# Patient Record
Sex: MALE | Race: WHITE | Employment: OTHER | URBAN - METROPOLITAN AREA
[De-identification: names, ages, dates, MRNs, and addresses within clinical notes are randomized per-mention and may not be internally consistent; named-entity substitution may affect disease eponyms.]

---

## 2019-05-04 DIAGNOSIS — E78.5 HYPERLIPIDEMIA, UNSPECIFIED HYPERLIPIDEMIA TYPE: ICD-10-CM

## 2019-05-04 DIAGNOSIS — H91.90 DECREASED HEARING, UNSPECIFIED LATERALITY: ICD-10-CM

## 2019-05-04 DIAGNOSIS — R35.1 NOCTURIA: ICD-10-CM

## 2019-05-04 DIAGNOSIS — M10.9 GOUT, UNSPECIFIED CAUSE, UNSPECIFIED CHRONICITY, UNSPECIFIED SITE: ICD-10-CM

## 2019-05-04 DIAGNOSIS — G25.81 RESTLESS LEG: ICD-10-CM

## 2019-05-04 PROBLEM — F32.A DEPRESSION: Status: ACTIVE | Noted: 2019-05-04

## 2019-05-04 PROBLEM — M19.90 OSTEOARTHRITIS: Status: ACTIVE | Noted: 2019-05-04

## 2019-05-04 RX ORDER — ESCITALOPRAM OXALATE 10 MG/1
10 TABLET ORAL DAILY
COMMUNITY
End: 2019-05-31 | Stop reason: SDUPTHER

## 2019-05-04 RX ORDER — GEMFIBROZIL 600 MG/1
600 TABLET, FILM COATED ORAL
COMMUNITY
End: 2019-05-31 | Stop reason: SDUPTHER

## 2019-05-04 RX ORDER — FINASTERIDE 5 MG/1
5 TABLET, FILM COATED ORAL DAILY
COMMUNITY
End: 2019-05-31 | Stop reason: SDUPTHER

## 2019-05-04 RX ORDER — ALLOPURINOL 300 MG/1
300 TABLET ORAL DAILY
COMMUNITY
End: 2019-05-31 | Stop reason: SDUPTHER

## 2019-05-04 RX ORDER — VENLAFAXINE HYDROCHLORIDE 150 MG/1
150 CAPSULE, EXTENDED RELEASE ORAL EVERY MORNING
COMMUNITY
End: 2019-05-31 | Stop reason: SDUPTHER

## 2019-05-31 ENCOUNTER — OFFICE VISIT (OUTPATIENT)
Dept: FAMILY MEDICINE CLINIC | Age: 79
End: 2019-05-31
Payer: MEDICARE

## 2019-05-31 VITALS
DIASTOLIC BLOOD PRESSURE: 90 MMHG | BODY MASS INDEX: 34.07 KG/M2 | HEART RATE: 84 BPM | OXYGEN SATURATION: 96 % | WEIGHT: 243.4 LBS | SYSTOLIC BLOOD PRESSURE: 140 MMHG | HEIGHT: 71 IN

## 2019-05-31 DIAGNOSIS — R53.83 FATIGUE, UNSPECIFIED TYPE: ICD-10-CM

## 2019-05-31 DIAGNOSIS — F33.9 EPISODE OF RECURRENT MAJOR DEPRESSIVE DISORDER, UNSPECIFIED DEPRESSION EPISODE SEVERITY (HCC): ICD-10-CM

## 2019-05-31 DIAGNOSIS — R06.02 SHORTNESS OF BREATH: ICD-10-CM

## 2019-05-31 DIAGNOSIS — M25.531 BILATERAL WRIST PAIN: ICD-10-CM

## 2019-05-31 DIAGNOSIS — M25.532 BILATERAL WRIST PAIN: ICD-10-CM

## 2019-05-31 DIAGNOSIS — R35.1 NOCTURIA: ICD-10-CM

## 2019-05-31 DIAGNOSIS — E78.5 HYPERLIPIDEMIA, UNSPECIFIED HYPERLIPIDEMIA TYPE: Primary | ICD-10-CM

## 2019-05-31 DIAGNOSIS — M10.9 GOUT, UNSPECIFIED CAUSE, UNSPECIFIED CHRONICITY, UNSPECIFIED SITE: ICD-10-CM

## 2019-05-31 DIAGNOSIS — R07.9 CHEST PAIN, UNSPECIFIED TYPE: ICD-10-CM

## 2019-05-31 PROBLEM — H26.492 LEFT POSTERIOR CAPSULAR OPACIFICATION: Status: ACTIVE | Noted: 2017-01-18

## 2019-05-31 PROBLEM — M17.12 OSTEOARTHRITIS OF LEFT KNEE: Status: ACTIVE | Noted: 2019-02-27

## 2019-05-31 PROCEDURE — G8427 DOCREV CUR MEDS BY ELIG CLIN: HCPCS | Performed by: FAMILY MEDICINE

## 2019-05-31 PROCEDURE — 1123F ACP DISCUSS/DSCN MKR DOCD: CPT | Performed by: FAMILY MEDICINE

## 2019-05-31 PROCEDURE — 99214 OFFICE O/P EST MOD 30 MIN: CPT | Performed by: FAMILY MEDICINE

## 2019-05-31 PROCEDURE — 93000 ELECTROCARDIOGRAM COMPLETE: CPT | Performed by: FAMILY MEDICINE

## 2019-05-31 PROCEDURE — 4040F PNEUMOC VAC/ADMIN/RCVD: CPT | Performed by: FAMILY MEDICINE

## 2019-05-31 PROCEDURE — 1036F TOBACCO NON-USER: CPT | Performed by: FAMILY MEDICINE

## 2019-05-31 PROCEDURE — G8417 CALC BMI ABV UP PARAM F/U: HCPCS | Performed by: FAMILY MEDICINE

## 2019-05-31 RX ORDER — FINASTERIDE 5 MG/1
5 TABLET, FILM COATED ORAL DAILY
Qty: 90 TABLET | Refills: 1 | Status: ON HOLD | OUTPATIENT
Start: 2019-05-31 | End: 2019-07-12 | Stop reason: SDUPTHER

## 2019-05-31 RX ORDER — GEMFIBROZIL 600 MG/1
600 TABLET, FILM COATED ORAL
Qty: 180 TABLET | Refills: 1 | Status: SHIPPED | OUTPATIENT
Start: 2019-05-31 | End: 2019-07-31 | Stop reason: SDUPTHER

## 2019-05-31 RX ORDER — ALLOPURINOL 300 MG/1
300 TABLET ORAL DAILY
Qty: 90 TABLET | Refills: 1 | Status: SHIPPED | OUTPATIENT
Start: 2019-05-31 | End: 2019-10-09 | Stop reason: SDUPTHER

## 2019-05-31 RX ORDER — VENLAFAXINE HYDROCHLORIDE 150 MG/1
150 CAPSULE, EXTENDED RELEASE ORAL EVERY MORNING
Qty: 90 CAPSULE | Refills: 1 | Status: ON HOLD | OUTPATIENT
Start: 2019-05-31 | End: 2019-07-12 | Stop reason: SDUPTHER

## 2019-05-31 RX ORDER — ESCITALOPRAM OXALATE 10 MG/1
10 TABLET ORAL DAILY
Qty: 90 TABLET | Refills: 1 | Status: ON HOLD | OUTPATIENT
Start: 2019-05-31 | End: 2019-07-12 | Stop reason: SDUPTHER

## 2019-05-31 ASSESSMENT — ENCOUNTER SYMPTOMS: SHORTNESS OF BREATH: 1

## 2019-05-31 ASSESSMENT — PATIENT HEALTH QUESTIONNAIRE - PHQ9
1. LITTLE INTEREST OR PLEASURE IN DOING THINGS: 0
SUM OF ALL RESPONSES TO PHQ QUESTIONS 1-9: 0
SUM OF ALL RESPONSES TO PHQ9 QUESTIONS 1 & 2: 0
SUM OF ALL RESPONSES TO PHQ QUESTIONS 1-9: 0
2. FEELING DOWN, DEPRESSED OR HOPELESS: 0

## 2019-05-31 NOTE — PROGRESS NOTES
L knee pain d/t recent total knee replacement. Neurological: Positive for dizziness (occasionally - with working under a car. ). Negative for headaches.        PAST MEDICAL HISTORY  Past Medical History:   Diagnosis Date    Decreased hearing 2019    Gout 2019    Hyperlipidemia 2019    Nocturia 2019    Osteoarthritis 2019    Restless leg 2019       FAMILY HISTORY  Family History   Problem Relation Age of Onset    Cancer Mother        SOCIAL HISTORY  Social History     Socioeconomic History    Marital status:      Spouse name: None    Number of children: None    Years of education: None    Highest education level: None   Occupational History    None   Social Needs    Financial resource strain: None    Food insecurity:     Worry: None     Inability: None    Transportation needs:     Medical: None     Non-medical: None   Tobacco Use    Smoking status: Former Smoker     Packs/day: 2.00     Years: 25.00     Pack years: 50.00     Types: Cigarettes     Start date: 1960     Last attempt to quit:      Years since quittin.4    Smokeless tobacco: Never Used   Substance and Sexual Activity    Alcohol use: None    Drug use: None    Sexual activity: None   Lifestyle    Physical activity:     Days per week: None     Minutes per session: None    Stress: None   Relationships    Social connections:     Talks on phone: None     Gets together: None     Attends Jainism service: None     Active member of club or organization: None     Attends meetings of clubs or organizations: None     Relationship status: None    Intimate partner violence:     Fear of current or ex partner: None     Emotionally abused: None     Physically abused: None     Forced sexual activity: None   Other Topics Concern    None   Social History Narrative    None        SURGICAL HISTORY  Past Surgical History:   Procedure Laterality Date    BLEPHAROPLASTY  2010    CHOLECYSTECTOMY      COLECTOMY  2000    EYE SURGERY Left 2011    EYE SURGERY Right 2011    JOINT REPLACEMENT      KNEE ARTHROSCOPY  2002       CURRENT MEDICATIONS  Current Outpatient Medications   Medication Sig Dispense Refill    escitalopram (LEXAPRO) 10 MG tablet Take 1 tablet by mouth daily 90 tablet 1    allopurinol (ZYLOPRIM) 300 MG tablet Take 1 tablet by mouth daily 90 tablet 1    venlafaxine (EFFEXOR XR) 150 MG extended release capsule Take 1 capsule by mouth every morning 90 capsule 1    gemfibrozil (LOPID) 600 MG tablet Take 1 tablet by mouth 2 times daily (before meals) 180 tablet 1    finasteride (PROSCAR) 5 MG tablet Take 1 tablet by mouth daily 90 tablet 1    Multiple Vitamins-Minerals (PRESERVISION AREDS PO) Take by mouth 2 caps by mouth twice a day. No current facility-administered medications for this visit. ALLERGIES  No Known Allergies    PHYSICAL EXAM    Physical Exam   Constitutional: He is oriented to person, place, and time. He appears well-developed and well-nourished. No distress. HENT:   Head: Normocephalic and atraumatic. Cardiovascular: Normal rate, regular rhythm and normal heart sounds. Pulmonary/Chest: Effort normal and breath sounds normal.   Patient is speaking in full sentences. Lungs CTA bilaterally. Musculoskeletal: Normal range of motion. He exhibits no tenderness (calf pain ). Neurological: He is alert and oriented to person, place, and time. Skin: Skin is warm and dry. He is not diaphoretic. Psychiatric: He has a normal mood and affect. Thought content normal.   Nursing note and vitals reviewed. ASSESSMENT & PLAN    1. Hyperlipidemia, unspecified hyperlipidemia type  Continue taking medications as prescribed. Refills will be provided as necessary. Will complete fasting lab work, and patient will be updated on those results once completed. - gemfibrozil (LOPID) 600 MG tablet;  Take 1 tablet by mouth 2 times daily (before meals)  Dispense: 180 tablet; Metabolic Panel; Future  - TSH without Reflex; Future  - T4, Free; Future  - Vitamin B12; Future  - Folate; Future    8. Bilateral wrist pain  Was instructed to use hand splints bilaterally to help with the pain as this seems to be c/w carpal tunnel. He is to let us know should the wrist splints did not help. Patient verbalized understanding of and agreement with current POC for the assessment and plan dictated above. Electronically signed by HUANG Lindsey on 5/31/2019      Please note that this chart was generated using dragon dictation software. Although every effort was made to ensure the accuracy of this automated transcription, some errors in transcription may have occurred.

## 2019-06-04 ENCOUNTER — TELEPHONE (OUTPATIENT)
Dept: FAMILY MEDICINE CLINIC | Age: 79
End: 2019-06-04

## 2019-06-04 NOTE — TELEPHONE ENCOUNTER
----- Message from Tierney Sampson sent at 6/4/2019 11:40 AM EDT -----  Contact: PATIENT   TO BHARAT:    PATIENT IS TRYING TO FIND OUT ABOUT HIS REFERRAL TO CARDIOLOGIST AND LEON SAID REFERRAL IS PENDING.

## 2019-06-04 NOTE — TELEPHONE ENCOUNTER
Spoke with pt 1205 pm regard refer to cardiologist genia Rain [place] [facility] please call [facility] Cardiology at [phone] to check on the status of his refer. pt voiced understanding.   Electronically signed by Akshat Waller LPN on 0/9/3723 at 49:84 PM

## 2019-06-04 NOTE — TELEPHONE ENCOUNTER
Please have the pt contact their office to see if they have him scheduled yet. Thanks, [address] [place] [address] Rd Cardiology  [address]  [employer]  K[place]n W[place], [address] [facility],Third Floor  [phone]

## 2019-06-05 ENCOUNTER — TELEPHONE (OUTPATIENT)
Dept: FAMILY MEDICINE CLINIC | Age: 79
End: 2019-06-05

## 2019-06-05 NOTE — TELEPHONE ENCOUNTER
SPOKE WITH PT  PM REGARD. BW RESULTS PER OVI CARTER PAC AND CONTINUE TAKING MEDICATIONS AS PRESCRIBED.  PT VOICED UNDERSTANDING  Electronically signed by Lorri Diaz LPN on 1/7/5341 at 9:48 PM

## 2019-06-14 ENCOUNTER — NURSE ONLY (OUTPATIENT)
Dept: CARDIOLOGY CLINIC | Age: 79
End: 2019-06-14
Payer: MEDICARE

## 2019-06-14 ENCOUNTER — INITIAL CONSULT (OUTPATIENT)
Dept: CARDIOLOGY CLINIC | Age: 79
End: 2019-06-14
Payer: MEDICARE

## 2019-06-14 VITALS
HEART RATE: 90 BPM | WEIGHT: 243 LBS | BODY MASS INDEX: 34.37 KG/M2 | DIASTOLIC BLOOD PRESSURE: 80 MMHG | SYSTOLIC BLOOD PRESSURE: 138 MMHG

## 2019-06-14 DIAGNOSIS — R00.2 HEART PALPITATIONS: ICD-10-CM

## 2019-06-14 DIAGNOSIS — R07.9 CHEST PAIN, UNSPECIFIED TYPE: Primary | ICD-10-CM

## 2019-06-14 DIAGNOSIS — R06.02 SOB (SHORTNESS OF BREATH): ICD-10-CM

## 2019-06-14 DIAGNOSIS — R07.2 PRECORDIAL PAIN: ICD-10-CM

## 2019-06-14 PROCEDURE — 0296T PR EXT ECG > 48HR TO 21 DAY RCRD W/CONECT INTL RCRD: CPT | Performed by: INTERNAL MEDICINE

## 2019-06-14 PROCEDURE — 93000 ELECTROCARDIOGRAM COMPLETE: CPT | Performed by: INTERNAL MEDICINE

## 2019-06-14 PROCEDURE — 99204 OFFICE O/P NEW MOD 45 MIN: CPT | Performed by: INTERNAL MEDICINE

## 2019-06-14 PROCEDURE — G8427 DOCREV CUR MEDS BY ELIG CLIN: HCPCS | Performed by: INTERNAL MEDICINE

## 2019-06-14 PROCEDURE — G8417 CALC BMI ABV UP PARAM F/U: HCPCS | Performed by: INTERNAL MEDICINE

## 2019-06-14 NOTE — PROGRESS NOTES
Applied Zio monitor for 2 days. PF#[id number]. Instructed patient to avoid showering in the first 24 hours. Press the button on the monitor when you feel a symptom and write it in your diary. Do not submerge in water. No lotion or power near the patch. Please return the monitor in the box provided. Patient verbalized understanding.

## 2019-06-14 NOTE — ASSESSMENT & PLAN NOTE
Ongoing for a year, describes as pressure felicita matthews he is walking up a hill after he gets his mail .  Descried as pressure , it does not radiate but  he does feel pain in left shoulder

## 2019-06-14 NOTE — ASSESSMENT & PLAN NOTE
HE is ytired all the time especially after a busy day. EKG shows frequent  PVC, HE snores at night .  HE has less energy , check tsh , place holter , check echo

## 2019-06-14 NOTE — PROGRESS NOTES
CARDIOLOGY CONSULT  NOTE    Chief Complaint: chest Pain / SOB     HPI:   Ana Alaniz is a 70y.o. year old who has Past medical history as noted below. HE comes in for evaluation due to worsening shortness of breath with exertion and chest pressure when he is walking up a hill or drive way. HE says the pressure in chest gets better with rest , itr does not radiate but once in a while he feels left arm pressure and tingling. HE also notices tingling and palpations. EKG shows frequent PVC ( every 5th beat)  Father had MI in his 52's . HE is retired from [employer] . Some times her gets very tired and fatigued with no energy after strenuous work   Snores at night       Current Outpatient Medications   Medication Sig Dispense Refill    metoprolol tartrate (LOPRESSOR) 25 MG tablet Take 1 tablet by mouth 2 times daily 60 tablet 5    escitalopram (LEXAPRO) 10 MG tablet Take 1 tablet by mouth daily 90 tablet 1    allopurinol (ZYLOPRIM) 300 MG tablet Take 1 tablet by mouth daily 90 tablet 1    venlafaxine (EFFEXOR XR) 150 MG extended release capsule Take 1 capsule by mouth every morning 90 capsule 1    gemfibrozil (LOPID) 600 MG tablet Take 1 tablet by mouth 2 times daily (before meals) 180 tablet 1    finasteride (PROSCAR) 5 MG tablet Take 1 tablet by mouth daily 90 tablet 1    Multiple Vitamins-Minerals (PRESERVISION AREDS PO) Take by mouth 2 caps by mouth twice a day. No current facility-administered medications for this visit. Allergies:   Patient has no known allergies.     Patient History:  Past Medical History:   Diagnosis Date    Decreased hearing 5/4/2019    Gout 5/4/2019    Hyperlipidemia 5/4/2019    Nocturia 5/4/2019    Osteoarthritis 5/4/2019    Restless leg 5/4/2019     Past Surgical History:   Procedure Laterality Date    BLEPHAROPLASTY  2010    CHOLECYSTECTOMY  2001    COLECTOMY  2000    EYE SURGERY Left 2011    EYE SURGERY Right 2011    JOINT REPLACEMENT      KNEE ARTHROSCOPY  2002     Family History   Problem Relation Age of Onset    Cancer Mother      Social History     Tobacco Use    Smoking status: Former Smoker     Packs/day: 2.00     Years: 25.00     Pack years: 50.00     Types: Cigarettes     Start date: 1960     Last attempt to quit:      Years since quittin.4    Smokeless tobacco: Never Used   Substance Use Topics    Alcohol use: Not on file        Review of Systems:   · Constitutional: No Fever or Weight Loss   · Eyes: No Decreased Vision  · ENT: No Headaches, Hearing Loss or Vertigo  · Cardiovascular: as per note above   · Respiratory: No cough or wheezing and as per note above. · Gastrointestinal: No abdominal pain, appetite loss, blood in stools, constipation, diarrhea or heartburn  · Genitourinary: No dysuria, trouble voiding, or hematuria  · Musculoskeletal:  denies any new  joint aches , swelling  or pain   · Integumentary: No rash or pruritis  · Neurological: No TIA or stroke symptoms  · Psychiatric: No anxiety or depression  · Endocrine: No malaise, fatigue or temperature intolerance  · Hematologic/Lymphatic: No bleeding problems, blood clots or swollen lymph nodes  · Allergic/Immunologic: No nasal congestion or hives    Objective:      Physical Exam:  /80   Pulse 90   Wt 243 lb (110.2 kg)   BMI 34.37 kg/m²   Wt Readings from Last 3 Encounters:   19 243 lb (110.2 kg)   19 243 lb 6.4 oz (110.4 kg)     Body mass index is 34.37 kg/m². Vitals:    19 0844   BP: 138/80   Pulse: 90        General Appearance:  No distress, conversant  Constitutional:  Well developed, Well nourished, No acute distress, Non-toxic appearance. HENT:  Normocephalic, Atraumatic, Bilateral external ears normal, Oropharynx moist, No oral exudates, Nose normal. Neck- Normal range of motion, No tenderness, Supple, No stridor,no apical-carotid delay  Eyes:  PERRL, EOMI, Conjunctiva normal, No discharge.    Respiratory: Normal breath sounds, No respiratory distress, No wheezing, No chest tenderness. ,no use of accessory muscles, NO crackles  Cardiovascular: (PMI) apex non displaced,no lifts no thrills,S1 and S2 audible, No added heart sounds, No signs of ankle edema, or volume overload, No evidence of JVD, No crackles  GI:  Bowel sounds normal, Soft, No tenderness, No masses, No gross visceromegaly   :  No costovertebral angle tenderness   Musculoskeletal:  No edema, no tenderness, no deformities. Back- no tenderness  Integument:  Well hydrated, no rash   Lymphatic:  No lymphadenopathy noted   Neurologic:  Alert & oriented x 3, CN 2-12 normal, normal motor function, normal sensory function, no focal deficits noted   Psychiatric:  Speech and behavior appropriate       Medical decision making and Data review:  DATA:  No results found for: TROPONINT  BNP:  No results found for: PROBNP  PT/INR:  No results found for: PTINR  No results found for: LABA1C  No results found for: CHOL, TRIG, HDL, LDLCALC, LDLDIRECT  Lab Results   Component Value Date    ALT 21 01/17/2012    AST 23 01/17/2012     TSH: No results found for: TSH  Lab Results   Component Value Date    AST 23 01/17/2012    ALT 21 01/17/2012    BILIDIR 0.2 01/17/2012    BILITOT 0.4 01/17/2012    ALKPHOS 73 01/17/2012     No results found for: PROBNP  No results found for: LABA1C  No results found for: WBC, HGB, HCT, PLT  All labs, medications and tests reviewed by myself including data and history from outside source , patient and available family . Assessment & Plan:      1. Chest pain, unspecified type    2. Heart palpitations    3. Precordial pain    4. SOB (shortness of breath)         SOB (shortness of breath)  Notices with exertion , no ankle swelling, check bnp    Precordial pain  Ongoing for a year, describes as pressure especiall ywhen he is walking up a hill after he gets his mail .  Descried as pressure , it does not radiate but  he does feel pain in left shoulder very concerning for angina, start metoprolol , and get cardiolite     Heart palpitations  HE is ytired all the time especially after a busy day. EKG shows frequent  PVC, HE snores at night . HE has less energy , check tsh , place holter , check echo     Dyslipidemia :  All available lab work was reviewed. Patient was advised to repeat lab work before next visit. Necessary orders were placed , instructions given by myself       Counseled extensively and medication compliance urged. We discussed that for the  prevention of ASCVD our  goal is aggressive risk modification. Patient is encouraged to exercise even a brisk walk for 30 minutes  at least 3 to 4 times a week   Various goals were discussed and questions answered. Continue current medications. Appropriate prescriptions are addressed and refills ordered. Questions answered and patient verbalizes understanding. Call for any problems, questions, or concerns. Continue all other medications of all above medical condition listed as is. Return in about 1 month (around 7/14/2019). Please note this report has been partially produced using speech recognition software and may contain errors related to that system including errors in grammar, punctuation, and spelling, as well as words and phrases that may be inappropriate. If there are any questions or concerns please feel free to contact the dictating provider for clarification.

## 2019-06-21 ENCOUNTER — TELEPHONE (OUTPATIENT)
Dept: CARDIOLOGY CLINIC | Age: 79
End: 2019-06-21

## 2019-06-21 NOTE — TELEPHONE ENCOUNTER
Patient just started Metoprolol he has a rash on his foot that is causing a stabbing pain , please call wants to discontinue

## 2019-06-24 ENCOUNTER — PROCEDURE VISIT (OUTPATIENT)
Dept: CARDIOLOGY CLINIC | Age: 79
End: 2019-06-24
Payer: MEDICARE

## 2019-06-24 DIAGNOSIS — R00.2 HEART PALPITATIONS: ICD-10-CM

## 2019-06-24 DIAGNOSIS — R06.02 SOB (SHORTNESS OF BREATH): ICD-10-CM

## 2019-06-24 DIAGNOSIS — R07.9 CHEST PAIN, UNSPECIFIED TYPE: ICD-10-CM

## 2019-06-24 DIAGNOSIS — R07.2 PRECORDIAL PAIN: ICD-10-CM

## 2019-06-24 LAB
LV EF: 41 %
LVEF MODALITY: NORMAL

## 2019-06-24 PROCEDURE — 0298T PR EXT ECG > 48HR TO 21 DAY REVIEW AND INTERPRETATN: CPT | Performed by: INTERNAL MEDICINE

## 2019-06-24 PROCEDURE — 78452 HT MUSCLE IMAGE SPECT MULT: CPT | Performed by: INTERNAL MEDICINE

## 2019-06-24 PROCEDURE — 93015 CV STRESS TEST SUPVJ I&R: CPT | Performed by: INTERNAL MEDICINE

## 2019-06-24 PROCEDURE — A9500 TC99M SESTAMIBI: HCPCS | Performed by: INTERNAL MEDICINE

## 2019-06-25 ENCOUNTER — TELEPHONE (OUTPATIENT)
Dept: CARDIOLOGY CLINIC | Age: 79
End: 2019-06-25

## 2019-06-25 NOTE — TELEPHONE ENCOUNTER
Abnormal 2 day monitor , with wide complex runs  Patient had a min HR of 53 bpm, max HR of 164 bpm, and avg HR of 85  bpm. Predominant underlying rhythm was Sinus Rhythm. 8 Ventricular  Tachycardia runs occurred, the run with the fastest interval  lasting 4 beats with a max rate of 164 bpm, the longest lasting 5  beats with an avg rate of  130 bpm. Isolated SVEs were rare (<1.0%), SVE Couplets were rare  (<1.0%), and SVE Triplets were rare (<1.0%). Electronically signed by Dr. Loco Humphrey 06/24/19 05:52 PM (CT)    Left msg on v/m with results.

## 2019-06-25 NOTE — TELEPHONE ENCOUNTER
Pt will come in 7-3-19 for ABN NM.       Summary    Supervising physician Dr. Robin Loredo .   [place] portion of stress test is negative for ischemia by diagnostic criteria.    Completed 4.6 METS and 4 Mins of exercise    Global hypokinesis with EF of 41 %    Mild ischemia of lateral wall involving small to medium size of left    ventricle    Abnormal stress test

## 2019-06-27 ENCOUNTER — PROCEDURE VISIT (OUTPATIENT)
Dept: CARDIOLOGY CLINIC | Age: 79
End: 2019-06-27
Payer: MEDICARE

## 2019-06-27 DIAGNOSIS — R06.02 SOB (SHORTNESS OF BREATH): Primary | ICD-10-CM

## 2019-06-27 DIAGNOSIS — R07.9 CHEST PAIN, UNSPECIFIED TYPE: ICD-10-CM

## 2019-06-27 DIAGNOSIS — R07.2 PRECORDIAL PAIN: ICD-10-CM

## 2019-06-27 DIAGNOSIS — R00.2 HEART PALPITATIONS: ICD-10-CM

## 2019-06-27 LAB
LV EF: 50 %
LVEF MODALITY: NORMAL

## 2019-06-27 PROCEDURE — 93306 TTE W/DOPPLER COMPLETE: CPT | Performed by: INTERNAL MEDICINE

## 2019-07-02 ENCOUNTER — HOSPITAL ENCOUNTER (INPATIENT)
Age: 79
LOS: 12 days | Discharge: HOME HEALTH | DRG: 330 | End: 2019-07-14
Attending: EMERGENCY MEDICINE | Admitting: INTERNAL MEDICINE
Payer: MEDICARE

## 2019-07-02 ENCOUNTER — APPOINTMENT (OUTPATIENT)
Dept: GENERAL RADIOLOGY | Age: 79
DRG: 330 | End: 2019-07-02
Payer: MEDICARE

## 2019-07-02 DIAGNOSIS — E86.0 DEHYDRATION: ICD-10-CM

## 2019-07-02 DIAGNOSIS — R19.7 BLOODY DIARRHEA: ICD-10-CM

## 2019-07-02 DIAGNOSIS — R58 ABDOMINAL HEMORRHAGE: ICD-10-CM

## 2019-07-02 DIAGNOSIS — R06.02 SHORTNESS OF BREATH: ICD-10-CM

## 2019-07-02 DIAGNOSIS — R53.1 GENERALIZED WEAKNESS: Primary | ICD-10-CM

## 2019-07-02 DIAGNOSIS — R58 BLEEDING: ICD-10-CM

## 2019-07-02 PROBLEM — K92.2 GIB (GASTROINTESTINAL BLEEDING): Status: ACTIVE | Noted: 2019-07-02

## 2019-07-02 LAB
ALBUMIN SERPL-MCNC: 3 GM/DL (ref 3.4–5)
ALP BLD-CCNC: 56 IU/L (ref 40–129)
ALT SERPL-CCNC: 9 U/L (ref 10–40)
ANION GAP SERPL CALCULATED.3IONS-SCNC: 10 MMOL/L (ref 4–16)
APTT: 33.9 SECONDS (ref 21.2–33)
AST SERPL-CCNC: 14 IU/L (ref 15–37)
BASOPHILS ABSOLUTE: 0.1 K/CU MM
BASOPHILS RELATIVE PERCENT: 0.6 % (ref 0–1)
BILIRUB SERPL-MCNC: 0.3 MG/DL (ref 0–1)
BUN BLDV-MCNC: 24 MG/DL (ref 6–23)
CALCIUM SERPL-MCNC: 7.2 MG/DL (ref 8.3–10.6)
CHLORIDE BLD-SCNC: 116 MMOL/L (ref 99–110)
CO2: 18 MMOL/L (ref 21–32)
CREAT SERPL-MCNC: 0.7 MG/DL (ref 0.9–1.3)
DIFFERENTIAL TYPE: ABNORMAL
EOSINOPHILS ABSOLUTE: 0.1 K/CU MM
EOSINOPHILS RELATIVE PERCENT: 0.6 % (ref 0–3)
GFR AFRICAN AMERICAN: >60 ML/MIN/1.73M2
GFR NON-AFRICAN AMERICAN: >60 ML/MIN/1.73M2
GLUCOSE BLD-MCNC: 99 MG/DL (ref 70–99)
HCT VFR BLD CALC: 32.3 % (ref 42–52)
HCT VFR BLD CALC: 33 % (ref 42–52)
HEMOGLOBIN: 10.2 GM/DL (ref 13.5–18)
HEMOGLOBIN: 10.5 GM/DL (ref 13.5–18)
IMMATURE NEUTROPHIL %: 0.2 % (ref 0–0.43)
INR BLD: 1.24 INDEX
LACTATE: 1.6 MMOL/L (ref 0.4–2)
LYMPHOCYTES ABSOLUTE: 0.9 K/CU MM
LYMPHOCYTES RELATIVE PERCENT: 10.6 % (ref 24–44)
MCH RBC QN AUTO: 31.1 PG (ref 27–31)
MCHC RBC AUTO-ENTMCNC: 31.6 % (ref 32–36)
MCV RBC AUTO: 98.5 FL (ref 78–100)
MONOCYTES ABSOLUTE: 0.3 K/CU MM
MONOCYTES RELATIVE PERCENT: 4 % (ref 0–4)
NUCLEATED RBC %: 0 %
PDW BLD-RTO: 13.8 % (ref 11.7–14.9)
PLATELET # BLD: 228 K/CU MM (ref 140–440)
PMV BLD AUTO: 11 FL (ref 7.5–11.1)
POTASSIUM SERPL-SCNC: 4.2 MMOL/L (ref 3.5–5.1)
PRO-BNP: 95.08 PG/ML
PROTHROMBIN TIME: 14.1 SECONDS (ref 9.12–12.5)
RBC # BLD: 3.28 M/CU MM (ref 4.6–6.2)
SEGMENTED NEUTROPHILS ABSOLUTE COUNT: 7 K/CU MM
SEGMENTED NEUTROPHILS RELATIVE PERCENT: 84 % (ref 36–66)
SODIUM BLD-SCNC: 144 MMOL/L (ref 135–145)
TOTAL IMMATURE NEUTOROPHIL: 0.02 K/CU MM
TOTAL NUCLEATED RBC: 0 K/CU MM
TOTAL PROTEIN: 5.2 GM/DL (ref 6.4–8.2)
TROPONIN T: <0.01 NG/ML
WBC # BLD: 8.3 K/CU MM (ref 4–10.5)

## 2019-07-02 PROCEDURE — 86922 COMPATIBILITY TEST ANTIGLOB: CPT

## 2019-07-02 PROCEDURE — 2580000003 HC RX 258: Performed by: INTERNAL MEDICINE

## 2019-07-02 PROCEDURE — 93005 ELECTROCARDIOGRAM TRACING: CPT | Performed by: EMERGENCY MEDICINE

## 2019-07-02 PROCEDURE — 85610 PROTHROMBIN TIME: CPT

## 2019-07-02 PROCEDURE — 2060000000 HC ICU INTERMEDIATE R&B

## 2019-07-02 PROCEDURE — 36415 COLL VENOUS BLD VENIPUNCTURE: CPT

## 2019-07-02 PROCEDURE — 99285 EMERGENCY DEPT VISIT HI MDM: CPT

## 2019-07-02 PROCEDURE — 80053 COMPREHEN METABOLIC PANEL: CPT

## 2019-07-02 PROCEDURE — 83605 ASSAY OF LACTIC ACID: CPT

## 2019-07-02 PROCEDURE — 96361 HYDRATE IV INFUSION ADD-ON: CPT

## 2019-07-02 PROCEDURE — 87324 CLOSTRIDIUM AG IA: CPT

## 2019-07-02 PROCEDURE — 96366 THER/PROPH/DIAG IV INF ADDON: CPT

## 2019-07-02 PROCEDURE — 84484 ASSAY OF TROPONIN QUANT: CPT

## 2019-07-02 PROCEDURE — 2580000003 HC RX 258: Performed by: EMERGENCY MEDICINE

## 2019-07-02 PROCEDURE — 85025 COMPLETE CBC W/AUTO DIFF WBC: CPT

## 2019-07-02 PROCEDURE — 86900 BLOOD TYPING SEROLOGIC ABO: CPT

## 2019-07-02 PROCEDURE — 85730 THROMBOPLASTIN TIME PARTIAL: CPT

## 2019-07-02 PROCEDURE — 87507 IADNA-DNA/RNA PROBE TQ 12-25: CPT

## 2019-07-02 PROCEDURE — 71045 X-RAY EXAM CHEST 1 VIEW: CPT

## 2019-07-02 PROCEDURE — 6360000002 HC RX W HCPCS: Performed by: EMERGENCY MEDICINE

## 2019-07-02 PROCEDURE — 96365 THER/PROPH/DIAG IV INF INIT: CPT

## 2019-07-02 PROCEDURE — 86901 BLOOD TYPING SEROLOGIC RH(D): CPT

## 2019-07-02 PROCEDURE — P9016 RBC LEUKOCYTES REDUCED: HCPCS

## 2019-07-02 PROCEDURE — 85018 HEMOGLOBIN: CPT

## 2019-07-02 PROCEDURE — 86850 RBC ANTIBODY SCREEN: CPT

## 2019-07-02 PROCEDURE — 85014 HEMATOCRIT: CPT

## 2019-07-02 PROCEDURE — 93010 ELECTROCARDIOGRAM REPORT: CPT | Performed by: INTERNAL MEDICINE

## 2019-07-02 PROCEDURE — 83880 ASSAY OF NATRIURETIC PEPTIDE: CPT

## 2019-07-02 RX ORDER — VENLAFAXINE HYDROCHLORIDE 37.5 MG/1
150 CAPSULE, EXTENDED RELEASE ORAL EVERY MORNING
Status: DISCONTINUED | OUTPATIENT
Start: 2019-07-03 | End: 2019-07-14 | Stop reason: HOSPADM

## 2019-07-02 RX ORDER — FINASTERIDE 5 MG/1
5 TABLET, FILM COATED ORAL DAILY
Status: DISCONTINUED | OUTPATIENT
Start: 2019-07-03 | End: 2019-07-14 | Stop reason: HOSPADM

## 2019-07-02 RX ORDER — CLONIDINE HYDROCHLORIDE 0.1 MG/1
0.1 TABLET ORAL 4 TIMES DAILY PRN
Status: DISCONTINUED | OUTPATIENT
Start: 2019-07-02 | End: 2019-07-03

## 2019-07-02 RX ORDER — 0.9 % SODIUM CHLORIDE 0.9 %
500 INTRAVENOUS SOLUTION INTRAVENOUS ONCE
Status: COMPLETED | OUTPATIENT
Start: 2019-07-02 | End: 2019-07-02

## 2019-07-02 RX ORDER — SODIUM CHLORIDE 9 MG/ML
INJECTION, SOLUTION INTRAVENOUS CONTINUOUS
Status: DISCONTINUED | OUTPATIENT
Start: 2019-07-02 | End: 2019-07-04

## 2019-07-02 RX ORDER — ACETAMINOPHEN 325 MG/1
650 TABLET ORAL EVERY 4 HOURS PRN
Status: DISCONTINUED | OUTPATIENT
Start: 2019-07-02 | End: 2019-07-14 | Stop reason: HOSPADM

## 2019-07-02 RX ORDER — ESCITALOPRAM OXALATE 10 MG/1
10 TABLET ORAL DAILY
Status: DISCONTINUED | OUTPATIENT
Start: 2019-07-03 | End: 2019-07-14 | Stop reason: HOSPADM

## 2019-07-02 RX ORDER — ALLOPURINOL 100 MG/1
300 TABLET ORAL DAILY
Status: DISCONTINUED | OUTPATIENT
Start: 2019-07-03 | End: 2019-07-14 | Stop reason: HOSPADM

## 2019-07-02 RX ADMIN — SODIUM CHLORIDE: 9 INJECTION, SOLUTION INTRAVENOUS at 22:37

## 2019-07-02 RX ADMIN — CALCIUM GLUCONATE 1 G: 98 INJECTION, SOLUTION INTRAVENOUS at 17:52

## 2019-07-02 RX ADMIN — SODIUM CHLORIDE 500 ML: 9 INJECTION, SOLUTION INTRAVENOUS at 16:40

## 2019-07-02 ASSESSMENT — PAIN SCALES - GENERAL
PAINLEVEL_OUTOF10: 0
PAINLEVEL_OUTOF10: 0

## 2019-07-02 NOTE — ED NOTES
Bed: ED-17  Expected date:   Expected time:   Means of arrival:   Comments:  brad Collins RN  07/02/19 [id number]

## 2019-07-02 NOTE — H&P
None     Inability: None    Transportation needs:     Medical: None     Non-medical: None   Tobacco Use    Smoking status: Former Smoker     Packs/day: 2.00     Years: 25.00     Pack years: 50.00     Types: Cigarettes     Start date: 1960     Last attempt to quit: 1985     Years since quittin.5    Smokeless tobacco: Never Used   Substance and Sexual Activity    Alcohol use: None    Drug use: None    Sexual activity: None   Lifestyle    Physical activity:     Days per week: None     Minutes per session: None    Stress: None   Relationships    Social connections:     Talks on phone: None     Gets together: None     Attends Christian service: None     Active member of club or organization: None     Attends meetings of clubs or organizations: None     Relationship status: None    Intimate partner violence:     Fear of current or ex partner: None     Emotionally abused: None     Physically abused: None     Forced sexual activity: None   Other Topics Concern    None   Social History Narrative    None       MEDICATIONS   Medications Prior to Admission  Not in a hospital admission.     Current Medications  Current Facility-Administered Medications   Medication Dose Route Frequency Provider Last Rate Last Dose    calcium gluconate 1 g in dextrose 5 % 100 mL IVPB  1 g Intravenous Once Tobey Nissen,  mL/hr at 19 1752 1 g at 19 1752     Current Outpatient Medications   Medication Sig Dispense Refill    escitalopram (LEXAPRO) 10 MG tablet Take 1 tablet by mouth daily 90 tablet 1    allopurinol (ZYLOPRIM) 300 MG tablet Take 1 tablet by mouth daily 90 tablet 1    venlafaxine (EFFEXOR XR) 150 MG extended release capsule Take 1 capsule by mouth every morning 90 capsule 1    gemfibrozil (LOPID) 600 MG tablet Take 1 tablet by mouth 2 times daily (before meals) 180 tablet 1    finasteride (PROSCAR) 5 MG tablet Take 1 tablet by mouth daily 90 tablet 1    Multiple Vitamins-Minerals Completed Updated: 07/02/19 1559     Narrative:       EXAMINATION:  ONE XRAY VIEW OF THE CHEST    7/2/2019 3:37 pm    COMPARISON:  None. HISTORY:  ORDERING SYSTEM PROVIDED HISTORY: chest pain  TECHNOLOGIST PROVIDED HISTORY:  Reason for exam:->chest pain  Ordering Physician Provided Reason for Exam: chest pain  Acuity: Acute  Type of Exam: Initial  Additional signs and symptoms: na  Relevant Medical/Surgical History: na    FINDINGS:  The lungs are without acute focal process.  There is no effusion or  pneumothorax. The cardiomediastinal silhouette is without acute process.  The  osseous structures are without acute process.     Impression:       No acute process.         EKG personally reviewed with rate 74, NSR      Relevant labs and imaging reviewed    ASSESSMENT AND PLAN       Acute lower GIB - red blood DE  Does not appear to be bleeding in the ED but reportedly had bleeding all morning today  - clears  - IVF  - trend H&H q6  - check lactate  - GI eval   - close monitoring    SOB, OTERO, decreased exercise tolerance  - consult card - known to Dr Nayely Song and completed outpatient stress and TTE  - tele, close observation    Depression  BPH  HLD    SCD ppx    Case d/w ED physician    [address] [facility], Internal Medicine  7/2/2019 at 5:53 PM

## 2019-07-03 ENCOUNTER — APPOINTMENT (OUTPATIENT)
Dept: CT IMAGING | Age: 79
DRG: 330 | End: 2019-07-03
Payer: MEDICARE

## 2019-07-03 LAB
ADENOVIRUS F 40 41 PCR: NOT DETECTED
ANION GAP SERPL CALCULATED.3IONS-SCNC: 9 MMOL/L (ref 4–16)
ASTROVIRUS PCR: NOT DETECTED
BASOPHILS ABSOLUTE: 0.1 K/CU MM
BASOPHILS RELATIVE PERCENT: 0.5 % (ref 0–1)
BUN BLDV-MCNC: 35 MG/DL (ref 6–23)
CALCIUM SERPL-MCNC: 8.5 MG/DL (ref 8.3–10.6)
CAMPYLOBACTER PCR: NOT DETECTED
CHLORIDE BLD-SCNC: 109 MMOL/L (ref 99–110)
CLOSTRIDIUM DIFFICILE, PCR: NORMAL
CLOSTRIDIUM DIFFICILE, PCR: NORMAL
CO2: 25 MMOL/L (ref 21–32)
CREAT SERPL-MCNC: 0.8 MG/DL (ref 0.9–1.3)
CRYPTOSPORIDIUM PCR: NOT DETECTED
CYCLOSPORA CAYETANENSIS PCR: NOT DETECTED
DIFFERENTIAL TYPE: ABNORMAL
E COLI 0157 PCR: NOT DETECTED
E COLI ENTEROAGGREGATIVE PCR: NOT DETECTED
E COLI ENTEROPATHOGENIC PCR: NOT DETECTED
E COLI ENTEROTOXIGENIC PCR: NOT DETECTED
E COLI SHIGA LIKE TOXIN PCR: NOT DETECTED
E COLI SHIGELLA/ENTEROINVASIVE PCR: NOT DETECTED
ENTAMOEBA HISTOLYTICA PCR: NOT DETECTED
EOSINOPHILS ABSOLUTE: 0.2 K/CU MM
EOSINOPHILS RELATIVE PERCENT: 1.7 % (ref 0–3)
GFR AFRICAN AMERICAN: >60 ML/MIN/1.73M2
GFR NON-AFRICAN AMERICAN: >60 ML/MIN/1.73M2
GIARDIA LAMBLIA PCR: NOT DETECTED
GLUCOSE BLD-MCNC: 108 MG/DL (ref 70–99)
HCT VFR BLD CALC: 27.5 % (ref 42–52)
HCT VFR BLD CALC: 28.4 % (ref 42–52)
HEMOGLOBIN: 8.8 GM/DL (ref 13.5–18)
HEMOGLOBIN: 9.1 GM/DL (ref 13.5–18)
IMMATURE NEUTROPHIL %: 0.2 % (ref 0–0.43)
LACTATE: 1.2 MMOL/L (ref 0.4–2)
LYMPHOCYTES ABSOLUTE: 2.1 K/CU MM
LYMPHOCYTES RELATIVE PERCENT: 21 % (ref 24–44)
MCH RBC QN AUTO: 31.1 PG (ref 27–31)
MCHC RBC AUTO-ENTMCNC: 32 % (ref 32–36)
MCV RBC AUTO: 96.9 FL (ref 78–100)
MONOCYTES ABSOLUTE: 0.7 K/CU MM
MONOCYTES RELATIVE PERCENT: 6.7 % (ref 0–4)
NOROVIRUS GI GII PCR: NOT DETECTED
NUCLEATED RBC %: 0 %
PDW BLD-RTO: 13.9 % (ref 11.7–14.9)
PLATELET # BLD: 234 K/CU MM (ref 140–440)
PLESIOMONAS SHIGELLOIDES PCR: NOT DETECTED
PMV BLD AUTO: 10.8 FL (ref 7.5–11.1)
POTASSIUM SERPL-SCNC: 4.7 MMOL/L (ref 3.5–5.1)
RBC # BLD: 2.93 M/CU MM (ref 4.6–6.2)
ROTAVIRUS A PCR: NOT DETECTED
SALMONELLA PCR: NOT DETECTED
SAPOVIRUS PCR: NOT DETECTED
SEGMENTED NEUTROPHILS ABSOLUTE COUNT: 7.1 K/CU MM
SEGMENTED NEUTROPHILS RELATIVE PERCENT: 69.9 % (ref 36–66)
SODIUM BLD-SCNC: 143 MMOL/L (ref 135–145)
TOTAL IMMATURE NEUTOROPHIL: 0.02 K/CU MM
TOTAL NUCLEATED RBC: 0 K/CU MM
VIBRIO CHOLERAE PCR: NOT DETECTED
VIBRIO PCR: NOT DETECTED
WBC # BLD: 10.2 K/CU MM (ref 4–10.5)
YERSINIA ENTEROCOLITICA PCR: NOT DETECTED

## 2019-07-03 PROCEDURE — 83690 ASSAY OF LIPASE: CPT

## 2019-07-03 PROCEDURE — 80048 BASIC METABOLIC PNL TOTAL CA: CPT

## 2019-07-03 PROCEDURE — 85025 COMPLETE CBC W/AUTO DIFF WBC: CPT

## 2019-07-03 PROCEDURE — 80053 COMPREHEN METABOLIC PANEL: CPT

## 2019-07-03 PROCEDURE — 6370000000 HC RX 637 (ALT 250 FOR IP): Performed by: INTERNAL MEDICINE

## 2019-07-03 PROCEDURE — 85018 HEMOGLOBIN: CPT

## 2019-07-03 PROCEDURE — 2060000000 HC ICU INTERMEDIATE R&B

## 2019-07-03 PROCEDURE — 6360000004 HC RX CONTRAST MEDICATION: Performed by: INTERNAL MEDICINE

## 2019-07-03 PROCEDURE — 74177 CT ABD & PELVIS W/CONTRAST: CPT

## 2019-07-03 PROCEDURE — 82150 ASSAY OF AMYLASE: CPT

## 2019-07-03 PROCEDURE — 36415 COLL VENOUS BLD VENIPUNCTURE: CPT

## 2019-07-03 PROCEDURE — 99221 1ST HOSP IP/OBS SF/LOW 40: CPT | Performed by: INTERNAL MEDICINE

## 2019-07-03 PROCEDURE — 6370000000 HC RX 637 (ALT 250 FOR IP): Performed by: SPECIALIST

## 2019-07-03 PROCEDURE — 83605 ASSAY OF LACTIC ACID: CPT

## 2019-07-03 PROCEDURE — 85014 HEMATOCRIT: CPT

## 2019-07-03 PROCEDURE — 2580000003 HC RX 258: Performed by: INTERNAL MEDICINE

## 2019-07-03 RX ORDER — MIDODRINE HYDROCHLORIDE 5 MG/1
10 TABLET ORAL
Status: DISCONTINUED | OUTPATIENT
Start: 2019-07-03 | End: 2019-07-05

## 2019-07-03 RX ORDER — 0.9 % SODIUM CHLORIDE 0.9 %
10 VIAL (ML) INJECTION
Status: COMPLETED | OUTPATIENT
Start: 2019-07-03 | End: 2019-07-03

## 2019-07-03 RX ORDER — 0.9 % SODIUM CHLORIDE 0.9 %
500 INTRAVENOUS SOLUTION INTRAVENOUS ONCE
Status: COMPLETED | OUTPATIENT
Start: 2019-07-03 | End: 2019-07-03

## 2019-07-03 RX ADMIN — IOPAMIDOL 75 ML: 755 INJECTION, SOLUTION INTRAVENOUS at 16:21

## 2019-07-03 RX ADMIN — VENLAFAXINE HYDROCHLORIDE 150 MG: 37.5 CAPSULE, EXTENDED RELEASE ORAL at 08:04

## 2019-07-03 RX ADMIN — SODIUM CHLORIDE: 9 INJECTION, SOLUTION INTRAVENOUS at 16:30

## 2019-07-03 RX ADMIN — POLYETHYLENE GLYCOL 3350, SODIUM SULFATE ANHYDROUS, SODIUM BICARBONATE, SODIUM CHLORIDE, POTASSIUM CHLORIDE 4000 ML: 236; 22.74; 6.74; 5.86; 2.97 POWDER, FOR SOLUTION ORAL at 18:03

## 2019-07-03 RX ADMIN — METOPROLOL TARTRATE 25 MG: 25 TABLET ORAL at 23:38

## 2019-07-03 RX ADMIN — SODIUM CHLORIDE 10 ML: 9 INJECTION, SOLUTION INTRAMUSCULAR; INTRAVENOUS; SUBCUTANEOUS at 16:21

## 2019-07-03 RX ADMIN — ALLOPURINOL 300 MG: 100 TABLET ORAL at 08:04

## 2019-07-03 RX ADMIN — SODIUM CHLORIDE 500 ML: 9 INJECTION, SOLUTION INTRAVENOUS at 08:51

## 2019-07-03 RX ADMIN — FINASTERIDE 5 MG: 5 TABLET, FILM COATED ORAL at 08:04

## 2019-07-03 RX ADMIN — ESCITALOPRAM OXALATE 10 MG: 10 TABLET ORAL at 08:04

## 2019-07-03 RX ADMIN — IOHEXOL 50 ML: 240 INJECTION, SOLUTION INTRATHECAL; INTRAVASCULAR; INTRAVENOUS; ORAL at 16:20

## 2019-07-03 ASSESSMENT — PAIN SCALES - GENERAL: PAINLEVEL_OUTOF10: 0

## 2019-07-03 NOTE — CONSULTS
CARDIOLOGY CONSULT NOTE   Reason for consultation:  Fatigue, shortness of breath, abnormal stress test    Referring physician:  Brigido Haines MD     Primary care physician: Gary Montanez MD      Dear Dr. Jacqueline Swanson  Thanks for the consult. History of present illness:Rohit is a 70 y. o.year old who  presents with fatigue, for shortness of breath which is mild, for few weekls, intermittent, self limiting, not associated with cough or fever, gets worse with activity and better with rest, he had abnormal stress test which is suggestive of lateral wall ischemia, unfortunately, he has rectal bleeding also and GI has been on the case/. Blood pressure, cholesterol, blood glucose and weight are well controlled. Past medical history:    has a past medical history of Decreased hearing, Gout, H/O cardiovascular stress test, H/O echocardiogram, Hyperlipidemia, Nocturia, Osteoarthritis, and Restless leg. Past surgical history:   has a past surgical history that includes colectomy (2000); Knee arthroscopy (2002); joint replacement; Cholecystectomy (2001); Blepharoplasty (2010); Eye surgery (Left, 2011); and Eye surgery (Right, 2011). Social History:   reports that he quit smoking about 34 years ago. His smoking use included cigarettes. He started smoking about 59 years ago. He has a 50.00 pack-year smoking history.  He has never used smokeless tobacco.  Family history:   no family history of CAD, STROKE of DM    No Known Allergies      midodrine (PROAMATINE) tablet 10 mg TID WC   polyethylene glycol (GoLYTELY) solution 4,000 mL Once   iohexol (OMNIPAQUE 240) injection 50 mL ONCE PRN   allopurinol (ZYLOPRIM) tablet 300 mg Daily   escitalopram (LEXAPRO) tablet 10 mg Daily   finasteride (PROSCAR) tablet 5 mg Daily   metoprolol tartrate (LOPRESSOR) tablet 25 mg BID   venlafaxine (EFFEXOR XR) extended release capsule 150 mg QAM   0.9 % sodium chloride infusion Continuous   acetaminophen (TYLENOL) tablet 650 mg Q4H PRN

## 2019-07-03 NOTE — PROGRESS NOTES
Notified Rammary NP of patient having reaction to metoprolol. Patient states he gets rash on top of foot. Waiting response.     Raghav Juarez RN

## 2019-07-03 NOTE — CONSULTS
Will defer hold PT eval for time being -- no significant needs for acute care PT service. Patient moving well in limited intervals with nursing staff, without need for physical assist, with limiting SOB. Recommend continuing nursing mobility in limited intervals to avoid SOB exacerbations. Intend to f/u chart review in few days.     Jake [facility] 7627  2:16 PM 7/3/2019

## 2019-07-03 NOTE — CONSULTS
to  diverticular bleed, however an upper GI bleeding lesion cannot be  entirely ruled out. RECOMMENDATIONS:  1.  I agree with present management with IV fluids. 2.  Monitor the patient's serial H and H and transfuse on p.r.n. basis  to keep hemoglobin above 7 gm percent. 3.  We will follow up on the CTA of the abdomen and pelvis results. 4.  We will proceed with colonoscopy and EGD in a.m. as a part of workup  of the patient's GI bleeding. 5.  Small bowel evaluation later if needed. 6.  The case and plan has been discussed in detail with the patient.         Todd Merida MD    D: 07/03/2019 10:47:17       T: 07/03/2019 11:22:55     [id number]  Job#: [id number]     Doc#: [id number]    CC:  Macie Christie MD

## 2019-07-04 ENCOUNTER — ANESTHESIA (OUTPATIENT)
Dept: ENDOSCOPY | Age: 79
DRG: 330 | End: 2019-07-04
Payer: MEDICARE

## 2019-07-04 ENCOUNTER — ANESTHESIA EVENT (OUTPATIENT)
Dept: ENDOSCOPY | Age: 79
DRG: 330 | End: 2019-07-04
Payer: MEDICARE

## 2019-07-04 VITALS — OXYGEN SATURATION: 100 % | DIASTOLIC BLOOD PRESSURE: 46 MMHG | SYSTOLIC BLOOD PRESSURE: 117 MMHG

## 2019-07-04 LAB
ALBUMIN SERPL-MCNC: 3.4 GM/DL (ref 3.4–5)
ALP BLD-CCNC: 56 IU/L (ref 40–129)
ALT SERPL-CCNC: 10 U/L (ref 10–40)
AMYLASE: 49 U/L (ref 25–115)
ANION GAP SERPL CALCULATED.3IONS-SCNC: 9 MMOL/L (ref 4–16)
AST SERPL-CCNC: 16 IU/L (ref 15–37)
BASOPHILS ABSOLUTE: 0.1 K/CU MM
BASOPHILS RELATIVE PERCENT: 0.6 % (ref 0–1)
BILIRUB SERPL-MCNC: 0.2 MG/DL (ref 0–1)
BUN BLDV-MCNC: 21 MG/DL (ref 6–23)
CALCIUM SERPL-MCNC: 8.3 MG/DL (ref 8.3–10.6)
CHLORIDE BLD-SCNC: 109 MMOL/L (ref 99–110)
CO2: 24 MMOL/L (ref 21–32)
CREAT SERPL-MCNC: 0.7 MG/DL (ref 0.9–1.3)
DIFFERENTIAL TYPE: ABNORMAL
EOSINOPHILS ABSOLUTE: 0.2 K/CU MM
EOSINOPHILS RELATIVE PERCENT: 1.7 % (ref 0–3)
GFR AFRICAN AMERICAN: >60 ML/MIN/1.73M2
GFR NON-AFRICAN AMERICAN: >60 ML/MIN/1.73M2
GLUCOSE BLD-MCNC: 98 MG/DL (ref 70–99)
HCT VFR BLD CALC: 22.2 % (ref 42–52)
HCT VFR BLD CALC: 24.8 % (ref 42–52)
HEMOGLOBIN: 7.2 GM/DL (ref 13.5–18)
HEMOGLOBIN: 8 GM/DL (ref 13.5–18)
IMMATURE NEUTROPHIL %: 0.2 % (ref 0–0.43)
LACTATE: 1.6 MMOL/L (ref 0.4–2)
LIPASE: 23 IU/L (ref 13–60)
LYMPHOCYTES ABSOLUTE: 1.9 K/CU MM
LYMPHOCYTES RELATIVE PERCENT: 22 % (ref 24–44)
MCH RBC QN AUTO: 31 PG (ref 27–31)
MCHC RBC AUTO-ENTMCNC: 32.3 % (ref 32–36)
MCV RBC AUTO: 96.1 FL (ref 78–100)
MONOCYTES ABSOLUTE: 0.6 K/CU MM
MONOCYTES RELATIVE PERCENT: 7.4 % (ref 0–4)
NUCLEATED RBC %: 0 %
PDW BLD-RTO: 14 % (ref 11.7–14.9)
PLATELET # BLD: 212 K/CU MM (ref 140–440)
PMV BLD AUTO: 11.3 FL (ref 7.5–11.1)
POTASSIUM SERPL-SCNC: 3.8 MMOL/L (ref 3.5–5.1)
RBC # BLD: 2.58 M/CU MM (ref 4.6–6.2)
SEGMENTED NEUTROPHILS ABSOLUTE COUNT: 5.9 K/CU MM
SEGMENTED NEUTROPHILS RELATIVE PERCENT: 68.1 % (ref 36–66)
SODIUM BLD-SCNC: 142 MMOL/L (ref 135–145)
TOTAL IMMATURE NEUTOROPHIL: 0.02 K/CU MM
TOTAL NUCLEATED RBC: 0 K/CU MM
TOTAL PROTEIN: 5.4 GM/DL (ref 6.4–8.2)
WBC # BLD: 8.6 K/CU MM (ref 4–10.5)

## 2019-07-04 PROCEDURE — 6370000000 HC RX 637 (ALT 250 FOR IP): Performed by: INTERNAL MEDICINE

## 2019-07-04 PROCEDURE — 2580000003 HC RX 258: Performed by: ANESTHESIOLOGY

## 2019-07-04 PROCEDURE — 6360000002 HC RX W HCPCS: Performed by: ANESTHESIOLOGY

## 2019-07-04 PROCEDURE — 0DJD8ZZ INSPECTION OF LOWER INTESTINAL TRACT, VIA NATURAL OR ARTIFICIAL OPENING ENDOSCOPIC: ICD-10-PCS | Performed by: SPECIALIST

## 2019-07-04 PROCEDURE — 3609009500 HC COLONOSCOPY DIAGNOSTIC OR SCREENING: Performed by: SPECIALIST

## 2019-07-04 PROCEDURE — 2709999900 HC NON-CHARGEABLE SUPPLY: Performed by: SPECIALIST

## 2019-07-04 PROCEDURE — 0DJ08ZZ INSPECTION OF UPPER INTESTINAL TRACT, VIA NATURAL OR ARTIFICIAL OPENING ENDOSCOPIC: ICD-10-PCS | Performed by: SPECIALIST

## 2019-07-04 PROCEDURE — 3700000000 HC ANESTHESIA ATTENDED CARE: Performed by: SPECIALIST

## 2019-07-04 PROCEDURE — 99232 SBSQ HOSP IP/OBS MODERATE 35: CPT | Performed by: INTERNAL MEDICINE

## 2019-07-04 PROCEDURE — 2060000000 HC ICU INTERMEDIATE R&B

## 2019-07-04 PROCEDURE — C9113 INJ PANTOPRAZOLE SODIUM, VIA: HCPCS | Performed by: SPECIALIST

## 2019-07-04 PROCEDURE — 85018 HEMOGLOBIN: CPT

## 2019-07-04 PROCEDURE — 3609012800 HC EGD DIAGNOSTIC ONLY: Performed by: SPECIALIST

## 2019-07-04 PROCEDURE — 6360000002 HC RX W HCPCS: Performed by: SPECIALIST

## 2019-07-04 PROCEDURE — 2580000003 HC RX 258: Performed by: INTERNAL MEDICINE

## 2019-07-04 PROCEDURE — 3700000001 HC ADD 15 MINUTES (ANESTHESIA): Performed by: SPECIALIST

## 2019-07-04 PROCEDURE — 85014 HEMATOCRIT: CPT

## 2019-07-04 RX ORDER — SODIUM CHLORIDE 9 MG/ML
INJECTION, SOLUTION INTRAVENOUS CONTINUOUS PRN
Status: DISCONTINUED | OUTPATIENT
Start: 2019-07-04 | End: 2019-07-04 | Stop reason: SDUPTHER

## 2019-07-04 RX ORDER — PROPOFOL 10 MG/ML
INJECTION, EMULSION INTRAVENOUS PRN
Status: DISCONTINUED | OUTPATIENT
Start: 2019-07-04 | End: 2019-07-04 | Stop reason: SDUPTHER

## 2019-07-04 RX ORDER — PANTOPRAZOLE SODIUM 40 MG/10ML
40 INJECTION, POWDER, LYOPHILIZED, FOR SOLUTION INTRAVENOUS DAILY
Status: DISCONTINUED | OUTPATIENT
Start: 2019-07-04 | End: 2019-07-10

## 2019-07-04 RX ADMIN — METOPROLOL TARTRATE 25 MG: 25 TABLET ORAL at 10:26

## 2019-07-04 RX ADMIN — SODIUM CHLORIDE: 9 INJECTION, SOLUTION INTRAVENOUS at 07:36

## 2019-07-04 RX ADMIN — MIDODRINE HYDROCHLORIDE 10 MG: 5 TABLET ORAL at 13:01

## 2019-07-04 RX ADMIN — PROPOFOL 50 MG: 10 INJECTION, EMULSION INTRAVENOUS at 08:16

## 2019-07-04 RX ADMIN — ESCITALOPRAM OXALATE 10 MG: 10 TABLET ORAL at 10:26

## 2019-07-04 RX ADMIN — PROPOFOL 50 MG: 10 INJECTION, EMULSION INTRAVENOUS at 07:56

## 2019-07-04 RX ADMIN — VENLAFAXINE HYDROCHLORIDE 150 MG: 37.5 CAPSULE, EXTENDED RELEASE ORAL at 10:28

## 2019-07-04 RX ADMIN — PROPOFOL 50 MG: 10 INJECTION, EMULSION INTRAVENOUS at 07:50

## 2019-07-04 RX ADMIN — PROPOFOL 50 MG: 10 INJECTION, EMULSION INTRAVENOUS at 07:45

## 2019-07-04 RX ADMIN — SODIUM CHLORIDE: 9 INJECTION, SOLUTION INTRAVENOUS at 04:54

## 2019-07-04 RX ADMIN — ALLOPURINOL 300 MG: 100 TABLET ORAL at 10:28

## 2019-07-04 RX ADMIN — METOPROLOL TARTRATE 25 MG: 25 TABLET ORAL at 20:55

## 2019-07-04 RX ADMIN — MIDODRINE HYDROCHLORIDE 10 MG: 5 TABLET ORAL at 10:27

## 2019-07-04 RX ADMIN — PANTOPRAZOLE SODIUM 40 MG: 40 INJECTION, POWDER, FOR SOLUTION INTRAVENOUS at 10:26

## 2019-07-04 RX ADMIN — PROPOFOL 50 MG: 10 INJECTION, EMULSION INTRAVENOUS at 08:06

## 2019-07-04 RX ADMIN — PROPOFOL 50 MG: 10 INJECTION, EMULSION INTRAVENOUS at 08:03

## 2019-07-04 RX ADMIN — FINASTERIDE 5 MG: 5 TABLET, FILM COATED ORAL at 10:27

## 2019-07-04 ASSESSMENT — ENCOUNTER SYMPTOMS: SHORTNESS OF BREATH: 1

## 2019-07-04 ASSESSMENT — PAIN SCALES - GENERAL: PAINLEVEL_OUTOF10: 0

## 2019-07-04 NOTE — PROGRESS NOTES
Progress Note (Hospitalist, Internal Medicine)  IDENTIFYING INFORMATION   PATIENT:  Naun Lynne  MRN:  [id number]  ADMIT DATE: 7/2/2019  TIME OF EVALUATION: 7/4/2019 12:02 PM      HISTORY OF PRESENT ILLNESS   Rohit Weller is a 70 y.o. male with a past medical history of subacute SOB, OTERO who visits with Dr Kevin Krause and had outpatient stress and ECHO last week who was suppose to visit card outpatient today for discussion of results developed acute onset red bloody diarrhea since this morning all morning and was in the bathroom. Denies any abdominal pain or painful passage of stool. His GI symptoms caused him to be fatigued noting some SOB that is worse with exertion. In the ED, there were no visible evidence of bleeding and w/o abdo symptoms. SUBJECTIVE     No more bleeding overnight. Had breakfast after scope and appears to be doing fair. MEDICATIONS   Medications Prior to Admission  Medications Prior to Admission: escitalopram (LEXAPRO) 10 MG tablet, Take 1 tablet by mouth daily  allopurinol (ZYLOPRIM) 300 MG tablet, Take 1 tablet by mouth daily  venlafaxine (EFFEXOR XR) 150 MG extended release capsule, Take 1 capsule by mouth every morning  gemfibrozil (LOPID) 600 MG tablet, Take 1 tablet by mouth 2 times daily (before meals)  finasteride (PROSCAR) 5 MG tablet, Take 1 tablet by mouth daily  Multiple Vitamins-Minerals (PRESERVISION AREDS PO), Take by mouth 2 caps by mouth twice a day.   metoprolol tartrate (LOPRESSOR) 25 MG tablet, Take 1 tablet by mouth 2 times daily    Current Medications  Current Facility-Administered Medications   Medication Dose Route Frequency Provider Last Rate Last Dose    pantoprazole (PROTONIX) injection 40 mg  40 mg Intravenous Daily Doreen Sandoval MD   40 mg at 07/04/19 1026    midodrine (PROAMATINE) tablet 10 mg  10 mg Oral TID  Melissa Santana MD   10 mg at 07/04/19 1027    allopurinol (ZYLOPRIM) tablet 300 mg  300 mg Oral Daily Melissa Santana MD   300 mg at 07/04/19 1028   

## 2019-07-04 NOTE — OP NOTE
discharge and the  patient will also be followed up by his primary provider, Dr. Gary Riddle. The patient tolerated the procedure well. There were no postop  complications.         Darnell Kenny MD    D: 07/04/2019 8:39:27       T: 07/04/2019 9:07:37     [id number]  Job#: [id number]     Doc#: [id number]    CC:  Yoana Herrera MD

## 2019-07-04 NOTE — ANESTHESIA PRE PROCEDURE
Felicity Song MD   150 mg at 07/03/19 0804    0.9 % sodium chloride infusion   Intravenous Continuous Andre Cloud  mL/hr at 07/04/19 0454      acetaminophen (TYLENOL) tablet 650 mg  650 mg Oral Q4H PRN Andre Cloud MD        hydrALAZINE (APRESOLINE) 10 mg in sodium chloride 0.9 % 50 mL ivpb  10 mg Intravenous Q4H PRN Andre Cloud MD           Allergies:  No Known Allergies    Problem List:    Patient Active Problem List   Diagnosis Code    Hyperlipidemia E78.5    Depression F32.9    Osteoarthritis M19.90    Nocturia R35.1    Decreased hearing H91.90    Restless leg G25.81    Gout M10.9    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    Fuchs' corneal dystrophy H18.51    Left posterior capsular opacification H26.492    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     H/O echocardiogram 8/27/19    EF 50, Mod AR, Mild MR & TR    Hyperlipidemia 5/4/2019    Nocturia 5/4/2019    Osteoarthritis 5/4/2019    Restless leg 5/4/2019       Past Surgical History:        Procedure Laterality Date    BLEPHAROPLASTY  2010    CHOLECYSTECTOMY  2001    COLECTOMY  2000    EYE SURGERY Left 2011    EYE SURGERY Right 2011    JOINT REPLACEMENT      KNEE ARTHROSCOPY  2002       Social History:    Social History     Tobacco Use    Smoking status: Former Smoker     Packs/day: 2.00     Years: 25.00     Pack years: 50.00     Types: Cigarettes     Start date: 5/31/1960     Last attempt

## 2019-07-04 NOTE — OP NOTE
[address], [address] [place] [address]                                OPERATIVE REPORT    PATIENT NAME: KENTRELL ESPINOZA                      :        1940  MED REC NO:   [id number]                          ROOM:       2018  ACCOUNT NO:   [de-identified]                           ADMIT DATE: 2019  PROVIDER:     Sergio Olivia MD    DATE OF PROCEDURE:  2019    EGD REPORT    PRIMARY PROVIDER:  Joan Lucero MD    The patient is in room 2018. CHIEF COMPLAINT:  History of hematochezia; rule out upper GI bleeding. PREMEDICATION:  Please refer to the anesthesiologist's notes. DESCRIPTION OF PROCEDURE:  The patient was placed in the left lateral  decubitus position and the video Olympus gastroscope was introduced in  the back of the throat and was advanced from the esophagus. ESOPHAGUS:  A small sliding hiatal hernia was noted in the mucosa. The  GE junction was inflamed, but there was no evidence of ulcer, stricture,  Walker esophagus or mass lesion. STOMACH:  The fundus, cardia, body, antrum, lesser curvature, greater  curvature and the pyloric regions of the stomach were examined. The  mucosa of the stomach was hyperemic in the antrum and prepyloric region,  but no erosion or ulcers were seen. The gastroscope was retroflexed and  the fundus and cardia were carefully examined and no mass lesions were  seen. DUODENUM:  The first and second portions of the duodenum were examined  and a large diverticulum was noted in the second portion of the duodenum  with slightly hyperemic mucosa, but no erosion or ulcers were seen. No blood recent or old was seen in the upper GI tract. POSTOPERATIVE DIAGNOSES:  1.  Small sliding hiatal hernia. 2.  Mild distal esophagitis, gastritis and duodenitis. 3.  Incidentally large periampullary diverticulum noted in the duodenum. RECOMMENDATIONS:  1.   We will proceed with colonoscopy for further workup of the patient's  anemia and GI bleeding. 2.  We will monitor the patient's H and H.  3.  The patient has been instructed to avoid intake of NSAIDs. The patient tolerated the procedure well. There were no postop  complications.         Ale Ramirez MD    D: 07/04/2019 7:56:02       T: 07/04/2019 13:49:41     [email or web address]  Job#: [id number]     Doc#: [id number]    CC:  Arvind Currie MD

## 2019-07-05 ENCOUNTER — APPOINTMENT (OUTPATIENT)
Dept: CT IMAGING | Age: 79
DRG: 330 | End: 2019-07-05
Payer: MEDICARE

## 2019-07-05 LAB
ALBUMIN SERPL-MCNC: 3 GM/DL (ref 3.4–5)
ALP BLD-CCNC: 51 IU/L (ref 40–128)
ALT SERPL-CCNC: 11 U/L (ref 10–40)
ANION GAP SERPL CALCULATED.3IONS-SCNC: 9 MMOL/L (ref 4–16)
AST SERPL-CCNC: 16 IU/L (ref 15–37)
BASOPHILS ABSOLUTE: 0 K/CU MM
BASOPHILS RELATIVE PERCENT: 0.5 % (ref 0–1)
BILIRUB SERPL-MCNC: 0.2 MG/DL (ref 0–1)
BUN BLDV-MCNC: 14 MG/DL (ref 6–23)
CALCIUM SERPL-MCNC: 8.2 MG/DL (ref 8.3–10.6)
CHLORIDE BLD-SCNC: 108 MMOL/L (ref 99–110)
CO2: 26 MMOL/L (ref 21–32)
CREAT SERPL-MCNC: 0.7 MG/DL (ref 0.9–1.3)
DIFFERENTIAL TYPE: ABNORMAL
EOSINOPHILS ABSOLUTE: 0.2 K/CU MM
EOSINOPHILS RELATIVE PERCENT: 3.3 % (ref 0–3)
GFR AFRICAN AMERICAN: >60 ML/MIN/1.73M2
GFR NON-AFRICAN AMERICAN: >60 ML/MIN/1.73M2
GLUCOSE BLD-MCNC: 95 MG/DL (ref 70–99)
HCT VFR BLD CALC: 20.3 % (ref 42–52)
HCT VFR BLD CALC: 20.9 % (ref 42–52)
HCT VFR BLD CALC: 24.1 % (ref 42–52)
HCT VFR BLD CALC: 25 % (ref 42–52)
HEMOGLOBIN: 7.7 GM/DL (ref 13.5–18)
HEMOGLOBIN: 8 GM/DL (ref 13.5–18)
HEMOGLOBIN: ABNORMAL GM/DL (ref 13.5–18)
HEMOGLOBIN: ABNORMAL GM/DL (ref 13.5–18)
IMMATURE NEUTROPHIL %: 0.3 % (ref 0–0.43)
LACTATE: 1.1 MMOL/L (ref 0.4–2)
LYMPHOCYTES ABSOLUTE: 1.9 K/CU MM
LYMPHOCYTES RELATIVE PERCENT: 26.1 % (ref 24–44)
MCH RBC QN AUTO: 31.2 PG (ref 27–31)
MCHC RBC AUTO-ENTMCNC: 32.1 % (ref 32–36)
MCV RBC AUTO: 97.2 FL (ref 78–100)
MONOCYTES ABSOLUTE: 0.6 K/CU MM
MONOCYTES RELATIVE PERCENT: 7.6 % (ref 0–4)
NUCLEATED RBC %: 0 %
PDW BLD-RTO: 14.2 % (ref 11.7–14.9)
PLATELET # BLD: 196 K/CU MM (ref 140–440)
PMV BLD AUTO: 11.5 FL (ref 7.5–11.1)
POTASSIUM SERPL-SCNC: 4.1 MMOL/L (ref 3.5–5.1)
RBC # BLD: 2.15 M/CU MM (ref 4.6–6.2)
SEGMENTED NEUTROPHILS ABSOLUTE COUNT: 4.6 K/CU MM
SEGMENTED NEUTROPHILS RELATIVE PERCENT: 62.2 % (ref 36–66)
SODIUM BLD-SCNC: 143 MMOL/L (ref 135–145)
TOTAL IMMATURE NEUTOROPHIL: 0.02 K/CU MM
TOTAL NUCLEATED RBC: 0 K/CU MM
TOTAL PROTEIN: 4.8 GM/DL (ref 6.4–8.2)
WBC # BLD: 7.4 K/CU MM (ref 4–10.5)

## 2019-07-05 PROCEDURE — 86900 BLOOD TYPING SEROLOGIC ABO: CPT

## 2019-07-05 PROCEDURE — 2060000000 HC ICU INTERMEDIATE R&B

## 2019-07-05 PROCEDURE — 6360000004 HC RX CONTRAST MEDICATION: Performed by: INTERNAL MEDICINE

## 2019-07-05 PROCEDURE — 80053 COMPREHEN METABOLIC PANEL: CPT

## 2019-07-05 PROCEDURE — 85018 HEMOGLOBIN: CPT

## 2019-07-05 PROCEDURE — 36415 COLL VENOUS BLD VENIPUNCTURE: CPT

## 2019-07-05 PROCEDURE — 6370000000 HC RX 637 (ALT 250 FOR IP): Performed by: INTERNAL MEDICINE

## 2019-07-05 PROCEDURE — 85025 COMPLETE CBC W/AUTO DIFF WBC: CPT

## 2019-07-05 PROCEDURE — 2580000003 HC RX 258: Performed by: INTERNAL MEDICINE

## 2019-07-05 PROCEDURE — 74174 CTA ABD&PLVS W/CONTRAST: CPT

## 2019-07-05 PROCEDURE — 83605 ASSAY OF LACTIC ACID: CPT

## 2019-07-05 PROCEDURE — 80048 BASIC METABOLIC PNL TOTAL CA: CPT

## 2019-07-05 PROCEDURE — 2580000003 HC RX 258: Performed by: SPECIALIST

## 2019-07-05 PROCEDURE — 86901 BLOOD TYPING SEROLOGIC RH(D): CPT

## 2019-07-05 PROCEDURE — C9113 INJ PANTOPRAZOLE SODIUM, VIA: HCPCS | Performed by: SPECIALIST

## 2019-07-05 PROCEDURE — 85014 HEMATOCRIT: CPT

## 2019-07-05 PROCEDURE — 6360000002 HC RX W HCPCS: Performed by: SPECIALIST

## 2019-07-05 PROCEDURE — 99232 SBSQ HOSP IP/OBS MODERATE 35: CPT | Performed by: INTERNAL MEDICINE

## 2019-07-05 PROCEDURE — P9016 RBC LEUKOCYTES REDUCED: HCPCS

## 2019-07-05 PROCEDURE — 86850 RBC ANTIBODY SCREEN: CPT

## 2019-07-05 PROCEDURE — 86922 COMPATIBILITY TEST ANTIGLOB: CPT

## 2019-07-05 PROCEDURE — 36430 TRANSFUSION BLD/BLD COMPNT: CPT

## 2019-07-05 RX ORDER — 0.9 % SODIUM CHLORIDE 0.9 %
250 INTRAVENOUS SOLUTION INTRAVENOUS ONCE
Status: COMPLETED | OUTPATIENT
Start: 2019-07-05 | End: 2019-07-06

## 2019-07-05 RX ORDER — 0.9 % SODIUM CHLORIDE 0.9 %
10 VIAL (ML) INJECTION
Status: COMPLETED | OUTPATIENT
Start: 2019-07-05 | End: 2019-07-05

## 2019-07-05 RX ORDER — 0.9 % SODIUM CHLORIDE 0.9 %
250 INTRAVENOUS SOLUTION INTRAVENOUS ONCE
Status: COMPLETED | OUTPATIENT
Start: 2019-07-05 | End: 2019-07-05

## 2019-07-05 RX ORDER — SODIUM CHLORIDE 9 MG/ML
INJECTION, SOLUTION INTRAVENOUS CONTINUOUS
Status: DISCONTINUED | OUTPATIENT
Start: 2019-07-05 | End: 2019-07-08

## 2019-07-05 RX ADMIN — METOPROLOL TARTRATE 25 MG: 25 TABLET ORAL at 08:54

## 2019-07-05 RX ADMIN — Medication 10 ML: at 10:50

## 2019-07-05 RX ADMIN — SODIUM CHLORIDE 250 ML: 9 INJECTION, SOLUTION INTRAVENOUS at 23:55

## 2019-07-05 RX ADMIN — ALLOPURINOL 300 MG: 100 TABLET ORAL at 08:53

## 2019-07-05 RX ADMIN — METOPROLOL TARTRATE 25 MG: 25 TABLET ORAL at 20:44

## 2019-07-05 RX ADMIN — FINASTERIDE 5 MG: 5 TABLET, FILM COATED ORAL at 08:54

## 2019-07-05 RX ADMIN — SODIUM CHLORIDE: 9 INJECTION, SOLUTION INTRAVENOUS at 23:43

## 2019-07-05 RX ADMIN — SODIUM CHLORIDE: 9 INJECTION, SOLUTION INTRAVENOUS at 11:24

## 2019-07-05 RX ADMIN — ESCITALOPRAM OXALATE 10 MG: 10 TABLET ORAL at 08:54

## 2019-07-05 RX ADMIN — SODIUM CHLORIDE 250 ML: 9 INJECTION, SOLUTION INTRAVENOUS at 04:58

## 2019-07-05 RX ADMIN — IOPAMIDOL 100 ML: 755 INJECTION, SOLUTION INTRAVENOUS at 10:50

## 2019-07-05 RX ADMIN — VENLAFAXINE HYDROCHLORIDE 150 MG: 37.5 CAPSULE, EXTENDED RELEASE ORAL at 08:54

## 2019-07-05 RX ADMIN — PANTOPRAZOLE SODIUM 40 MG: 40 INJECTION, POWDER, FOR SOLUTION INTRAVENOUS at 08:54

## 2019-07-05 ASSESSMENT — PAIN SCALES - GENERAL
PAINLEVEL_OUTOF10: 0
PAINLEVEL_OUTOF10: 0

## 2019-07-05 NOTE — PROGRESS NOTES
Progress Note (Hospitalist, Internal Medicine)  IDENTIFYING INFORMATION   PATIENT:  Shree Vega  MRN:  [id number]  ADMIT DATE: 7/2/2019  TIME OF EVALUATION: 7/5/2019 12:13 PM      HISTORY OF PRESENT ILLNESS   Rohit Lakhani is a 70 y.o. male with a past medical history of subacute SOB, OTERO who visits with Dr Kathy Opitz and had outpatient stress and ECHO last week who was suppose to visit card outpatient today for discussion of results developed acute onset red bloody diarrhea since this morning all morning and was in the bathroom. Denies any abdominal pain or painful passage of stool. His GI symptoms caused him to be fatigued noting some SOB that is worse with exertion. In the ED, there were no visible evidence of bleeding and w/o abdo symptoms. SUBJECTIVE     Had food yesterday, doing fair all day but later had rebleeding this morning - liquid with bright red blood and minimal stool    MEDICATIONS   Medications Prior to Admission  Medications Prior to Admission: escitalopram (LEXAPRO) 10 MG tablet, Take 1 tablet by mouth daily  allopurinol (ZYLOPRIM) 300 MG tablet, Take 1 tablet by mouth daily  venlafaxine (EFFEXOR XR) 150 MG extended release capsule, Take 1 capsule by mouth every morning  gemfibrozil (LOPID) 600 MG tablet, Take 1 tablet by mouth 2 times daily (before meals)  finasteride (PROSCAR) 5 MG tablet, Take 1 tablet by mouth daily  Multiple Vitamins-Minerals (PRESERVISION AREDS PO), Take by mouth 2 caps by mouth twice a day.   metoprolol tartrate (LOPRESSOR) 25 MG tablet, Take 1 tablet by mouth 2 times daily    Current Medications  Current Facility-Administered Medications   Medication Dose Route Frequency Provider Last Rate Last Dose    0.9 % sodium chloride infusion   Intravenous Continuous Moises Raymond MD 75 mL/hr at 07/05/19 1124      pantoprazole (PROTONIX) injection 40 mg  40 mg Intravenous Daily Jt Espinoza MD   40 mg at 07/05/19 0854    allopurinol (ZYLOPRIM) tablet 300 mg  300 mg Oral Daily

## 2019-07-05 NOTE — PROGRESS NOTES
Report given to this nurse by Clraibel Mar RN, no skin issues patient is awake, alert and orient.   Claribel Mar took patient to the bathroom and walked him in the hallway before she left he tolerated this well

## 2019-07-06 ENCOUNTER — APPOINTMENT (OUTPATIENT)
Dept: INTERVENTIONAL RADIOLOGY/VASCULAR | Age: 79
DRG: 330 | End: 2019-07-06
Payer: MEDICARE

## 2019-07-06 ENCOUNTER — APPOINTMENT (OUTPATIENT)
Dept: NUCLEAR MEDICINE | Age: 79
DRG: 330 | End: 2019-07-06
Payer: MEDICARE

## 2019-07-06 LAB
ABO/RH: NORMAL
ALBUMIN SERPL-MCNC: 3 GM/DL (ref 3.4–5)
ALP BLD-CCNC: 49 IU/L (ref 40–128)
ALT SERPL-CCNC: 16 U/L (ref 10–40)
ANION GAP SERPL CALCULATED.3IONS-SCNC: 5 MMOL/L (ref 4–16)
ANTIBODY SCREEN: NEGATIVE
AST SERPL-CCNC: 21 IU/L (ref 15–37)
BASOPHILS ABSOLUTE: 0 K/CU MM
BASOPHILS RELATIVE PERCENT: 0.6 % (ref 0–1)
BILIRUB SERPL-MCNC: 0.7 MG/DL (ref 0–1)
BUN BLDV-MCNC: 13 MG/DL (ref 6–23)
CALCIUM SERPL-MCNC: 8.1 MG/DL (ref 8.3–10.6)
CHLORIDE BLD-SCNC: 109 MMOL/L (ref 99–110)
CO2: 27 MMOL/L (ref 21–32)
COMPONENT: NORMAL
CREAT SERPL-MCNC: 0.8 MG/DL (ref 0.9–1.3)
CROSSMATCH RESULT: NORMAL
DIFFERENTIAL TYPE: ABNORMAL
EOSINOPHILS ABSOLUTE: 0.3 K/CU MM
EOSINOPHILS RELATIVE PERCENT: 4.1 % (ref 0–3)
GFR AFRICAN AMERICAN: >60 ML/MIN/1.73M2
GFR NON-AFRICAN AMERICAN: >60 ML/MIN/1.73M2
GLUCOSE BLD-MCNC: 89 MG/DL (ref 70–99)
HCT VFR BLD CALC: 25.1 % (ref 42–52)
HCT VFR BLD CALC: 25.7 % (ref 42–52)
HCT VFR BLD CALC: 27.6 % (ref 42–52)
HEMOGLOBIN: 8.1 GM/DL (ref 13.5–18)
HEMOGLOBIN: 8.3 GM/DL (ref 13.5–18)
HEMOGLOBIN: 8.8 GM/DL (ref 13.5–18)
IMMATURE NEUTROPHIL %: 0.3 % (ref 0–0.43)
LYMPHOCYTES ABSOLUTE: 1.5 K/CU MM
LYMPHOCYTES RELATIVE PERCENT: 21.3 % (ref 24–44)
MCH RBC QN AUTO: 29.9 PG (ref 27–31)
MCHC RBC AUTO-ENTMCNC: 32.3 % (ref 32–36)
MCV RBC AUTO: 92.6 FL (ref 78–100)
MONOCYTES ABSOLUTE: 0.5 K/CU MM
MONOCYTES RELATIVE PERCENT: 6.4 % (ref 0–4)
NUCLEATED RBC %: 0.3 %
PDW BLD-RTO: 15.4 % (ref 11.7–14.9)
PLATELET # BLD: 178 K/CU MM (ref 140–440)
PMV BLD AUTO: 10.9 FL (ref 7.5–11.1)
POTASSIUM SERPL-SCNC: 4.3 MMOL/L (ref 3.5–5.1)
RBC # BLD: 2.71 M/CU MM (ref 4.6–6.2)
SEGMENTED NEUTROPHILS ABSOLUTE COUNT: 4.9 K/CU MM
SEGMENTED NEUTROPHILS RELATIVE PERCENT: 67.3 % (ref 36–66)
SODIUM BLD-SCNC: 141 MMOL/L (ref 135–145)
STATUS: NORMAL
TOTAL IMMATURE NEUTOROPHIL: 0.02 K/CU MM
TOTAL NUCLEATED RBC: 0 K/CU MM
TOTAL PROTEIN: 4.7 GM/DL (ref 6.4–8.2)
TRANSFUSION STATUS: NORMAL
UNIT DIVISION: 0
UNIT NUMBER: NORMAL
WBC # BLD: 7.2 K/CU MM (ref 4–10.5)

## 2019-07-06 PROCEDURE — 99221 1ST HOSP IP/OBS SF/LOW 40: CPT | Performed by: SURGERY

## 2019-07-06 PROCEDURE — 2709999900 HC NON-CHARGEABLE SUPPLY

## 2019-07-06 PROCEDURE — 85025 COMPLETE CBC W/AUTO DIFF WBC: CPT

## 2019-07-06 PROCEDURE — 6370000000 HC RX 637 (ALT 250 FOR IP): Performed by: INTERNAL MEDICINE

## 2019-07-06 PROCEDURE — 2060000000 HC ICU INTERMEDIATE R&B

## 2019-07-06 PROCEDURE — 80053 COMPREHEN METABOLIC PANEL: CPT

## 2019-07-06 PROCEDURE — C9113 INJ PANTOPRAZOLE SODIUM, VIA: HCPCS | Performed by: SPECIALIST

## 2019-07-06 PROCEDURE — 36430 TRANSFUSION BLD/BLD COMPNT: CPT

## 2019-07-06 PROCEDURE — 75726 ARTERY X-RAYS ABDOMEN: CPT

## 2019-07-06 PROCEDURE — 78278 ACUTE GI BLOOD LOSS IMAGING: CPT

## 2019-07-06 PROCEDURE — 36415 COLL VENOUS BLD VENIPUNCTURE: CPT

## 2019-07-06 PROCEDURE — C1894 INTRO/SHEATH, NON-LASER: HCPCS

## 2019-07-06 PROCEDURE — 2580000003 HC RX 258: Performed by: INTERNAL MEDICINE

## 2019-07-06 PROCEDURE — 6360000002 HC RX W HCPCS: Performed by: SPECIALIST

## 2019-07-06 PROCEDURE — B4101ZZ FLUOROSCOPY OF ABDOMINAL AORTA USING LOW OSMOLAR CONTRAST: ICD-10-PCS | Performed by: RADIOLOGY

## 2019-07-06 PROCEDURE — C1887 CATHETER, GUIDING: HCPCS

## 2019-07-06 PROCEDURE — 36245 INS CATH ABD/L-EXT ART 1ST: CPT

## 2019-07-06 PROCEDURE — C1769 GUIDE WIRE: HCPCS

## 2019-07-06 PROCEDURE — A9560 TC99M LABELED RBC: HCPCS | Performed by: SPECIALIST

## 2019-07-06 PROCEDURE — 80048 BASIC METABOLIC PNL TOTAL CA: CPT

## 2019-07-06 PROCEDURE — 99232 SBSQ HOSP IP/OBS MODERATE 35: CPT | Performed by: INTERNAL MEDICINE

## 2019-07-06 PROCEDURE — 85014 HEMATOCRIT: CPT

## 2019-07-06 PROCEDURE — 3430000000 HC RX DIAGNOSTIC RADIOPHARMACEUTICAL: Performed by: SPECIALIST

## 2019-07-06 PROCEDURE — 85018 HEMOGLOBIN: CPT

## 2019-07-06 RX ORDER — 0.9 % SODIUM CHLORIDE 0.9 %
250 INTRAVENOUS SOLUTION INTRAVENOUS ONCE
Status: COMPLETED | OUTPATIENT
Start: 2019-07-06 | End: 2019-07-08

## 2019-07-06 RX ADMIN — METOPROLOL TARTRATE 25 MG: 25 TABLET ORAL at 09:17

## 2019-07-06 RX ADMIN — Medication 25 MILLICURIE: at 14:21

## 2019-07-06 RX ADMIN — METOPROLOL TARTRATE 25 MG: 25 TABLET ORAL at 20:15

## 2019-07-06 RX ADMIN — PANTOPRAZOLE SODIUM 40 MG: 40 INJECTION, POWDER, FOR SOLUTION INTRAVENOUS at 09:16

## 2019-07-06 RX ADMIN — ESCITALOPRAM OXALATE 10 MG: 10 TABLET ORAL at 09:16

## 2019-07-06 RX ADMIN — VENLAFAXINE HYDROCHLORIDE 150 MG: 37.5 CAPSULE, EXTENDED RELEASE ORAL at 09:16

## 2019-07-06 RX ADMIN — FINASTERIDE 5 MG: 5 TABLET, FILM COATED ORAL at 09:17

## 2019-07-06 RX ADMIN — SODIUM CHLORIDE 250 ML: 9 INJECTION, SOLUTION INTRAVENOUS at 16:45

## 2019-07-06 RX ADMIN — ALLOPURINOL 300 MG: 100 TABLET ORAL at 09:17

## 2019-07-06 RX ADMIN — SODIUM CHLORIDE: 9 INJECTION, SOLUTION INTRAVENOUS at 23:46

## 2019-07-06 ASSESSMENT — PAIN SCALES - GENERAL
PAINLEVEL_OUTOF10: 0
PAINLEVEL_OUTOF10: 0

## 2019-07-06 NOTE — PROCEDURES
IR Note  R fem access/5 fr sheath  SMA angio shows no extravasation of vascular abnormality.   Catheter and sheath removed

## 2019-07-06 NOTE — PROGRESS NOTES
escitalopram (LEXAPRO) tablet 10 mg  10 mg Oral Daily Bernard Pugh MD   10 mg at 07/06/19 0916    finasteride (PROSCAR) tablet 5 mg  5 mg Oral Daily Bernard Pugh MD   5 mg at 07/06/19 0917    metoprolol tartrate (LOPRESSOR) tablet 25 mg  25 mg Oral BID Bernard Pugh MD   25 mg at 07/06/19 0917    venlafaxine (EFFEXOR XR) extended release capsule 150 mg  150 mg Oral QAM Bernard Pugh MD   150 mg at 07/06/19 7777    acetaminophen (TYLENOL) tablet 650 mg  650 mg Oral Q4H PRN Bernard Pugh MD        hydrALAZINE (APRESOLINE) 10 mg in sodium chloride 0.9 % 50 mL ivpb  10 mg Intravenous Q4H PRN Bernard Pugh MD             Allergies  No Known Allergies    REVIEW OF SYSTEMS     Within above limitations. 14 point review of systems reviewed. Pertinent positive or negative as per HPI or otherwise negative per 14 point systems review. Reviewed 7/6/2019 at 12:53 PM    PHYSICAL EXAM       Blood pressure 107/62, pulse 55, temperature 97.9 °F (36.6 °C), temperature source Oral, resp. rate 13, height 6' (1.829 m), weight 247 lb 9.2 oz (112.3 kg), SpO2 99 %. General - AAO x 3  Psych - Appropriate affect/speech. No agitation  Eyes - Eye lids intact. No scleral icterus  ENT - Lips wnl. External ear clear/dry/intact. No thyromegaly on inspection  Neuro - No gross peripheral or central neuro deficits on inspection  Heart - Sinus. RRR. S1 and S2 present. No elevated JVD appreciated  Lung - Adequate air entry b/l, No crackles/wheezes appreciated  GI -  Soft. No guarding/rigidity. BS+   - No CVA/suprapubic tenderness or palpable bladder distension  Skin - Intact. No rash/petechiae/ecchymosis. Warm extremities      LABS AND IMAGING   CBC  [unfilled]    Last 3 Hemoglobin  Lab Results   Component Value Date    HGB 8.1 07/06/2019    HGB  07/05/2019     6.5   CRITICAL CALLED TO CHRISTEL LAO ON 7/5/19 AT 2312 BY TIFFANIE.       HGB 8.0 07/05/2019     Last 3 WBC/ANC  Lab Results   Component Value Date    WBC 7.2 07/06/2019    WBC 7.4

## 2019-07-06 NOTE — CONSULTS
Non-medical: None   Tobacco Use    Smoking status: Former Smoker     Packs/day: 2.00     Years: 25.00     Pack years: 50.00     Types: Cigarettes     Start date: 1960     Last attempt to quit: 1985     Years since quittin.5    Smokeless tobacco: Never Used   Substance and Sexual Activity    Alcohol use: None    Drug use: None    Sexual activity: None   Lifestyle    Physical activity:     Days per week: None     Minutes per session: None    Stress: None   Relationships    Social connections:     Talks on phone: None     Gets together: None     Attends Hoahaoism service: None     Active member of club or organization: None     Attends meetings of clubs or organizations: None     Relationship status: None    Intimate partner violence:     Fear of current or ex partner: None     Emotionally abused: None     Physically abused: None     Forced sexual activity: None   Other Topics Concern    None   Social History Narrative    None      Family History   Problem Relation Age of Onset    Cancer Mother     otherwise irrelevant to this surgical issue. Review of Systems:  Constitutional: Negative for fever, chills, diaphoresis, appetite change and fatigue. HENT: Negative for sore throat, trouble swallowing and voice change. Respiratory: Negative for cough, positive for shortness of breath no wheezing. Cardiovascular: Negative for chest pain positive for SOB with one flight of stairs exertion, no pitting LE edema. Gastrointestinal: Negative for nausea, vomiting, abdominal distention, constipation, no diarrhea, positive h/o blood in stool, not anymore, no anal bleeding or rectal pain. Musculoskeletal: Negative for joint swelling and arthralgias. Skin: Warm and dry, well perfused. Neurological: Negative for seizures, syncope, speech difficulty and weakness. Hematological/Lymphatic: Negative for adenopathy. No history of DVT/PE. Does not bruise/bleed easily.    Psychiatric/Behavioral: 20.3 (L) 42 - 52 %   CBC Auto Differential    Collection Time: 07/06/19  7:28 AM   Result Value Ref Range    WBC 7.2 4.0 - 10.5 K/CU MM    RBC 2.71 (L) 4.6 - 6.2 M/CU MM    Hemoglobin 8.1 (L) 13.5 - 18.0 GM/DL    Hematocrit 25.1 (L) 42 - 52 %    MCV 92.6 78 - 100 FL    MCH 29.9 27 - 31 PG    MCHC 32.3 32.0 - 36.0 %    RDW 15.4 (H) 11.7 - 14.9 %    Platelets 416 260 - 658 K/CU MM    MPV 10.9 7.5 - 11.1 FL    Differential Type AUTOMATED DIFFERENTIAL     Segs Relative 67.3 (H) 36 - 66 %    Lymphocytes % 21.3 (L) 24 - 44 %    Monocytes % 6.4 (H) 0 - 4 %    Eosinophils % 4.1 (H) 0 - 3 %    Basophils % 0.6 0 - 1 %    Segs Absolute 4.9 K/CU MM    Lymphocytes # 1.5 K/CU MM    Monocytes # 0.5 K/CU MM    Eosinophils # 0.3 K/CU MM    Basophils # 0.0 K/CU MM    Nucleated RBC % 0.3 %    Total Nucleated RBC 0.0 K/CU MM    Total Immature Neutrophil 0.02 K/CU MM    Immature Neutrophil % 0.3 0 - 0.43 %   Comprehensive Metabolic Panel    Collection Time: 07/06/19  7:28 AM   Result Value Ref Range    Sodium 141 135 - 145 MMOL/L    Potassium 4.3 3.5 - 5.1 MMOL/L    Chloride 109 99 - 110 mMol/L    CO2 27 21 - 32 MMOL/L    BUN 13 6 - 23 MG/DL    CREATININE 0.8 (L) 0.9 - 1.3 MG/DL    Glucose 89 70 - 99 MG/DL    Calcium 8.1 (L) 8.3 - 10.6 MG/DL    Alb 3.0 (L) 3.4 - 5.0 GM/DL    Total Protein 4.7 (L) 6.4 - 8.2 GM/DL    Total Bilirubin 0.7 0.0 - 1.0 MG/DL    ALT 16 10 - 40 U/L    AST 21 15 - 37 IU/L    Alkaline Phosphatase 49 40 - 128 IU/L    GFR Non-African American >60 >60 mL/min/1.73m2    GFR African American >60 >60 mL/min/1.73m2    Anion Gap 5 4 - 16       Diagnosis:  Patient Active Problem List   Diagnosis    Hyperlipidemia    Depression    Osteoarthritis    Nocturia    Decreased hearing    Restless leg    Gout    Benign neoplasm of right choroid    Fuchs' corneal dystrophy    Left posterior capsular opacification    Macular pigment deposit    Macular scar    Post corneal transplant    Osteoarthritis of left knee   

## 2019-07-06 NOTE — PROGRESS NOTES
Provider Lori notified about- \"You order a bleeding study on this patient today. The test is complete it and the nuclear test just call me to inform that they found out that he does a bleed. Please call.

## 2019-07-07 ENCOUNTER — APPOINTMENT (OUTPATIENT)
Dept: NUCLEAR MEDICINE | Age: 79
DRG: 330 | End: 2019-07-07
Payer: MEDICARE

## 2019-07-07 ENCOUNTER — APPOINTMENT (OUTPATIENT)
Dept: INTERVENTIONAL RADIOLOGY/VASCULAR | Age: 79
DRG: 330 | End: 2019-07-07
Payer: MEDICARE

## 2019-07-07 LAB
ALBUMIN SERPL-MCNC: 3 GM/DL (ref 3.4–5)
ALP BLD-CCNC: 52 IU/L (ref 40–128)
ALT SERPL-CCNC: 20 U/L (ref 10–40)
ANION GAP SERPL CALCULATED.3IONS-SCNC: 7 MMOL/L (ref 4–16)
AST SERPL-CCNC: 25 IU/L (ref 15–37)
BASOPHILS ABSOLUTE: 0.1 K/CU MM
BASOPHILS RELATIVE PERCENT: 0.6 % (ref 0–1)
BILIRUB SERPL-MCNC: 0.4 MG/DL (ref 0–1)
BUN BLDV-MCNC: 13 MG/DL (ref 6–23)
CALCIUM SERPL-MCNC: 8.2 MG/DL (ref 8.3–10.6)
CHLORIDE BLD-SCNC: 109 MMOL/L (ref 99–110)
CO2: 27 MMOL/L (ref 21–32)
CREAT SERPL-MCNC: 0.8 MG/DL (ref 0.9–1.3)
DIFFERENTIAL TYPE: ABNORMAL
EOSINOPHILS ABSOLUTE: 0.4 K/CU MM
EOSINOPHILS RELATIVE PERCENT: 5.2 % (ref 0–3)
GFR AFRICAN AMERICAN: >60 ML/MIN/1.73M2
GFR NON-AFRICAN AMERICAN: >60 ML/MIN/1.73M2
GLUCOSE BLD-MCNC: 98 MG/DL (ref 70–99)
HCT VFR BLD CALC: 26.2 % (ref 42–52)
HCT VFR BLD CALC: 27 % (ref 42–52)
HCT VFR BLD CALC: 28.1 % (ref 42–52)
HEMOGLOBIN: 8.3 GM/DL (ref 13.5–18)
HEMOGLOBIN: 8.5 GM/DL (ref 13.5–18)
HEMOGLOBIN: 8.9 GM/DL (ref 13.5–18)
IMMATURE NEUTROPHIL %: 0.4 % (ref 0–0.43)
LYMPHOCYTES ABSOLUTE: 1.6 K/CU MM
LYMPHOCYTES RELATIVE PERCENT: 18.5 % (ref 24–44)
MCH RBC QN AUTO: 29.2 PG (ref 27–31)
MCHC RBC AUTO-ENTMCNC: 31.5 % (ref 32–36)
MCV RBC AUTO: 92.8 FL (ref 78–100)
MONOCYTES ABSOLUTE: 0.6 K/CU MM
MONOCYTES RELATIVE PERCENT: 7 % (ref 0–4)
NUCLEATED RBC %: 0.2 %
PDW BLD-RTO: 16.1 % (ref 11.7–14.9)
PLATELET # BLD: 186 K/CU MM (ref 140–440)
PMV BLD AUTO: 10.4 FL (ref 7.5–11.1)
POTASSIUM SERPL-SCNC: 4 MMOL/L (ref 3.5–5.1)
RBC # BLD: 2.91 M/CU MM (ref 4.6–6.2)
SEGMENTED NEUTROPHILS ABSOLUTE COUNT: 5.8 K/CU MM
SEGMENTED NEUTROPHILS RELATIVE PERCENT: 68.3 % (ref 36–66)
SODIUM BLD-SCNC: 143 MMOL/L (ref 135–145)
TOTAL IMMATURE NEUTOROPHIL: 0.03 K/CU MM
TOTAL NUCLEATED RBC: 0 K/CU MM
TOTAL PROTEIN: 4.7 GM/DL (ref 6.4–8.2)
WBC # BLD: 8.4 K/CU MM (ref 4–10.5)

## 2019-07-07 PROCEDURE — C1769 GUIDE WIRE: HCPCS

## 2019-07-07 PROCEDURE — 2709999900 HC NON-CHARGEABLE SUPPLY

## 2019-07-07 PROCEDURE — P9017 PLASMA 1 DONOR FRZ W/IN 8 HR: HCPCS

## 2019-07-07 PROCEDURE — C9113 INJ PANTOPRAZOLE SODIUM, VIA: HCPCS | Performed by: SPECIALIST

## 2019-07-07 PROCEDURE — 85014 HEMATOCRIT: CPT

## 2019-07-07 PROCEDURE — C1894 INTRO/SHEATH, NON-LASER: HCPCS

## 2019-07-07 PROCEDURE — 80053 COMPREHEN METABOLIC PANEL: CPT

## 2019-07-07 PROCEDURE — 36245 INS CATH ABD/L-EXT ART 1ST: CPT

## 2019-07-07 PROCEDURE — 36415 COLL VENOUS BLD VENIPUNCTURE: CPT

## 2019-07-07 PROCEDURE — 99232 SBSQ HOSP IP/OBS MODERATE 35: CPT | Performed by: SURGERY

## 2019-07-07 PROCEDURE — 2060000000 HC ICU INTERMEDIATE R&B

## 2019-07-07 PROCEDURE — 75726 ARTERY X-RAYS ABDOMEN: CPT

## 2019-07-07 PROCEDURE — 85018 HEMOGLOBIN: CPT

## 2019-07-07 PROCEDURE — 36430 TRANSFUSION BLD/BLD COMPNT: CPT

## 2019-07-07 PROCEDURE — 6370000000 HC RX 637 (ALT 250 FOR IP): Performed by: INTERNAL MEDICINE

## 2019-07-07 PROCEDURE — 86927 PLASMA FRESH FROZEN: CPT

## 2019-07-07 PROCEDURE — 85025 COMPLETE CBC W/AUTO DIFF WBC: CPT

## 2019-07-07 PROCEDURE — 78278 ACUTE GI BLOOD LOSS IMAGING: CPT

## 2019-07-07 PROCEDURE — 6360000004 HC RX CONTRAST MEDICATION: Performed by: INTERNAL MEDICINE

## 2019-07-07 PROCEDURE — 99232 SBSQ HOSP IP/OBS MODERATE 35: CPT | Performed by: INTERNAL MEDICINE

## 2019-07-07 PROCEDURE — B4101ZZ FLUOROSCOPY OF ABDOMINAL AORTA USING LOW OSMOLAR CONTRAST: ICD-10-PCS | Performed by: RADIOLOGY

## 2019-07-07 PROCEDURE — 6360000002 HC RX W HCPCS: Performed by: SPECIALIST

## 2019-07-07 PROCEDURE — 2580000003 HC RX 258: Performed by: INTERNAL MEDICINE

## 2019-07-07 PROCEDURE — 6360000002 HC RX W HCPCS: Performed by: INTERNAL MEDICINE

## 2019-07-07 RX ORDER — 0.9 % SODIUM CHLORIDE 0.9 %
250 INTRAVENOUS SOLUTION INTRAVENOUS ONCE
Status: COMPLETED | OUTPATIENT
Start: 2019-07-07 | End: 2019-07-07

## 2019-07-07 RX ADMIN — ALLOPURINOL 300 MG: 100 TABLET ORAL at 09:33

## 2019-07-07 RX ADMIN — FINASTERIDE 5 MG: 5 TABLET, FILM COATED ORAL at 09:33

## 2019-07-07 RX ADMIN — SODIUM CHLORIDE 250 ML: 9 INJECTION, SOLUTION INTRAVENOUS at 17:30

## 2019-07-07 RX ADMIN — VENLAFAXINE HYDROCHLORIDE 150 MG: 37.5 CAPSULE, EXTENDED RELEASE ORAL at 09:32

## 2019-07-07 RX ADMIN — METOPROLOL TARTRATE 25 MG: 25 TABLET ORAL at 09:33

## 2019-07-07 RX ADMIN — ESCITALOPRAM OXALATE 10 MG: 10 TABLET ORAL at 09:33

## 2019-07-07 RX ADMIN — PANTOPRAZOLE SODIUM 40 MG: 40 INJECTION, POWDER, FOR SOLUTION INTRAVENOUS at 09:32

## 2019-07-07 RX ADMIN — IOPAMIDOL 44 ML: 755 INJECTION, SOLUTION INTRAVENOUS at 22:11

## 2019-07-07 RX ADMIN — SODIUM CHLORIDE: 9 INJECTION, SOLUTION INTRAVENOUS at 14:20

## 2019-07-07 RX ADMIN — DESMOPRESSIN ACETATE 44.92 MCG: 4 SOLUTION INTRAVENOUS at 20:44

## 2019-07-07 ASSESSMENT — PAIN SCALES - GENERAL: PAINLEVEL_OUTOF10: 0

## 2019-07-07 NOTE — PROGRESS NOTES
Pt up to the bathroom with standby assist. Pt had no BM, but had gas. Blood noted on the toilet paper. Will cont to monitor.

## 2019-07-07 NOTE — PROGRESS NOTES
Frozen plasma 2 units were order. Initiated first unit. In the room with the patient to monitor for the first 15 minutes. Patient tolerates the transfusion well.

## 2019-07-07 NOTE — PROGRESS NOTES
Assume care for the patient. Bedside report received from [facility]. Patient is asleep at this time. Dressing around the right groin  puncture site the dressing is clean, dry, and intact. No hematoma noted. Call light within reach.

## 2019-07-07 NOTE — PROGRESS NOTES
Today's plan: continue present care, patient is still losing blood and not stable for discharge, hold antiplatelets and not stable for [facility]. Admit Date:  7/2/2019    Subjective:ok      Chief complaints on admission  Chief Complaint   Patient presents with    Fatigue    Rectal Bleeding         History of present illness:Rohit is a 70 y. o.year old who  presents with had concerns including Fatigue and Rectal Bleeding. Past medical history:    has a past medical history of Decreased hearing, Gout, H/O cardiovascular stress test, H/O echocardiogram, Hyperlipidemia, Nocturia, Osteoarthritis, and Restless leg. Past surgical history:   has a past surgical history that includes colectomy (2000); Knee arthroscopy (2002); joint replacement; Cholecystectomy (2001); Blepharoplasty (2010); Eye surgery (Left, 2011); Eye surgery (Right, 2011); Upper gastrointestinal endoscopy (N/A, 7/4/2019); and Colonoscopy (N/A, 7/4/2019). Social History:   reports that he quit smoking about 34 years ago. His smoking use included cigarettes. He started smoking about 59 years ago. He has a 50.00 pack-year smoking history. He has never used smokeless tobacco.  Family history:  family history includes Cancer in his mother. No Known Allergies      Objective:   /68   Pulse 56   Temp 98.6 °F (37 °C) (Oral)   Resp 21   Ht 6' (1.829 m)   Wt 247 lb 9.2 oz (112.3 kg)   SpO2 100%   BMI 33.58 kg/m²       Intake/Output Summary (Last 24 hours) at 7/7/2019 1924  Last data filed at 7/7/2019 1841  Gross per 24 hour   Intake 3765.56 ml   Output 750 ml   Net 3015.56 ml       TELEMETRY: Sinus     Physical Exam:  Constitutional:  Well developed, Well nourished, No acute distress, Non-toxic appearance. HENT:  Normocephalic, Atraumatic, Bilateral external ears normal, Oropharynx moist, No oral exudates, Nose normal. Neck- Normal range of motion, No tenderness, Supple, No stridor. Eyes:  PERRL, EOMI, Conjunctiva normal, No discharge.

## 2019-07-08 ENCOUNTER — ANESTHESIA EVENT (OUTPATIENT)
Dept: OPERATING ROOM | Age: 79
DRG: 330 | End: 2019-07-08
Payer: MEDICARE

## 2019-07-08 ENCOUNTER — ANESTHESIA (OUTPATIENT)
Dept: OPERATING ROOM | Age: 79
DRG: 330 | End: 2019-07-08
Payer: MEDICARE

## 2019-07-08 VITALS
OXYGEN SATURATION: 91 % | RESPIRATION RATE: 5 BRPM | SYSTOLIC BLOOD PRESSURE: 168 MMHG | DIASTOLIC BLOOD PRESSURE: 83 MMHG | TEMPERATURE: 97.9 F

## 2019-07-08 LAB
ALBUMIN SERPL-MCNC: 3.3 GM/DL (ref 3.4–5)
ALP BLD-CCNC: 58 IU/L (ref 40–128)
ALT SERPL-CCNC: 26 U/L (ref 10–40)
AMYLASE: 39 U/L (ref 25–115)
ANION GAP SERPL CALCULATED.3IONS-SCNC: 11 MMOL/L (ref 4–16)
AST SERPL-CCNC: 26 IU/L (ref 15–37)
BASOPHILS ABSOLUTE: 0 K/CU MM
BASOPHILS RELATIVE PERCENT: 0.3 % (ref 0–1)
BILIRUB SERPL-MCNC: 0.5 MG/DL (ref 0–1)
BUN BLDV-MCNC: 10 MG/DL (ref 6–23)
CALCIUM SERPL-MCNC: 8.5 MG/DL (ref 8.3–10.6)
CHLORIDE BLD-SCNC: 108 MMOL/L (ref 99–110)
CO2: 24 MMOL/L (ref 21–32)
COMPONENT: NORMAL
COMPONENT: NORMAL
CREAT SERPL-MCNC: 0.7 MG/DL (ref 0.9–1.3)
DIFFERENTIAL TYPE: ABNORMAL
EOSINOPHILS ABSOLUTE: 0.3 K/CU MM
EOSINOPHILS RELATIVE PERCENT: 3.4 % (ref 0–3)
GFR AFRICAN AMERICAN: >60 ML/MIN/1.73M2
GFR NON-AFRICAN AMERICAN: >60 ML/MIN/1.73M2
GLUCOSE BLD-MCNC: 101 MG/DL (ref 70–99)
HCT VFR BLD CALC: 23.7 % (ref 42–52)
HCT VFR BLD CALC: 26.6 % (ref 42–52)
HCT VFR BLD CALC: 36 % (ref 42–52)
HCT VFR BLD CALC: 36.3 % (ref 42–52)
HEMOGLOBIN: 11.5 GM/DL (ref 13.5–18)
HEMOGLOBIN: 7.5 GM/DL (ref 13.5–18)
HEMOGLOBIN: 8.4 GM/DL (ref 13.5–18)
HEMOGLOBIN: ABNORMAL GM/DL (ref 13.5–18)
IMMATURE NEUTROPHIL %: 0.3 % (ref 0–0.43)
LIPASE: 21 IU/L (ref 13–60)
LYMPHOCYTES ABSOLUTE: 1.3 K/CU MM
LYMPHOCYTES RELATIVE PERCENT: 14.1 % (ref 24–44)
MCH RBC QN AUTO: 29.6 PG (ref 27–31)
MCHC RBC AUTO-ENTMCNC: 31.6 % (ref 32–36)
MCV RBC AUTO: 93.7 FL (ref 78–100)
MONOCYTES ABSOLUTE: 0.5 K/CU MM
MONOCYTES RELATIVE PERCENT: 5.5 % (ref 0–4)
NUCLEATED RBC %: 0 %
PDW BLD-RTO: 16.3 % (ref 11.7–14.9)
PLATELET # BLD: 197 K/CU MM (ref 140–440)
PMV BLD AUTO: 11 FL (ref 7.5–11.1)
POTASSIUM SERPL-SCNC: 4.1 MMOL/L (ref 3.5–5.1)
RBC # BLD: 2.53 M/CU MM (ref 4.6–6.2)
SEGMENTED NEUTROPHILS ABSOLUTE COUNT: 7.2 K/CU MM
SEGMENTED NEUTROPHILS RELATIVE PERCENT: 76.4 % (ref 36–66)
SODIUM BLD-SCNC: 143 MMOL/L (ref 135–145)
STATUS: NORMAL
STATUS: NORMAL
TOTAL IMMATURE NEUTOROPHIL: 0.03 K/CU MM
TOTAL NUCLEATED RBC: 0 K/CU MM
TOTAL PROTEIN: 5.3 GM/DL (ref 6.4–8.2)
TRANSFUSION STATUS: NORMAL
TRANSFUSION STATUS: NORMAL
UNIT DIVISION: 0
UNIT DIVISION: NORMAL
UNIT NUMBER: NORMAL
UNIT NUMBER: NORMAL
WBC # BLD: 9.4 K/CU MM (ref 4–10.5)

## 2019-07-08 PROCEDURE — 86850 RBC ANTIBODY SCREEN: CPT

## 2019-07-08 PROCEDURE — 85025 COMPLETE CBC W/AUTO DIFF WBC: CPT

## 2019-07-08 PROCEDURE — 0DBF0ZZ EXCISION OF RIGHT LARGE INTESTINE, OPEN APPROACH: ICD-10-PCS | Performed by: SURGERY

## 2019-07-08 PROCEDURE — 86900 BLOOD TYPING SEROLOGIC ABO: CPT

## 2019-07-08 PROCEDURE — 2580000003 HC RX 258: Performed by: INTERNAL MEDICINE

## 2019-07-08 PROCEDURE — 85014 HEMATOCRIT: CPT

## 2019-07-08 PROCEDURE — 85018 HEMOGLOBIN: CPT

## 2019-07-08 PROCEDURE — 7100000000 HC PACU RECOVERY - FIRST 15 MIN: Performed by: SURGERY

## 2019-07-08 PROCEDURE — 3600000014 HC SURGERY LEVEL 4 ADDTL 15MIN: Performed by: SURGERY

## 2019-07-08 PROCEDURE — 94761 N-INVAS EAR/PLS OXIMETRY MLT: CPT

## 2019-07-08 PROCEDURE — APPSS30 APP SPLIT SHARED TIME 16-30 MINUTES: Performed by: NURSE PRACTITIONER

## 2019-07-08 PROCEDURE — 6360000002 HC RX W HCPCS: Performed by: SURGERY

## 2019-07-08 PROCEDURE — 2500000003 HC RX 250 WO HCPCS: Performed by: SURGERY

## 2019-07-08 PROCEDURE — 36430 TRANSFUSION BLD/BLD COMPNT: CPT

## 2019-07-08 PROCEDURE — 3700000001 HC ADD 15 MINUTES (ANESTHESIA): Performed by: SURGERY

## 2019-07-08 PROCEDURE — 36415 COLL VENOUS BLD VENIPUNCTURE: CPT

## 2019-07-08 PROCEDURE — C9113 INJ PANTOPRAZOLE SODIUM, VIA: HCPCS | Performed by: SPECIALIST

## 2019-07-08 PROCEDURE — 1200000000 HC SEMI PRIVATE

## 2019-07-08 PROCEDURE — P9016 RBC LEUKOCYTES REDUCED: HCPCS

## 2019-07-08 PROCEDURE — 82150 ASSAY OF AMYLASE: CPT

## 2019-07-08 PROCEDURE — 2580000003 HC RX 258: Performed by: SPECIALIST

## 2019-07-08 PROCEDURE — 80048 BASIC METABOLIC PNL TOTAL CA: CPT

## 2019-07-08 PROCEDURE — 6370000000 HC RX 637 (ALT 250 FOR IP): Performed by: SURGERY

## 2019-07-08 PROCEDURE — 6360000002 HC RX W HCPCS: Performed by: NURSE ANESTHETIST, CERTIFIED REGISTERED

## 2019-07-08 PROCEDURE — 3700000000 HC ANESTHESIA ATTENDED CARE: Performed by: SURGERY

## 2019-07-08 PROCEDURE — 2709999900 HC NON-CHARGEABLE SUPPLY: Performed by: SURGERY

## 2019-07-08 PROCEDURE — 2580000003 HC RX 258

## 2019-07-08 PROCEDURE — 88307 TISSUE EXAM BY PATHOLOGIST: CPT

## 2019-07-08 PROCEDURE — 2500000003 HC RX 250 WO HCPCS: Performed by: NURSE ANESTHETIST, CERTIFIED REGISTERED

## 2019-07-08 PROCEDURE — 6360000002 HC RX W HCPCS: Performed by: SPECIALIST

## 2019-07-08 PROCEDURE — 6370000000 HC RX 637 (ALT 250 FOR IP): Performed by: INTERNAL MEDICINE

## 2019-07-08 PROCEDURE — 99232 SBSQ HOSP IP/OBS MODERATE 35: CPT | Performed by: INTERNAL MEDICINE

## 2019-07-08 PROCEDURE — 83690 ASSAY OF LIPASE: CPT

## 2019-07-08 PROCEDURE — 86901 BLOOD TYPING SEROLOGIC RH(D): CPT

## 2019-07-08 PROCEDURE — 7100000001 HC PACU RECOVERY - ADDTL 15 MIN: Performed by: SURGERY

## 2019-07-08 PROCEDURE — 3600000004 HC SURGERY LEVEL 4 BASE: Performed by: SURGERY

## 2019-07-08 PROCEDURE — 80053 COMPREHEN METABOLIC PANEL: CPT

## 2019-07-08 PROCEDURE — 86922 COMPATIBILITY TEST ANTIGLOB: CPT

## 2019-07-08 PROCEDURE — 2580000003 HC RX 258: Performed by: NURSE ANESTHETIST, CERTIFIED REGISTERED

## 2019-07-08 PROCEDURE — 2720000010 HC SURG SUPPLY STERILE: Performed by: SURGERY

## 2019-07-08 RX ORDER — HYDROMORPHONE HCL 110MG/55ML
PATIENT CONTROLLED ANALGESIA SYRINGE INTRAVENOUS PRN
Status: DISCONTINUED | OUTPATIENT
Start: 2019-07-08 | End: 2019-07-08 | Stop reason: SDUPTHER

## 2019-07-08 RX ORDER — DIPHENHYDRAMINE HCL 25 MG
25 TABLET ORAL ONCE
Status: COMPLETED | OUTPATIENT
Start: 2019-07-08 | End: 2019-07-08

## 2019-07-08 RX ORDER — 0.9 % SODIUM CHLORIDE 0.9 %
250 INTRAVENOUS SOLUTION INTRAVENOUS ONCE
Status: DISCONTINUED | OUTPATIENT
Start: 2019-07-08 | End: 2019-07-08

## 2019-07-08 RX ORDER — LIDOCAINE HYDROCHLORIDE 20 MG/ML
INJECTION, SOLUTION INTRAVENOUS PRN
Status: DISCONTINUED | OUTPATIENT
Start: 2019-07-08 | End: 2019-07-08 | Stop reason: SDUPTHER

## 2019-07-08 RX ORDER — PROPOFOL 10 MG/ML
INJECTION, EMULSION INTRAVENOUS PRN
Status: DISCONTINUED | OUTPATIENT
Start: 2019-07-08 | End: 2019-07-08 | Stop reason: SDUPTHER

## 2019-07-08 RX ORDER — ONDANSETRON 2 MG/ML
INJECTION INTRAMUSCULAR; INTRAVENOUS PRN
Status: DISCONTINUED | OUTPATIENT
Start: 2019-07-08 | End: 2019-07-08 | Stop reason: SDUPTHER

## 2019-07-08 RX ORDER — ROCURONIUM BROMIDE 10 MG/ML
INJECTION, SOLUTION INTRAVENOUS PRN
Status: DISCONTINUED | OUTPATIENT
Start: 2019-07-08 | End: 2019-07-08 | Stop reason: SDUPTHER

## 2019-07-08 RX ORDER — SODIUM CHLORIDE, SODIUM LACTATE, POTASSIUM CHLORIDE, CALCIUM CHLORIDE 600; 310; 30; 20 MG/100ML; MG/100ML; MG/100ML; MG/100ML
INJECTION, SOLUTION INTRAVENOUS CONTINUOUS PRN
Status: DISCONTINUED | OUTPATIENT
Start: 2019-07-08 | End: 2019-07-08 | Stop reason: SDUPTHER

## 2019-07-08 RX ORDER — BACITRACIN, NEOMYCIN, POLYMYXIN B 400; 3.5; 5 [USP'U]/G; MG/G; [USP'U]/G
OINTMENT TOPICAL 2 TIMES DAILY PRN
Status: DISCONTINUED | OUTPATIENT
Start: 2019-07-08 | End: 2019-07-14 | Stop reason: HOSPADM

## 2019-07-08 RX ORDER — SODIUM CHLORIDE, SODIUM LACTATE, POTASSIUM CHLORIDE, CALCIUM CHLORIDE 600; 310; 30; 20 MG/100ML; MG/100ML; MG/100ML; MG/100ML
INJECTION, SOLUTION INTRAVENOUS CONTINUOUS
Status: DISCONTINUED | OUTPATIENT
Start: 2019-07-08 | End: 2019-07-10

## 2019-07-08 RX ORDER — SODIUM CHLORIDE 9 MG/ML
INJECTION, SOLUTION INTRAVENOUS
Status: DISPENSED
Start: 2019-07-08 | End: 2019-07-08

## 2019-07-08 RX ORDER — HYDRALAZINE HYDROCHLORIDE 20 MG/ML
INJECTION INTRAMUSCULAR; INTRAVENOUS PRN
Status: DISCONTINUED | OUTPATIENT
Start: 2019-07-08 | End: 2019-07-08 | Stop reason: SDUPTHER

## 2019-07-08 RX ORDER — HYDROMORPHONE HCL 110MG/55ML
0.5 PATIENT CONTROLLED ANALGESIA SYRINGE INTRAVENOUS
Status: DISCONTINUED | OUTPATIENT
Start: 2019-07-08 | End: 2019-07-14 | Stop reason: HOSPADM

## 2019-07-08 RX ORDER — 0.9 % SODIUM CHLORIDE 0.9 %
250 INTRAVENOUS SOLUTION INTRAVENOUS ONCE
Status: COMPLETED | OUTPATIENT
Start: 2019-07-08 | End: 2019-07-08

## 2019-07-08 RX ORDER — ONDANSETRON 2 MG/ML
4 INJECTION INTRAMUSCULAR; INTRAVENOUS EVERY 6 HOURS PRN
Status: DISCONTINUED | OUTPATIENT
Start: 2019-07-08 | End: 2019-07-14 | Stop reason: HOSPADM

## 2019-07-08 RX ORDER — 0.9 % SODIUM CHLORIDE 0.9 %
20 INTRAVENOUS SOLUTION INTRAVENOUS ONCE
Status: DISCONTINUED | OUTPATIENT
Start: 2019-07-08 | End: 2019-07-08

## 2019-07-08 RX ORDER — SUCCINYLCHOLINE/SOD CL,ISO/PF 100 MG/5ML
SYRINGE (ML) INTRAVENOUS PRN
Status: DISCONTINUED | OUTPATIENT
Start: 2019-07-08 | End: 2019-07-08 | Stop reason: SDUPTHER

## 2019-07-08 RX ORDER — HYDRALAZINE HYDROCHLORIDE 20 MG/ML
5 INJECTION INTRAMUSCULAR; INTRAVENOUS EVERY 10 MIN PRN
Status: DISCONTINUED | OUTPATIENT
Start: 2019-07-08 | End: 2019-07-08 | Stop reason: HOSPADM

## 2019-07-08 RX ORDER — CEFAZOLIN SODIUM 2 G/100ML
2 INJECTION, SOLUTION INTRAVENOUS ONCE
Status: COMPLETED | OUTPATIENT
Start: 2019-07-08 | End: 2019-07-08

## 2019-07-08 RX ORDER — SODIUM CHLORIDE, SODIUM LACTATE, POTASSIUM CHLORIDE, CALCIUM CHLORIDE 600; 310; 30; 20 MG/100ML; MG/100ML; MG/100ML; MG/100ML
INJECTION, SOLUTION INTRAVENOUS
Status: COMPLETED
Start: 2019-07-08 | End: 2019-07-08

## 2019-07-08 RX ORDER — LABETALOL 20 MG/4 ML (5 MG/ML) INTRAVENOUS SYRINGE
5 EVERY 10 MIN PRN
Status: DISCONTINUED | OUTPATIENT
Start: 2019-07-08 | End: 2019-07-08 | Stop reason: HOSPADM

## 2019-07-08 RX ORDER — KETOROLAC TROMETHAMINE 30 MG/ML
INJECTION, SOLUTION INTRAMUSCULAR; INTRAVENOUS PRN
Status: DISCONTINUED | OUTPATIENT
Start: 2019-07-08 | End: 2019-07-08 | Stop reason: SDUPTHER

## 2019-07-08 RX ORDER — DEXAMETHASONE SODIUM PHOSPHATE 4 MG/ML
INJECTION, SOLUTION INTRA-ARTICULAR; INTRALESIONAL; INTRAMUSCULAR; INTRAVENOUS; SOFT TISSUE PRN
Status: DISCONTINUED | OUTPATIENT
Start: 2019-07-08 | End: 2019-07-08 | Stop reason: SDUPTHER

## 2019-07-08 RX ORDER — ACETAMINOPHEN 10 MG/ML
INJECTION, SOLUTION INTRAVENOUS PRN
Status: DISCONTINUED | OUTPATIENT
Start: 2019-07-08 | End: 2019-07-08 | Stop reason: SDUPTHER

## 2019-07-08 RX ORDER — HYDROMORPHONE HCL 110MG/55ML
0.5 PATIENT CONTROLLED ANALGESIA SYRINGE INTRAVENOUS EVERY 5 MIN PRN
Status: DISCONTINUED | OUTPATIENT
Start: 2019-07-08 | End: 2019-07-08 | Stop reason: HOSPADM

## 2019-07-08 RX ORDER — CEFAZOLIN SODIUM 2 G/100ML
2 INJECTION, SOLUTION INTRAVENOUS EVERY 8 HOURS
Status: COMPLETED | OUTPATIENT
Start: 2019-07-09 | End: 2019-07-09

## 2019-07-08 RX ORDER — FENTANYL CITRATE 50 UG/ML
25 INJECTION, SOLUTION INTRAMUSCULAR; INTRAVENOUS EVERY 5 MIN PRN
Status: DISCONTINUED | OUTPATIENT
Start: 2019-07-08 | End: 2019-07-08 | Stop reason: HOSPADM

## 2019-07-08 RX ADMIN — DEXAMETHASONE SODIUM PHOSPHATE 8 MG: 4 INJECTION, SOLUTION INTRAMUSCULAR; INTRAVENOUS at 15:27

## 2019-07-08 RX ADMIN — SODIUM CHLORIDE, POTASSIUM CHLORIDE, SODIUM LACTATE AND CALCIUM CHLORIDE: 600; 310; 30; 20 INJECTION, SOLUTION INTRAVENOUS at 16:52

## 2019-07-08 RX ADMIN — HYDROMORPHONE HYDROCHLORIDE 0.4 MG: 2 INJECTION INTRAMUSCULAR; INTRAVENOUS; SUBCUTANEOUS at 16:05

## 2019-07-08 RX ADMIN — ROCURONIUM BROMIDE 50 MG: 10 INJECTION INTRAVENOUS at 15:30

## 2019-07-08 RX ADMIN — METOPROLOL TARTRATE 25 MG: 25 TABLET ORAL at 09:55

## 2019-07-08 RX ADMIN — HYDROMORPHONE HYDROCHLORIDE 0.5 MG: 2 INJECTION, SOLUTION INTRAMUSCULAR; INTRAVENOUS; SUBCUTANEOUS at 20:52

## 2019-07-08 RX ADMIN — HYDROMORPHONE HYDROCHLORIDE 1 MG: 2 INJECTION INTRAMUSCULAR; INTRAVENOUS; SUBCUTANEOUS at 15:27

## 2019-07-08 RX ADMIN — HYDRALAZINE HYDROCHLORIDE 10 MG: 20 INJECTION INTRAMUSCULAR; INTRAVENOUS at 16:47

## 2019-07-08 RX ADMIN — METRONIDAZOLE 500 MG: 500 INJECTION, SOLUTION INTRAVENOUS at 15:45

## 2019-07-08 RX ADMIN — SUGAMMADEX 100 MG: 100 INJECTION, SOLUTION INTRAVENOUS at 16:56

## 2019-07-08 RX ADMIN — ROCURONIUM BROMIDE 20 MG: 10 INJECTION INTRAVENOUS at 16:32

## 2019-07-08 RX ADMIN — METOPROLOL TARTRATE 25 MG: 25 TABLET ORAL at 20:52

## 2019-07-08 RX ADMIN — SODIUM CHLORIDE, POTASSIUM CHLORIDE, SODIUM LACTATE AND CALCIUM CHLORIDE: 600; 310; 30; 20 INJECTION, SOLUTION INTRAVENOUS at 15:20

## 2019-07-08 RX ADMIN — VENLAFAXINE HYDROCHLORIDE 150 MG: 37.5 CAPSULE, EXTENDED RELEASE ORAL at 09:55

## 2019-07-08 RX ADMIN — BACITRACIN, NEOMYCIN, POLYMYXIN B: 400; 3.5; 5 OINTMENT TOPICAL at 20:56

## 2019-07-08 RX ADMIN — SODIUM CHLORIDE 250 ML: 9 INJECTION, SOLUTION INTRAVENOUS at 01:46

## 2019-07-08 RX ADMIN — HYDROMORPHONE HYDROCHLORIDE 0.4 MG: 2 INJECTION INTRAMUSCULAR; INTRAVENOUS; SUBCUTANEOUS at 15:47

## 2019-07-08 RX ADMIN — PROPOFOL 100 MG: 10 INJECTION, EMULSION INTRAVENOUS at 15:27

## 2019-07-08 RX ADMIN — PANTOPRAZOLE SODIUM 40 MG: 40 INJECTION, POWDER, FOR SOLUTION INTRAVENOUS at 09:56

## 2019-07-08 RX ADMIN — SODIUM CHLORIDE: 9 INJECTION, SOLUTION INTRAVENOUS at 17:58

## 2019-07-08 RX ADMIN — ACETAMINOPHEN 1000 MG: 10 INJECTION, SOLUTION INTRAVENOUS at 16:00

## 2019-07-08 RX ADMIN — ONDANSETRON 4 MG: 2 INJECTION INTRAMUSCULAR; INTRAVENOUS at 15:27

## 2019-07-08 RX ADMIN — Medication 100 MG: at 15:27

## 2019-07-08 RX ADMIN — ESCITALOPRAM OXALATE 10 MG: 10 TABLET ORAL at 09:55

## 2019-07-08 RX ADMIN — SUGAMMADEX 100 MG: 100 INJECTION, SOLUTION INTRAVENOUS at 16:50

## 2019-07-08 RX ADMIN — DIPHENHYDRAMINE HCL 25 MG: 25 TABLET ORAL at 03:26

## 2019-07-08 RX ADMIN — LIDOCAINE HYDROCHLORIDE 100 MG: 20 INJECTION, SOLUTION INTRAVENOUS at 15:27

## 2019-07-08 RX ADMIN — CEFAZOLIN SODIUM 2 G: 2 INJECTION, SOLUTION INTRAVENOUS at 15:30

## 2019-07-08 RX ADMIN — SODIUM CHLORIDE, POTASSIUM CHLORIDE, SODIUM LACTATE AND CALCIUM CHLORIDE 1000 ML: 600; 310; 30; 20 INJECTION, SOLUTION INTRAVENOUS at 15:18

## 2019-07-08 RX ADMIN — FINASTERIDE 5 MG: 5 TABLET, FILM COATED ORAL at 09:55

## 2019-07-08 RX ADMIN — KETOROLAC TROMETHAMINE 15 MG: 30 INJECTION, SOLUTION INTRAMUSCULAR; INTRAVENOUS at 16:54

## 2019-07-08 RX ADMIN — HYDROMORPHONE HYDROCHLORIDE 0.2 MG: 2 INJECTION INTRAMUSCULAR; INTRAVENOUS; SUBCUTANEOUS at 16:30

## 2019-07-08 RX ADMIN — ALLOPURINOL 300 MG: 100 TABLET ORAL at 09:55

## 2019-07-08 ASSESSMENT — PULMONARY FUNCTION TESTS
PIF_VALUE: 2
PIF_VALUE: 15
PIF_VALUE: 16
PIF_VALUE: 3
PIF_VALUE: 17
PIF_VALUE: 15
PIF_VALUE: 16
PIF_VALUE: 16
PIF_VALUE: 10
PIF_VALUE: 16
PIF_VALUE: 17
PIF_VALUE: 17
PIF_VALUE: 16
PIF_VALUE: 17
PIF_VALUE: 15
PIF_VALUE: 19
PIF_VALUE: 16
PIF_VALUE: 15
PIF_VALUE: 17
PIF_VALUE: 15
PIF_VALUE: 17
PIF_VALUE: 16
PIF_VALUE: 20
PIF_VALUE: 17
PIF_VALUE: 9
PIF_VALUE: 16
PIF_VALUE: 5
PIF_VALUE: 17
PIF_VALUE: 15
PIF_VALUE: 0
PIF_VALUE: 15
PIF_VALUE: 18
PIF_VALUE: 16
PIF_VALUE: 17
PIF_VALUE: 15
PIF_VALUE: 19
PIF_VALUE: 6
PIF_VALUE: 16
PIF_VALUE: 16
PIF_VALUE: 1
PIF_VALUE: 17
PIF_VALUE: 15
PIF_VALUE: 17
PIF_VALUE: 17
PIF_VALUE: 15
PIF_VALUE: 17
PIF_VALUE: 17
PIF_VALUE: 15
PIF_VALUE: 16
PIF_VALUE: 17
PIF_VALUE: 18
PIF_VALUE: 15
PIF_VALUE: 16
PIF_VALUE: 17
PIF_VALUE: 2
PIF_VALUE: 17
PIF_VALUE: 4
PIF_VALUE: 17
PIF_VALUE: 17
PIF_VALUE: 15
PIF_VALUE: 17
PIF_VALUE: 15
PIF_VALUE: 16
PIF_VALUE: 17
PIF_VALUE: 15
PIF_VALUE: 17
PIF_VALUE: 15
PIF_VALUE: 17
PIF_VALUE: 15
PIF_VALUE: 16
PIF_VALUE: 4
PIF_VALUE: 17
PIF_VALUE: 16
PIF_VALUE: 2
PIF_VALUE: 16
PIF_VALUE: 17
PIF_VALUE: 1
PIF_VALUE: 17
PIF_VALUE: 17
PIF_VALUE: 16
PIF_VALUE: 16
PIF_VALUE: 17
PIF_VALUE: 16
PIF_VALUE: 13

## 2019-07-08 ASSESSMENT — PAIN DESCRIPTION - FREQUENCY
FREQUENCY: INTERMITTENT
FREQUENCY: INTERMITTENT

## 2019-07-08 ASSESSMENT — PAIN SCALES - GENERAL
PAINLEVEL_OUTOF10: 2
PAINLEVEL_OUTOF10: 0
PAINLEVEL_OUTOF10: 4
PAINLEVEL_OUTOF10: 0

## 2019-07-08 ASSESSMENT — PAIN DESCRIPTION - DESCRIPTORS
DESCRIPTORS: ACHING
DESCRIPTORS: ACHING;DISCOMFORT

## 2019-07-08 ASSESSMENT — PAIN - FUNCTIONAL ASSESSMENT: PAIN_FUNCTIONAL_ASSESSMENT: PREVENTS OR INTERFERES SOME ACTIVE ACTIVITIES AND ADLS

## 2019-07-08 ASSESSMENT — PAIN DESCRIPTION - ORIENTATION: ORIENTATION: MID

## 2019-07-08 ASSESSMENT — PAIN DESCRIPTION - LOCATION
LOCATION: ABDOMEN
LOCATION: ABDOMEN

## 2019-07-08 ASSESSMENT — PAIN DESCRIPTION - PAIN TYPE
TYPE: SURGICAL PAIN
TYPE: SURGICAL PAIN

## 2019-07-08 ASSESSMENT — ENCOUNTER SYMPTOMS: SHORTNESS OF BREATH: 1

## 2019-07-08 ASSESSMENT — PAIN DESCRIPTION - PROGRESSION: CLINICAL_PROGRESSION: NOT CHANGED

## 2019-07-08 ASSESSMENT — PAIN DESCRIPTION - ONSET: ONSET: GRADUAL

## 2019-07-08 NOTE — PLAN OF CARE
Problem: Falls - Risk of:  Goal: Will remain free from falls  Description  Will remain free from falls  Outcome: Ongoing  Goal: Absence of physical injury  Description  Absence of physical injury  Outcome: Ongoing     Problem: Safety:  Goal: Free from accidental physical injury  Description  Free from accidental physical injury  Outcome: Ongoing     Problem: Daily Care:  Goal: Daily care needs are met  Description  Daily care needs are met  Outcome: Ongoing     Problem: Pain:  Goal: Patient's pain/discomfort is manageable  Description  Patient's pain/discomfort is manageable  Outcome: Ongoing     Problem: Discharge Planning:  Goal: Patients continuum of care needs are met  Description  Patients continuum of care needs are met  Outcome: Ongoing   Progressing towards the goals.

## 2019-07-08 NOTE — PROGRESS NOTES
allopurinol (ZYLOPRIM) tablet 300 mg  300 mg Oral Daily Kelly Starks MD   300 mg at 07/08/19 0955    escitalopram (LEXAPRO) tablet 10 mg  10 mg Oral Daily Kelly Starks MD   10 mg at 07/08/19 0955    finasteride (PROSCAR) tablet 5 mg  5 mg Oral Daily Kelly Starks MD   5 mg at 07/08/19 0955    metoprolol tartrate (LOPRESSOR) tablet 25 mg  25 mg Oral BID Kelly Starks MD   25 mg at 07/08/19 0955    venlafaxine (EFFEXOR XR) extended release capsule 150 mg  150 mg Oral QAM Kelly Starks MD   150 mg at 07/08/19 0955    acetaminophen (TYLENOL) tablet 650 mg  650 mg Oral Q4H PRN Kelly Starks MD        hydrALAZINE (APRESOLINE) 10 mg in sodium chloride 0.9 % 50 mL ivpb  10 mg Intravenous Q4H PRN Kelly Starks MD             Allergies  No Known Allergies    REVIEW OF SYSTEMS     Within above limitations. 14 point review of systems reviewed. Pertinent positive or negative as per HPI or otherwise negative per 14 point systems review. Reviewed 7/8/2019 at 12:00 PM    PHYSICAL EXAM       Blood pressure (!) 130/57, pulse 67, temperature 98.3 °F (36.8 °C), temperature source Oral, resp. rate 18, height 6' (1.829 m), weight 246 lb 9.8 oz (111.9 kg), SpO2 94 %. General - AAO x 3  Psych - Appropriate affect/speech. No agitation  Eyes - Eye lids intact. No scleral icterus  ENT - Lips wnl. External ear clear/dry/intact. No thyromegaly on inspection  Neuro - No gross peripheral or central neuro deficits on inspection  Heart - Sinus. RRR. S1 and S2 present. No elevated JVD appreciated  Lung - Adequate air entry b/l, No crackles/wheezes appreciated  GI -  Soft. No guarding/rigidity. BS+   - No CVA/suprapubic tenderness or palpable bladder distension  Skin - Intact. No rash/petechiae/ecchymosis.  Warm extremities      LABS AND IMAGING   CBC  [unfilled]    Last 3 Hemoglobin  Lab Results   Component Value Date    HGB 7.5 07/08/2019    HGB 8.3 07/07/2019    HGB 8.9 07/07/2019     Last 3 WBC/ANC  Lab Results   Component Value

## 2019-07-08 NOTE — PROGRESS NOTES
Start it blood transfusion, VS stable, pulse is in the low 50's. Monitor patient for the first 15 minutes. Patient tolerates transfusion well. All question was answered. Consent in the chart.

## 2019-07-08 NOTE — ANESTHESIA POSTPROCEDURE EVALUATION
Department of Anesthesiology  Postprocedure Note    Patient: Ayesha Awad  MRN: [id number]  YOB: 1940  Date of evaluation: 7/8/2019  Time:  5:58 PM     Procedure Summary     Date:  07/08/19 Room / Location:  [place] [address] 03 / 1200 Levine, Susan. \Hospital Has a New Name and Outlook.\""    Anesthesia Start:  7797 Anesthesia Stop:  3117    Procedure:  LAPAROTOMY EXPLORATORY RIGHT ILEOCOLECTOMY (N/A Abdomen) Diagnosis:  (BOWEL OBSTRUCTION)    Surgeon:  Enrrique Blanco MD Responsible Provider:  Ramon Dozier MD    Anesthesia Type:  general ASA Status:  4          Anesthesia Type: general    Connor Phase I: Connor Score: 7    Ocnnor Phase II:      Last vitals: Reviewed and per EMR flowsheets.        Anesthesia Post Evaluation    Patient location during evaluation: PACU  Patient participation: complete - patient participated  Level of consciousness: awake and alert  Pain score: 0  Airway patency: patent  Nausea & Vomiting: no nausea and no vomiting  Complications: no  Cardiovascular status: hemodynamically stable  Respiratory status: acceptable  Hydration status: euvolemic

## 2019-07-08 NOTE — PROCEDURES
Repeat SMA angio is negative. No extravasation. Of note patients last bloody bowel movement this AM plus colon currently airfilled on fluro.

## 2019-07-08 NOTE — BRIEF OP NOTE
Brief Postoperative Note  ______________________________________________________________    Patient: Neil Haynes  YOB: 1940  MRN: [id number]  Date of Procedure: 7/8/2019    Pre-Op Diagnosis: Lower GI bleeding    Post-Op Diagnosis: Same       Procedure(s):  LAPAROTOMY EXPLORATORY RIGHT ILEOCOLECTOMY    Anesthesia: General    Surgeon(s):  Kelly Syed MD    Assistant: none    Estimated Blood Loss (mL): 50    Complications: None    Specimens:   ID Type Source Tests Collected by Time Destination   A : ILEOCOLECTOMY Tissue Colon SURGICAL PATHOLOGY Kelly Syed MD 7/8/2019 1620        Implants:  * No implants in log *      Drains:   Urethral Catheter Non-latex 16 fr (Active)       Findings: diverticulosis    Kelly Syed MD  Date: 7/8/2019  Time: 5:08 PM

## 2019-07-08 NOTE — PROGRESS NOTES
concerns including Fatigue and Rectal Bleeding. Chief Complaint   Patient presents with    Fatigue    Rectal Bleeding     Interval history:  fatigue    Physical Exam:  General:  Awake, alert, NAD  Head:normal  Eye:normal  Neck:  No JVD   Chest:  Clear to auscultation, respiration easy  Cardiovascular:  RRR S1S2  Abdomen:   nontender  Extremities:  no edema  Pulses; palpable  Neuro: grossly normal      MEDICAL DECISION MAKING;    I agree with the above plan, which was planned by myself and discussed with NP. [facility] as OP per Dr Madelin Garcia bleed is being addressed  Nuclear images reviewed, ?  Mild lat wall ischemia , manage medically for now        Lluvia Cagle MD [facility]

## 2019-07-09 LAB
ABO/RH: NORMAL
ABO/RH: NORMAL
ANION GAP SERPL CALCULATED.3IONS-SCNC: 12 MMOL/L (ref 4–16)
ANTIBODY SCREEN: NEGATIVE
ANTIBODY SCREEN: NEGATIVE
BASOPHILS ABSOLUTE: 0 K/CU MM
BASOPHILS RELATIVE PERCENT: 0.2 % (ref 0–1)
BUN BLDV-MCNC: 15 MG/DL (ref 6–23)
CALCIUM SERPL-MCNC: 8.4 MG/DL (ref 8.3–10.6)
CHLORIDE BLD-SCNC: 103 MMOL/L (ref 99–110)
CO2: 24 MMOL/L (ref 21–32)
COMPONENT: NORMAL
CREAT SERPL-MCNC: 0.7 MG/DL (ref 0.9–1.3)
CROSSMATCH RESULT: NORMAL
DIFFERENTIAL TYPE: ABNORMAL
EOSINOPHILS ABSOLUTE: 0 K/CU MM
EOSINOPHILS RELATIVE PERCENT: 0 % (ref 0–3)
GFR AFRICAN AMERICAN: >60 ML/MIN/1.73M2
GFR NON-AFRICAN AMERICAN: >60 ML/MIN/1.73M2
GLUCOSE BLD-MCNC: 109 MG/DL (ref 70–99)
HCT VFR BLD CALC: 34.4 % (ref 42–52)
HEMOGLOBIN: 10.7 GM/DL (ref 13.5–18)
IMMATURE NEUTROPHIL %: 0.5 % (ref 0–0.43)
LYMPHOCYTES ABSOLUTE: 0.6 K/CU MM
LYMPHOCYTES RELATIVE PERCENT: 3.4 % (ref 24–44)
MAGNESIUM: 1.8 MG/DL (ref 1.8–2.4)
MCH RBC QN AUTO: 29.1 PG (ref 27–31)
MCHC RBC AUTO-ENTMCNC: 31.1 % (ref 32–36)
MCV RBC AUTO: 93.5 FL (ref 78–100)
MONOCYTES ABSOLUTE: 0.8 K/CU MM
MONOCYTES RELATIVE PERCENT: 4.3 % (ref 0–4)
NUCLEATED RBC %: 0 %
PDW BLD-RTO: 17.6 % (ref 11.7–14.9)
PLATELET # BLD: 212 K/CU MM (ref 140–440)
PMV BLD AUTO: 11.2 FL (ref 7.5–11.1)
POTASSIUM SERPL-SCNC: 4.3 MMOL/L (ref 3.5–5.1)
RBC # BLD: 3.68 M/CU MM (ref 4.6–6.2)
SEGMENTED NEUTROPHILS ABSOLUTE COUNT: 16.9 K/CU MM
SEGMENTED NEUTROPHILS RELATIVE PERCENT: 91.6 % (ref 36–66)
SODIUM BLD-SCNC: 139 MMOL/L (ref 135–145)
STATUS: NORMAL
TOTAL IMMATURE NEUTOROPHIL: 0.09 K/CU MM
TOTAL NUCLEATED RBC: 0 K/CU MM
TRANSFUSION STATUS: NORMAL
UNIT DIVISION: 0
UNIT NUMBER: NORMAL
WBC # BLD: 18.4 K/CU MM (ref 4–10.5)

## 2019-07-09 PROCEDURE — 85014 HEMATOCRIT: CPT

## 2019-07-09 PROCEDURE — 6370000000 HC RX 637 (ALT 250 FOR IP): Performed by: INTERNAL MEDICINE

## 2019-07-09 PROCEDURE — 83735 ASSAY OF MAGNESIUM: CPT

## 2019-07-09 PROCEDURE — 1200000000 HC SEMI PRIVATE

## 2019-07-09 PROCEDURE — 6360000002 HC RX W HCPCS: Performed by: SURGERY

## 2019-07-09 PROCEDURE — 85025 COMPLETE CBC W/AUTO DIFF WBC: CPT

## 2019-07-09 PROCEDURE — C9113 INJ PANTOPRAZOLE SODIUM, VIA: HCPCS | Performed by: SURGERY

## 2019-07-09 PROCEDURE — 99232 SBSQ HOSP IP/OBS MODERATE 35: CPT | Performed by: INTERNAL MEDICINE

## 2019-07-09 PROCEDURE — 36415 COLL VENOUS BLD VENIPUNCTURE: CPT

## 2019-07-09 PROCEDURE — 97165 OT EVAL LOW COMPLEX 30 MIN: CPT

## 2019-07-09 PROCEDURE — 2580000003 HC RX 258: Performed by: ANESTHESIOLOGY

## 2019-07-09 PROCEDURE — 80048 BASIC METABOLIC PNL TOTAL CA: CPT

## 2019-07-09 PROCEDURE — 6370000000 HC RX 637 (ALT 250 FOR IP): Performed by: SURGERY

## 2019-07-09 PROCEDURE — APPSS30 APP SPLIT SHARED TIME 16-30 MINUTES: Performed by: NURSE PRACTITIONER

## 2019-07-09 PROCEDURE — 97116 GAIT TRAINING THERAPY: CPT

## 2019-07-09 PROCEDURE — 97535 SELF CARE MNGMENT TRAINING: CPT

## 2019-07-09 PROCEDURE — 97530 THERAPEUTIC ACTIVITIES: CPT

## 2019-07-09 PROCEDURE — 2500000003 HC RX 250 WO HCPCS: Performed by: SURGERY

## 2019-07-09 PROCEDURE — 97162 PT EVAL MOD COMPLEX 30 MIN: CPT

## 2019-07-09 PROCEDURE — 85018 HEMOGLOBIN: CPT

## 2019-07-09 RX ORDER — FUROSEMIDE 10 MG/ML
20 INJECTION INTRAMUSCULAR; INTRAVENOUS ONCE
Status: COMPLETED | OUTPATIENT
Start: 2019-07-09 | End: 2019-07-09

## 2019-07-09 RX ORDER — OXYCODONE HYDROCHLORIDE AND ACETAMINOPHEN 5; 325 MG/1; MG/1
1 TABLET ORAL EVERY 4 HOURS PRN
Status: DISCONTINUED | OUTPATIENT
Start: 2019-07-09 | End: 2019-07-14 | Stop reason: HOSPADM

## 2019-07-09 RX ADMIN — CEFAZOLIN SODIUM 2 G: 2 INJECTION, SOLUTION INTRAVENOUS at 08:46

## 2019-07-09 RX ADMIN — SODIUM CHLORIDE, POTASSIUM CHLORIDE, SODIUM LACTATE AND CALCIUM CHLORIDE: 600; 310; 30; 20 INJECTION, SOLUTION INTRAVENOUS at 11:12

## 2019-07-09 RX ADMIN — METOPROLOL TARTRATE 25 MG: 25 TABLET ORAL at 08:59

## 2019-07-09 RX ADMIN — ESCITALOPRAM OXALATE 10 MG: 10 TABLET ORAL at 08:58

## 2019-07-09 RX ADMIN — OXYCODONE HYDROCHLORIDE AND ACETAMINOPHEN 1 TABLET: 5; 325 TABLET ORAL at 21:24

## 2019-07-09 RX ADMIN — METRONIDAZOLE 500 MG: 500 INJECTION, SOLUTION INTRAVENOUS at 11:13

## 2019-07-09 RX ADMIN — ALLOPURINOL 300 MG: 100 TABLET ORAL at 08:58

## 2019-07-09 RX ADMIN — FINASTERIDE 5 MG: 5 TABLET, FILM COATED ORAL at 08:58

## 2019-07-09 RX ADMIN — OXYCODONE HYDROCHLORIDE AND ACETAMINOPHEN 1 TABLET: 5; 325 TABLET ORAL at 16:41

## 2019-07-09 RX ADMIN — FUROSEMIDE 20 MG: 10 INJECTION, SOLUTION INTRAVENOUS at 08:51

## 2019-07-09 RX ADMIN — METOPROLOL TARTRATE 25 MG: 25 TABLET ORAL at 21:14

## 2019-07-09 RX ADMIN — SODIUM CHLORIDE, POTASSIUM CHLORIDE, SODIUM LACTATE AND CALCIUM CHLORIDE: 600; 310; 30; 20 INJECTION, SOLUTION INTRAVENOUS at 21:15

## 2019-07-09 RX ADMIN — PANTOPRAZOLE SODIUM 40 MG: 40 INJECTION, POWDER, FOR SOLUTION INTRAVENOUS at 08:50

## 2019-07-09 RX ADMIN — METRONIDAZOLE 500 MG: 500 INJECTION, SOLUTION INTRAVENOUS at 18:11

## 2019-07-09 RX ADMIN — METRONIDAZOLE 500 MG: 500 INJECTION, SOLUTION INTRAVENOUS at 02:01

## 2019-07-09 RX ADMIN — CEFAZOLIN SODIUM 2 G: 2 INJECTION, SOLUTION INTRAVENOUS at 16:35

## 2019-07-09 RX ADMIN — SODIUM CHLORIDE, POTASSIUM CHLORIDE, SODIUM LACTATE AND CALCIUM CHLORIDE: 600; 310; 30; 20 INJECTION, SOLUTION INTRAVENOUS at 02:01

## 2019-07-09 RX ADMIN — VENLAFAXINE HYDROCHLORIDE 150 MG: 37.5 CAPSULE, EXTENDED RELEASE ORAL at 08:58

## 2019-07-09 RX ADMIN — CEFAZOLIN SODIUM 2 G: 2 INJECTION, SOLUTION INTRAVENOUS at 00:18

## 2019-07-09 ASSESSMENT — PAIN SCALES - GENERAL
PAINLEVEL_OUTOF10: 0
PAINLEVEL_OUTOF10: 2
PAINLEVEL_OUTOF10: 4
PAINLEVEL_OUTOF10: 6
PAINLEVEL_OUTOF10: 0

## 2019-07-09 ASSESSMENT — PAIN - FUNCTIONAL ASSESSMENT
PAIN_FUNCTIONAL_ASSESSMENT: ACTIVITIES ARE NOT PREVENTED
PAIN_FUNCTIONAL_ASSESSMENT: ACTIVITIES ARE NOT PREVENTED

## 2019-07-09 ASSESSMENT — PAIN DESCRIPTION - PAIN TYPE
TYPE: ACUTE PAIN
TYPE: SURGICAL PAIN

## 2019-07-09 ASSESSMENT — PAIN DESCRIPTION - FREQUENCY
FREQUENCY: INTERMITTENT
FREQUENCY: INTERMITTENT

## 2019-07-09 ASSESSMENT — PAIN DESCRIPTION - ONSET
ONSET: GRADUAL
ONSET: GRADUAL

## 2019-07-09 ASSESSMENT — PAIN DESCRIPTION - LOCATION
LOCATION: ABDOMEN
LOCATION: ABDOMEN;HEAD

## 2019-07-09 ASSESSMENT — PAIN DESCRIPTION - DESCRIPTORS
DESCRIPTORS: SHARP;SHOOTING
DESCRIPTORS: DISCOMFORT;SHARP

## 2019-07-09 ASSESSMENT — PAIN DESCRIPTION - PROGRESSION
CLINICAL_PROGRESSION: NOT CHANGED
CLINICAL_PROGRESSION: GRADUALLY IMPROVING

## 2019-07-09 ASSESSMENT — PAIN DESCRIPTION - ORIENTATION
ORIENTATION: MID
ORIENTATION: MID

## 2019-07-09 NOTE — CONSULTS
[address] [facility],2Nd Floor ACUTE CARE PHYSICAL THERAPY EVALUATION  Artist Mikael, 1940, 4004/4004-A, 7/9/2019    History  [place]:  The primary encounter diagnosis was Generalized weakness. Diagnoses of Shortness of breath, Bloody diarrhea, Dehydration, Abdominal hemorrhage, and Bleeding were also pertinent to this visit. Patient  has a past medical history of Decreased hearing, Gout, H/O cardiovascular stress test, H/O echocardiogram, Hyperlipidemia, Nocturia, Osteoarthritis, and Restless leg. Patient  has a past surgical history that includes colectomy (2000); Knee arthroscopy (2002); joint replacement; Cholecystectomy (2001); Blepharoplasty (2010); Eye surgery (Left, 2011); Eye surgery (Right, 2011); Upper gastrointestinal endoscopy (N/A, 7/4/2019); Colonoscopy (N/A, 7/4/2019); and Small intestine surgery (N/A, 7/8/2019). Subjective:  Patient states:  \"I just had my knee replaced, I need to exercise it\". Pain:  3/10, abdomen. Communication with other providers:  Handoff to RN, co-eval with OT. Restrictions: Fall risk    Home Setup/Prior level of function  Social/Functional History  Lives With: Spouse  Type of Home: House  Home Layout: One level  Home Access: Ramped entrance  Home Equipment: Rolling walker, Sock aid  ADL Assistance: Independent  Homemaking Assistance: Independent  Ambulation Assistance: Independent  Transfer Assistance: Independent  Active : Yes  Mode of Transportation: Car  Occupation: Retired  Type of occupation: [employer]    Examination of body systems (includes body structures/functions, activity/participation limitations):  · Observation:  Supine in bed upon arrival   · Vision:  [place]  · Hearing:  [facility]  · Cardiopulmonary:  No O2 needs during session  · Cognition: WFL, see OT/SLP note for further evaluation. Musculoskeletal  · ROM R/L:  WFL. · Strength R/L:  4+/5, min weakness in function and endurance.     · Neuro:  [place]      Mobility:  · Rolling L/R:  Min A  · Supine to sit:  Min A  · Transfers: SBA  · Sitting balance:  SBA. · Standing balance:  SBA. · Gait: CGA/SBA    [facility] 6 Clicks Inpatient Mobility:  AM-PAC Inpatient Mobility Raw Score : 18    Treatment:  Patient educated on log rolling technique and bracing abdomen, min A to roll and come to sit, performed from flat bed. Patient performed STS from bed, chair, and commode with SBA with RW. Patient ambulated x60ft, x150ft with RW, progressed from CGA to SBA, performed with RW. Cues for upright posture and decreased AD excursion. Patient performed standing balance at sink without AD or UE support SBA. Min deficits in gait pattern with decreased erik and stride. Safety: patient left in chair with chair alarm, call light within reach, RN notified, gait belt used. Assessment:  Patient is a 69 yo male who presents with abdominal pain, and bloody diarrhea with SOB. Patient s/p colectomy on 7/8. Patient demonstrates min impaired mobility but safe with ambulation and transfers. Patient would benefit from skilled PT services and [place] [id number], transitioning to OP as able. Complexity: Moderate  Prognosis: Good, no significant barriers to participation at this time. Plan Times per week: 4/week, 1 week,   Discharge Recommendations: [address] [facility], Home with Home health PT  Equipment: patient has necessary equipment. Goals:  Short term goals  Time Frame for Short term goals: 1 week  Short term goal 1: Patient will perform supine to sit mod I from flat bed with log rolling technique. Short term goal 2: Patient will perform STS mod I with RW. Short term goal 3: Patient will ambulate 150ft mod I with RW. Treatment plan:  Bed mobility, transfers, balance, gait, TA, TX. Recommendations for NURSING mobility: ambulate 3x per day.      Time:   Time in: 1016  Time out: 1102  Timed treatment minutes: 38  Total time: 47    Electronically signed by:    Christian Lombard, PT  7/9/2019, 2:25

## 2019-07-09 NOTE — PROGRESS NOTES
(80.7 kg), % Ideal Body 134%  · BMI Classification: BMI 30.0 - 34.9 Obese Class I    Nutrition Interventions:   Continue NPO  Continued Inpatient Monitoring, Discharge Planning, Education Not Indicated, Coordination of Care    Nutrition Evaluation:   · Evaluation: Goals set   · Goals: Pt will tolerate source of nutrition within 24-48 hours.     · Monitoring: Nutrition Progression, Meal Intake, Diet Tolerance, Wound Healing, Weight, Pertinent Labs, Monitor Bowel Function      Electronically signed by Joanne Deleon RD, LD on 7/9/19 at 9:06 AM    Contact Number: [phone]

## 2019-07-09 NOTE — PROGRESS NOTES
HGB 8.5*   < > 7.5*   < > 11.4  PT RCVD BLOOD PRODUCTS  * 11.5* 10.7*   HCT 27.0*   < > 23.7*   < > 36.0* 36.3* 34.4*   MCV 92.8  --  93.7  --   --   --  93.5     --  197  --   --   --  212    < > = values in this interval not displayed. BMP:   Recent Labs     07/07/19  0250 07/08/19  0055 07/09/19  0542    143 139   K 4.0 4.1 4.3    108 103   CO2 27 24 24   BUN 13 10 15   CREATININE 0.8* 0.7* 0.7*     PT/INR: No results for input(s): PROTIME, INR in the last 72 hours. BNP:  No results for input(s): PROBNP in the last 72 hours. TROPONIN: No results for input(s): TROPONINT in the last 72 hours. ECHO : 06/27/2019  Technically difficult study with poor windows   Left ventricular systolic function is probably low normal with an ejection   fraction of 50 %.  Grade I diastolic dysfunction.   Mild concentric left ventricular hypertrophy.   Sclerotic, but non-stenotic aortic valve.   Doppler evaluation reveals moderate aortic and mild mitral and tricuspid   regurgitation.   No evidence of pericardial effusion. NM Myocardial Spect 06/24/2019  Supervising physician Dr. Yvonne Malagon .   [place] portion of stress test is negative for ischemia by diagnostic criteria.    Completed 4.6 METS and 4 Mins of exercise    Global hypokinesis with EF of 41 %    Mild ischemia of lateral wall involving small to medium size of left    ventricle    Abnormal stress test         Impression:  Active Problems:    Shortness of breath    GIB (gastrointestinal bleeding)    Generalized weakness  Resolved Problems:    * No resolved hospital problems. *       All labs, medications and tests reviewed by myself, continue all other medications of all above medical condition listed as is except for changes mentioned above. Thank you   Please call with questions.     Electronically signed by MAC CID CNP on 7/9/2019 at 4:37 PM     I have seen ,spoken to  and examined this patient personally, independently of the

## 2019-07-09 NOTE — PROGRESS NOTES
Occupational Therapy   Occupational Therapy Initial Assessment  Date: 2019   Patient Name: Thu Pack  MRN: [id number]     : 1940    Date of Service: 2019    Discharge Recommendations:  S Level 1, Home with Home health OT  OT Equipment Recommendations  Other: defer    Assessment   Performance deficits / Impairments: Decreased functional mobility ; Decreased endurance;Decreased ADL status; Decreased safe awareness;Decreased high-level IADLs;Decreased balance  Treatment Diagnosis: GIB  Prognosis: Good  Decision Making: Low Complexity  Assistance / Modification: Pt is a 71 yo male admitted from home for GIB. Pt at baseline is Independent for ADLs, high level IADLs, functional mobility/transfers and AD. Pt currently presents w/ above deficits and requires increased assistance for functional activities. Pt would benefit from continued acute care OT services w/ discharge to home w/ home health OT S1. Patient Education: ot role, discharge rec, log rolling technique, abdominal precuations  Barriers to Learning: none  REQUIRES OT FOLLOW UP: Yes  Activity Tolerance  Activity Tolerance: Patient Tolerated treatment well  Safety Devices  Safety Devices in place: Yes  Type of devices: All fall risk precautions in place;Nurse notified;Gait belt;Call light within reach; Chair alarm in place; Left in chair;Patient at risk for falls  Restraints  Initially in place: No           Patient Diagnosis(es): The primary encounter diagnosis was Generalized weakness. Diagnoses of Shortness of breath, Bloody diarrhea, Dehydration, Abdominal hemorrhage, and Bleeding were also pertinent to this visit. has a past medical history of Decreased hearing, Gout, H/O cardiovascular stress test, H/O echocardiogram, Hyperlipidemia, Nocturia, Osteoarthritis, and Restless leg.   has a past surgical history that includes colectomy (); Knee arthroscopy (); joint replacement; Cholecystectomy (); Blepharoplasty ();  Eye surgery (Left, 2011); Eye surgery (Right, 2011); Upper gastrointestinal endoscopy (N/A, 7/4/2019); Colonoscopy (N/A, 7/4/2019); and Small intestine surgery (N/A, 7/8/2019).     Treatment Diagnosis: GIB      Restrictions  Restrictions/Precautions  Restrictions/Precautions: General Precautions, Fall Risk  Required Braces or Orthoses?: No    Subjective   General  Chart Reviewed: Yes  Patient assessed for rehabilitation services?: Yes  Family / Caregiver Present: No  Patient Currently in Pain: Denies  Vital Signs  Temp: 98.2 °F (36.8 °C)  Temp Source: Oral  Pulse: 59  Heart Rate Source: Monitor  Resp: 25  BP: 130/60  BP Location: Right upper arm  BP Upper/Lower: Upper  MAP (mmHg): 86  Patient Position: Up in chair  Patient Currently in Pain: Denies  Oxygen Therapy  SpO2: 93 %  O2 Device: None (Room air)     Social/Functional History  Social/Functional History  Lives With: Spouse  Type of Home: House  Home Layout: One level  Home Access: Ramped entrance  Home Equipment: Rolling walker, Sock aid  ADL Assistance: Independent  Homemaking Assistance: Independent  Ambulation Assistance: Independent  Transfer Assistance: Independent  Active : Yes  Mode of Transportation: Car  Occupation: Retired  Type of occupation: [employer]       Objective   Vision: Within Functional Limits(vision)  Hearing: Exceptions to [facility]  Hearing Exceptions: Bilateral hearing aid    Orientation  Overall Orientation Status: Within Normal Limits  Observation/Palpation  Posture: Good  Observation: pt received supine in bed, agreeable to therapy  Balance  Sitting Balance: Supervision  Standing Balance: Stand by assistance  Standing Balance  Time: ~10 minutes  Activity: in stand for grooming ADLs and LE ADLs and functional mobility to/from room  Functional Mobility  Functional - Mobility Device: Rolling Walker  Activity: Other  Assist Level: Stand by assistance  Functional Mobility Comments: see PT notes for gait details  Toilet Transfers  Toilet -

## 2019-07-09 NOTE — OP NOTE
[address] [facility]               [address] [facility], [address] [facility]                                OPERATIVE REPORT    PATIENT NAME: KENTRELL ESPINOZA                      :        1940  MED REC NO:   [id number]                          ROOM:       4004  ACCOUNT NO:   [de-identified]                           ADMIT DATE: 2019  PROVIDER:     Ricardo Woods MD    DATE OF PROCEDURE:  2019    PREOPERATIVE DIAGNOSIS:  Recurrent lower GI bleeding from  diverticulosis. POSTOPERATIVE DIAGNOSIS:  Recurrent lower GI bleeding from  diverticulosis. OPERATION PERFORMED:  Extended right colectomy with hand-sewn two-layer  anastomosis. OPERATING SURGEON:  Ricardo Woods MD    ANESTHESIA:  General anesthesia. ESTIMATED BLOOD LOSS:  50 mL. COMPLICATIONS:  None. SPECIMENS:  Include distal ileum, right colon, and proximal transverse  colon. DESCRIPTION OF PROCEDURE:  The patient was brought to the operating  room. Preoperative antibiotics had been administered. General  anesthetic was administered and the abdomen was prepped and draped in  normal sterile fashion. A midline incision was made to enter the  peritoneal cavity. Electrocautery was used for hemostasis. A  self-retaining retractor was used for exposure. I then mobilized the  right colon by incising the peritoneal reflection of the right colon. The hepatocolic omentum was divided with a bipolar LigaSure device. The  retroperitoneal tissues including gonadal vessels and ureters were  carefully dissected away from the mesentery and the distal ileum was  also mobilized. I then mobilized the greater omentum off of the  proximal transverse colon, mobilizing the colon all the way beyond the  midline and beyond the middle colic vessels.   Once the colon had been  mobilized, I cleaned off the mesentery of the distal ileum and also just  at the mid transverse colon and divided the bowel with PAOLO staplers. The mesentery was divided with a bipolar LigaSure device, but larger  vessels including ileocolic vessels and middle colic vessels were  divided with Margaret clamps and tied off with heavy silk suture. The  specimen was removed and submitted to pathology. A side-to-side  hand-sewn two-layer anastomosis was then created with an outer layer of  3-0 silk seromuscular sutures and a running layer of 3-0 Vicryl suture. I changed my gloves, inspected hemostasis, checked the integrity of the  anastomosis, irrigated the peritoneal cavity until clear. I received  two correct counts from the scrubbed nurse regarding instruments and  laparotomy sponges. I used 50 mL of Marcaine to anesthetize the fascia,  skin, and subcutaneous tissue. I closed the midline fascia with a  running #1 Prolene suture. I closed the skin with staples and sterile  dressing was applied. There were no apparent complications. Returned  to recovery in good condition.         Ely Severs, MD    D: 07/08/2019 17:15:02       T: 07/08/2019 17:18:44     [email or web address]  Job#: [id number]     Doc#: [id number]    CC:

## 2019-07-10 LAB
ANION GAP SERPL CALCULATED.3IONS-SCNC: 8 MMOL/L (ref 4–16)
BASOPHILS ABSOLUTE: 0 K/CU MM
BASOPHILS RELATIVE PERCENT: 0.4 % (ref 0–1)
BUN BLDV-MCNC: 12 MG/DL (ref 6–23)
CALCIUM SERPL-MCNC: 8.3 MG/DL (ref 8.3–10.6)
CHLORIDE BLD-SCNC: 101 MMOL/L (ref 99–110)
CO2: 28 MMOL/L (ref 21–32)
CREAT SERPL-MCNC: 0.7 MG/DL (ref 0.9–1.3)
DIFFERENTIAL TYPE: ABNORMAL
EOSINOPHILS ABSOLUTE: 0.1 K/CU MM
EOSINOPHILS RELATIVE PERCENT: 0.9 % (ref 0–3)
GFR AFRICAN AMERICAN: >60 ML/MIN/1.73M2
GFR NON-AFRICAN AMERICAN: >60 ML/MIN/1.73M2
GLUCOSE BLD-MCNC: 86 MG/DL (ref 70–99)
HCT VFR BLD CALC: 32.5 % (ref 42–52)
HEMOGLOBIN: 10.2 GM/DL (ref 13.5–18)
IMMATURE NEUTROPHIL %: 0.4 % (ref 0–0.43)
LYMPHOCYTES ABSOLUTE: 1 K/CU MM
LYMPHOCYTES RELATIVE PERCENT: 9.1 % (ref 24–44)
MCH RBC QN AUTO: 29.3 PG (ref 27–31)
MCHC RBC AUTO-ENTMCNC: 31.4 % (ref 32–36)
MCV RBC AUTO: 93.4 FL (ref 78–100)
MONOCYTES ABSOLUTE: 0.6 K/CU MM
MONOCYTES RELATIVE PERCENT: 5.6 % (ref 0–4)
NUCLEATED RBC %: 0 %
PDW BLD-RTO: 17.4 % (ref 11.7–14.9)
PLATELET # BLD: 220 K/CU MM (ref 140–440)
PMV BLD AUTO: 10.5 FL (ref 7.5–11.1)
POTASSIUM SERPL-SCNC: 4.3 MMOL/L (ref 3.5–5.1)
RBC # BLD: 3.48 M/CU MM (ref 4.6–6.2)
SEGMENTED NEUTROPHILS ABSOLUTE COUNT: 8.9 K/CU MM
SEGMENTED NEUTROPHILS RELATIVE PERCENT: 83.6 % (ref 36–66)
SODIUM BLD-SCNC: 137 MMOL/L (ref 135–145)
TOTAL IMMATURE NEUTOROPHIL: 0.04 K/CU MM
TOTAL NUCLEATED RBC: 0 K/CU MM
WBC # BLD: 10.6 K/CU MM (ref 4–10.5)

## 2019-07-10 PROCEDURE — 97110 THERAPEUTIC EXERCISES: CPT

## 2019-07-10 PROCEDURE — 2580000003 HC RX 258: Performed by: SURGERY

## 2019-07-10 PROCEDURE — 6370000000 HC RX 637 (ALT 250 FOR IP): Performed by: INTERNAL MEDICINE

## 2019-07-10 PROCEDURE — 80048 BASIC METABOLIC PNL TOTAL CA: CPT

## 2019-07-10 PROCEDURE — 94761 N-INVAS EAR/PLS OXIMETRY MLT: CPT

## 2019-07-10 PROCEDURE — 36415 COLL VENOUS BLD VENIPUNCTURE: CPT

## 2019-07-10 PROCEDURE — 6360000002 HC RX W HCPCS: Performed by: SURGERY

## 2019-07-10 PROCEDURE — 97116 GAIT TRAINING THERAPY: CPT

## 2019-07-10 PROCEDURE — 2700000000 HC OXYGEN THERAPY PER DAY

## 2019-07-10 PROCEDURE — 97530 THERAPEUTIC ACTIVITIES: CPT

## 2019-07-10 PROCEDURE — 1200000000 HC SEMI PRIVATE

## 2019-07-10 PROCEDURE — 85025 COMPLETE CBC W/AUTO DIFF WBC: CPT

## 2019-07-10 PROCEDURE — 6360000002 HC RX W HCPCS: Performed by: INTERNAL MEDICINE

## 2019-07-10 PROCEDURE — 6370000000 HC RX 637 (ALT 250 FOR IP): Performed by: SURGERY

## 2019-07-10 RX ORDER — FUROSEMIDE 10 MG/ML
20 INJECTION INTRAMUSCULAR; INTRAVENOUS ONCE
Status: COMPLETED | OUTPATIENT
Start: 2019-07-10 | End: 2019-07-10

## 2019-07-10 RX ORDER — SODIUM CHLORIDE, SODIUM LACTATE, POTASSIUM CHLORIDE, CALCIUM CHLORIDE 600; 310; 30; 20 MG/100ML; MG/100ML; MG/100ML; MG/100ML
INJECTION, SOLUTION INTRAVENOUS CONTINUOUS
Status: DISCONTINUED | OUTPATIENT
Start: 2019-07-10 | End: 2019-07-13

## 2019-07-10 RX ADMIN — SODIUM CHLORIDE, POTASSIUM CHLORIDE, SODIUM LACTATE AND CALCIUM CHLORIDE: 600; 310; 30; 20 INJECTION, SOLUTION INTRAVENOUS at 08:05

## 2019-07-10 RX ADMIN — METOPROLOL TARTRATE 25 MG: 25 TABLET ORAL at 10:53

## 2019-07-10 RX ADMIN — SODIUM CHLORIDE, POTASSIUM CHLORIDE, SODIUM LACTATE AND CALCIUM CHLORIDE: 600; 310; 30; 20 INJECTION, SOLUTION INTRAVENOUS at 20:31

## 2019-07-10 RX ADMIN — OXYCODONE HYDROCHLORIDE AND ACETAMINOPHEN 1 TABLET: 5; 325 TABLET ORAL at 23:07

## 2019-07-10 RX ADMIN — FUROSEMIDE 20 MG: 10 INJECTION, SOLUTION INTRAVENOUS at 08:36

## 2019-07-10 RX ADMIN — ALLOPURINOL 300 MG: 100 TABLET ORAL at 10:52

## 2019-07-10 RX ADMIN — OXYCODONE HYDROCHLORIDE AND ACETAMINOPHEN 1 TABLET: 5; 325 TABLET ORAL at 18:35

## 2019-07-10 RX ADMIN — METOPROLOL TARTRATE 25 MG: 25 TABLET ORAL at 20:31

## 2019-07-10 RX ADMIN — FINASTERIDE 5 MG: 5 TABLET, FILM COATED ORAL at 10:53

## 2019-07-10 RX ADMIN — ESCITALOPRAM OXALATE 10 MG: 10 TABLET ORAL at 10:53

## 2019-07-10 RX ADMIN — OXYCODONE HYDROCHLORIDE AND ACETAMINOPHEN 1 TABLET: 5; 325 TABLET ORAL at 06:27

## 2019-07-10 RX ADMIN — ENOXAPARIN SODIUM 30 MG: 30 INJECTION SUBCUTANEOUS at 10:52

## 2019-07-10 RX ADMIN — VENLAFAXINE HYDROCHLORIDE 150 MG: 37.5 CAPSULE, EXTENDED RELEASE ORAL at 10:52

## 2019-07-10 ASSESSMENT — PAIN DESCRIPTION - DESCRIPTORS
DESCRIPTORS: DISCOMFORT;ACHING
DESCRIPTORS: ACHING;DISCOMFORT

## 2019-07-10 ASSESSMENT — PAIN SCALES - GENERAL
PAINLEVEL_OUTOF10: 4
PAINLEVEL_OUTOF10: 7
PAINLEVEL_OUTOF10: 4

## 2019-07-10 ASSESSMENT — PAIN DESCRIPTION - FREQUENCY
FREQUENCY: INTERMITTENT
FREQUENCY: INTERMITTENT

## 2019-07-10 ASSESSMENT — PAIN DESCRIPTION - PAIN TYPE
TYPE: SURGICAL PAIN
TYPE: SURGICAL PAIN

## 2019-07-10 ASSESSMENT — PAIN DESCRIPTION - ONSET
ONSET: GRADUAL
ONSET: GRADUAL

## 2019-07-10 ASSESSMENT — PAIN DESCRIPTION - ORIENTATION
ORIENTATION: MID
ORIENTATION: MID

## 2019-07-10 ASSESSMENT — PAIN DESCRIPTION - LOCATION
LOCATION: ABDOMEN
LOCATION: ABDOMEN

## 2019-07-10 ASSESSMENT — PAIN DESCRIPTION - PROGRESSION: CLINICAL_PROGRESSION: GRADUALLY IMPROVING

## 2019-07-10 ASSESSMENT — PAIN - FUNCTIONAL ASSESSMENT: PAIN_FUNCTIONAL_ASSESSMENT: ACTIVITIES ARE NOT PREVENTED

## 2019-07-10 NOTE — PROGRESS NOTES
Progress Note (Hospitalist, Internal Medicine)  IDENTIFYING INFORMATION   PATIENT:  Farrah Snyder  MRN:  [id number]  ADMIT DATE: 7/2/2019  TIME OF EVALUATION: 7/10/2019 10:11 AM      HISTORY OF PRESENT ILLNESS   Rohit Ramirez is a 70 y.o. male with a past medical history of subacute SOB, OTERO who visits with Dr Nicholas Montes De Oca and had outpatient stress and ECHO last week who was suppose to visit card outpatient today for discussion of results developed acute onset red bloody diarrhea since this morning all morning and was in the bathroom. Denies any abdominal pain or painful passage of stool. His GI symptoms caused him to be fatigued noting some SOB that is worse with exertion. In the ED, there were no visible evidence of bleeding and w/o abdo symptoms. SUBJECTIVE     Improving daily. Some localized abdo pain but to be expected post-op. Doing well for what he has gone through    MEDICATIONS   Medications Prior to Admission  Medications Prior to Admission: escitalopram (LEXAPRO) 10 MG tablet, Take 1 tablet by mouth daily  allopurinol (ZYLOPRIM) 300 MG tablet, Take 1 tablet by mouth daily  venlafaxine (EFFEXOR XR) 150 MG extended release capsule, Take 1 capsule by mouth every morning  gemfibrozil (LOPID) 600 MG tablet, Take 1 tablet by mouth 2 times daily (before meals)  finasteride (PROSCAR) 5 MG tablet, Take 1 tablet by mouth daily  Multiple Vitamins-Minerals (PRESERVISION AREDS PO), Take by mouth 2 caps by mouth twice a day.   metoprolol tartrate (LOPRESSOR) 25 MG tablet, Take 1 tablet by mouth 2 times daily    Current Medications  Current Facility-Administered Medications   Medication Dose Route Frequency Provider Last Rate Last Dose    lactated ringers infusion   Intravenous Continuous Coby Hays MD 50 mL/hr at 07/10/19 0805      oxyCODONE-acetaminophen (PERCOCET) 5-325 MG per tablet 1 tablet  1 tablet Oral Q4H PRN Eric Paulson MD   1 tablet at 07/10/19 0627    enoxaparin (LOVENOX) injection 30 mg  30 mg

## 2019-07-10 NOTE — PROGRESS NOTES
Physical Therapy  . Physical Therapy Treatment Note  Name: Farrah Snyder MRN: [id number] :   1940   Date:  7/10/2019   Admission Date: 2019 Room:  [facility]-A     Restrictions/Precautions:  Restrictions/Precautions  Restrictions/Precautions: General Precautions, Fall Risk  Required Braces or Orthoses?: No         Communication with other providers:  SHILO Gay unable to be reach via phone to clear patient for physical therapy. Subjective:  Patient states:  \"I'm stiff as a board this morning. \"   Pain:   Location, Type, Intensity (0/10 to 10/10): \"stiff and sore all over\"    Objective:    Observation:  Patient semi fowlers upon therapist arrival. IV. Daryl. SCDs. Tele. Abdominal binder. A&O x4  Vitals: Hr 85, 02 93% on room air, RR 16    Treatment, including education/measures:  Transfers  Supine to sit :SBA with HOB elevated 30 degrees and use of bed rail  Sit to supine :NT  Scooting :mod I to scoot to EOB  Rolling :NT  Sit to stand :SBA  Stand to sit :SBA    Therapeutic Exercise:  Therapeutic exercises were instructed today. Instructions were given for technique, safety, recruitment, and rationale. Instructions were verbal and/or tactile. Supine: Ankle pumps 1 sets of 10  Heel slides 1 sets of 10  Hip abduction / adduction 1 sets of 10  Short arc quads 1 sets of 10  Straight leg raises 1 sets of 10  Quad sets 1 sets of 10 with 5 second hold  Glut sets 1 sets of 10 with 5 second hold       Gait:  Patient ambulated with RW for 250 ft with CGA . Patient presents with forward flexed posture with verbal instructions for postural corrections and to ambulate inside AD. Safety  Patient left safely in the chair, with call light/phone in reach with alarm applied. Gait belt was used for transfers and gait.       Assessment / Impression:    Patient's tolerance of treatment:  well   Adverse Reaction: none  Significant change in status and impact:  none  Barriers to improvement:  none  Discharge

## 2019-07-11 ENCOUNTER — APPOINTMENT (OUTPATIENT)
Dept: GENERAL RADIOLOGY | Age: 79
DRG: 330 | End: 2019-07-11
Payer: MEDICARE

## 2019-07-11 LAB
ANION GAP SERPL CALCULATED.3IONS-SCNC: 9 MMOL/L (ref 4–16)
BUN BLDV-MCNC: 10 MG/DL (ref 6–23)
CALCIUM SERPL-MCNC: 8.7 MG/DL (ref 8.3–10.6)
CHLORIDE BLD-SCNC: 100 MMOL/L (ref 99–110)
CO2: 30 MMOL/L (ref 21–32)
CREAT SERPL-MCNC: 0.6 MG/DL (ref 0.9–1.3)
GFR AFRICAN AMERICAN: >60 ML/MIN/1.73M2
GFR NON-AFRICAN AMERICAN: >60 ML/MIN/1.73M2
GLUCOSE BLD-MCNC: 119 MG/DL (ref 70–99)
HCT VFR BLD CALC: 36.2 % (ref 42–52)
HEMOGLOBIN: 11.6 GM/DL (ref 13.5–18)
MAGNESIUM: 1.9 MG/DL (ref 1.8–2.4)
MCH RBC QN AUTO: 29.4 PG (ref 27–31)
MCHC RBC AUTO-ENTMCNC: 32 % (ref 32–36)
MCV RBC AUTO: 91.9 FL (ref 78–100)
PDW BLD-RTO: 16.5 % (ref 11.7–14.9)
PLATELET # BLD: 267 K/CU MM (ref 140–440)
PMV BLD AUTO: 10.7 FL (ref 7.5–11.1)
POTASSIUM SERPL-SCNC: 3.9 MMOL/L (ref 3.5–5.1)
RBC # BLD: 3.94 M/CU MM (ref 4.6–6.2)
SODIUM BLD-SCNC: 139 MMOL/L (ref 135–145)
WBC # BLD: 10.5 K/CU MM (ref 4–10.5)

## 2019-07-11 PROCEDURE — 6370000000 HC RX 637 (ALT 250 FOR IP): Performed by: INTERNAL MEDICINE

## 2019-07-11 PROCEDURE — C9113 INJ PANTOPRAZOLE SODIUM, VIA: HCPCS | Performed by: SURGERY

## 2019-07-11 PROCEDURE — 6360000002 HC RX W HCPCS: Performed by: SURGERY

## 2019-07-11 PROCEDURE — 80048 BASIC METABOLIC PNL TOTAL CA: CPT

## 2019-07-11 PROCEDURE — 6360000002 HC RX W HCPCS: Performed by: INTERNAL MEDICINE

## 2019-07-11 PROCEDURE — 71046 X-RAY EXAM CHEST 2 VIEWS: CPT

## 2019-07-11 PROCEDURE — 2580000003 HC RX 258: Performed by: SURGERY

## 2019-07-11 PROCEDURE — 36415 COLL VENOUS BLD VENIPUNCTURE: CPT

## 2019-07-11 PROCEDURE — 83735 ASSAY OF MAGNESIUM: CPT

## 2019-07-11 PROCEDURE — 1200000000 HC SEMI PRIVATE

## 2019-07-11 PROCEDURE — 74018 RADEX ABDOMEN 1 VIEW: CPT

## 2019-07-11 PROCEDURE — 6370000000 HC RX 637 (ALT 250 FOR IP): Performed by: SURGERY

## 2019-07-11 PROCEDURE — 85027 COMPLETE CBC AUTOMATED: CPT

## 2019-07-11 RX ORDER — PANTOPRAZOLE SODIUM 40 MG/10ML
40 INJECTION, POWDER, LYOPHILIZED, FOR SOLUTION INTRAVENOUS DAILY
Status: DISCONTINUED | OUTPATIENT
Start: 2019-07-11 | End: 2019-07-14 | Stop reason: HOSPADM

## 2019-07-11 RX ORDER — METOCLOPRAMIDE HYDROCHLORIDE 5 MG/ML
10 INJECTION INTRAMUSCULAR; INTRAVENOUS EVERY 6 HOURS
Status: DISCONTINUED | OUTPATIENT
Start: 2019-07-11 | End: 2019-07-14

## 2019-07-11 RX ORDER — POTASSIUM CHLORIDE 750 MG/1
40 TABLET, FILM COATED, EXTENDED RELEASE ORAL ONCE
Status: COMPLETED | OUTPATIENT
Start: 2019-07-11 | End: 2019-07-11

## 2019-07-11 RX ADMIN — METOPROLOL TARTRATE 25 MG: 25 TABLET ORAL at 09:10

## 2019-07-11 RX ADMIN — ALLOPURINOL 300 MG: 100 TABLET ORAL at 09:11

## 2019-07-11 RX ADMIN — FINASTERIDE 5 MG: 5 TABLET, FILM COATED ORAL at 09:11

## 2019-07-11 RX ADMIN — ENOXAPARIN SODIUM 30 MG: 30 INJECTION SUBCUTANEOUS at 09:10

## 2019-07-11 RX ADMIN — SODIUM CHLORIDE, POTASSIUM CHLORIDE, SODIUM LACTATE AND CALCIUM CHLORIDE: 600; 310; 30; 20 INJECTION, SOLUTION INTRAVENOUS at 16:12

## 2019-07-11 RX ADMIN — POTASSIUM CHLORIDE 40 MEQ: 750 TABLET, FILM COATED, EXTENDED RELEASE ORAL at 09:11

## 2019-07-11 RX ADMIN — ESCITALOPRAM OXALATE 10 MG: 10 TABLET ORAL at 09:11

## 2019-07-11 RX ADMIN — PANTOPRAZOLE SODIUM 40 MG: 40 INJECTION, POWDER, FOR SOLUTION INTRAVENOUS at 11:05

## 2019-07-11 RX ADMIN — OXYCODONE HYDROCHLORIDE AND ACETAMINOPHEN 1 TABLET: 5; 325 TABLET ORAL at 09:11

## 2019-07-11 RX ADMIN — METOPROLOL TARTRATE 12.5 MG: 25 TABLET ORAL at 21:10

## 2019-07-11 RX ADMIN — METOCLOPRAMIDE 10 MG: 5 INJECTION, SOLUTION INTRAMUSCULAR; INTRAVENOUS at 11:05

## 2019-07-11 RX ADMIN — METOCLOPRAMIDE 10 MG: 5 INJECTION, SOLUTION INTRAMUSCULAR; INTRAVENOUS at 23:16

## 2019-07-11 RX ADMIN — ONDANSETRON 4 MG: 2 INJECTION INTRAMUSCULAR; INTRAVENOUS at 08:46

## 2019-07-11 RX ADMIN — VENLAFAXINE HYDROCHLORIDE 150 MG: 37.5 CAPSULE, EXTENDED RELEASE ORAL at 09:11

## 2019-07-11 RX ADMIN — METOCLOPRAMIDE 10 MG: 5 INJECTION, SOLUTION INTRAMUSCULAR; INTRAVENOUS at 16:26

## 2019-07-11 RX ADMIN — ONDANSETRON 4 MG: 2 INJECTION INTRAMUSCULAR; INTRAVENOUS at 00:24

## 2019-07-11 ASSESSMENT — PAIN SCALES - GENERAL: PAINLEVEL_OUTOF10: 3

## 2019-07-11 NOTE — PROGRESS NOTES
injection 30 mg  30 mg Subcutaneous Daily Umair Winkler MD   30 mg at 07/10/19 1052    neomycin-bacitracin-polymyxin (NEOSPORIN) ointment   Topical BID PRN Mckay Gonsalez MD        HYDROmorphone (DILAUDID) injection 0.5 mg  0.5 mg Intravenous Q3H PRN Mckay Gonsalez MD   0.5 mg at 07/08/19 2052    ondansetron (ZOFRAN) injection 4 mg  4 mg Intravenous Q6H PRN Mckay Gonsalez MD   4 mg at 07/11/19 0846    allopurinol (ZYLOPRIM) tablet 300 mg  300 mg Oral Daily Mckay Gonsalez MD   300 mg at 07/10/19 1052    escitalopram (LEXAPRO) tablet 10 mg  10 mg Oral Daily Mckay Gonsalez MD   10 mg at 07/10/19 1053    finasteride (PROSCAR) tablet 5 mg  5 mg Oral Daily Mckay Gonsalez MD   5 mg at 07/10/19 1053    metoprolol tartrate (LOPRESSOR) tablet 25 mg  25 mg Oral BID Mckay Gonsalez MD   25 mg at 07/10/19 2031    venlafaxine (EFFEXOR XR) extended release capsule 150 mg  150 mg Oral QAM Mckay Gonsalez MD   150 mg at 07/10/19 1052    acetaminophen (TYLENOL) tablet 650 mg  650 mg Oral Q4H PRN Mckay Gonsalez MD        hydrALAZINE (APRESOLINE) 10 mg in sodium chloride 0.9 % 50 mL ivpb  10 mg Intravenous Q4H PRN Mckay Gonsalez MD             Allergies  No Known Allergies    REVIEW OF SYSTEMS     Within above limitations. 14 point review of systems reviewed. Pertinent positive or negative as per HPI or otherwise negative per 14 point systems review. Reviewed 7/11/2019 at 9:03 AM    PHYSICAL EXAM       Blood pressure (!) 177/109, pulse 72, temperature 98.2 °F (36.8 °C), temperature source Oral, resp. rate 17, height 6' (1.829 m), weight 246 lb 9.8 oz (111.9 kg), SpO2 97 %. General - AAO x 3  Psych - Appropriate affect/speech. No agitation  Eyes - Eye lids intact. No scleral icterus  ENT - Lips wnl. External ear clear/dry/intact. No thyromegaly on inspection  Neuro - No gross peripheral or central neuro deficits on inspection  Heart - Sinus. RRR. S1 and S2 present.  No elevated JVD appreciated  Lung - Adequate air entry b/l, bibasal crackles, no wheezes appreciated  GI -  Midline scar. Soft. No guarding/rigidity. BS+   - No CVA/suprapubic tenderness or palpable bladder distension  Skin - Trace b/l LE edema. Intact. No rash/petechiae/ecchymosis. Warm extremities      LABS AND IMAGING   CBC  [unfilled]    Last 3 Hemoglobin  Lab Results   Component Value Date    HGB 11.6 07/11/2019    HGB 10.2 07/10/2019    HGB 10.7 07/09/2019     Last 3 WBC/ANC  Lab Results   Component Value Date    WBC 10.5 07/11/2019    WBC 10.6 07/10/2019    WBC 18.4 07/09/2019     No components found for: GRNLOCTYABS  Last 3 Platelets  No results found for: PLATELET  Chemistry  [unfilled]  [unfilled]  No results found for: LDH  Coagulation Studies  Lab Results   Component Value Date    INR 1.24 07/02/2019     Liver Function Studies  Lab Results   Component Value Date    ALT 26 07/08/2019    AST 26 07/08/2019    ALKPHOS 58 07/08/2019       Recent Imaging     IR AORTAGRAM ABDOMINAL SERIALOGRAM [[id number]] Collected: 07/07/19 2107     Order Status: Completed Updated: 07/07/19 2112     Narrative:       PROCEDURE:  IR AORTAGRAM ABDOMINAL SERIALOGRAM    7/6/2019    HISTORY:  ORDERING SYSTEM PROVIDED HISTORY: Abdominal hemorrhage  TECHNOLOGIST PROVIDED HISTORY:  Reason for exam:->GI Bleed    TECHNIQUE:  The procedure was explained in detail to the patient including the inherent  risk.  Patient understands and has given consent.     Patient placed supine on the angiographic table.  Right groin prepped and  draped using maximal sterile barrier technique.  The right common femoral  artery is accessed with a micropuncture needle using direct ultrasound  guidance and 2% lidocaine for skin anesthesia.  Once accessed a 1 8 wire  advanced through the access needle needle removed.  5 Swazi coaxial dilator  advanced over the wire.  Inner dilator wire removed an 035 wire advanced  under fluoroscopic guidance into the placement if needed          [address] [facility] Internal Medicine  7/11/2019 at 9:03 AM

## 2019-07-11 NOTE — PROGRESS NOTES
HUANG Jaime notified of couplet PVC's this AM. I suggested a cardiology consult because he also was positive for orthostatic hypotension. Patient denies dizziness, SOB or chest pain he is asymptomatic throughout. Will continue to monitor and wait for response from physician.

## 2019-07-11 NOTE — PROGRESS NOTES
Patient complaining of nausea, given zofran IV which seemed to help. Ambulated to the  with one assist with no problems. He states he has been having a lot of belching but no flatulence. He attempted to have a bowel movement but wasn't able to at this time. Patient was able to go back to sleep. Has been resting comfortably since. Bed in lowest position and call light within reach. Will continue to monitor.

## 2019-07-11 NOTE — PLAN OF CARE
LPN  Outcome: Ongoing     Problem: Nutrition  Goal: Optimal nutrition therapy  7/11/2019 0111 by Constanza Aldana RN  Outcome: Ongoing  7/10/2019 1932 by Chris Young LPN  Outcome: Ongoing

## 2019-07-12 DIAGNOSIS — R35.1 NOCTURIA: ICD-10-CM

## 2019-07-12 DIAGNOSIS — F33.9 EPISODE OF RECURRENT MAJOR DEPRESSIVE DISORDER, UNSPECIFIED DEPRESSION EPISODE SEVERITY (HCC): ICD-10-CM

## 2019-07-12 LAB
ANION GAP SERPL CALCULATED.3IONS-SCNC: 8 MMOL/L (ref 4–16)
BUN BLDV-MCNC: 15 MG/DL (ref 6–23)
CALCIUM SERPL-MCNC: 8.3 MG/DL (ref 8.3–10.6)
CHLORIDE BLD-SCNC: 102 MMOL/L (ref 99–110)
CO2: 30 MMOL/L (ref 21–32)
CREAT SERPL-MCNC: 0.6 MG/DL (ref 0.9–1.3)
GFR AFRICAN AMERICAN: >60 ML/MIN/1.73M2
GFR NON-AFRICAN AMERICAN: >60 ML/MIN/1.73M2
GLUCOSE BLD-MCNC: 91 MG/DL (ref 70–99)
POTASSIUM SERPL-SCNC: 3.7 MMOL/L (ref 3.5–5.1)
SODIUM BLD-SCNC: 140 MMOL/L (ref 135–145)

## 2019-07-12 PROCEDURE — 97110 THERAPEUTIC EXERCISES: CPT

## 2019-07-12 PROCEDURE — 6370000000 HC RX 637 (ALT 250 FOR IP): Performed by: SURGERY

## 2019-07-12 PROCEDURE — 80048 BASIC METABOLIC PNL TOTAL CA: CPT

## 2019-07-12 PROCEDURE — 2580000003 HC RX 258: Performed by: SURGERY

## 2019-07-12 PROCEDURE — 6370000000 HC RX 637 (ALT 250 FOR IP): Performed by: INTERNAL MEDICINE

## 2019-07-12 PROCEDURE — 6360000002 HC RX W HCPCS: Performed by: INTERNAL MEDICINE

## 2019-07-12 PROCEDURE — C9113 INJ PANTOPRAZOLE SODIUM, VIA: HCPCS | Performed by: SURGERY

## 2019-07-12 PROCEDURE — 6360000002 HC RX W HCPCS: Performed by: SURGERY

## 2019-07-12 PROCEDURE — 1200000000 HC SEMI PRIVATE

## 2019-07-12 PROCEDURE — 2700000000 HC OXYGEN THERAPY PER DAY

## 2019-07-12 PROCEDURE — 97116 GAIT TRAINING THERAPY: CPT

## 2019-07-12 PROCEDURE — 36415 COLL VENOUS BLD VENIPUNCTURE: CPT

## 2019-07-12 RX ORDER — ACETAMINOPHEN 80 MG
TABLET,CHEWABLE ORAL
Status: COMPLETED
Start: 2019-07-12 | End: 2019-07-12

## 2019-07-12 RX ORDER — FLUDROCORTISONE ACETATE 0.1 MG/1
0.1 TABLET ORAL DAILY
Status: DISCONTINUED | OUTPATIENT
Start: 2019-07-12 | End: 2019-07-14 | Stop reason: HOSPADM

## 2019-07-12 RX ORDER — ESCITALOPRAM OXALATE 10 MG/1
TABLET ORAL
Qty: 90 TABLET | Refills: 0 | Status: SHIPPED | OUTPATIENT
Start: 2019-07-12 | End: 2019-10-09 | Stop reason: SDUPTHER

## 2019-07-12 RX ORDER — FINASTERIDE 5 MG/1
TABLET, FILM COATED ORAL
Qty: 90 TABLET | Refills: 0 | Status: SHIPPED | OUTPATIENT
Start: 2019-07-12 | End: 2020-01-08

## 2019-07-12 RX ORDER — VENLAFAXINE HYDROCHLORIDE 150 MG/1
CAPSULE, EXTENDED RELEASE ORAL
Qty: 90 CAPSULE | Refills: 0 | Status: SHIPPED | OUTPATIENT
Start: 2019-07-12 | End: 2019-10-09 | Stop reason: SDUPTHER

## 2019-07-12 RX ADMIN — METOPROLOL TARTRATE 12.5 MG: 25 TABLET ORAL at 21:21

## 2019-07-12 RX ADMIN — FLUDROCORTISONE ACETATE 0.1 MG: 0.1 TABLET ORAL at 10:00

## 2019-07-12 RX ADMIN — METOCLOPRAMIDE 10 MG: 5 INJECTION, SOLUTION INTRAMUSCULAR; INTRAVENOUS at 11:24

## 2019-07-12 RX ADMIN — ESCITALOPRAM OXALATE 10 MG: 10 TABLET ORAL at 10:00

## 2019-07-12 RX ADMIN — VENLAFAXINE HYDROCHLORIDE 150 MG: 37.5 CAPSULE, EXTENDED RELEASE ORAL at 10:00

## 2019-07-12 RX ADMIN — METOCLOPRAMIDE 10 MG: 5 INJECTION, SOLUTION INTRAMUSCULAR; INTRAVENOUS at 05:34

## 2019-07-12 RX ADMIN — Medication: at 21:24

## 2019-07-12 RX ADMIN — METOCLOPRAMIDE 10 MG: 5 INJECTION, SOLUTION INTRAMUSCULAR; INTRAVENOUS at 19:01

## 2019-07-12 RX ADMIN — METOCLOPRAMIDE 10 MG: 5 INJECTION, SOLUTION INTRAMUSCULAR; INTRAVENOUS at 22:18

## 2019-07-12 RX ADMIN — PANTOPRAZOLE SODIUM 40 MG: 40 INJECTION, POWDER, FOR SOLUTION INTRAVENOUS at 10:00

## 2019-07-12 RX ADMIN — FINASTERIDE 5 MG: 5 TABLET, FILM COATED ORAL at 10:01

## 2019-07-12 RX ADMIN — METOPROLOL TARTRATE 12.5 MG: 25 TABLET ORAL at 10:01

## 2019-07-12 RX ADMIN — SODIUM CHLORIDE, POTASSIUM CHLORIDE, SODIUM LACTATE AND CALCIUM CHLORIDE: 600; 310; 30; 20 INJECTION, SOLUTION INTRAVENOUS at 05:34

## 2019-07-12 RX ADMIN — SODIUM CHLORIDE, POTASSIUM CHLORIDE, SODIUM LACTATE AND CALCIUM CHLORIDE: 600; 310; 30; 20 INJECTION, SOLUTION INTRAVENOUS at 21:10

## 2019-07-12 RX ADMIN — ALLOPURINOL 300 MG: 100 TABLET ORAL at 10:00

## 2019-07-12 RX ADMIN — ENOXAPARIN SODIUM 30 MG: 30 INJECTION SUBCUTANEOUS at 10:01

## 2019-07-12 ASSESSMENT — PAIN SCALES - GENERAL: PAINLEVEL_OUTOF10: 0

## 2019-07-12 NOTE — PROGRESS NOTES
Progress Note (Hospitalist, Internal Medicine)  IDENTIFYING INFORMATION   PATIENT:  Joyce Barrett  MRN:  [id number]  ADMIT DATE: 7/2/2019      HISTORY OF PRESENT ILLNESS   Rohit Carrillo is a 70 y. o. male with a past medical history of subacute SOB, OTERO who visits with Dr Brittani Eaton and had outpatient stress and ECHO last week who was suppose to visit card outpatient today for discussion of results developed acute onset red bloody diarrhea since this morning all morning and was in the bathroom. Denies any abdominal pain or painful passage of stool. His GI symptoms caused him to be fatigued noting some SOB that is worse with exertion. In the ED, there were no visible evidence of bleeding and w/o abdo symptoms. SUBJECTIVE     He is doing fair. Bloating present but maybe better. Feels that he is progressing. MEDICATIONS   Medications Prior to Admission  Medications Prior to Admission: allopurinol (ZYLOPRIM) 300 MG tablet, Take 1 tablet by mouth daily  gemfibrozil (LOPID) 600 MG tablet, Take 1 tablet by mouth 2 times daily (before meals)  Multiple Vitamins-Minerals (PRESERVISION AREDS PO), Take by mouth 2 caps by mouth twice a day.   metoprolol tartrate (LOPRESSOR) 25 MG tablet, Take 1 tablet by mouth 2 times daily    Current Medications  Current Facility-Administered Medications   Medication Dose Route Frequency Provider Last Rate Last Dose    metoclopramide (REGLAN) injection 10 mg  10 mg Intravenous Q6H Abiodun Benjamin MD   10 mg at 07/12/19 0534    pantoprazole (PROTONIX) injection 40 mg  40 mg Intravenous Daily Abiodun Benjamin MD   40 mg at 07/11/19 1105    metoprolol tartrate (LOPRESSOR) tablet 12.5 mg  12.5 mg Oral BID Domi Colon MD   12.5 mg at 07/11/19 2110    lactated ringers infusion   Intravenous Continuous Abiodun Benjamin MD 75 mL/hr at 07/12/19 0534      oxyCODONE-acetaminophen (PERCOCET) 5-325 MG per tablet 1 tablet  1 tablet Oral Q4H PRN Domi Colon MD   1 tablet at 07/11/19 5908 na    FINDINGS:  The lungs are without acute focal process.  There is no effusion or  pneumothorax. The cardiomediastinal silhouette is without acute process.  The  osseous structures are without acute process.     Impression:       No acute process.             Relevant labs and imaging reviewed    ASSESSMENT AND PLAN       Acute lower GIB - red blood [place] suspect diverticular bleed  Anemia due to acute blood loss  - completed EGD and C-scope 7/4 noting  --[id number]-DUODENAL DIVERTICULUM----/   D.COLI --HDS   NO BLOOD NOTED IN THE UPPER OR L/ GIT  - d/c IVF  - check lactate is wnl  - check CT A/P which r/o colitis  - C.diff negative, rest of GI PCR negative  - CTA negative  - nuclear med scan 7/6, 7/7 found focal uptake along cecum or proximal ascending colon  - attempted IR embolization 7/6, 7/7 but unsuccessful since no extravasation   - 4 U pRBC 7/5   - 2 U pRBC 7/7  - 2 U pRBC 7/8  - due to refractory GIB, need for large vol transfusion w/o improvement, underwent partial colectomy 7/8 given refractory bleeding with repeated large vol transfusion  - completed trudy-operative IV antibiotic ppx  - on LR IVF - reduced fluid today 7/10 with IV lasix given  - bey removal 7/10  - progress to clears per surgery 7/10  - 7/11 - due to symptoms, check AXR which confirmed ileus  - 7/12 - CXR non PNA or fluid congestion, continue IVF, to slowly restart clears, OOB, increase activity    Low BP 7/3  Orthostasis noted   - IVF, bolus  - improve with vol resus  - on metoprolol but with holding parameters   - add florinef trial, orthostatic precautions  - BP better now      SOB, OTERO, decreased exercise tolerance  - consult card - known to Dr Patricia Myrick and completed outpatient stress and TTE that is abnormal. Missed appointment due to hospital admission  - tele, close observation  - outpatient card f/u     Depression  BPH  HLD     SCD ppx   Lovenox ppx  OOB, increase activity    He reports deconditioning from this

## 2019-07-12 NOTE — PROGRESS NOTES
Physical Therapy  . Physical Therapy Treatment Note  Name: Meredith Nails MRN: [id number] :   1940   Date:  2019   Admission Date: 2019 Room:  [address] [place], [place] [address]A     Restrictions/Precautions:  Restrictions/Precautions  Restrictions/Precautions: General Precautions, Fall Risk  Required Braces or Orthoses?: No         Communication with other providers:  SHILO Mike cleared patient for physical therapy. Subjective:  Patient states:  \"I'm tired of sitting and laying. \"   Pain:   Location, Type, Intensity (0/10 to 10/10): Denies pain. Objective:    Observation:  Patient supine in bed upon therapist arrival. Sultana Gray. A&O x4  Vitals: 02 97%, HR 79    Treatment, including education/measures:  Transfers  Supine to sit :SBA  Sit to supine :NT  Scooting :SBA  Rolling :NT  Sit to stand :SBA  Stand to sit :SBA    Therapeutic Exercise:  Therapeutic exercises were instructed today. Instructions were given for technique, safety, recruitment, and rationale. Instructions were verbal and/or tactile. Seated: Ankle pumps 1 sets of 10  Long arc quads 1 sets of 10  Marching 1 sets of 10  Hip abduction / adduction 1 sets of 10  Standing:  x25 marching B UE support on RW  Dynamic balance activities challenging reaching outside of YOKO and across mid line without support on AD. Patient required supervision for this activity. No LOB. Gait:  Patient ambulated with RW for 300 ft with SBA . Patient presents with forward flexed posture and had episodes of ambulation outside of the AD. Patient required verbal instructions for safety awareness and proper use of AD. Safety  Patient left safely in the chair, with call light/phone in reach with alarm applied. Gait belt was used for transfers and gait.       Assessment / Impression:    Patient's tolerance of treatment:  well   Adverse Reaction: none  Significant change in status and impact:  none  Barriers to improvement:  none  Discharge plan: Home with [address] [place] [address] for Next Session:    Per POC    Time in:  1300  Time out:  1340  Timed treatment minutes:  40  Total treatment time:  40    Previously filed items:  Social/Functional History  Lives With: Spouse  Type of Home: House  Home Layout: One level  Home Access: Ramped entrance  Home Equipment: Rolling walker, Sock aid  ADL Assistance: Independent  Homemaking Assistance: Independent  Ambulation Assistance: Independent  Transfer Assistance: Independent  Active : Yes  Mode of Transportation: Car  Occupation: Retired  Type of occupation: [employer]  Short term goals  Time Frame for Short term goals: 1 week  Short term goal 1: Patient will perform supine to sit mod I from flat bed with log rolling technique. Short term goal 2: Patient will perform STS mod I with RW. Short term goal 3: Patient will ambulate 150ft mod I with RW.        Electronically signed by:    Edward Avila PTA [id number]

## 2019-07-13 LAB
ANION GAP SERPL CALCULATED.3IONS-SCNC: 8 MMOL/L (ref 4–16)
BASOPHILS ABSOLUTE: 0 K/CU MM
BASOPHILS RELATIVE PERCENT: 0.5 % (ref 0–1)
BUN BLDV-MCNC: 12 MG/DL (ref 6–23)
CALCIUM SERPL-MCNC: 8.5 MG/DL (ref 8.3–10.6)
CHLORIDE BLD-SCNC: 101 MMOL/L (ref 99–110)
CO2: 33 MMOL/L (ref 21–32)
CREAT SERPL-MCNC: 0.6 MG/DL (ref 0.9–1.3)
DIFFERENTIAL TYPE: ABNORMAL
EOSINOPHILS ABSOLUTE: 0.2 K/CU MM
EOSINOPHILS RELATIVE PERCENT: 3.6 % (ref 0–3)
GFR AFRICAN AMERICAN: >60 ML/MIN/1.73M2
GFR NON-AFRICAN AMERICAN: >60 ML/MIN/1.73M2
GLUCOSE BLD-MCNC: 83 MG/DL (ref 70–99)
HCT VFR BLD CALC: 32.1 % (ref 42–52)
HEMOGLOBIN: 10.2 GM/DL (ref 13.5–18)
IMMATURE NEUTROPHIL %: 0.4 % (ref 0–0.43)
LYMPHOCYTES ABSOLUTE: 1.1 K/CU MM
LYMPHOCYTES RELATIVE PERCENT: 18.9 % (ref 24–44)
MCH RBC QN AUTO: 29.5 PG (ref 27–31)
MCHC RBC AUTO-ENTMCNC: 31.8 % (ref 32–36)
MCV RBC AUTO: 92.8 FL (ref 78–100)
MONOCYTES ABSOLUTE: 0.6 K/CU MM
MONOCYTES RELATIVE PERCENT: 11.2 % (ref 0–4)
NUCLEATED RBC %: 0 %
PDW BLD-RTO: 15.9 % (ref 11.7–14.9)
PLATELET # BLD: 313 K/CU MM (ref 140–440)
PMV BLD AUTO: 10.5 FL (ref 7.5–11.1)
POTASSIUM SERPL-SCNC: 3.8 MMOL/L (ref 3.5–5.1)
RBC # BLD: 3.46 M/CU MM (ref 4.6–6.2)
SEGMENTED NEUTROPHILS ABSOLUTE COUNT: 3.6 K/CU MM
SEGMENTED NEUTROPHILS RELATIVE PERCENT: 65.4 % (ref 36–66)
SODIUM BLD-SCNC: 142 MMOL/L (ref 135–145)
TOTAL IMMATURE NEUTOROPHIL: 0.02 K/CU MM
TOTAL NUCLEATED RBC: 0 K/CU MM
WBC # BLD: 5.6 K/CU MM (ref 4–10.5)

## 2019-07-13 PROCEDURE — 1200000000 HC SEMI PRIVATE

## 2019-07-13 PROCEDURE — 6360000002 HC RX W HCPCS: Performed by: INTERNAL MEDICINE

## 2019-07-13 PROCEDURE — 6360000002 HC RX W HCPCS: Performed by: SURGERY

## 2019-07-13 PROCEDURE — 97116 GAIT TRAINING THERAPY: CPT

## 2019-07-13 PROCEDURE — 85025 COMPLETE CBC W/AUTO DIFF WBC: CPT

## 2019-07-13 PROCEDURE — C9113 INJ PANTOPRAZOLE SODIUM, VIA: HCPCS | Performed by: SURGERY

## 2019-07-13 PROCEDURE — 6370000000 HC RX 637 (ALT 250 FOR IP): Performed by: INTERNAL MEDICINE

## 2019-07-13 PROCEDURE — 36415 COLL VENOUS BLD VENIPUNCTURE: CPT

## 2019-07-13 PROCEDURE — 80048 BASIC METABOLIC PNL TOTAL CA: CPT

## 2019-07-13 PROCEDURE — 97110 THERAPEUTIC EXERCISES: CPT

## 2019-07-13 PROCEDURE — 6370000000 HC RX 637 (ALT 250 FOR IP): Performed by: SURGERY

## 2019-07-13 RX ADMIN — ALLOPURINOL 300 MG: 100 TABLET ORAL at 10:14

## 2019-07-13 RX ADMIN — FINASTERIDE 5 MG: 5 TABLET, FILM COATED ORAL at 10:13

## 2019-07-13 RX ADMIN — METOCLOPRAMIDE 10 MG: 5 INJECTION, SOLUTION INTRAMUSCULAR; INTRAVENOUS at 11:46

## 2019-07-13 RX ADMIN — METOCLOPRAMIDE 10 MG: 5 INJECTION, SOLUTION INTRAMUSCULAR; INTRAVENOUS at 04:56

## 2019-07-13 RX ADMIN — PANTOPRAZOLE SODIUM 40 MG: 40 INJECTION, POWDER, FOR SOLUTION INTRAVENOUS at 10:13

## 2019-07-13 RX ADMIN — FLUDROCORTISONE ACETATE 0.1 MG: 0.1 TABLET ORAL at 10:13

## 2019-07-13 RX ADMIN — METOPROLOL TARTRATE 12.5 MG: 25 TABLET ORAL at 10:13

## 2019-07-13 RX ADMIN — METOCLOPRAMIDE 10 MG: 5 INJECTION, SOLUTION INTRAMUSCULAR; INTRAVENOUS at 22:49

## 2019-07-13 RX ADMIN — VENLAFAXINE HYDROCHLORIDE 150 MG: 37.5 CAPSULE, EXTENDED RELEASE ORAL at 10:14

## 2019-07-13 RX ADMIN — ENOXAPARIN SODIUM 30 MG: 30 INJECTION SUBCUTANEOUS at 10:13

## 2019-07-13 RX ADMIN — METOPROLOL TARTRATE 12.5 MG: 25 TABLET ORAL at 20:21

## 2019-07-13 RX ADMIN — METOCLOPRAMIDE 10 MG: 5 INJECTION, SOLUTION INTRAMUSCULAR; INTRAVENOUS at 17:38

## 2019-07-13 RX ADMIN — ESCITALOPRAM OXALATE 10 MG: 10 TABLET ORAL at 10:13

## 2019-07-13 NOTE — PROGRESS NOTES
Patient rested quietly during the night. Denies need for pain or nausea medicine. Ambulated in casas with walker, tolerated well. Voiding per urinal without difficulty. No BM noted on this shift.

## 2019-07-13 NOTE — FLOWSHEET NOTE
07/12/19 2124   Mobility   Activity Ambulate in casas;Return to bed   Level of Assistance Modified independent, requires aide device or extra time   Assistive Device Front wheel walker   Distance Ambulated (ft) 400 ft   Ambulation Response Tolerated well

## 2019-07-13 NOTE — PROGRESS NOTES
Progress Note (Hospitalist, Internal Medicine)  IDENTIFYING INFORMATION   PATIENT:  Marah Wilson  MRN:  [id number]  ADMIT DATE: 7/2/2019      HISTORY OF PRESENT ILLNESS   Rohit Carrillo is a 70 y. o. male with a past medical history of subacute SOB, OTERO who visits with Dr Joseline Romo and had outpatient stress and ECHO last week who was suppose to visit card outpatient today for discussion of results developed acute onset red bloody diarrhea since this morning all morning and was in the bathroom. Denies any abdominal pain or painful passage of stool. His GI symptoms caused him to be fatigued noting some SOB that is worse with exertion. In the ED, there were no visible evidence of bleeding and w/o abdo symptoms. SUBJECTIVE     Had small BM yesterday evening. Tolerating clears    Tried some regular diet today and appears fair    No more bleeding    MEDICATIONS   Medications Prior to Admission  Medications Prior to Admission: allopurinol (ZYLOPRIM) 300 MG tablet, Take 1 tablet by mouth daily  gemfibrozil (LOPID) 600 MG tablet, Take 1 tablet by mouth 2 times daily (before meals)  Multiple Vitamins-Minerals (PRESERVISION AREDS PO), Take by mouth 2 caps by mouth twice a day.   metoprolol tartrate (LOPRESSOR) 25 MG tablet, Take 1 tablet by mouth 2 times daily    Current Medications  Current Facility-Administered Medications   Medication Dose Route Frequency Provider Last Rate Last Dose    fludrocortisone (FLORINEF) tablet 0.1 mg  0.1 mg Oral Daily Iqra Christensen MD   0.1 mg at 07/13/19 1013    metoclopramide (REGLAN) injection 10 mg  10 mg Intravenous Q6H Jenny Paulson MD   10 mg at 07/13/19 0456    pantoprazole (PROTONIX) injection 40 mg  40 mg Intravenous Daily Jenny Paulson MD   40 mg at 07/13/19 1013    metoprolol tartrate (LOPRESSOR) tablet 12.5 mg  12.5 mg Oral BID Iqra Christensen MD   12.5 mg at 07/13/19 1013    lactated ringers infusion   Intravenous Continuous Antonietta Stafford MD 25 mL/hr at 07/13/19 20  hours, additional images can be acquired.     NM GI Blood Loss [[id number]] Collected: 07/06/19 1500     Order Status: Completed Updated: 07/06/19 1512     Narrative:       EXAMINATION:  NUCLEAR MEDICINE GASTRIC BLEEDING STUDY    7/6/2019    TECHNIQUE:  Following the intravenous injection of 22.0 mCi of Tc-99m labeled RBC's, a  flow study and standard images of the abdomen was obtained over a total  period of 60 minutes. COMPARISON:  CTA on 07/05/2019    HISTORY:  Active GI bleeding.  3 blood transfusions. FINDINGS:  Activity is seen within the blood pool, including the great vessels, heart,  liver, and spleen.  Activity within the pelvis relates to the urinary bladder  and penis. At 50-60 minutes, there is intense focal uptake within the right abdomen  between the liver and bladder which could be within the cecum or proximal  ascending colon.     Impression:       Focal intense uptake within the right abdomen may represent bleeding within  the cecum or proximal ascending colon. The findings were sent to the Radiology Results [address] at 3:10  PM on 07/06/2019 to be communicated to a licensed caregiver.     CTA ABDOMEN PELVIS W CONTRAST [[id number]] Collected: 07/05/19 1059     Order Status: Completed Updated: 07/05/19 1109     Narrative:       EXAMINATION:  CTA OF THE ABDOMEN AND PELVIS WITH CONTRAST    7/5/2019 10:42 am:    TECHNIQUE:  CTA of the abdomen and pelvis was performed with the administration of  intravenous contrast. Multiplanar reformatted images are provided for review. MIP images are provided for review. Dose modulation, iterative  reconstruction, and/or weight based adjustment of the mA/kV was utilized to  reduce the radiation dose to as low as reasonably achievable.     COMPARISON:  07/03/2019    HISTORY:  ORDERING SYSTEM PROVIDED HISTORY: identify source of GIB  TECHNOLOGIST PROVIDED HISTORY:  Reason for Exam: identify source of GIB  Acuity: Acute  Type of Exam: Provided Reason for Exam: chest pain  Acuity: Acute  Type of Exam: Initial  Additional signs and symptoms: na  Relevant Medical/Surgical History: na    FINDINGS:  The lungs are without acute focal process.  There is no effusion or  pneumothorax. The cardiomediastinal silhouette is without acute process. The  osseous structures are without acute process.     Impression:       No acute process.             Relevant labs and imaging reviewed    ASSESSMENT AND PLAN       Acute lower GIB - red blood [place] suspect diverticular bleed  Anemia due to acute blood loss  - completed EGD and C-scope 7/4 noting  --[id number]-DUODENAL DIVERTICULUM----/   D.COLI --HDS   NO BLOOD NOTED IN THE UPPER OR L/ GIT  - d/c IVF  - check lactate is wnl  - check CT A/P which r/o colitis  - C.diff negative, rest of GI PCR negative  - CTA negative  - nuclear med scan 7/6, 7/7 found focal uptake along cecum or proximal ascending colon  - attempted IR embolization 7/6, 7/7 but unsuccessful since no extravasation   - 4 U pRBC 7/5   - 2 U pRBC 7/7  - 2 U pRBC 7/8  - due to refractory GIB, need for large vol transfusion w/o improvement, underwent partial colectomy 7/8 given refractory bleeding with repeated large vol transfusion  - completed trudy-operative IV antibiotic ppx  - on LR IVF - reduced fluid today 7/10 with IV lasix given  - bey removal 7/10  - progress to clears per surgery 7/10  - 7/11 - due to symptoms, check AXR which confirmed ileus  - 7/12 - CXR non PNA or fluid congestion, continue IVF, to slowly restart clears, OOB, increase activity  - 7/13 - progress to general diet as he had small BM, flatus yesterday.     Low BP 7/3  Orthostasis noted   - IVF, bolus  - improve with vol resus  - on metoprolol but with holding parameters   - add florinef trial, orthostatic precautions  - BP better now      SOB, OTERO, decreased exercise tolerance  - consult card - known to Dr Nicholas Montes De Oca and completed outpatient stress and TTE that is abnormal. Missed appointment due to hospital admission  - tele, close observation  - outpatient card f/u     Depression  BPH  HLD     SCD ppx   Lovenox ppx  OOB, increase activity    He reports deconditioning from this illness.  PT/OT, case management eval for placement if needed          [address] [place] [address], Internal Medicine  7/13/2019 at 10:37 AM

## 2019-07-13 NOTE — PROGRESS NOTES
Pt up to chair and ambulating with standby assist. Denies pain. bm today. Diet advanced to full liquids. Tolerating diet with no nausea.

## 2019-07-13 NOTE — PROGRESS NOTES
advancement in 1-3 days     · Monitoring: Nutrition Progression, Meal Intake, Diet Tolerance, Wound Healing, Weight, Pertinent Labs, Monitor Bowel Function      Electronically signed by Jabier Jamison RD, LD on 7/12/19 at 9:30 PM    Contact Number: [phone]

## 2019-07-14 VITALS
TEMPERATURE: 97.7 F | OXYGEN SATURATION: 94 % | RESPIRATION RATE: 16 BRPM | HEIGHT: 72 IN | HEART RATE: 69 BPM | BODY MASS INDEX: 33.4 KG/M2 | DIASTOLIC BLOOD PRESSURE: 78 MMHG | WEIGHT: 246.61 LBS | SYSTOLIC BLOOD PRESSURE: 150 MMHG

## 2019-07-14 PROCEDURE — 6360000002 HC RX W HCPCS: Performed by: INTERNAL MEDICINE

## 2019-07-14 PROCEDURE — C9113 INJ PANTOPRAZOLE SODIUM, VIA: HCPCS | Performed by: SURGERY

## 2019-07-14 PROCEDURE — 6370000000 HC RX 637 (ALT 250 FOR IP): Performed by: SURGERY

## 2019-07-14 PROCEDURE — 97530 THERAPEUTIC ACTIVITIES: CPT

## 2019-07-14 PROCEDURE — 97116 GAIT TRAINING THERAPY: CPT

## 2019-07-14 PROCEDURE — 6360000002 HC RX W HCPCS: Performed by: SURGERY

## 2019-07-14 PROCEDURE — 6370000000 HC RX 637 (ALT 250 FOR IP): Performed by: INTERNAL MEDICINE

## 2019-07-14 RX ORDER — FLUDROCORTISONE ACETATE 0.1 MG/1
0.1 TABLET ORAL DAILY
Qty: 30 TABLET | Refills: 0 | Status: SHIPPED | OUTPATIENT
Start: 2019-07-15 | End: 2019-08-14

## 2019-07-14 RX ORDER — BLOOD PRESSURE TEST KIT
KIT MISCELLANEOUS
Qty: 1 KIT | Refills: 0 | Status: SHIPPED | OUTPATIENT
Start: 2019-07-14 | End: 2019-10-09

## 2019-07-14 RX ADMIN — ESCITALOPRAM OXALATE 10 MG: 10 TABLET ORAL at 09:56

## 2019-07-14 RX ADMIN — ALLOPURINOL 300 MG: 100 TABLET ORAL at 09:55

## 2019-07-14 RX ADMIN — METOCLOPRAMIDE 10 MG: 5 INJECTION, SOLUTION INTRAMUSCULAR; INTRAVENOUS at 05:27

## 2019-07-14 RX ADMIN — METOPROLOL TARTRATE 12.5 MG: 25 TABLET ORAL at 09:57

## 2019-07-14 RX ADMIN — FLUDROCORTISONE ACETATE 0.1 MG: 0.1 TABLET ORAL at 09:55

## 2019-07-14 RX ADMIN — FINASTERIDE 5 MG: 5 TABLET, FILM COATED ORAL at 09:56

## 2019-07-14 RX ADMIN — PANTOPRAZOLE SODIUM 40 MG: 40 INJECTION, POWDER, FOR SOLUTION INTRAVENOUS at 09:59

## 2019-07-14 RX ADMIN — VENLAFAXINE HYDROCHLORIDE 150 MG: 37.5 CAPSULE, EXTENDED RELEASE ORAL at 09:56

## 2019-07-14 RX ADMIN — ENOXAPARIN SODIUM 30 MG: 30 INJECTION SUBCUTANEOUS at 09:59

## 2019-07-14 NOTE — PLAN OF CARE
Problem: Falls - Risk of:  Goal: Will remain free from falls  Description  Will remain free from falls  7/14/2019 0847 by Navin Wolfe RN  Outcome: Ongoing  7/14/2019 0847 by Navin Wolfe RN  Outcome: Not Met This Shift  7/13/2019 2227 by Austin Olivera RN  Outcome: Met This Shift  Goal: Absence of physical injury  Description  Absence of physical injury  7/14/2019 0847 by Navin Wolfe RN  Outcome: Ongoing  7/14/2019 0847 by Navin Wolfe RN  Outcome: Not Met This Shift  7/13/2019 2227 by Austin Olivera RN  Outcome: Met This Shift     Problem: Safety:  Goal: Free from accidental physical injury  Description  Free from accidental physical injury  7/14/2019 0847 by Navin Wolfe RN  Outcome: Ongoing  7/14/2019 0847 by Navin Wolfe RN  Outcome: Not Met This Shift  7/13/2019 2227 by Austin Olivera RN  Outcome: Met This Shift     Problem: Daily Care:  Goal: Daily care needs are met  Description  Daily care needs are met  7/14/2019 0847 by Navin Wolfe RN  Outcome: Ongoing  7/14/2019 0847 by Navin Wolfe RN  Outcome: Not Met This Shift  7/13/2019 2227 by Austin Olivera RN  Outcome: Met This Shift     Problem: Pain:  Goal: Patient's pain/discomfort is manageable  Description  Patient's pain/discomfort is manageable  7/14/2019 0847 by Navin Wolfe RN  Outcome: Ongoing  7/14/2019 0847 by Navin Wolfe RN  Outcome: Not Met This Shift  7/13/2019 2227 by Austin Olivera RN  Outcome: Met This Shift  Goal: Pain level will decrease  Description  Pain level will decrease  7/14/2019 0847 by Navin Wolfe RN  Outcome: Ongoing  7/14/2019 0847 by Navin Wolfe RN  Outcome: Not Met This Shift  7/13/2019 2227 by Austin Olivera RN  Outcome: Met This Shift  Goal: Control of acute pain  Description  Control of acute pain  7/14/2019 0847 by Navin Wolfe RN  Outcome: Ongoing  7/14/2019 0847 by Navin Wolfe RN  Outcome: Not Met This Shift  7/13/2019 2227 by Shashi Castle RN  Outcome: Met This Shift  Goal: Control of chronic pain  Description  Control of chronic pain  7/14/2019 0847 by Sonido Ely RN  Outcome: Ongoing  7/14/2019 0847 by Sonido Ely RN  Outcome: Not Met This Shift  7/13/2019 2227 by Shashi Castle RN  Outcome: Ongoing     Problem: Discharge Planning:  Goal: Patients continuum of care needs are met  Description  Patients continuum of care needs are met  7/14/2019 0847 by Sonido Ely RN  Outcome: Ongoing  7/14/2019 0847 by Sonido Ely RN  Outcome: Not Met This Shift  7/13/2019 2227 by Shashi Castle RN  Outcome: Met This Shift     Problem: Nutrition  Goal: Optimal nutrition therapy  7/14/2019 0847 by Sonido Ely RN  Outcome: Ongoing  7/14/2019 0847 by Sonido Ely RN  Outcome: Not Met This Shift  7/13/2019 2227 by Shashi Castle RN  Outcome: Ongoing

## 2019-07-14 NOTE — PROGRESS NOTES
Physical Therapy  . Physical Therapy Treatment Note  Name: Buster Youssef MRN: [id number] :   1940   Date:  2019   Admission Date: 2019 [facility]:  [facility]     Restrictions/Precautions:  Restrictions/Precautions  Restrictions/Precautions: General Precautions, Fall Risk  Required Braces or Orthoses?: No         Communication with other providers:  SHILO Goetz cleared patient for physical therapy. Communication with Dr. Vicky Zuleta after therapy session. Patient would like [address] but declining PT / OT. Subjective:  Patient states:  \"I'm ready to go home. \"   Pain:   Location, Type, Intensity (0/10 to 10/10): Denies pain. Objective:    Observation:  Patient seated at EOB upon therapist arrival.   A&O x4    Treatment, including education/measures:  Transfers  Supine to sit :Patient received at EOB  Sit to supine :NT  Scooting :NT  Rolling :NT  Sit to stand :Independent from bed and chair  Stand to sit :independent to bed and chair    Gait:  Patient ambulated with RW for 450 ft with supervision. Additionally patient ambulated in room to chair and from bathroom with supervision and RW. Patient presents with reciprocal gait pattern and good gait quality. Patient requires intermittent cues for postural corrections. Standing balance:  Patient tolerated 3 minutes of dynamic standing for hygiene tasks. Patient requires set-up assistance as well. Patient required supervision assist with no LOB. Safety  Patient left safely in the chair, with call light/phone in reach with alarm applied. Gait belt was used for transfers and gait.       Assessment / Impression:    Patient's tolerance of treatment:  well   Adverse Reaction: none  Significant change in status and impact:  none  Barriers to improvement:  none  Discharge plan: Home    Plan for Next Session:    Per POC    Time in:  1025  Time out:  1105  Timed treatment minutes:  40  Total treatment time:  40    Previously filed

## 2019-07-14 NOTE — DISCHARGE SUMMARY
07/13/19  0510    142   K 3.7 3.8    101   CO2 30 33*   BUN 15 12   CREATININE 0.6* 0.6*   CALCIUM 8.3 8.5     Troponin: No results for input(s): TROPONINI in the last 72 hours. BNP: No results for input(s): BNP in the last 72 hours. Lipids: No results for input(s): CHOL, HDL in the last 72 hours. Invalid input(s): LDLCALCU  ABGs:No results for input(s): PH, DFE4MSJ, PO2ART, BE, ZOQ8BKN, CO2CT, O2SAT, LABCARB in the last 72 hours. Invalid input(s): METHGBART    Glucose: No results for input(s): POCGLU in the last 72 hours. Magnesium: No results for input(s): MG in the last 72 hours. Phosphorus:No results for input(s): PHOS in the last 72 hours. INR: No results for input(s): INR in the last 72 hours. Patient Instructions:   Bonnie Marshall   [place] Medication Instructions GSF:[id number]    Printed on:07/14/19 1112   Medication Information                      allopurinol (ZYLOPRIM) 300 MG tablet  Take 1 tablet by mouth daily             Blood Pressure KIT  BID for record down in log book             escitalopram (LEXAPRO) 10 MG tablet  TAKE 1 TABLET BY MOUTH ONE TIME A DAY              finasteride (PROSCAR) 5 MG tablet  TAKE 1 TABLET DAILY             fludrocortisone (FLORINEF) 0.1 MG tablet  Take 1 tablet by mouth daily Hold if SBP > 150             gemfibrozil (LOPID) 600 MG tablet  Take 1 tablet by mouth 2 times daily (before meals)             metoprolol tartrate (LOPRESSOR) 25 MG tablet  Take 0.5 tablets by mouth 2 times daily Hold for SBP < 100 or HR < 60             Multiple Vitamins-Minerals (PRESERVISION AREDS PO)  Take by mouth 2 caps by mouth twice a day.              venlafaxine (EFFEXOR XR) 150 MG extended release capsule  TAKE 1 CAPSULE BY MOUTH IN THE MORNING                   Code Status: Full Code     Consults:   IP CONSULT TO HOSPITALIST  IP CONSULT TO CARDIOLOGY  IP CONSULT TO GI  IP CONSULT TO CASE MANAGEMENT  IP CONSULT TO GENERAL SURGERY  IP CONSULT TO INTERVENTIONAL

## 2019-07-16 LAB
EKG ATRIAL RATE: 74 BPM
EKG DIAGNOSIS: NORMAL
EKG P AXIS: 28 DEGREES
EKG P-R INTERVAL: 160 MS
EKG Q-T INTERVAL: 396 MS
EKG QRS DURATION: 82 MS
EKG QTC CALCULATION (BAZETT): 439 MS
EKG R AXIS: -30 DEGREES
EKG T AXIS: 80 DEGREES
EKG VENTRICULAR RATE: 74 BPM

## 2019-07-17 ENCOUNTER — OFFICE VISIT (OUTPATIENT)
Dept: FAMILY MEDICINE CLINIC | Age: 79
End: 2019-07-17
Payer: MEDICARE

## 2019-07-17 VITALS
HEIGHT: 71 IN | HEART RATE: 60 BPM | DIASTOLIC BLOOD PRESSURE: 80 MMHG | WEIGHT: 234.8 LBS | SYSTOLIC BLOOD PRESSURE: 120 MMHG | BODY MASS INDEX: 32.87 KG/M2

## 2019-07-17 DIAGNOSIS — K92.2 GASTROINTESTINAL HEMORRHAGE, UNSPECIFIED GASTROINTESTINAL HEMORRHAGE TYPE: ICD-10-CM

## 2019-07-17 DIAGNOSIS — Z23 NEED FOR PROPHYLACTIC VACCINATION AND INOCULATION AGAINST VARICELLA: ICD-10-CM

## 2019-07-17 DIAGNOSIS — Z23 NEED FOR PROPHYLACTIC VACCINATION AGAINST STREPTOCOCCUS PNEUMONIAE (PNEUMOCOCCUS): ICD-10-CM

## 2019-07-17 DIAGNOSIS — D50.0 IRON DEFICIENCY ANEMIA DUE TO CHRONIC BLOOD LOSS: ICD-10-CM

## 2019-07-17 DIAGNOSIS — Z90.49 HISTORY OF COLON RESECTION: ICD-10-CM

## 2019-07-17 DIAGNOSIS — Z90.49 HISTORY OF COLON RESECTION: Primary | ICD-10-CM

## 2019-07-17 LAB
A/G RATIO: 1.6 (ref 1.1–2.2)
ALBUMIN SERPL-MCNC: 3.7 G/DL (ref 3.4–5)
ALP BLD-CCNC: 74 U/L (ref 40–129)
ALT SERPL-CCNC: 20 U/L (ref 10–40)
ANION GAP SERPL CALCULATED.3IONS-SCNC: 14 MMOL/L (ref 3–16)
AST SERPL-CCNC: 21 U/L (ref 15–37)
BILIRUB SERPL-MCNC: 0.4 MG/DL (ref 0–1)
BUN BLDV-MCNC: 12 MG/DL (ref 7–20)
CALCIUM SERPL-MCNC: 9.3 MG/DL (ref 8.3–10.6)
CHLORIDE BLD-SCNC: 103 MMOL/L (ref 99–110)
CO2: 26 MMOL/L (ref 21–32)
CREAT SERPL-MCNC: 0.6 MG/DL (ref 0.8–1.3)
GFR AFRICAN AMERICAN: >60
GFR NON-AFRICAN AMERICAN: >60
GLOBULIN: 2.3 G/DL
GLUCOSE BLD-MCNC: 94 MG/DL (ref 70–99)
HCT VFR BLD CALC: 34.7 % (ref 40.5–52.5)
HEMOGLOBIN: 11.1 G/DL (ref 13.5–17.5)
MCH RBC QN AUTO: 29.7 PG (ref 26–34)
MCHC RBC AUTO-ENTMCNC: 32 G/DL (ref 31–36)
MCV RBC AUTO: 92.8 FL (ref 80–100)
PDW BLD-RTO: 17.5 % (ref 12.4–15.4)
PLATELET # BLD: 367 K/UL (ref 135–450)
PMV BLD AUTO: 9 FL (ref 5–10.5)
POTASSIUM SERPL-SCNC: 4.3 MMOL/L (ref 3.5–5.1)
RBC # BLD: 3.74 M/UL (ref 4.2–5.9)
SODIUM BLD-SCNC: 143 MMOL/L (ref 136–145)
TOTAL PROTEIN: 6 G/DL (ref 6.4–8.2)
WBC # BLD: 5.5 K/UL (ref 4–11)

## 2019-07-17 PROCEDURE — 99214 OFFICE O/P EST MOD 30 MIN: CPT | Performed by: FAMILY MEDICINE

## 2019-07-17 ASSESSMENT — ENCOUNTER SYMPTOMS
CHEST TIGHTNESS: 0
BLOOD IN STOOL: 1
SHORTNESS OF BREATH: 0
ABDOMINAL PAIN: 0

## 2019-07-17 NOTE — PROGRESS NOTES
sounds. Pulmonary/Chest: Effort normal and breath sounds normal.   Abdominal: Soft. He exhibits no mass. There is no tenderness. There is no rebound. Musculoskeletal: Normal range of motion. Neurological: He is alert and oriented to person, place, and time. Skin: Skin is warm and dry. There is pallor. Psychiatric: He has a normal mood and affect. Thought content normal.   Nursing note and vitals reviewed. ASSESSMENT & PLAN     Diagnosis Orders   1. History of colon resection(secondary to diverticular bleed)  Comprehensive Metabolic Panel   2. Need for prophylactic vaccination against Streptococcus pneumoniae (pneumococcus)     3. Need for prophylactic vaccination and inoculation against varicella  zoster recombinant adjuvanted vaccine (SHINGRIX) 50 MCG/0.5ML SUSR injection   4. Gastrointestinal hemorrhage, unspecified gastrointestinal hemorrhage type  CBC   5. Iron deficiency anemia due to chronic blood loss       Point we will will provide refills as needed. Keep on the same regimen and recheck a CBC and a CMP and provide encouragement. He is to follow-up for today's results. Slowly advance diet and activity for strengthening. He is to keep appts with specialists post hospitalization. Return in about 2 months (around 9/17/2019), or if symptoms worsen or fail to improve, for Medicare Annual Wellness Visit (AWV).          Electronically signed by Balwinder Jauregui MD on 7/17/2019

## 2019-07-18 ENCOUNTER — TELEPHONE (OUTPATIENT)
Dept: FAMILY MEDICINE CLINIC | Age: 79
End: 2019-07-18

## 2019-07-18 NOTE — TELEPHONE ENCOUNTER
----- Message from Linda Turk MD sent at 7/18/2019 12:07 PM EDT -----  Call pt: labs look ok with hgb=11.4  P: con't with current rx and reg diet-hgb will improve back to nml with time.

## 2019-07-29 ENCOUNTER — OFFICE VISIT (OUTPATIENT)
Dept: CARDIOLOGY CLINIC | Age: 79
End: 2019-07-29
Payer: MEDICARE

## 2019-07-29 VITALS
HEIGHT: 72 IN | BODY MASS INDEX: 30.56 KG/M2 | DIASTOLIC BLOOD PRESSURE: 74 MMHG | HEART RATE: 64 BPM | SYSTOLIC BLOOD PRESSURE: 116 MMHG | WEIGHT: 225.6 LBS

## 2019-07-29 DIAGNOSIS — K92.2 GASTROINTESTINAL HEMORRHAGE, UNSPECIFIED GASTROINTESTINAL HEMORRHAGE TYPE: ICD-10-CM

## 2019-07-29 DIAGNOSIS — E78.5 HYPERLIPIDEMIA, UNSPECIFIED HYPERLIPIDEMIA TYPE: Primary | ICD-10-CM

## 2019-07-29 DIAGNOSIS — Z92.89 HISTORY OF RECENT HOSPITALIZATION: ICD-10-CM

## 2019-07-29 PROCEDURE — 1036F TOBACCO NON-USER: CPT | Performed by: NURSE PRACTITIONER

## 2019-07-29 PROCEDURE — G8427 DOCREV CUR MEDS BY ELIG CLIN: HCPCS | Performed by: NURSE PRACTITIONER

## 2019-07-29 PROCEDURE — 1111F DSCHRG MED/CURRENT MED MERGE: CPT | Performed by: NURSE PRACTITIONER

## 2019-07-29 PROCEDURE — 1123F ACP DISCUSS/DSCN MKR DOCD: CPT | Performed by: NURSE PRACTITIONER

## 2019-07-29 PROCEDURE — G8417 CALC BMI ABV UP PARAM F/U: HCPCS | Performed by: NURSE PRACTITIONER

## 2019-07-29 PROCEDURE — 4040F PNEUMOC VAC/ADMIN/RCVD: CPT | Performed by: NURSE PRACTITIONER

## 2019-07-29 PROCEDURE — 99213 OFFICE O/P EST LOW 20 MIN: CPT | Performed by: NURSE PRACTITIONER

## 2019-07-29 NOTE — PROGRESS NOTES
[facility]  [address], [address] [facility],Third Floor  Phone: [phone]    Fax [phone]                  Ata Diaz MD, Merlinda Servant, MD, [address] Gutiérrez MD, MD Bernarda De León MD Johnn Bergamo, MD Audie Deist, APRN      Annetta Amato, APRN  Ameena Galeano, APRN     Chasidy Esteves, APRN    CARDIOLOGY  NOTE      7/29/2019    RE: Neil Haynes  (1940)                               TO:  Dr. Scottie Dickens MD  The primary cardiologist is Dr. Yuri Gibson    CC:   1. Hyperlipidemia, unspecified hyperlipidemia type    2. Gastrointestinal hemorrhage, unspecified gastrointestinal hemorrhage type    3. History of recent hospitalization      Patient denies all of the following:  Chest Pain  Palpitations  Increased Shortness of Breath  Edema  Dizziness  Syncope      HPI: Thank you for involving me in taking care of your patient Neil Haynes, who is a  70y.o. year old male with a history as listed above. Patient is  active and male does exercises regularly. Patient is  compliant with his medications. Patient denies any cardiac complaints or needs.      Vitals:    07/29/19 1051   BP: 116/74   Pulse: 64       Current Outpatient Medications   Medication Sig Dispense Refill    metoprolol tartrate (LOPRESSOR) 25 MG tablet Take 0.5 tablets by mouth 2 times daily Hold for SBP < 100 or HR < 60 60 tablet 5    fludrocortisone (FLORINEF) 0.1 MG tablet Take 1 tablet by mouth daily Hold if SBP > 150 30 tablet 0    Blood Pressure KIT BID for record down in log book 1 kit 0    venlafaxine (EFFEXOR XR) 150 MG extended release capsule TAKE 1 CAPSULE BY MOUTH IN THE MORNING  90 capsule 0    escitalopram (LEXAPRO) 10 MG tablet TAKE 1 TABLET BY MOUTH ONE TIME A DAY  90 tablet 0    finasteride (PROSCAR) 5 MG tablet TAKE 1 TABLET DAILY 90 tablet 0    allopurinol (ZYLOPRIM) 300 MG tablet Take 1 tablet by mouth daily 90 tablet 1    gemfibrozil (LOPID)

## 2019-07-29 NOTE — ASSESSMENT & PLAN NOTE
· Reviewed recent Stress test and Echo. · Encouraged pt to call office if increased shortness of breath, chest pain, or swelling is noted.

## 2019-07-31 ENCOUNTER — TELEPHONE (OUTPATIENT)
Dept: FAMILY MEDICINE CLINIC | Age: 79
End: 2019-07-31

## 2019-07-31 DIAGNOSIS — E78.5 HYPERLIPIDEMIA, UNSPECIFIED HYPERLIPIDEMIA TYPE: ICD-10-CM

## 2019-07-31 RX ORDER — GEMFIBROZIL 600 MG/1
600 TABLET, FILM COATED ORAL
Qty: 180 TABLET | Refills: 1 | Status: SHIPPED | OUTPATIENT
Start: 2019-07-31 | End: 2020-01-21 | Stop reason: SDUPTHER

## 2019-08-01 ENCOUNTER — HOSPITAL ENCOUNTER (OUTPATIENT)
Age: 79
Discharge: HOME | End: 2019-08-01
Payer: MEDICARE

## 2019-08-01 LAB
BASOPHILS ABSOLUTE: 0.1 K/CU MM
BASOPHILS RELATIVE PERCENT: 1.1 % (ref 0–1)
CHOLESTEROL: 117 MG/DL
DIFFERENTIAL TYPE: ABNORMAL
EOSINOPHILS ABSOLUTE: 0.3 K/CU MM
EOSINOPHILS RELATIVE PERCENT: 4.6 % (ref 0–3)
HCT VFR BLD CALC: 40.4 % (ref 42–52)
HDLC SERPL-MCNC: 29 MG/DL
HEMOGLOBIN: 12.2 GM/DL (ref 13.5–18)
IMMATURE NEUTROPHIL %: 0.2 % (ref 0–0.43)
LDL CHOLESTEROL DIRECT: 76 MG/DL
LYMPHOCYTES ABSOLUTE: 1.5 K/CU MM
LYMPHOCYTES RELATIVE PERCENT: 25.2 % (ref 24–44)
MCH RBC QN AUTO: 29.2 PG (ref 27–31)
MCHC RBC AUTO-ENTMCNC: 30.2 % (ref 32–36)
MCV RBC AUTO: 96.7 FL (ref 78–100)
MONOCYTES ABSOLUTE: 0.6 K/CU MM
MONOCYTES RELATIVE PERCENT: 9 % (ref 0–4)
NUCLEATED RBC %: 0 %
PDW BLD-RTO: 14.7 % (ref 11.7–14.9)
PLATELET # BLD: 296 K/CU MM (ref 140–440)
PMV BLD AUTO: 11.4 FL (ref 7.5–11.1)
RBC # BLD: 4.18 M/CU MM (ref 4.6–6.2)
SEGMENTED NEUTROPHILS ABSOLUTE COUNT: 3.7 K/CU MM
SEGMENTED NEUTROPHILS RELATIVE PERCENT: 59.9 % (ref 36–66)
TOTAL IMMATURE NEUTOROPHIL: 0.01 K/CU MM
TOTAL NUCLEATED RBC: 0 K/CU MM
TRIGL SERPL-MCNC: 153 MG/DL
WBC # BLD: 6.1 K/CU MM (ref 4–10.5)

## 2019-08-01 PROCEDURE — 85025 COMPLETE CBC W/AUTO DIFF WBC: CPT

## 2019-08-01 PROCEDURE — 36415 COLL VENOUS BLD VENIPUNCTURE: CPT

## 2019-08-01 PROCEDURE — 80061 LIPID PANEL: CPT

## 2019-08-01 PROCEDURE — 83721 ASSAY OF BLOOD LIPOPROTEIN: CPT

## 2019-08-07 ENCOUNTER — TELEPHONE (OUTPATIENT)
Dept: FAMILY MEDICINE CLINIC | Age: 79
End: 2019-08-07

## 2019-08-07 NOTE — TELEPHONE ENCOUNTER
----- Message from Get Garcia sent at 8/7/2019 10:14 AM EDT -----  Contact: huey- [facility] [phone]  Verbal ok for two more visits for discharge planning

## 2019-10-09 ENCOUNTER — OFFICE VISIT (OUTPATIENT)
Dept: FAMILY MEDICINE CLINIC | Age: 79
End: 2019-10-09
Payer: MEDICARE

## 2019-10-09 VITALS
SYSTOLIC BLOOD PRESSURE: 118 MMHG | WEIGHT: 231.6 LBS | HEIGHT: 71 IN | DIASTOLIC BLOOD PRESSURE: 86 MMHG | BODY MASS INDEX: 32.42 KG/M2 | HEART RATE: 68 BPM

## 2019-10-09 DIAGNOSIS — Z23 NEEDS FLU SHOT: ICD-10-CM

## 2019-10-09 DIAGNOSIS — M15.9 OSTEOARTHRITIS OF MULTIPLE JOINTS, UNSPECIFIED OSTEOARTHRITIS TYPE: ICD-10-CM

## 2019-10-09 DIAGNOSIS — N40.0 BENIGN PROSTATIC HYPERPLASIA WITHOUT LOWER URINARY TRACT SYMPTOMS: ICD-10-CM

## 2019-10-09 DIAGNOSIS — M10.9 GOUT, UNSPECIFIED CAUSE, UNSPECIFIED CHRONICITY, UNSPECIFIED SITE: ICD-10-CM

## 2019-10-09 DIAGNOSIS — F33.9 EPISODE OF RECURRENT MAJOR DEPRESSIVE DISORDER, UNSPECIFIED DEPRESSION EPISODE SEVERITY (HCC): Primary | ICD-10-CM

## 2019-10-09 PROCEDURE — 1036F TOBACCO NON-USER: CPT | Performed by: FAMILY MEDICINE

## 2019-10-09 PROCEDURE — 90653 IIV ADJUVANT VACCINE IM: CPT | Performed by: FAMILY MEDICINE

## 2019-10-09 PROCEDURE — G8427 DOCREV CUR MEDS BY ELIG CLIN: HCPCS | Performed by: FAMILY MEDICINE

## 2019-10-09 PROCEDURE — G8482 FLU IMMUNIZE ORDER/ADMIN: HCPCS | Performed by: FAMILY MEDICINE

## 2019-10-09 PROCEDURE — G0008 ADMIN INFLUENZA VIRUS VAC: HCPCS | Performed by: FAMILY MEDICINE

## 2019-10-09 PROCEDURE — 99214 OFFICE O/P EST MOD 30 MIN: CPT | Performed by: FAMILY MEDICINE

## 2019-10-09 PROCEDURE — G8417 CALC BMI ABV UP PARAM F/U: HCPCS | Performed by: FAMILY MEDICINE

## 2019-10-09 PROCEDURE — 1123F ACP DISCUSS/DSCN MKR DOCD: CPT | Performed by: FAMILY MEDICINE

## 2019-10-09 PROCEDURE — 4040F PNEUMOC VAC/ADMIN/RCVD: CPT | Performed by: FAMILY MEDICINE

## 2019-10-09 RX ORDER — VENLAFAXINE HYDROCHLORIDE 150 MG/1
CAPSULE, EXTENDED RELEASE ORAL
Qty: 90 CAPSULE | Refills: 1 | Status: SHIPPED | OUTPATIENT
Start: 2019-10-09 | End: 2020-07-20

## 2019-10-09 RX ORDER — ESCITALOPRAM OXALATE 10 MG/1
TABLET ORAL
Qty: 90 TABLET | Refills: 1 | Status: SHIPPED | OUTPATIENT
Start: 2019-10-09 | End: 2020-07-20

## 2019-10-09 RX ORDER — ALLOPURINOL 300 MG/1
300 TABLET ORAL DAILY
Qty: 90 TABLET | Refills: 1 | Status: SHIPPED | OUTPATIENT
Start: 2019-10-09 | End: 2020-01-21 | Stop reason: SDUPTHER

## 2019-10-09 RX ORDER — AMINO ACIDS/MV,IRON,MIN
1 TABLET ORAL DAILY
COMMUNITY

## 2019-10-09 ASSESSMENT — ENCOUNTER SYMPTOMS
NAUSEA: 0
WHEEZING: 0
SHORTNESS OF BREATH: 0
VOMITING: 0
TROUBLE SWALLOWING: 0
EYE PAIN: 0
CHEST TIGHTNESS: 0
DIARRHEA: 0
ABDOMINAL PAIN: 0
BLOOD IN STOOL: 0

## 2019-10-22 ENCOUNTER — OFFICE VISIT (OUTPATIENT)
Dept: CARDIOLOGY CLINIC | Age: 79
End: 2019-10-22
Payer: MEDICARE

## 2019-10-22 VITALS
WEIGHT: 235.4 LBS | DIASTOLIC BLOOD PRESSURE: 80 MMHG | BODY MASS INDEX: 31.89 KG/M2 | HEART RATE: 62 BPM | SYSTOLIC BLOOD PRESSURE: 130 MMHG | HEIGHT: 72 IN

## 2019-10-22 DIAGNOSIS — G25.81 RESTLESS LEG: ICD-10-CM

## 2019-10-22 DIAGNOSIS — R53.1 GENERALIZED WEAKNESS: Primary | ICD-10-CM

## 2019-10-22 DIAGNOSIS — R00.2 HEART PALPITATIONS: ICD-10-CM

## 2019-10-22 PROCEDURE — 4040F PNEUMOC VAC/ADMIN/RCVD: CPT | Performed by: INTERNAL MEDICINE

## 2019-10-22 PROCEDURE — 1123F ACP DISCUSS/DSCN MKR DOCD: CPT | Performed by: INTERNAL MEDICINE

## 2019-10-22 PROCEDURE — G8482 FLU IMMUNIZE ORDER/ADMIN: HCPCS | Performed by: INTERNAL MEDICINE

## 2019-10-22 PROCEDURE — G8417 CALC BMI ABV UP PARAM F/U: HCPCS | Performed by: INTERNAL MEDICINE

## 2019-10-22 PROCEDURE — G8427 DOCREV CUR MEDS BY ELIG CLIN: HCPCS | Performed by: INTERNAL MEDICINE

## 2019-10-22 PROCEDURE — 1036F TOBACCO NON-USER: CPT | Performed by: INTERNAL MEDICINE

## 2019-10-22 PROCEDURE — 99214 OFFICE O/P EST MOD 30 MIN: CPT | Performed by: INTERNAL MEDICINE

## 2019-10-22 RX ORDER — ISOSORBIDE MONONITRATE 30 MG/1
30 TABLET, EXTENDED RELEASE ORAL DAILY
Qty: 30 TABLET | Refills: 3 | Status: SHIPPED | OUTPATIENT
Start: 2019-10-22 | End: 2019-11-26 | Stop reason: SDUPTHER

## 2019-11-26 RX ORDER — ISOSORBIDE MONONITRATE 30 MG/1
30 TABLET, EXTENDED RELEASE ORAL DAILY
Qty: 90 TABLET | Refills: 3 | Status: SHIPPED | OUTPATIENT
Start: 2019-11-26 | End: 2020-05-04 | Stop reason: SDUPTHER

## 2019-11-29 DIAGNOSIS — M10.9 GOUT, UNSPECIFIED CAUSE, UNSPECIFIED CHRONICITY, UNSPECIFIED SITE: ICD-10-CM

## 2019-11-29 RX ORDER — ALLOPURINOL 300 MG/1
TABLET ORAL
Qty: 90 TABLET | Refills: 1 | Status: SHIPPED | OUTPATIENT
Start: 2019-11-29 | End: 2020-07-20

## 2020-01-06 ENCOUNTER — HOSPITAL ENCOUNTER (OUTPATIENT)
Dept: PHYSICAL THERAPY | Age: 80
Setting detail: THERAPIES SERIES
Discharge: HOME | End: 2020-01-06
Payer: MEDICARE

## 2020-01-06 PROCEDURE — 97110 THERAPEUTIC EXERCISES: CPT

## 2020-01-06 PROCEDURE — 97162 PT EVAL MOD COMPLEX 30 MIN: CPT

## 2020-01-06 PROCEDURE — 97112 NEUROMUSCULAR REEDUCATION: CPT

## 2020-01-06 ASSESSMENT — PAIN DESCRIPTION - DESCRIPTORS: DESCRIPTORS: ACHING

## 2020-01-06 ASSESSMENT — PAIN - FUNCTIONAL ASSESSMENT: PAIN_FUNCTIONAL_ASSESSMENT: PREVENTS OR INTERFERES WITH MANY ACTIVE NOT PASSIVE ACTIVITIES

## 2020-01-06 ASSESSMENT — PAIN DESCRIPTION - LOCATION
LOCATION: HIP
LOCATION_2: KNEE
LOCATION_3: KNEE

## 2020-01-06 ASSESSMENT — PAIN DESCRIPTION - FREQUENCY: FREQUENCY: CONTINUOUS

## 2020-01-06 ASSESSMENT — PAIN DESCRIPTION - PAIN TYPE
TYPE: CHRONIC PAIN
TYPE_2: CHRONIC PAIN
TYPE_3: CHRONIC PAIN

## 2020-01-06 ASSESSMENT — PAIN DESCRIPTION - PROGRESSION: CLINICAL_PROGRESSION: GRADUALLY WORSENING

## 2020-01-06 ASSESSMENT — PAIN SCALES - GENERAL: PAINLEVEL_OUTOF10: 4

## 2020-01-06 ASSESSMENT — PAIN DESCRIPTION - INTENSITY
RATING_3: 1
RATING_2: 1

## 2020-01-06 ASSESSMENT — PAIN DESCRIPTION - ORIENTATION
ORIENTATION_2: ANTERIOR
ORIENTATION: LEFT

## 2020-01-06 ASSESSMENT — PAIN DESCRIPTION - ONSET: ONSET: GRADUAL

## 2020-01-06 NOTE — PROGRESS NOTES
[place] [address]  IADL Comments: Unable to work on cars    Objective     Observation/Palpation  Palpation: TTP lateral hip inferior to greater trochanter   Observation: Foward head posture    PROM LLE (degrees)  LLE General PROM: L HIP: ER 50*, IR 10*  AROM LLE (degrees)  LLE General AROM: L HIP: ER 50*, IR 10*  PROM RUE (degrees)  RUE General PROM: R hip ER 50*, IR 20*    Strength RLE  Comment: RLE strength grossly 4+/5  Strength LLE  Comment: LLE hip abduction 4-/5; R hip flexion, extension 4/5, adduction 4+/5     Additional Measures  Other: 30 second chair stand 7 reps; TUG 13\"; Sensation  Overall Sensation Status: WNL  Bed mobility  Bridging: Independent  Supine to Sit: Modified independent  Transfers  Sit to Stand: Modified independent  Stand to sit: Modified independent       Ambulation  Ambulation?: Yes  WB Status: WBAT  Balance  Posture: Fair  Sitting - Static: Good  Sitting - Dynamic: Good  Standing - Static: Good  Standing - Dynamic: Fair     Assessment   Conditions Requiring Skilled Therapeutic Intervention  Body structures, Functions, Activity limitations: Decreased functional mobility ; Decreased ADL status; Decreased ROM; Decreased strength;Decreased safe awareness;Decreased high-level IADLs  Assessment: 70yo male presents with L lateral hip pain 3-4/10, and [place] knee pain 1/10. Patient with [place] TKA, L 2018, R 2003. Patient presents with localized pain to the left greater trochanter with occassional radiation of symptoms into L lateral thigh and into L lateral lumbar spine. Repeated lumbar movements did not change pain. Patient would benefit from skilled PT to improve strength around L hip to decrease pain, to improve functional strength required for mobility.   Treatment Diagnosis: L hip pain; [place] knee pain  Prognosis: Good  Decision Making: Medium Complexity  History: multiple comorbidities effecting POC  Exam: sensation, strength, lumbar AROM  Clinical Presentation: evolving  Barriers to Learning: none  REQUIRES PT FOLLOW UP: Yes  Activity Tolerance  Activity Tolerance: Patient Tolerated treatment well         Plan   Plan  Times per week: 2  Times per day: Daily  Plan weeks: 6  Specific instructions for Next Treatment: 4 way hip; LE nustep for improved strength; gait speed training; tandem stance for balance  Current Treatment Recommendations: Strengthening, Functional Mobility Training, Gait Training, Home Exercise Program, Balance Training    G-Code     OutComes Score     AM-PAC Score     Goals  Short term goals  Time Frame for Short term goals: 4 weeks, 2/3/20  Short term goal 1: Pt will ambulate >1 hr with L hip pain <3/10.   Short term goal 2: Pt will report 70% PLOF (eval: 50%)  Short term goal 3: Pt will demo improved functional strength evidenced by 30 second chair stand of 10 reps (eval: 7 reps)  Short term goal 4: Pt will be IND in HEP  Long term goals  Time Frame for Long term goals : 6 weeks, 2/13/19  Long term goal 1: Pt will demo decreased fall risk evidenced by TUG of <12\" (eval: 13\")  Long term goal 2: Pt will demo improved functional strength evidenced by 30 second chair stand of 12 reps (eval: 7 reps)  Patient Goals   Patient goals : \"I want to determine if therapy is going to help my hip or I need a replacement\"       Therapy Time   Individual Concurrent Group Co-treatment   Time In  [address] [place] [address]         Time Out  1450         Minutes  [address] [place] [address], PT

## 2020-01-06 NOTE — FLOWSHEET NOTE
Outpatient Physical Therapy  [place]           [x] Phone: [phone]   Fax: [phone]  [facility]y Mendoza           [] Phone: [phone]   Fax: [phone]        Physical Therapy Daily Treatment Note  Date:  2020    Patient Name:  Seng Morejon    :  1940  MRN: [id number]  Restrictions/Precautions:    Diagnosis:    bilateral primary osteoarthritis of hip; unilateral primary OA left knee  Date of Injury/Surgery:   Treatment Diagnosis: Treatment Diagnosis: L hip pain; [place] knee pain    Insurance/Certification information:   [facility]; 20% coinsurance   Referring Physician:   Rosanne Escobar M.D  Next Doctor Visit:    Plan of care signed (Y/N):    Outcome Measure: TUG 13\"; 30SCST 7 reps  Visit# / total visits:   Pain level:3-4 /10   Goals:       Short term goals  Time Frame for Short term goals: 4 weeks, 2/3/20  Short term goal 1: Pt will ambulate >1 hr with L hip pain <3/10. Short term goal 2: Pt will report 70% PLOF (eval: 50%)  Short term goal 3: Pt will demo improved functional strength evidenced by 30 second chair stand of 10 reps (eval: 7 reps)  Short term goal 4: Pt will be IND in HEP  Long term goals  Time Frame for Long term goals : 6 weeks, 19  Long term goal 1: Pt will demo decreased fall risk evidenced by TUG of <12\" (eval: 13\")  Long term goal 2: Pt will demo improved functional strength evidenced by 30 second chair stand of 12 reps (eval: 7 reps)    Summary of Evaluation: Assessment: 70yo male presents with L lateral hip pain 3-4/10, and [place] knee pain 1/10. Patient with [place] TKA, L 2018, R 2003. Patient presents with localized pain to the left greater trochanter with occassional radiation of symptoms into L lateral thigh and into L lateral lumbar spine. Repeated lumbar movements did not change pain. Patient would benefit from skilled PT to improve strength around L hip to decrease pain, to improve functional strength required for mobility.         Subjective:  See eval         Any changes in Ambulatory Summary Sheet? None        Objective:  See eval           Exercises: (No more than 4 columns)   Exercise/Equipment Date Date Date           WARM UP                     TABLE      Supine clamshells Yellow TB x 10 reps                                STANDING                                                     PROPRIOCEPTION      Standing dynamic balance Lumbar flexion, ext, SB x 10 reps                             MODALITIES                      Other Therapeutic Activities/Education:  Education on POC. Mutually agreed upon goals established. Home Exercise Program:  Supine clamshells 3 x 10 reps, 2x daily. Yellow TB      Manual Treatments:        Modalities:        Communication with other providers:        Assessment:  (Response towards treatment session) (Pain Rating)    Assessment: 68yo male presents with L lateral hip pain 3-4/10, and [place] knee pain 1/10. Patient with [place] TKA, L 2018, R 2003. Patient presents with localized pain to the left greater trochanter with occassional radiation of symptoms into L lateral thigh and into L lateral lumbar spine. Repeated lumbar movements did not change pain. Patient would benefit from skilled PT to improve strength around L hip to decrease pain, to improve functional strength required for mobility.       Plan for Next Session: Specific instructions for Next Treatment: 4 way hip; LE nustep for improved strength; gait speed training; tandem stance for balance      Time In / Time Out:     1349/1450      Timed Code/Total Treatment Minutes:  13' NMR; 16' TA      Next Progress Note due:        Plan of Care Interventions:  [x] Therapeutic Exercise  [] Modalities:  [x] Therapeutic Activity     [x] Ultrasound  [] Estim  [x] Gait Training      [] Cervical Traction [] Lumbar Traction  [x] Neuromuscular Re-education    [x] Cold/hotpack [] Iontophoresis   [x] Instruction in HEP      [x] Vasopneumatic   [] Dry Needling    [x] Manual Therapy               [] Aquatic Therapy              Electronically signed by:  Erin Muller, 1/6/2020, 3:05 PM

## 2020-01-06 NOTE — PLAN OF CARE
Outpatient Physical Therapy           [place]           [] Phone: [phone]   Fax: [phone]  [place] [facility]           [] Phone: [phone]   Fax: [phone]     To:  Cory Galan    From: Marisel Corral, PT     Patient: Bruno Haas       : 1940  Diagnosis:     Treatment Diagnosis: Treatment Diagnosis: L hip pain; [place] knee pain   Date: 2020    Physical Therapy Certification/Re-Certification Form  Dear Dr. Michelle Donnelly following patient has been evaluated for physical therapy services and for therapy to continue, insurance requires physician review of the treatment plan initially and every 90 days. Please review the attached evaluation and/or summary of the patient's plan of care, and verify that you agree therapy should continue by signing the attached document and sending it back to our office. Assessment:    Assessment: 70yo male presents with L lateral hip pain 3-4/10, and [place] knee pain 1/10. Patient with [place] TKA, L , R . Patient presents with localized pain to the left greater trochanter with occassional radiation of symptoms into L lateral thigh and into L lateral lumbar spine. Repeated lumbar movements did not change pain. Patient would benefit from skilled PT to improve strength around L hip to decrease pain, to improve functional strength required for mobility. Plan of Care/Treatment to date:  [x] Therapeutic Exercise  [] Modalities:  [x] Therapeutic Activity     [] Ultrasound  [] Electrical Stimulation  [x] Gait Training      [] Cervical Traction [] Lumbar Traction  [x] Neuromuscular Re-education    [x] Cold/hotpack [] Iontophoresis   [x] Instruction in HEP      [x] Vasopneumatic     [x] Manual Therapy               [] Aquatic Therapy       Other:    ? Frequency/Duration:  # Days per week: [] 1 day # Weeks: [] 1 week [] 5 weeks     [x] 2 days?    [] 2 weeks [x] 6 weeks     [] 3 days   [] 3 weeks [] 7 weeks     [] 4 days   [] 4 weeks [] 8 weeks         [] 9 weeks [] 10 weeks         [] 11 weeks [] 12 weeks    Rehab Potential/Progress: [] Excellent [x] Good [] Fair  [] Poor     Goals:      Short term goals  Time Frame for Short term goals: 4 weeks, 2/3/20  Short term goal 1: Pt will ambulate >1 hr with L hip pain <3/10. Short term goal 2: Pt will report 70% PLOF (eval: 50%)  Short term goal 3: Pt will demo improved functional strength evidenced by 30 second chair stand of 10 reps (eval: 7 reps)  Short term goal 4: Pt will be IND in HEP  Long term goals  Time Frame for Long term goals : 6 weeks, 2/13/19  Long term goal 1: Pt will demo decreased fall risk evidenced by TUG of <12\" (eval: 13\")  Long term goal 2: Pt will demo improved functional strength evidenced by 30 second chair stand of 12 reps (eval: 7 reps)      Electronically signed by:  Erin Muller PT, 1/6/2020, 3:09 PM        If you have any questions or concerns, please don't hesitate to call.   Thank you for your referral.      Physician Signature:________________________________Date:_________ TIME: _____  By signing above, therapists plan is approved by physician

## 2020-01-08 RX ORDER — FINASTERIDE 5 MG/1
TABLET, FILM COATED ORAL
Qty: 90 TABLET | Refills: 0 | Status: SHIPPED | OUTPATIENT
Start: 2020-01-08 | End: 2020-04-07

## 2020-01-10 ENCOUNTER — HOSPITAL ENCOUNTER (OUTPATIENT)
Dept: PHYSICAL THERAPY | Age: 80
Setting detail: THERAPIES SERIES
Discharge: HOME | End: 2020-01-10
Payer: MEDICARE

## 2020-01-10 PROCEDURE — 97116 GAIT TRAINING THERAPY: CPT

## 2020-01-10 PROCEDURE — 97110 THERAPEUTIC EXERCISES: CPT

## 2020-01-10 NOTE — FLOWSHEET NOTE
Outpatient Physical Therapy  [place]           [x] Phone: [phone]   Fax: [phone]  Tessa Chiu           [] Phone: [phone]   Fax: [phone]        Physical Therapy Daily Treatment Note  Date:  1/10/2020    Patient Name:  Jennifer Preciado    :  1940  MRN: [id number]  Restrictions/Precautions:    Diagnosis:    bilateral primary osteoarthritis of hip; unilateral primary OA left knee  Date of Injury/Surgery:   Treatment Diagnosis: Treatment Diagnosis: L hip pain; [place] knee pain    Insurance/Certification information:   [facility]; 20% coinsurance   Referring Physician:   Santiago Babb M.D  Next Doctor Visit:    Plan of care signed (Y/N):  YES  Outcome Measure: TUG 13\"; 30SCST 7 reps  Visit# / total visits:   Pain level: 1 /10   Goals:       Short term goals  Time Frame for Short term goals: 4 weeks, 2/3/20  Short term goal 1: Pt will ambulate >1 hr with L hip pain <3/10. Short term goal 2: Pt will report 70% PLOF (eval: 50%)  Short term goal 3: Pt will demo improved functional strength evidenced by 30 second chair stand of 10 reps (eval: 7 reps)  Short term goal 4: Pt will be IND in HEP  Long term goals  Time Frame for Long term goals : 6 weeks, 19  Long term goal 1: Pt will demo decreased fall risk evidenced by TUG of <12\" (eval: 13\")  Long term goal 2: Pt will demo improved functional strength evidenced by 30 second chair stand of 12 reps (eval: 7 reps)    Summary of Evaluation: Assessment: 68yo male presents with L lateral hip pain 3-4/10, and [place] knee pain 1/10. Patient with [place] TKA, L 2018, R 2003. Patient presents with localized pain to the left greater trochanter with occassional radiation of symptoms into L lateral thigh and into L lateral lumbar spine. Repeated lumbar movements did not change pain. Patient would benefit from skilled PT to improve strength around L hip to decrease pain, to improve functional strength required for mobility.         Subjective:  Pt reports pain unchanged in hip. Has been performing exercise 1x daily. Any changes in Ambulatory Summary Sheet? None        Objective:  See eval           Exercises: (No more than 4 columns)   Exercise/Equipment Date Date 1/10/20 (2) Date           WARM UP                     TABLE      Supine clamshells Yellow TB x 10 reps     Supine ab/adduction with pillow  2 x 10 reps; 5 second isometric hold                         STANDING      Hip extension R/L   X 10 reps; 5 second isometric hold    Sit to stand from raised bed height- cues for quick concentric, slow eccentric  3 x 10 reps                                       PROPRIOCEPTION      Standing dynamic balance Lumbar flexion, ext, SB x 10 reps                             MODALITIES                      Other Therapeutic Activities/Education:  Education to use SPC at home to decrease L hip pain symptoms. Gait training: Gait training x 600' x SPC with cues for improved posture, step length and cane sequencing. Pt demos good carryover with 50% cues at end of gait training. Gait training x 1000' x trekking poles with education on proper lateral placement of poles to prevent tripping, sequencing, posture, and push off. Pt continues to require 50% cues with trekking poles. Rest breaks as needed for explanation of gait pattern. Home Exercise Program:  Supine clamshells 3 x 10 reps, 2x daily. 1/10/19   Hooklying Isometric Hip Abduction Adduction with Belt and Ball - 10 reps - 3 sets - 1x daily - 7x weekly   Sit to Stand without Arm Support - 10 reps - 3 sets - 1x daily - 7x weekly   Standing Hip Extension - 10 reps - 3 sets - 5 hold - 1x daily - 7x weekly         Manual Treatments:        Modalities:        Communication with other providers:        Assessment:  No increased pain with exercises. Pt reports understanding of printed HEP.       Plan for Next Session: Specific instructions for Next Treatment: 4 way hip; LE nustep for improved strength; gait speed training; tandem stance for balance      Time In / Time Out:     1349/1450      Timed Code/Total Treatment Minutes:  32' gait; 22' TE      Next Progress Note due:  10th visit      Plan of Care Interventions:  [x] Therapeutic Exercise  [] Modalities:  [x] Therapeutic Activity     [x] Ultrasound  [] Estim  [x] Gait Training      [] Cervical Traction [] Lumbar Traction  [x] Neuromuscular Re-education    [x] Cold/hotpack [] Iontophoresis   [x] Instruction in HEP      [x] Vasopneumatic   [] Dry Needling    [x] Manual Therapy               [] Aquatic Therapy              Electronically signed by:  Piero Tadeo, 1/10/2020, 8:44 AM

## 2020-01-14 ENCOUNTER — HOSPITAL ENCOUNTER (OUTPATIENT)
Dept: PHYSICAL THERAPY | Age: 80
Setting detail: THERAPIES SERIES
Discharge: HOME | End: 2020-01-14
Payer: MEDICARE

## 2020-01-14 PROCEDURE — 97110 THERAPEUTIC EXERCISES: CPT

## 2020-01-14 PROCEDURE — 97112 NEUROMUSCULAR REEDUCATION: CPT

## 2020-01-14 NOTE — FLOWSHEET NOTE
Outpatient Physical Therapy  [place]           [x] Phone: [phone]   Fax: [phone]  [place]           [] Phone: [phone]   Fax: [phone]        Physical Therapy Daily Treatment Note  Date:  2020    Patient Name:  Duayne Kayser    :  1940  MRN: [id number]  Restrictions/Precautions:    Diagnosis:    bilateral primary osteoarthritis of hip; unilateral primary OA left knee  Date of Injury/Surgery:   Treatment Diagnosis: Treatment Diagnosis: L hip pain; [place] knee pain    Insurance/Certification information:   [facility]; 20% coinsurance   Referring Physician:   Barbara Caceres M.D  Next Doctor Visit:    Plan of care signed (Y/N):  YES  Outcome Measure: TUG 13\"; 30SCST 7 reps  Visit# / total visits: 3  / 12  Pain level: 1 /10  Goals:       Short term goals  Time Frame for Short term goals: 4 weeks, 2/3/20  Short term goal 1: Pt will ambulate >1 hr with L hip pain <3/10. Short term goal 2: Pt will report 70% PLOF (eval: 50%)  Short term goal 3: Pt will demo improved functional strength evidenced by 30 second chair stand of 10 reps (eval: 7 reps)  Short term goal 4: Pt will be IND in HEP  Long term goals  Time Frame for Long term goals : 6 weeks, 19  Long term goal 1: Pt will demo decreased fall risk evidenced by TUG of <12\" (eval: 13\")  Long term goal 2: Pt will demo improved functional strength evidenced by 30 second chair stand of 12 reps (eval: 7 reps)    Summary of Evaluation: Assessment: 68yo male presents with L lateral hip pain 3-4/10, and [place] knee pain 1/10. Patient with [place] TKA, L , R . Patient presents with localized pain to the left greater trochanter with occassional radiation of symptoms into L lateral thigh and into L lateral lumbar spine. Repeated lumbar movements did not change pain. Patient would benefit from skilled PT to improve strength around L hip to decrease pain, to improve functional strength required for mobility.         Subjective:  Pt reports working on car this weekend. Pain at worst over the weekend was 3-4/10. Any changes in Ambulatory Summary Sheet? None        Objective:  See eval           Exercises: (No more than 4 columns)   Exercise/Equipment Date Date 1/10/20 (2) Date 1/14/20 (3)           WARM UP                     TABLE      Supine clamshells Yellow TB x 10 reps     Supine ab/adduction with pillow  2 x 10 reps; 5 second isometric hold                         STANDING      Hip extension R/L   X 10 reps; 5 second isometric hold 25# CYBEX 2 x 10 reps   Sit to stand from raised bed height- cues for quick concentric, slow eccentric  3 x 10 reps 2 x 20 reps x 10# weight ball. Shuttle    2 sets x 20 reps x 3 bands   Standing hip abduction L   3 x 12 reps x 5# weight                          PROPRIOCEPTION      Standing dynamic balance Lumbar flexion, ext, SB x 10 reps                             MODALITIES                      Other Therapeutic Activities/Education:  Re- Education to use SPC at home to decrease L hip pain symptoms. Pt educated to bring trekking poles for proper fitting and gait training next session. Gait training:       Home Exercise Program:  Supine clamshells 3 x 10 reps, 2x daily. Sit to stands from bed height emphasis on quick concentric and slow eccentric 3 x 20 reps 1x/day;     1/10/19   Hooklying Isometric Hip Abduction Adduction with Belt and Ball - 10 reps - 3 sets - 1x daily - 7x weekly   Sit to Stand without Arm Support - 10 reps - 3 sets - 1x daily - 7x weekly   Standing Hip Extension - 10 reps - 3 sets - 5 hold - 1x daily - 7x weekly     1/14/19: Sit to stand 2-3 sets x 20 reps with 10# weigth and [place] shoulder extension for improved functional and kinesthetic sense. Manual Treatments:        Modalities:        Communication with other providers:        Assessment:  Increased pain with L hip extension on Cybex radiating into L posterior buttox into leg.       Plan for Next Session: Print new HEP: sit to stands with 10# dumbbell and [place] shoulder flexion, standing hip abduction, total gym squats or single leg squat,     Time In / Time Out:   11:14/12:19 = 43      Timed Code/Total Treatment Minutes:   TE 28; 15 NMR      Next Progress Note due:  10th visit      Plan of Care Interventions:  [x] Therapeutic Exercise  [] Modalities:  [x] Therapeutic Activity     [x] Ultrasound  [] Estim  [x] Gait Training      [] Cervical Traction [] Lumbar Traction  [x] Neuromuscular Re-education    [x] Cold/hotpack [] Iontophoresis   [x] Instruction in HEP      [x] Vasopneumatic   [] Dry Needling    [x] Manual Therapy               [] Aquatic Therapy              Electronically signed by:  Marisel Corral, 1/14/2020, 10:31 AM

## 2020-01-16 ENCOUNTER — HOSPITAL ENCOUNTER (OUTPATIENT)
Dept: PHYSICAL THERAPY | Age: 80
Setting detail: THERAPIES SERIES
Discharge: HOME | End: 2020-01-16
Payer: MEDICARE

## 2020-01-16 PROCEDURE — 97110 THERAPEUTIC EXERCISES: CPT

## 2020-01-16 PROCEDURE — 97116 GAIT TRAINING THERAPY: CPT

## 2020-01-21 ENCOUNTER — HOSPITAL ENCOUNTER (OUTPATIENT)
Dept: PHYSICAL THERAPY | Age: 80
Setting detail: THERAPIES SERIES
Discharge: HOME | End: 2020-01-21
Payer: MEDICARE

## 2020-01-21 ENCOUNTER — OFFICE VISIT (OUTPATIENT)
Dept: FAMILY MEDICINE CLINIC | Age: 80
End: 2020-01-21
Payer: MEDICARE

## 2020-01-21 VITALS
DIASTOLIC BLOOD PRESSURE: 80 MMHG | WEIGHT: 241.6 LBS | HEART RATE: 85 BPM | HEIGHT: 72 IN | BODY MASS INDEX: 32.72 KG/M2 | SYSTOLIC BLOOD PRESSURE: 130 MMHG | OXYGEN SATURATION: 98 %

## 2020-01-21 PROCEDURE — 1123F ACP DISCUSS/DSCN MKR DOCD: CPT | Performed by: FAMILY MEDICINE

## 2020-01-21 PROCEDURE — 97110 THERAPEUTIC EXERCISES: CPT

## 2020-01-21 PROCEDURE — G8427 DOCREV CUR MEDS BY ELIG CLIN: HCPCS | Performed by: FAMILY MEDICINE

## 2020-01-21 PROCEDURE — 97116 GAIT TRAINING THERAPY: CPT

## 2020-01-21 PROCEDURE — 1036F TOBACCO NON-USER: CPT | Performed by: FAMILY MEDICINE

## 2020-01-21 PROCEDURE — G8482 FLU IMMUNIZE ORDER/ADMIN: HCPCS | Performed by: FAMILY MEDICINE

## 2020-01-21 PROCEDURE — 99213 OFFICE O/P EST LOW 20 MIN: CPT | Performed by: FAMILY MEDICINE

## 2020-01-21 PROCEDURE — 4040F PNEUMOC VAC/ADMIN/RCVD: CPT | Performed by: FAMILY MEDICINE

## 2020-01-21 PROCEDURE — G8417 CALC BMI ABV UP PARAM F/U: HCPCS | Performed by: FAMILY MEDICINE

## 2020-01-21 RX ORDER — GEMFIBROZIL 600 MG/1
600 TABLET, FILM COATED ORAL
Qty: 180 TABLET | Refills: 1 | Status: SHIPPED | OUTPATIENT
Start: 2020-01-21 | End: 2020-07-20

## 2020-01-21 ASSESSMENT — ENCOUNTER SYMPTOMS
COUGH: 0
WHEEZING: 0
ABDOMINAL PAIN: 0
RESPIRATORY NEGATIVE: 1
CHEST TIGHTNESS: 0
SHORTNESS OF BREATH: 0

## 2020-01-21 NOTE — PROGRESS NOTES
2020     Rohit Carrillo      Chief Complaint   Patient presents with    Other     Routine visit, patient doing well, he is doing some P. T for left hip, it is helpful.  Medication Refill     Pended       HPI      Debbie Barger is a 70 y.o. male who presents today with the followin. Episode of recurrent major depressive disorder, unspecified depression episode severity ([address]    2. Need for prophylactic vaccination and inoculation against varicella    3. Hyperlipidemia, unspecified hyperlipidemia type    4. Primary osteoarthritis of left hip    5. Heart palpitations    Patient is here for his routine follow-up  He is going to physical therapy for right hip problem  He is seeing an orthopedic surgeon about that but no surgery planned at this time  He has a history of hyperlipidemia and arrhythmia for which he takes beta-blocker  He has chronic depression which is been very well managed on his current 2 antidepressants  He has no depressed mood or suicidal thoughts  He is on medication for hyperlipidemia    REVIEW OF SYMPTOMS    Review of Systems   Constitutional: Negative. Negative for activity change, appetite change and unexpected weight change. Respiratory: Negative. Negative for cough, chest tightness, shortness of breath and wheezing. Cardiovascular: Negative. Negative for chest pain and leg swelling. Gastrointestinal: Negative for abdominal pain. Musculoskeletal: Positive for arthralgias. Neurological: Negative. Psychiatric/Behavioral: Negative for dysphoric mood and sleep disturbance. The patient is not nervous/anxious. PAST MEDICAL HISTORY  Past Medical History:   Diagnosis Date    Decreased hearing 2019    Gout 2019    H/O cardiovascular stress test 2019    EF 41%,  Mild ischemia of lateral wall involving small to medium size of left ventricle.     H/O echocardiogram 19    EF 50, Mod AR, Mild MR & TR    Hyperlipidemia 2019    Nocturia 2019    Osteoarthritis 2019    Restless leg 2019       FAMILY HISTORY  Family History   Problem Relation Age of Onset    Cancer Mother        SOCIAL HISTORY  Social History     Socioeconomic History    Marital status:      Spouse name: None    Number of children: None    Years of education: None    Highest education level: None   Occupational History    None   Social Needs    Financial resource strain: None    Food insecurity:     Worry: None     Inability: None    Transportation needs:     Medical: None     Non-medical: None   Tobacco Use    Smoking status: Former Smoker     Packs/day: 2.00     Years: 25.00     Pack years: 50.00     Types: Cigarettes     Start date: 1960     Last attempt to quit: 1985     Years since quittin.0    Smokeless tobacco: Never Used   Substance and Sexual Activity    Alcohol use: Yes     Comment: rarely    Drug use: None    Sexual activity: None   Lifestyle    Physical activity:     Days per week: None     Minutes per session: None    Stress: None   Relationships    Social connections:     Talks on phone: None     Gets together: None     Attends Orthodoxy service: None     Active member of club or organization: None     Attends meetings of clubs or organizations: None     Relationship status: None    Intimate partner violence:     Fear of current or ex partner: None     Emotionally abused: None     Physically abused: None     Forced sexual activity: None   Other Topics Concern    None   Social History Narrative    None        SURGICAL HISTORY  Past Surgical History:   Procedure Laterality Date    BLEPHAROPLASTY  2010    CHOLECYSTECTOMY      COLECTOMY      COLONOSCOPY N/A 2019    COLONOSCOPY DIAGNOSTIC performed by Arie Aldana MD at [address] Left 2011    EYE SURGERY Right 2011    JOINT REPLACEMENT      KNEE ARTHROSCOPY      SMALL INTESTINE SURGERY N/A 2019    LAPAROTOMY EXPLORATORY RIGHT ILEOCOLECTOMY performed by Babette Mcburney, MD at [address] [facility] N/A 7/4/2019    EGD DIAGNOSTIC ONLY performed by Izabella Bray MD at [address] [place] [address]  Current Outpatient Medications   Medication Sig Dispense Refill    gemfibrozil (LOPID) 600 MG tablet Take 1 tablet by mouth 2 times daily (before meals) 180 tablet 1    finasteride (PROSCAR) 5 MG tablet TAKE 1 TABLET DAILY 90 tablet 0    allopurinol (ZYLOPRIM) 300 MG tablet TAKE 1 TABLET DAILY 90 tablet 1    isosorbide mononitrate (IMDUR) 30 MG extended release tablet Take 1 tablet by mouth daily 90 tablet 3    Multiple Vitamins-Minerals (OCUVITE EXTRA) TABS Take 1 tablet by mouth daily      Multiple Vitamins-Minerals (ICAPS AREDS 2) CAPS Take 1 capsule by mouth 2 times daily      escitalopram (LEXAPRO) 10 MG tablet TAKE 1 TABLET BY MOUTH ONE TIME A DAY 90 tablet 1    venlafaxine (EFFEXOR XR) 150 MG extended release capsule TAKE 1 CAPSULE BY MOUTH IN THE MORNING 90 capsule 1    metoprolol tartrate (LOPRESSOR) 25 MG tablet Take 0.5 tablets by mouth 2 times daily Hold for SBP < 100 or HR < 60 60 tablet 5    Multiple Vitamins-Minerals (PRESERVISION AREDS PO) Take by mouth 2 caps by mouth twice a day. No current facility-administered medications for this visit. ALLERGIES  No Known Allergies    PHYSICAL EXAM    /80 (Site: Left Upper Arm, Position: Sitting, Cuff Size: Medium Adult)   Pulse 85   Ht 6' (1.829 m)   Wt 241 lb 9.6 oz (109.6 kg)   SpO2 98%   BMI 32.77 kg/m²     Physical Exam  Vitals signs and nursing note reviewed. Constitutional:       Appearance: Normal appearance. He is well-developed. HENT:      Right Ear: External ear normal.      Left Ear: External ear normal.      Mouth/Throat:      Mouth: Mucous membranes are moist.      Pharynx: Oropharynx is clear. Eyes:      Conjunctiva/sclera: Conjunctivae normal.   Cardiovascular:      Rate and Rhythm: Normal rate and regular rhythm.

## 2020-01-21 NOTE — FLOWSHEET NOTE
mobility. Subjective:  Pt reports using cane over the weekend and trekking poles for walk. Patient reports improved gait using trekking poles. Pt states \"I feel like I'm walking better. I think the hip is doing better. Maybe I was just walking different and that was causing my pain\". 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 Any changes in Ambulatory Summary Sheet? None        Objective:  See eval           Exercises: (No more than 4 columns)   Exercise/Equipment Date 20 (3) Date 2020 (4) Date  2020 (5)             WARM UP                        TABLE       Supine clamshells       Supine ab/adduction with pillow       Supine bridges c TVA activation   3 x 10 reps                     STANDING       Hip extension R/L  25# CYBEX 2 x 10 reps      Sit to stand from raised bed height- cues for quick concentric, slow eccentric 2 x 20 reps x 10# weight ball. Shuttle  2 sets x 20 reps x 3 bands 5 sets x 8-20 reps. 3 bands progressing to 5 bands 1 x 20 4 bands; 5 bands 2 x 15 reps     Standing hip abduction L 3 x 12 reps x 5# weight 3 x 12 reps x 5# weight     4 way hip standing   2 x 5 reps each direction    Side lying hip abduction   3 x 10 (2#) x 3 sec iso hold                PROPRIOCEPTION       Standing dynamic balance                                   MODALITIES                         Other Therapeutic Activities/Education:  Re- Education to use SPC at home to decrease L hip pain symptoms. Pt reports noncompliance with SPC at this time 2020. Gait trainin/21/20 Gait training 150' x 2 with cues for sprint walking. Pt demos mildly antalgic gait with decreased stance time on LLE with no reports of increased pain with gait training. TUG with 10# medicine ball 12.27\" + 12.71\". TUG without weight 10.09\"    Home Exercise Program:  Supine clamshells 3 x 10 reps, 2x daily.  Sit to stands from bed height emphasis

## 2020-01-23 ENCOUNTER — HOSPITAL ENCOUNTER (OUTPATIENT)
Dept: PHYSICAL THERAPY | Age: 80
Setting detail: THERAPIES SERIES
Discharge: HOME | End: 2020-01-23
Payer: MEDICARE

## 2020-01-23 PROCEDURE — 97110 THERAPEUTIC EXERCISES: CPT

## 2020-01-23 PROCEDURE — 97112 NEUROMUSCULAR REEDUCATION: CPT

## 2020-01-23 NOTE — FLOWSHEET NOTE
ache in R knee and L hip yesterday following  session. Any changes in Ambulatory Summary Sheet? None        Objective:  See eval           Exercises: (No more than 4 columns)   Exercise/Equipment Date 2020 (4) Date  2020 (5) Date 2020 (6)           WARM UP         Nustep  5 min x level 5, seat 12; lilia modfied RPE 3/10          TABLE      Supine clamshells      Supine ab/adduction with pillow      Supine bridges c TVA activation  3 x 10 reps                   STANDING      Hip extension R/L       Sit to stand from raised bed height- cues for quick concentric, slow eccentric      Shuttle  5 sets x 8-20 reps. 3 bands progressing to 5 bands 1 x 20 4 bands; 5 bands 2 x 15 reps  4 bands x 15 reps; 5 bands x 15 reps, 6 bands 2 sets x 8 reps   Standing hip abduction L 3 x 12 reps x 5# weight     4 way hip standing  2 x 5 reps each direction 2 x 10 reps each direction x 5# weight   Side lying hip abduction  3 x 10 (2#) x 3 sec iso hold               PROPRIOCEPTION      Freemotion 20' walk outs   Retro walk 13# x 5 reps, anterior walk x 7#; lateral walk 7# R/L                           MODALITIES                      Other Therapeutic Activities/Education:      Gait trainin/21/20 Gait training 150' x 2 with cues for sprint walking. Pt demos mildly antalgic gait with decreased stance time on LLE with no reports of increased pain with gait training. TUG with 10# medicine ball 12.27\" + 12.71\". TUG without weight 10.09\"    Home Exercise Program:  Supine clamshells 3 x 10 reps, 2x daily.  Sit to stands from bed height emphasis on quick concentric and slow eccentric 3 x 20 reps 1x/day;     1/10/19   Hooklying Isometric Hip Abduction Adduction with Belt and Ball - 10 reps - 3 sets - 1x daily - 7x weekly   Sit to Stand without Arm Support - 10 reps - 3 sets - 1x daily - 7x weekly   Standing Hip Extension - 10 reps - 3 sets - 5 hold - 1x daily - 7x weekly     19: Sit to stand 2-3 sets x

## 2020-01-27 ENCOUNTER — HOSPITAL ENCOUNTER (OUTPATIENT)
Dept: PHYSICAL THERAPY | Age: 80
Setting detail: THERAPIES SERIES
Discharge: HOME | End: 2020-01-27
Payer: MEDICARE

## 2020-01-27 PROCEDURE — 97112 NEUROMUSCULAR REEDUCATION: CPT

## 2020-01-27 PROCEDURE — 97110 THERAPEUTIC EXERCISES: CPT

## 2020-01-27 NOTE — FLOWSHEET NOTE
1/10/19   Hooklying Isometric Hip Abduction Adduction with Belt and Ball - 10 reps - 3 sets - 1x daily - 7x weekly   Sit to Stand without Arm Support - 10 reps - 3 sets - 1x daily - 7x weekly   Standing Hip Extension - 10 reps - 3 sets - 5 hold - 1x daily - 7x weekly     1/14/19: Sit to stand 2-3 sets x 20 reps with 10# weigth and [place] shoulder extension for improved functional and kinesthetic sense. Manual Treatments:        Modalities:        Communication with other providers:        Assessment: Pt reports no increased pain with activity. Pt reports 3/10 modified LAYO RPE with nustep and no SOB.        Plan for Next Session: dynamic balance activities, functional gait, therex     Time In / Time Out:   10:44-11:25= 39    Timed Code/Total Treatment Minutes:   TE 32' ; 12' nmr    Next Progress Note due:  10th visit      Plan of Care Interventions:  [x] Therapeutic Exercise  [] Modalities:  [x] Therapeutic Activity     [x] Ultrasound  [] Estim  [x] Gait Training      [] Cervical Traction [] Lumbar Traction  [x] Neuromuscular Re-education    [x] Cold/hotpack [] Iontophoresis   [x] Instruction in HEP      [x] Vasopneumatic   [] Dry Needling    [x] Manual Therapy               [] Aquatic Therapy              Electronically signed by:  Leyla Bustillo,PT 1/27/2020, 10:40 AM

## 2020-01-28 ENCOUNTER — OFFICE VISIT (OUTPATIENT)
Dept: CARDIOLOGY CLINIC | Age: 80
End: 2020-01-28
Payer: MEDICARE

## 2020-01-28 ENCOUNTER — HOSPITAL ENCOUNTER (OUTPATIENT)
Age: 80
Discharge: HOME | End: 2020-01-28
Payer: MEDICARE

## 2020-01-28 VITALS
SYSTOLIC BLOOD PRESSURE: 122 MMHG | HEIGHT: 72 IN | WEIGHT: 237.2 LBS | BODY MASS INDEX: 32.13 KG/M2 | DIASTOLIC BLOOD PRESSURE: 84 MMHG | HEART RATE: 62 BPM

## 2020-01-28 PROBLEM — Z92.89 HISTORY OF RECENT HOSPITALIZATION: Status: RESOLVED | Noted: 2019-07-29 | Resolved: 2020-01-28

## 2020-01-28 LAB
ALBUMIN SERPL-MCNC: 4.4 GM/DL (ref 3.4–5)
ALP BLD-CCNC: 72 IU/L (ref 40–128)
ALT SERPL-CCNC: 16 U/L (ref 10–40)
ANION GAP SERPL CALCULATED.3IONS-SCNC: 13 MMOL/L (ref 4–16)
AST SERPL-CCNC: 21 IU/L (ref 15–37)
BILIRUB SERPL-MCNC: 0.3 MG/DL (ref 0–1)
BUN BLDV-MCNC: 22 MG/DL (ref 6–23)
CALCIUM SERPL-MCNC: 9.8 MG/DL (ref 8.3–10.6)
CHLORIDE BLD-SCNC: 102 MMOL/L (ref 99–110)
CO2: 25 MMOL/L (ref 21–32)
CREAT SERPL-MCNC: 0.8 MG/DL (ref 0.9–1.3)
GFR AFRICAN AMERICAN: >60 ML/MIN/1.73M2
GFR NON-AFRICAN AMERICAN: >60 ML/MIN/1.73M2
GLUCOSE BLD-MCNC: 102 MG/DL (ref 70–99)
HCT VFR BLD CALC: 44.3 % (ref 42–52)
HEMOGLOBIN: 14.3 GM/DL (ref 13.5–18)
MCH RBC QN AUTO: 31.8 PG (ref 27–31)
MCHC RBC AUTO-ENTMCNC: 32.3 % (ref 32–36)
MCV RBC AUTO: 98.4 FL (ref 78–100)
PDW BLD-RTO: 14 % (ref 11.7–14.9)
PLATELET # BLD: 266 K/CU MM (ref 140–440)
PMV BLD AUTO: 11.7 FL (ref 7.5–11.1)
POTASSIUM SERPL-SCNC: 5 MMOL/L (ref 3.5–5.1)
PRO-BNP: 61.38 PG/ML
RBC # BLD: 4.5 M/CU MM (ref 4.6–6.2)
SODIUM BLD-SCNC: 140 MMOL/L (ref 135–145)
TOTAL PROTEIN: 7.1 GM/DL (ref 6.4–8.2)
WBC # BLD: 6.9 K/CU MM (ref 4–10.5)

## 2020-01-28 PROCEDURE — 99214 OFFICE O/P EST MOD 30 MIN: CPT | Performed by: INTERNAL MEDICINE

## 2020-01-28 PROCEDURE — 1036F TOBACCO NON-USER: CPT | Performed by: INTERNAL MEDICINE

## 2020-01-28 PROCEDURE — 80053 COMPREHEN METABOLIC PANEL: CPT

## 2020-01-28 PROCEDURE — 83880 ASSAY OF NATRIURETIC PEPTIDE: CPT

## 2020-01-28 PROCEDURE — 1123F ACP DISCUSS/DSCN MKR DOCD: CPT | Performed by: INTERNAL MEDICINE

## 2020-01-28 PROCEDURE — G8482 FLU IMMUNIZE ORDER/ADMIN: HCPCS | Performed by: INTERNAL MEDICINE

## 2020-01-28 PROCEDURE — G8417 CALC BMI ABV UP PARAM F/U: HCPCS | Performed by: INTERNAL MEDICINE

## 2020-01-28 PROCEDURE — 4040F PNEUMOC VAC/ADMIN/RCVD: CPT | Performed by: INTERNAL MEDICINE

## 2020-01-28 PROCEDURE — 85027 COMPLETE CBC AUTOMATED: CPT

## 2020-01-28 PROCEDURE — G8427 DOCREV CUR MEDS BY ELIG CLIN: HCPCS | Performed by: INTERNAL MEDICINE

## 2020-01-28 PROCEDURE — 36415 COLL VENOUS BLD VENIPUNCTURE: CPT

## 2020-01-28 RX ORDER — NITROGLYCERIN 0.4 MG/1
0.4 TABLET SUBLINGUAL EVERY 5 MIN PRN
Qty: 25 TABLET | Refills: 3 | Status: SHIPPED | OUTPATIENT
Start: 2020-01-28 | End: 2020-07-21

## 2020-01-28 RX ORDER — RANOLAZINE 500 MG/1
500 TABLET, EXTENDED RELEASE ORAL 2 TIMES DAILY
Qty: 60 TABLET | Refills: 3 | Status: SHIPPED | OUTPATIENT
Start: 2020-01-28 | End: 2020-05-04 | Stop reason: SDUPTHER

## 2020-01-28 NOTE — LETTER
[facility]     Dr. Regis Harris     LEFT HEART CATHETERIZATION WITH POSSIBLE PERCUTANEOUS CORONARY INTERVENTION     Patient Name: Joanna Holcomb   : 1940   MRN# [id number]    Date of Procedure: 20 Time: 10 Arrival Time: 8 AM    The catheterization and angiogram are usually outpatient procedures, however if stenting is needed you will stay overnight. You will need to be at the hospital two hours before the procedure. You will need to arrange for someone to drive you home. You will go to registration in the main lobby. HOSPITAL:  [facility])  Call to Pre-[place] at: [phone] 1-2 days before your procedure. Please have blood work and chest-x-ray done 1 to 2 days before procedure at    [facility]. X Please do not have anything by mouth after midnight prior to or 8 hours before the procedure. X You may take your medications with a sip of water in the morning before your procedure or  take them with you. Patient Signature:  _________________________   Staff Signature: Noe Vargas   Staff Given Instructions:_______________________________                                          [facility]    Dr. Rosendo Giron     Patient Name: Joanna Holcomb   : 1940   MRN# [id number]    Date of Procedure: 20 Time: 10 AM     DIAGNOSIS:   Z01.810    LEFT HEART CATHETERIZATION WITH POSSIBLE PERCUTANEOUS CORONARY INTERVENTION       X Chest x-Ray PA & Lateral View     X EKG   X Type & Screen     X CBC  X BMP  X PT  X PTT            ? PLEASE CALL ABNORMAL RESULTS TO THE  PHYSICIAN? ATTENTION PATIENT: Pretesting is to be done before the cath. You do not have to fast for the lab work. You must go to the [facility] at [address] [facility] [facility] [place]. to have this lab work done.   Phone: [phone] Hours: 7:00 am to 5:00 pm  ? All anesthesia and sedation carry risks. My practitioner has discussed my anticipated anesthesia and/or sedation and the risks of using, risk of not using, benefits, side effects, and alternatives. ? Use of pathology  ? I authorize [facility] [place] to dispose of tissues, specimens or organs when pathology is complete. ? Use of radiology  ? A contrast agent may be required for radiology procedures. My practitioner has advised me of the risks of using, risks of not using, benefits, side effects, and alternatives. ? Observers or use of photography, video/audio recording, or televising of the procedure(s). This is for medical, scientific, or educational purposes. This includes appropriate portions of my body. My identity will not be revealed. ? I consent to release of my social security number and other identifying information to [employer] (FDA), and the supplier/, if I receive tissue, a device, or implant. This is to track the tissue, device, or implant for defect, recall, infection, etc.     ? Use of blood and/or blood products, if needed, through my hospital stay. My practitioner has advised me of the risks of using, risks of not using, benefits, side effects, and alternatives. ___ I do NOT want Blood or Blood products given. (Complete separate  refusal form)    Code Status (estella one):  ___ I do NOT HAVE a DNR order. I am a Full-code.   I will receive CPR, intubation,  chest compressions, medications, and/or other life saving measures if I have a  cardiac or respiratory arrest.    ___ I have a Do Not Resuscitate (DNR)order.   (estella one below)  ___  I rescind my DNR for surgery and immediate post-operative period through Phase 2 recovery.    This means, for that time period, I will be a Full-code and receive CPR, intubation, chest compressions, medications, and/or other life saving measures, if I have a cardiac or respiratory

## 2020-01-28 NOTE — PROGRESS NOTES
volume overload, No evidence of JVD, No crackles  GI:  Bowel sounds normal, Soft, No tenderness, No masses, No gross visceromegaly   :  No costovertebral angle tenderness   Musculoskeletal:  No edema, no tenderness, no deformities.  Back- no tenderness  Integument:  Well hydrated, no rash   Lymphatic:  No lymphadenopathy noted   Neurologic:  Alert & oriented x 3, CN 2-12 normal, normal motor function, normal sensory function, no focal deficits noted   Psychiatric:  Speech and behavior appropriate       Medical decision making and Data review:  DATA:  Lab Results   Component Value Date    TROPONINT <0.010 07/02/2019     BNP:    Lab Results   Component Value Date    PROBNP 61.38 01/28/2020     PT/INR:  No results found for: PTINR  No results found for: LABA1C  Lab Results   Component Value Date    CHOL 117 08/01/2019    TRIG 153 (H) 08/01/2019    HDL 29 (L) 08/01/2019    LDLDIRECT 76 08/01/2019     Lab Results   Component Value Date    ALT 16 01/28/2020    AST 21 01/28/2020     TSH: No results found for: TSH  Lab Results   Component Value Date    AST 21 01/28/2020    ALT 16 01/28/2020    BILIDIR 0.2 01/17/2012    BILITOT 0.3 01/28/2020    ALKPHOS 72 01/28/2020     Lab Results   Component Value Date    PROBNP 61.38 01/28/2020    PROBNP 95.08 07/02/2019     No results found for: LABA1C  Lab Results   Component Value Date    WBC 6.9 01/28/2020    HGB 14.3 01/28/2020    HCT 44.3 01/28/2020     01/28/2020     Stress test  6/24/19     Iliana Guajardo portion of stress test is negative for ischemia by diagnostic criteria.    Completed 4.6 METS and 4 Mins of exercise    Global hypokinesis with EF of 41 %    Mild ischemia of lateral wall involving small to medium size of left    ventricle    Abnormal stress test         Echo 6/27/19  Summary   Technically difficult study with poor windows   Left ventricular systolic function is probably low normal with an ejection   fraction of 50 prescriptions are addressed and refills ordered. Questions answered and patient verbalizes understanding. Call for any problems, questions, or concerns. Continue all other medications of all above medical condition listed as is. Return in about 3 months (around 4/28/2020). Please note this report has been partially produced using speech recognition software and may contain errors related to that system including errors in grammar, punctuation, and spelling, as well as words and phrases that may be inappropriate. If there are any questions or concerns please feel free to contact the dictating provider for clarification.

## 2020-01-28 NOTE — LETTER
CLINICAL STAFF DOCUMENTATION    Zahida Priya  1940  [id number]    Have you had any Chest Pain - No    Have you had any Shortness of Breath - Yes has been doing physical therapy   If Yes - When on exertion    Have you had any dizziness - No      Have you had any palpitations - No          Do you have any edema - No    Check Venous \"LEG PROBLEM Questionnaire\"    Do you have a surgery or procedure scheduled in the near future - No      Ask patient [facility] not [facility]    Check to see if we have an E-MAIL on file for the patient    Check medication list thoroughly!!!  BE SURE TO ASK PATIENT IF THEY NEED MEDICATION REFILLS

## 2020-01-29 ENCOUNTER — TELEPHONE (OUTPATIENT)
Dept: CARDIOLOGY CLINIC | Age: 80
End: 2020-01-29

## 2020-01-29 ENCOUNTER — HOSPITAL ENCOUNTER (OUTPATIENT)
Dept: PHYSICAL THERAPY | Age: 80
Setting detail: THERAPIES SERIES
Discharge: HOME | End: 2020-01-29
Payer: MEDICARE

## 2020-01-29 PROCEDURE — 97110 THERAPEUTIC EXERCISES: CPT

## 2020-01-29 PROCEDURE — 97530 THERAPEUTIC ACTIVITIES: CPT

## 2020-01-29 NOTE — FLOWSHEET NOTE
Outpatient Physical Therapy  [place]           [x] Phone: [phone]   Fax: [phone]  [place]           [] Phone: [phone]   Fax: [phone]        Physical Therapy Daily Treatment Note  Date:  2020    Patient Name:  Nick Sharma    :  1940  MRN: [id number]  Restrictions/Precautions:    Diagnosis:    bilateral primary osteoarthritis of hip; unilateral primary OA left knee  Date of Injury/Surgery:   Treatment Diagnosis: Treatment Diagnosis: L hip pain; [place] knee pain    Insurance/Certification information:   [facility]; 20% coinsurance   Referring Physician:   Uvaldo Diaz M.D  Next Doctor Visit:    Plan of care signed (Y/N):  YES  Outcome Measure: TUG 13\"; 30SCST 7 reps  Visit# / total visits:   Pain level:  1/10     Short term goals  Time Frame for Short term goals: 4 weeks, 2/3/20  Short term goal 1: Pt will ambulate >1 hr with L hip pain <3/10. MET  Short term goal 2: Pt will report 70% PLOF (eval: 50%) MET (90%)  Short term goal 3: Pt will demo improved functional strength evidenced by 30 second chair stand of 10 reps (eval: 7 reps) MET  Short term goal 4: Pt will be IND in HEP met    Long term goals  Time Frame for Long term goals : 6 weeks, 19  Long term goal 1: Pt will demo decreased fall risk evidenced by TUG of <12\" (eval: 13\") MET  Long term goal 2: Pt will demo improved functional strength evidenced by 30 second chair stand of 12 reps (eval: 7 reps)     Summary of Evaluation: Assessment: 70yo male presents with L lateral hip pain 3-4/10, and [place] knee pain 1/10. Patient with [place] TKA, L , R . Patient presents with localized pain to the left greater trochanter with occassional radiation of symptoms into L lateral thigh and into L lateral lumbar spine. Repeated lumbar movements did not change pain. Patient would benefit from skilled PT to improve strength around L hip to decrease pain, to improve functional strength required for mobility.         Subjective: Rohit reports having cardiologist appointment recently and has a scheduled left heart catheterization on 20. Any changes in Ambulatory Summary Sheet? None        Objective:  30 second chair stand x 8 reps          Exercises: (No more than 4 columns)   Exercise/Equipment Date 2020 (6) 2020  (7) 2020 (8)           WARM UP         Nustep  5 min x level 6, seat 12; lilia modfied RPE 3/10 5 min x level 5, seat 12; lilia modfied RPE 3/10         TABLE      Supine clamshells      Supine ab/adduction with pillow      Supine bridges c TVA activation                     STANDING      Hip extension R/L       Sit to stand from raised bed height- cues for quick concentric, slow eccentric   20\" surface 3 x 10 reps   Shuttle  4 bands x 15 reps; 5 bands x 15 reps, 6 bands 2 sets x 8 reps 4 bands x 20 reps; 5 bands x 15 reps, 6 bands 2 sets x 12 reps; unilateral leg pressR/L x 10reps    Standing hip abduction L      4 way hip standing 2 x 10 reps each direction x 5# weight  2 x 10 reps each direction x 5# weight   Side lying hip abduction  3 x 10 (4#) x 3 sec iso hold 3 x 10 (5#) x 3 sec iso hold   Abduction walk  10' R/L x 10# exercise band    PROPRIOCEPTION      Freemotion 20' walk outs Retro walk 13# x 5 reps, anterior walk x 7#; lateral walk 7# R/L Retro walk 13# x 5 reps, anterior walk x 7#; lateral walk 7# R/L                            MODALITIES                      Other Therapeutic Activities/Education:      Gait trainin/21/20 Gait training 150' x 2 with cues for sprint walking. Pt demos mildly antalgic gait with decreased stance time on LLE with no reports of increased pain with gait training. TUG with 10# medicine ball 12.27\" + 12.71\". TUG without weight 10.09\"    Home Exercise Program:  Supine clamshells 3 x 10 reps, 2x daily.  Sit to stands from bed height emphasis on quick concentric and slow eccentric 3 x 20 reps 1x/day;     1/10/19:   Hooklying Isometric Hip Abduction Adduction

## 2020-01-29 NOTE — TELEPHONE ENCOUNTER
Notified pt that labs were good and did not show any anemia. He knows that we are doing the [facility] 2-6-20 as scheduled.

## 2020-02-04 ENCOUNTER — HOSPITAL ENCOUNTER (OUTPATIENT)
Dept: GENERAL RADIOLOGY | Age: 80
Discharge: HOME | End: 2020-02-04
Payer: MEDICARE

## 2020-02-04 ENCOUNTER — HOSPITAL ENCOUNTER (OUTPATIENT)
Age: 80
Discharge: HOME | End: 2020-02-04
Payer: MEDICARE

## 2020-02-04 LAB
ABO/RH: NORMAL
ANTIBODY SCREEN: NEGATIVE
APTT: 64.7 SECONDS (ref 25.1–37.1)
COMMENT: NORMAL
EKG ATRIAL RATE: 65 BPM
EKG DIAGNOSIS: NORMAL
EKG P AXIS: 66 DEGREES
EKG P-R INTERVAL: 168 MS
EKG Q-T INTERVAL: 414 MS
EKG QRS DURATION: 80 MS
EKG QTC CALCULATION (BAZETT): 430 MS
EKG R AXIS: -24 DEGREES
EKG T AXIS: 47 DEGREES
EKG VENTRICULAR RATE: 65 BPM
INR BLD: 1.02 INDEX
PROTHROMBIN TIME: 12.4 SECONDS (ref 11.7–14.5)

## 2020-02-04 PROCEDURE — 86900 BLOOD TYPING SEROLOGIC ABO: CPT

## 2020-02-04 PROCEDURE — 36415 COLL VENOUS BLD VENIPUNCTURE: CPT

## 2020-02-04 PROCEDURE — 86850 RBC ANTIBODY SCREEN: CPT

## 2020-02-04 PROCEDURE — 93010 ELECTROCARDIOGRAM REPORT: CPT | Performed by: INTERNAL MEDICINE

## 2020-02-04 PROCEDURE — 85610 PROTHROMBIN TIME: CPT

## 2020-02-04 PROCEDURE — 85730 THROMBOPLASTIN TIME PARTIAL: CPT

## 2020-02-04 PROCEDURE — 86901 BLOOD TYPING SEROLOGIC RH(D): CPT

## 2020-02-04 PROCEDURE — 71046 X-RAY EXAM CHEST 2 VIEWS: CPT

## 2020-02-04 PROCEDURE — 93005 ELECTROCARDIOGRAM TRACING: CPT | Performed by: INTERNAL MEDICINE

## 2020-02-05 ENCOUNTER — TELEPHONE (OUTPATIENT)
Dept: CARDIOLOGY CLINIC | Age: 80
End: 2020-02-05

## 2020-02-06 ENCOUNTER — HOSPITAL ENCOUNTER (OUTPATIENT)
Dept: CARDIAC CATH/INVASIVE PROCEDURES | Age: 80
Discharge: HOME | End: 2020-02-07
Attending: INTERNAL MEDICINE | Admitting: INTERNAL MEDICINE
Payer: MEDICARE

## 2020-02-06 PROBLEM — I25.10 CAD IN NATIVE ARTERY: Status: ACTIVE | Noted: 2020-02-06

## 2020-02-06 LAB — ACTIVATED CLOTTING TIME, LOW RANGE: >400 SEC

## 2020-02-06 PROCEDURE — 85347 COAGULATION TIME ACTIVATED: CPT

## 2020-02-06 PROCEDURE — C9600 PERC DRUG-EL COR STENT SING: HCPCS

## 2020-02-06 PROCEDURE — 92928 PRQ TCAT PLMT NTRAC ST 1 LES: CPT | Performed by: INTERNAL MEDICINE

## 2020-02-06 PROCEDURE — 6370000000 HC RX 637 (ALT 250 FOR IP): Performed by: INTERNAL MEDICINE

## 2020-02-06 PROCEDURE — C1887 CATHETER, GUIDING: HCPCS

## 2020-02-06 PROCEDURE — C1769 GUIDE WIRE: HCPCS

## 2020-02-06 PROCEDURE — 93458 L HRT ARTERY/VENTRICLE ANGIO: CPT | Performed by: INTERNAL MEDICINE

## 2020-02-06 PROCEDURE — 6370000000 HC RX 637 (ALT 250 FOR IP)

## 2020-02-06 PROCEDURE — 92929 PR PRQ TRLUML CORONARY STENT W/ANGIO ADDL ART/BRNCH: CPT | Performed by: INTERNAL MEDICINE

## 2020-02-06 PROCEDURE — 2709999900 HC NON-CHARGEABLE SUPPLY

## 2020-02-06 PROCEDURE — C1874 STENT, COATED/COV W/DEL SYS: HCPCS

## 2020-02-06 PROCEDURE — C1894 INTRO/SHEATH, NON-LASER: HCPCS

## 2020-02-06 PROCEDURE — 2500000003 HC RX 250 WO HCPCS

## 2020-02-06 PROCEDURE — 2580000003 HC RX 258: Performed by: INTERNAL MEDICINE

## 2020-02-06 PROCEDURE — 6360000004 HC RX CONTRAST MEDICATION

## 2020-02-06 PROCEDURE — 93458 L HRT ARTERY/VENTRICLE ANGIO: CPT

## 2020-02-06 PROCEDURE — 6360000002 HC RX W HCPCS

## 2020-02-06 RX ORDER — GEMFIBROZIL 600 MG/1
600 TABLET, FILM COATED ORAL
Status: DISCONTINUED | OUTPATIENT
Start: 2020-02-06 | End: 2020-02-07 | Stop reason: HOSPADM

## 2020-02-06 RX ORDER — CLOPIDOGREL BISULFATE 75 MG/1
75 TABLET ORAL DAILY
Status: DISCONTINUED | OUTPATIENT
Start: 2020-02-06 | End: 2020-02-07 | Stop reason: HOSPADM

## 2020-02-06 RX ORDER — DIPHENHYDRAMINE HCL 25 MG
25 TABLET ORAL ONCE
Status: COMPLETED | OUTPATIENT
Start: 2020-02-06 | End: 2020-02-06

## 2020-02-06 RX ORDER — RANOLAZINE 500 MG/1
500 TABLET, EXTENDED RELEASE ORAL 2 TIMES DAILY
Status: DISCONTINUED | OUTPATIENT
Start: 2020-02-06 | End: 2020-02-07 | Stop reason: HOSPADM

## 2020-02-06 RX ORDER — NITROGLYCERIN 0.4 MG/1
0.4 TABLET SUBLINGUAL EVERY 5 MIN PRN
Status: DISCONTINUED | OUTPATIENT
Start: 2020-02-06 | End: 2020-02-07 | Stop reason: HOSPADM

## 2020-02-06 RX ORDER — ASPIRIN 81 MG/1
81 TABLET, CHEWABLE ORAL DAILY
Status: DISCONTINUED | OUTPATIENT
Start: 2020-02-07 | End: 2020-02-07 | Stop reason: HOSPADM

## 2020-02-06 RX ORDER — SODIUM CHLORIDE 9 MG/ML
INJECTION, SOLUTION INTRAVENOUS CONTINUOUS
Status: DISCONTINUED | OUTPATIENT
Start: 2020-02-06 | End: 2020-02-07 | Stop reason: HOSPADM

## 2020-02-06 RX ORDER — ALLOPURINOL 300 MG/1
300 TABLET ORAL DAILY
Status: DISCONTINUED | OUTPATIENT
Start: 2020-02-06 | End: 2020-02-07 | Stop reason: HOSPADM

## 2020-02-06 RX ORDER — VENLAFAXINE HYDROCHLORIDE 37.5 MG/1
75 CAPSULE, EXTENDED RELEASE ORAL
Status: DISCONTINUED | OUTPATIENT
Start: 2020-02-07 | End: 2020-02-07 | Stop reason: HOSPADM

## 2020-02-06 RX ORDER — SODIUM CHLORIDE 9 MG/ML
INJECTION, SOLUTION INTRAVENOUS CONTINUOUS
Status: DISCONTINUED | OUTPATIENT
Start: 2020-02-06 | End: 2020-02-06 | Stop reason: SDUPTHER

## 2020-02-06 RX ORDER — ISOSORBIDE MONONITRATE 30 MG/1
30 TABLET, EXTENDED RELEASE ORAL DAILY
Status: DISCONTINUED | OUTPATIENT
Start: 2020-02-06 | End: 2020-02-07 | Stop reason: HOSPADM

## 2020-02-06 RX ORDER — ESCITALOPRAM OXALATE 10 MG/1
10 TABLET ORAL DAILY
Status: DISCONTINUED | OUTPATIENT
Start: 2020-02-06 | End: 2020-02-07 | Stop reason: HOSPADM

## 2020-02-06 RX ORDER — SODIUM CHLORIDE 0.9 % (FLUSH) 0.9 %
10 SYRINGE (ML) INJECTION PRN
Status: DISCONTINUED | OUTPATIENT
Start: 2020-02-06 | End: 2020-02-07 | Stop reason: HOSPADM

## 2020-02-06 RX ORDER — ONDANSETRON 2 MG/ML
4 INJECTION INTRAMUSCULAR; INTRAVENOUS EVERY 6 HOURS PRN
Status: DISCONTINUED | OUTPATIENT
Start: 2020-02-06 | End: 2020-02-07 | Stop reason: HOSPADM

## 2020-02-06 RX ORDER — DIAZEPAM 5 MG/1
5 TABLET ORAL ONCE
Status: COMPLETED | OUTPATIENT
Start: 2020-02-06 | End: 2020-02-06

## 2020-02-06 RX ORDER — ACETAMINOPHEN 325 MG/1
650 TABLET ORAL EVERY 4 HOURS PRN
Status: DISCONTINUED | OUTPATIENT
Start: 2020-02-06 | End: 2020-02-07 | Stop reason: HOSPADM

## 2020-02-06 RX ORDER — CLOPIDOGREL BISULFATE 75 MG/1
75 TABLET ORAL DAILY
Status: DISCONTINUED | OUTPATIENT
Start: 2020-02-07 | End: 2020-02-06 | Stop reason: SDUPTHER

## 2020-02-06 RX ORDER — VITS A,C,E/LUTEIN/MINERALS 300MCG-200
1 TABLET ORAL DAILY
Status: DISCONTINUED | OUTPATIENT
Start: 2020-02-06 | End: 2020-02-07 | Stop reason: HOSPADM

## 2020-02-06 RX ORDER — DIPHENHYDRAMINE HCL 25 MG
50 TABLET ORAL EVERY 6 HOURS PRN
Status: DISCONTINUED | OUTPATIENT
Start: 2020-02-06 | End: 2020-02-07 | Stop reason: HOSPADM

## 2020-02-06 RX ORDER — ACETAMINOPHEN 80 MG
TABLET,CHEWABLE ORAL
Status: COMPLETED
Start: 2020-02-06 | End: 2020-02-06

## 2020-02-06 RX ORDER — SODIUM CHLORIDE 0.9 % (FLUSH) 0.9 %
10 SYRINGE (ML) INJECTION EVERY 12 HOURS SCHEDULED
Status: DISCONTINUED | OUTPATIENT
Start: 2020-02-06 | End: 2020-02-07 | Stop reason: HOSPADM

## 2020-02-06 RX ORDER — FINASTERIDE 5 MG/1
5 TABLET, FILM COATED ORAL DAILY
Status: DISCONTINUED | OUTPATIENT
Start: 2020-02-06 | End: 2020-02-07 | Stop reason: HOSPADM

## 2020-02-06 RX ADMIN — Medication 1 TABLET: at 19:44

## 2020-02-06 RX ADMIN — Medication: at 19:44

## 2020-02-06 RX ADMIN — METOPROLOL TARTRATE 12.5 MG: 25 TABLET ORAL at 21:25

## 2020-02-06 RX ADMIN — GEMFIBROZIL 600 MG: 600 TABLET ORAL at 14:58

## 2020-02-06 RX ADMIN — DIAZEPAM 5 MG: 5 TABLET ORAL at 08:43

## 2020-02-06 RX ADMIN — RANOLAZINE 500 MG: 500 TABLET, FILM COATED, EXTENDED RELEASE ORAL at 21:25

## 2020-02-06 RX ADMIN — ISOSORBIDE MONONITRATE 30 MG: 30 TABLET, EXTENDED RELEASE ORAL at 14:57

## 2020-02-06 RX ADMIN — DIPHENHYDRAMINE HYDROCHLORIDE 50 MG: 25 TABLET ORAL at 23:41

## 2020-02-06 RX ADMIN — FINASTERIDE 5 MG: 5 TABLET, FILM COATED ORAL at 14:57

## 2020-02-06 RX ADMIN — RANOLAZINE 500 MG: 500 TABLET, FILM COATED, EXTENDED RELEASE ORAL at 15:05

## 2020-02-06 RX ADMIN — METOPROLOL TARTRATE 12.5 MG: 25 TABLET ORAL at 14:57

## 2020-02-06 RX ADMIN — ESCITALOPRAM OXALATE 10 MG: 10 TABLET ORAL at 14:57

## 2020-02-06 RX ADMIN — ALLOPURINOL 300 MG: 300 TABLET ORAL at 14:57

## 2020-02-06 RX ADMIN — DIPHENHYDRAMINE HYDROCHLORIDE 25 MG: 25 TABLET ORAL at 08:43

## 2020-02-06 RX ADMIN — SODIUM CHLORIDE, PRESERVATIVE FREE 10 ML: 5 INJECTION INTRAVENOUS at 21:26

## 2020-02-06 RX ADMIN — SODIUM CHLORIDE: 9 INJECTION, SOLUTION INTRAVENOUS at 08:43

## 2020-02-06 ASSESSMENT — PAIN SCALES - GENERAL
PAINLEVEL_OUTOF10: 0
PAINLEVEL_OUTOF10: 0

## 2020-02-06 NOTE — H&P
Mario Zapata, 70 y.o., male    Primary care physician:  Mauro Lauren MD     Chief Complaint: Chest Pain    History of Present Illness:  Shaylee Baires is a 70y.o. year old who has Past medical history as noted below. He says he is short of breath and noticing chest pressure with exertion . He was actually seen by myself in June 2019 at that time he was having a lot of shortness of breath and chest pressure. Stress test was abnormal is planned for cardiac cath he had lab work which revealedsevere profound anemia he went to the hospital and was actually anemic. He required multiple units of blood transfusion work-up revealed his bleeding from his colon he underwent colectomy by Dr. Corazon Barrera. .when he walks down his driveway to get his mail but he is not having chest pain as such. He was recently traveling to [facility] [place] he did very well did not have any chest pressure or pain his stress test showed lateral wall ischemia echo was normal.  He never got cardiac cath due to anemia   EKG shows frequent PVC ( every 5th beat)  Father had MI in his 52's . HE is retired from [employer] . Some times her gets very tired and fatigued with no energy after strenuous work Snores at night      Past medical history:    has a past medical history of Decreased hearing, Gout, H/O cardiovascular stress test, H/O echocardiogram, Hyperlipidemia, Nocturia, Osteoarthritis, and Restless leg. Past surgical history:   has a past surgical history that includes colectomy (2000); Knee arthroscopy (2002); joint replacement; Cholecystectomy (2001); Blepharoplasty (2010); Eye surgery (Left, 2011); Eye surgery (Right, 2011); Upper gastrointestinal endoscopy (N/A, 7/4/2019); Colonoscopy (N/A, 7/4/2019); and Small intestine surgery (N/A, 7/8/2019). Social History:   reports that he quit smoking about 35 years ago. His smoking use included cigarettes. He started smoking about 59 years ago. He has a 50.00 pack-year smoking history.  He has never used smokeless tobacco. He reports current alcohol use of about 1.0 standard drinks of alcohol per week. Family history:  family history includes Cancer in his mother. Allergies:    No Known Allergies    Home Medications:  Prior to Admission medications    Medication Sig Start Date End Date Taking? Authorizing Provider   ranolazine (RANEXA) 500 MG extended release tablet Take 1 tablet by mouth 2 times daily 1/28/20  Yes Stephane Suárez MD   gemfibrozil (LOPID) 600 MG tablet Take 1 tablet by mouth 2 times daily (before meals) 1/21/20  Yes Chinedu Farah MD   finasteride (PROSCAR) 5 MG tablet TAKE 1 TABLET DAILY 1/8/20  Yes HUANG Reyes   allopurinol (ZYLOPRIM) 300 MG tablet TAKE 1 TABLET DAILY 11/29/19  Yes HUANG Reyes   isosorbide mononitrate (IMDUR) 30 MG extended release tablet Take 1 tablet by mouth daily 11/26/19  Yes Stephane Suárez MD   Multiple Vitamins-Minerals (OCUVITE EXTRA) TABS Take 1 tablet by mouth daily   Yes Historical Provider, MD   Multiple Vitamins-Minerals (ICAPS AREDS 2) CAPS Take 1 capsule by mouth 2 times daily   Yes Historical Provider, MD   escitalopram (LEXAPRO) 10 MG tablet TAKE 1 TABLET BY MOUTH ONE TIME A DAY 10/9/19  Yes Madelyn Thompson MD   venlafaxine (EFFEXOR XR) 150 MG extended release capsule TAKE 1 CAPSULE BY MOUTH IN THE MORNING 10/9/19  Yes Madelyn Thompson MD   metoprolol tartrate (LOPRESSOR) 25 MG tablet Take 0.5 tablets by mouth 2 times daily Hold for SBP < 100 or HR < 60 7/14/19  Yes Celestino Cordero MD   Multiple Vitamins-Minerals (PRESERVISION AREDS PO) Take by mouth 2 caps by mouth twice a day.    Yes Historical Provider, MD   nitroGLYCERIN (NITROSTAT) 0.4 MG SL tablet Place 1 tablet under the tongue every 5 minutes as needed for Chest pain 1/28/20   Stephane Suárez MD       Review of systems: Review of Systems:   · Constitutional: No Fever or Weight Loss   · Eyes: No Decreased Vision  · ENT: No Headaches, Hearing Loss or No erythema, No rash. Skin: no rash, no ulcers  Lymphatic:  No lymphadenopathy noted. Neurologic:  Alert & oriented x 3, Normal motor function, Normal sensory function, No focal deficits noted. Lab Review   No results for input(s): WBC, HGB, HCT, PLT in the last 72 hours. No results for input(s): NA, K, CL, CO2, PHOS, BUN, CREATININE in the last 72 hours. Invalid input(s): CA  No results for input(s): AST, ALT, ALB, BILIDIR, BILITOT, ALKPHOS in the last 72 hours. No results for input(s): TROPONINI in the last 72 hours. Lab Results   Component Value Date    INR 1.02 02/04/2020    PROTIME 12.4 02/04/2020     No results found for: BNP  Abnormal stress test / chest pain  Precordial pain  Ongoing for a year, describes as pressure especially when he is walking up a hill after he gets his mail . Describes it as as pressure , stress test is abnormal but given his history of massive GI bleed was here I will get a CBC to make sure he is not anemic again. I am however concerned about his low EF he may have multivessel disease. If CBC is normal and is not any anemic will proceed with cardiac catheterization define his coronary anatomy  Alternates and risk of the procedure were dicussed in detail. Patient is in agreement to proceed  Mallampati is 2  ASA is 2             ASA : 2 , Mallampati class II  Plan:   1. Chest Pain/ Possible Angina: Alternate and risks versus benefits were discussed in detail. We will plan cardiac catheterization. We  discussed the risk of but not limited to  potential kidney failure, emergent surgery,blood transfusion  transfusion, infection, potential even death.   Patient is in agreement and wants to proceed

## 2020-02-07 VITALS
TEMPERATURE: 98.3 F | WEIGHT: 237 LBS | SYSTOLIC BLOOD PRESSURE: 128 MMHG | RESPIRATION RATE: 19 BRPM | HEART RATE: 84 BPM | OXYGEN SATURATION: 93 % | BODY MASS INDEX: 32.1 KG/M2 | DIASTOLIC BLOOD PRESSURE: 79 MMHG | HEIGHT: 72 IN

## 2020-02-07 LAB
HCT VFR BLD CALC: 40 % (ref 42–52)
HEMOGLOBIN: 13.4 GM/DL (ref 13.5–18)
MCH RBC QN AUTO: 31.5 PG (ref 27–31)
MCHC RBC AUTO-ENTMCNC: 33.5 % (ref 32–36)
MCV RBC AUTO: 93.9 FL (ref 78–100)
PDW BLD-RTO: 14.1 % (ref 11.7–14.9)
PLATELET # BLD: 241 K/CU MM (ref 140–440)
PMV BLD AUTO: 11.3 FL (ref 7.5–11.1)
RBC # BLD: 4.26 M/CU MM (ref 4.6–6.2)
WBC # BLD: 8.8 K/CU MM (ref 4–10.5)

## 2020-02-07 PROCEDURE — 6370000000 HC RX 637 (ALT 250 FOR IP): Performed by: INTERNAL MEDICINE

## 2020-02-07 PROCEDURE — 99238 HOSP IP/OBS DSCHRG MGMT 30/<: CPT | Performed by: INTERNAL MEDICINE

## 2020-02-07 PROCEDURE — 85027 COMPLETE CBC AUTOMATED: CPT

## 2020-02-07 PROCEDURE — 94761 N-INVAS EAR/PLS OXIMETRY MLT: CPT

## 2020-02-07 RX ORDER — ASPIRIN 81 MG/1
81 TABLET, CHEWABLE ORAL DAILY
Qty: 90 TABLET | Refills: 3 | Status: SHIPPED | OUTPATIENT
Start: 2020-02-07 | End: 2020-07-21

## 2020-02-07 RX ORDER — CLOPIDOGREL BISULFATE 75 MG/1
75 TABLET ORAL DAILY
Qty: 90 TABLET | Refills: 3 | Status: SHIPPED | OUTPATIENT
Start: 2020-02-07 | End: 2020-05-04 | Stop reason: SDUPTHER

## 2020-02-07 RX ADMIN — METOPROLOL TARTRATE 12.5 MG: 25 TABLET ORAL at 09:07

## 2020-02-07 RX ADMIN — ESCITALOPRAM OXALATE 10 MG: 10 TABLET ORAL at 09:07

## 2020-02-07 RX ADMIN — FINASTERIDE 5 MG: 5 TABLET, FILM COATED ORAL at 09:07

## 2020-02-07 RX ADMIN — CLOPIDOGREL BISULFATE 75 MG: 75 TABLET ORAL at 09:07

## 2020-02-07 RX ADMIN — Medication 1 TABLET: at 09:10

## 2020-02-07 RX ADMIN — ASPIRIN 81 MG 81 MG: 81 TABLET ORAL at 09:07

## 2020-02-07 RX ADMIN — ALLOPURINOL 300 MG: 300 TABLET ORAL at 09:08

## 2020-02-07 RX ADMIN — GEMFIBROZIL 600 MG: 600 TABLET ORAL at 09:08

## 2020-02-07 RX ADMIN — RANOLAZINE 500 MG: 500 TABLET, FILM COATED, EXTENDED RELEASE ORAL at 09:07

## 2020-02-07 RX ADMIN — VENLAFAXINE HYDROCHLORIDE 75 MG: 37.5 CAPSULE, EXTENDED RELEASE ORAL at 09:07

## 2020-02-07 RX ADMIN — ISOSORBIDE MONONITRATE 30 MG: 30 TABLET, EXTENDED RELEASE ORAL at 09:08

## 2020-02-07 ASSESSMENT — PAIN SCALES - GENERAL: PAINLEVEL_OUTOF10: 0

## 2020-02-07 NOTE — PLAN OF CARE
Problem: Infection:  Goal: Will remain free from infection  Description  Will remain free from infection  2/7/2020 0924 by Marcene Gottron, RN  Outcome: Ongoing  2/7/2020 0300 by Carline Brown RN  Outcome: Ongoing     Problem: Safety:  Goal: Free from accidental physical injury  Description  Free from accidental physical injury  2/7/2020 0924 by Marcene Gottron, RN  Outcome: Ongoing  2/7/2020 0300 by Carline Brown RN  Outcome: Ongoing  Goal: Free from intentional harm  Description  Free from intentional harm  2/7/2020 0924 by Marcene Gottron, RN  Outcome: Ongoing  2/7/2020 0300 by Carline Brown RN  Outcome: Ongoing     Problem: Daily Care:  Goal: Daily care needs are met  Description  Daily care needs are met  2/7/2020 0924 by Marcene Gottron, RN  Outcome: Ongoing  2/7/2020 0300 by Carline Brown RN  Outcome: Ongoing     Problem: Daily Care:  Goal: Daily care needs are met  Description  Daily care needs are met  2/7/2020 3732 by Marcene Gottron, RN  Outcome: Ongoing  2/7/2020 0300 by Carline Brown RN  Outcome: Ongoing     Problem: Pain:  Goal: Patient's pain/discomfort is manageable  Description  Patient's pain/discomfort is manageable  2/7/2020 0924 by Marcene Gottron, RN  Outcome: Ongoing  2/7/2020 0300 by Carline Brown RN  Outcome: Ongoing     Problem: Skin Integrity:  Goal: Skin integrity will stabilize  Description  Skin integrity will stabilize  2/7/2020 0924 by Marcene Gottron, RN  Outcome: Ongoing  2/7/2020 0300 by Carline Brown RN  Outcome: Ongoing     Problem: Discharge Planning:  Goal: Patients continuum of care needs are met  Description  Patients continuum of care needs are met  2/7/2020 0924 by Marcene Gottron, RN  Outcome: Ongoing  2/7/2020 0300 by Carline Brown RN  Outcome: Ongoing     Problem: Tissue Perfusion - Cardiopulmonary, Altered:  Goal: Absence of angina  Description  Absence of angina  Outcome: Ongoing  Goal: Hemodynamic stability will improve  Description  Hemodynamic stability will improve  Outcome: Ongoing     Problem: Tissue Perfusion - Peripheral, Altered:  Goal: Absence of hematoma at arterial access site  Description  Absence of hematoma at arterial access site  Outcome: Ongoing  Goal: Circulatory function of lower extremities is within specified parameters  Description  Circulatory function of lower extremities is within specified parameters  Outcome: Ongoing     Problem: Tobacco Use:  Goal: Will participate in inpatient tobacco-use cessation counseling  Description  Will participate in inpatient tobacco-use cessation counseling  Outcome: Ongoing

## 2020-02-07 NOTE — DISCHARGE SUMMARY
metoprolol tartrate 25 MG tablet  Commonly known as:  LOPRESSOR  Take 0.5 tablets by mouth 2 times daily Hold for SBP < 100 or HR < 60     nitroGLYCERIN 0.4 MG SL tablet  Commonly known as:  Nitrostat  Place 1 tablet under the tongue every 5 minutes as needed for Chest pain     ranolazine 500 MG extended release tablet  Commonly known as:  Ranexa  Take 1 tablet by mouth 2 times daily     venlafaxine 150 MG extended release capsule  Commonly known as:  EFFEXOR XR  TAKE 1 CAPSULE BY MOUTH IN THE MORNING           Where to Get Your Medications      These medications were sent to [address] [place] [address], [address]     Phone:  [phone]   · aspirin 81 MG chewable tablet  · clopidogrel 75 MG tablet         Consults:  none    Significant Diagnostic Studies:  See cath    Treatments:   PCI to lad and circ    Discharge Exam:  /71   Pulse 71   Temp 97.6 °F (36.4 °C) (Oral)   Resp 19   Ht 6' (1.829 m)   Wt 237 lb (107.5 kg)   SpO2 97%   BMI 32.14 kg/m²     General Appearance:    Alert, cooperative, no distress, appears stated age   Head:    Normocephalic, without obvious abnormality, atraumatic   Eyes:    PERRL, conjunctiva/corneas clear, EOM's intact, fundi     benign, both eyes        Ears:    Normal TM's and external ear canals, both ears   Nose:   Nares normal, septum midline, mucosa normal, no drainage    or sinus tenderness   Throat:   Lips, mucosa, and tongue normal; teeth and gums normal   Neck:   Supple, symmetrical, trachea midline, no adenopathy;        thyroid:  No enlargement/tenderness/nodules; no carotid    bruit or JVD   Back:     Symmetric, no curvature, ROM normal, no CVA tenderness   Lungs:     Clear to auscultation bilaterally, respirations unlabored   Chest wall:    No tenderness or deformity   Heart:    Regular rate and rhythm, S1 and S2 normal, no murmur, rub   or gallop               Extremities:   Extremities normal, atraumatic, no cyanosis or edema   Pulses:   2+ and symmetric all extremities   Skin:   Skin color, texture, turgor normal, no rashes or lesions   Lymph nodes:   Cervical, supraclavicular, and axillary nodes normal   Neurologic:   CNII-XII intact.  Normal strength, sensation and reflexes       throughout       Disposition:   home    Signed:  Radha Robledo  2/7/2020, 8:05 AM

## 2020-02-10 ENCOUNTER — OFFICE VISIT (OUTPATIENT)
Dept: CARDIOLOGY CLINIC | Age: 80
End: 2020-02-10
Payer: MEDICARE

## 2020-02-10 VITALS
HEART RATE: 78 BPM | SYSTOLIC BLOOD PRESSURE: 100 MMHG | BODY MASS INDEX: 32.21 KG/M2 | HEIGHT: 72 IN | WEIGHT: 237.8 LBS | DIASTOLIC BLOOD PRESSURE: 68 MMHG

## 2020-02-10 PROCEDURE — 1123F ACP DISCUSS/DSCN MKR DOCD: CPT | Performed by: INTERNAL MEDICINE

## 2020-02-10 PROCEDURE — 99214 OFFICE O/P EST MOD 30 MIN: CPT | Performed by: INTERNAL MEDICINE

## 2020-02-10 PROCEDURE — 1036F TOBACCO NON-USER: CPT | Performed by: INTERNAL MEDICINE

## 2020-02-10 PROCEDURE — G8417 CALC BMI ABV UP PARAM F/U: HCPCS | Performed by: INTERNAL MEDICINE

## 2020-02-10 PROCEDURE — 93000 ELECTROCARDIOGRAM COMPLETE: CPT | Performed by: INTERNAL MEDICINE

## 2020-02-10 PROCEDURE — 4040F PNEUMOC VAC/ADMIN/RCVD: CPT | Performed by: INTERNAL MEDICINE

## 2020-02-10 PROCEDURE — G8427 DOCREV CUR MEDS BY ELIG CLIN: HCPCS | Performed by: INTERNAL MEDICINE

## 2020-02-10 PROCEDURE — G8482 FLU IMMUNIZE ORDER/ADMIN: HCPCS | Performed by: INTERNAL MEDICINE

## 2020-02-10 NOTE — PROGRESS NOTES
Types: 1 Cans of beer per week     Comment: rarely        Review of Systems:   · Constitutional: No Fever or Weight Loss   · Eyes: No Decreased Vision  · ENT: No Headaches, Hearing Loss or Vertigo  · Cardiovascular: as per note above   · Respiratory: No cough or wheezing and as per note above. · Gastrointestinal: No abdominal pain, appetite loss, blood in stools, constipation, diarrhea or heartburn  · Genitourinary: No dysuria, trouble voiding, or hematuria  · Musculoskeletal:  denies any new  joint aches , swelling  or pain   · Integumentary: No rash or pruritis  · Neurological: No TIA or stroke symptoms  · Psychiatric: No anxiety or depression  · Endocrine: No malaise, fatigue or temperature intolerance  · Hematologic/Lymphatic: No bleeding problems, blood clots or swollen lymph nodes  · Allergic/Immunologic: No nasal congestion or hives    Objective:      Physical Exam:  /68   Pulse 78   Ht 6' (1.829 m)   Wt 237 lb 12.8 oz (107.9 kg)   BMI 32.25 kg/m²   Wt Readings from Last 3 Encounters:   02/10/20 237 lb 12.8 oz (107.9 kg)   02/06/20 237 lb (107.5 kg)   01/28/20 237 lb 3.2 oz (107.6 kg)     Body mass index is 32.25 kg/m². Vitals:    02/10/20 1100   BP: 100/68   Pulse: 78        General Appearance:  No distress, conversant  Constitutional:  Well developed, Well nourished, No acute distress, Non-toxic appearance. HENT:  Normocephalic, Atraumatic, Bilateral external ears normal, Oropharynx moist, No oral exudates, Nose normal. Neck- Normal range of motion, No tenderness, Supple, No stridor,no apical-carotid delay  Eyes:  PERRL, EOMI, Conjunctiva normal, No discharge. Respiratory:  Normal breath sounds, No respiratory distress, No wheezing, No chest tenderness. ,no use of accessory muscles, NO crackles  Cardiovascular: (PMI) apex non displaced,no lifts no thrills,S1 and S2 audible, No added heart sounds, No signs of ankle edema, or volume overload, No evidence of JVD, No crackles  GI:  Bowel sounds normal, Soft, No tenderness, No masses, No gross visceromegaly   :  No costovertebral angle tenderness   Musculoskeletal:  No edema, no tenderness, no deformities. Back- no tenderness  Integument:  Well hydrated, no rash   Lymphatic:  No lymphadenopathy noted   Neurologic:  Alert & oriented x 3, CN 2-12 normal, normal motor function, normal sensory function, no focal deficits noted   Psychiatric:  Speech and behavior appropriate       Medical decision making and Data review:  DATA:  Lab Results   Component Value Date    TROPONINT <0.010 07/02/2019     BNP:    Lab Results   Component Value Date    PROBNP 61.38 01/28/2020     PT/INR:  No results found for: PTINR  No results found for: LABA1C  Lab Results   Component Value Date    CHOL 117 08/01/2019    TRIG 153 (H) 08/01/2019    HDL 29 (L) 08/01/2019    LDLDIRECT 76 08/01/2019     Lab Results   Component Value Date    ALT 16 01/28/2020    AST 21 01/28/2020     TSH: No results found for: TSH  Lab Results   Component Value Date    AST 21 01/28/2020    ALT 16 01/28/2020    BILIDIR 0.2 01/17/2012    BILITOT 0.3 01/28/2020    ALKPHOS 72 01/28/2020     Lab Results   Component Value Date    PROBNP 61.38 01/28/2020    PROBNP 95.08 07/02/2019     No results found for: LABA1C  Lab Results   Component Value Date    WBC 8.8 02/07/2020    HGB 13.4 (L) 02/07/2020    HCT 40.0 (L) 02/07/2020     02/07/2020     Stress test  6/24/19     Kirill Ambriz portion of stress test is negative for ischemia by diagnostic criteria.    Completed 4.6 METS and 4 Mins of exercise    Global hypokinesis with EF of 41 %    Mild ischemia of lateral wall involving small to medium size of left    ventricle    Abnormal stress test         Echo 6/27/19  Summary   Technically difficult study with poor windows   Left ventricular systolic function is probably low normal with an ejection   fraction of 50 %.    Grade I diastolic dysfunction.   Mild concentric left ventricular hypertrophy.   Sclerotic, but non-stenotic aortic valve.   Doppler evaluation reveals moderate aortic and mild mitral and tricuspid   regurgitation.   No evidence of pericardial effusion. Cath 2020   Procedure Summary   Indication Abnormal stress test showing lateral ischemia pt on 3   anti anginal meds and having class III angina   Access L Radial   1. PCI to OM ostial lesion reduced 90 % lesion to 0 % using 2.75   X 18 LUISA   2. Mid LAD has 70-80 % lesion reduced to 0 % using 3.0 X 18 LUISA   3. PDA has 80-90 % tandem lesions but small vessel   4. LVEDP was 11 mmHG  Angiographic Findings      Diagnostic Findings      Cardiac Arteries and Lesion Findings     LMCA: Normal, Normal (no stenosis %) and No Angiographicalyl Significant  Disease. widely patent     LAD: Abnormal and Focal stenosis. Mid LAD had calcified focal 70-80 % stenosis  , large Type III LAD , 2 diag are patent       Lesion on Mid LAD: 80% stenosis. Culprit lesion. Devices used       - Runthrough  cm Wire. Number of passes: 1.       - 3.0 x 18 mm Resolute Integrity Drug Eluting Stent. Diameter: 3 mm. Length: 18 mm. 1 inflation(s) to a max pressure of: 12 stephanie. LCx: Ostial OM after Circ Av groove take off has 80-90 % lesion     Lesion on 1st Ob Claire% stenosis. Culprit lesion. RCA: Diffuse irregularity and Abnormal.Large vessel , Right Dominant system ,  PDA has 80-90 % lesion , there are 2 tandem lesions but PDA is small after  lesions   Interventional procedure  Cardiac lesions  LAD:     Lesion on Mid LAD: 80% stenosis. Culprit lesion. LCx:     Lesion on 1st Ob Claire% stenosis. Culprit lesion. All labs, medications and tests reviewed by myself including data and history from outside source , patient and available family . Assessment & Plan:      1. CAD in native artery    2. Hyperlipidemia, unspecified hyperlipidemia type    3. Shortness of breath    4.  Precordial pain         Abnormal stress test / chest pain  Precordial pain  S/p PCi to LAD and Circ in Jan 2020 for Class III angina on 3 anti anginal now . We will refer himt o cardiac rehab and have him walk on treadmill to assess functional status. HE go lightheaded after standing up suddenly . Could be due to imdur or metoprolol causing ortho stasis. Continue Plavix and aspirin till June 2020 and then switch to aspirin alone. Check cbc in 1 mth due to anemia history     Heart palpitations  EF is also low normal at 50% . HE is tired all the time especially after a busy day. EKG shows frequent  PVC, HE snores at night . HE has less energy which improved after his anemia has resolved. continue metoprolol     Dyslipidemia :  All available lab work was reviewed. Patient was advised to repeat lab work before next visit. Necessary orders were placed , instructions given by myself       Counseled extensively and medication compliance urged. We discussed that for the  prevention of ASCVD our  goal is aggressive risk modification. Patient is encouraged to exercise even a brisk walk for 30 minutes  at least 3 to 4 times a week   Various goals were discussed and questions answered. Continue current medications. Appropriate prescriptions are addressed and refills ordered. Questions answered and patient verbalizes understanding. Call for any problems, questions, or concerns. Continue all other medications of all above medical condition listed as is. Return in about 6 months (around 8/10/2020). Please note this report has been partially produced using speech recognition software and may contain errors related to that system including errors in grammar, punctuation, and spelling, as well as words and phrases that may be inappropriate. If there are any questions or concerns please feel free to contact the dictating provider for clarification.

## 2020-02-10 NOTE — PATIENT INSTRUCTIONS
Patient Education        Angina: Care Instructions  Your Care Instructions    You have a problem called angina. Angina happens when there is not enough blood flow to your heart muscle. Angina is a sign of coronary artery disease (CAD). CAD occurs when blood vessels that supply the heart become narrowed. Having CAD increases your risk of a heart attack. Chest pain or pressure is the most common symptom of angina. But some people have other symptoms, like:  · Pain, pressure, or a strange feeling in the back, neck, jaw, or upper belly, or in one or both shoulders or arms. · Shortness of breath. · Nausea or vomiting. · Lightheadedness or sudden weakness. · Fast or irregular heartbeat. Women are somewhat more likely than men to have angina symptoms like shortness of breath, nausea, and back or jaw pain. Angina can be dangerous. That's why it is important to pay attention to your symptoms. Know what is typical for you, learn how to control your symptoms, and understand when you need to get treatment. A change in your usual pattern of symptoms is an emergency. It may mean that you are having a heart attack. The doctor has checked you carefully, but problems can develop later. If you notice any problems or new symptoms, get medical treatment right away. Follow-up care is a key part of your treatment and safety. Be sure to make and go to all appointments, and call your doctor if you are having problems. It's also a good idea to know your test results and keep a list of the medicines you take. How can you care for yourself at home? Medicines    · If your doctor has given you nitroglycerin for angina symptoms, keep it with you at all times. If you have symptoms, sit down and rest, and take the first dose of nitroglycerin as directed. If your symptoms get worse or are not getting better within 5 minutes, call  911 right away. Stay on the phone.  The emergency  will give you further instructions.     · If bread, pasta, and couscous. · Eat fish at least 2 times a week. Try tuna, salmon, mackerel, lake trout, herring, or sardines. · Eat moderate amounts of low-fat dairy products, such as milk, cheese, or yogurt. · Eat moderate amounts of poultry and eggs. · Choose healthy (unsaturated) fats, such as nuts, olive oil, and certain nut or seed oils like canola, soybean, and flaxseed. · Limit unhealthy (saturated) fats, such as butter, palm oil, and coconut oil. And limit fats found in animal products, such as meat and dairy products made with whole milk. Try to eat red meat only a few times a month in very small amounts. · Limit sweets and desserts to only a few times a week. This includes sugar-sweetened drinks like soda. The Mediterranean diet may also include red wine with your meal--1 glass each day for women and up to 2 glasses a day for men. Tips for eating at home  · Use herbs, spices, garlic, lemon zest, and citrus juice instead of salt to add flavor to foods. · Add avocado slices to your sandwich instead of rodriguez. · Have fish for lunch or dinner instead of red meat. Brush the fish with olive oil, and broil or grill it. · Sprinkle your salad with seeds or nuts instead of cheese. · Cook with olive or canola oil instead of butter or oils that are high in saturated fat. · Switch from 2% milk or whole milk to 1% or fat-free milk. · Dip raw vegetables in a vinaigrette dressing or hummus instead of dips made from mayonnaise or sour cream.  · Have a piece of fruit for dessert instead of a piece of cake. Try baked apples, or have some dried fruit. Tips for eating out  · Try broiled, grilled, baked, or poached fish instead of having it fried or breaded. · Ask your  to have your meals prepared with olive oil instead of butter. · Order dishes made with marinara sauce or sauces made from olive oil. Avoid sauces made from cream or mayonnaise.   · Choose whole-grain breads, whole wheat pasta and pizza

## 2020-02-10 NOTE — LETTER
CLINICAL STAFF DOCUMENTATION    [facility]  1940  [id number]    Have you had any Chest Pain - No    Have you had any Shortness of Breath - Yes on exertion    Have you had any dizziness - No    Have you had any palpitations - No    Do you have any edema - No    Check Venous \"LEG PROBLEM Questionnaire\"    Do you have a surgery or procedure scheduled in the near future - No  BE SURE TO GIVE THIS INFORMATION to [facility]    Ask patient if they want to sign [facility] if they are not already signed up    Check to see if we have an E-MAIL on file for the patient    Check medication list thoroughly!!!  BE SURE TO ASK PATIENT IF THEY NEED MEDICATION REFILLS

## 2020-02-18 ENCOUNTER — NURSE ONLY (OUTPATIENT)
Dept: CARDIOLOGY CLINIC | Age: 80
End: 2020-02-18
Payer: MEDICARE

## 2020-02-18 ENCOUNTER — PROCEDURE VISIT (OUTPATIENT)
Dept: CARDIOLOGY CLINIC | Age: 80
End: 2020-02-18
Payer: MEDICARE

## 2020-02-18 PROCEDURE — 93015 CV STRESS TEST SUPVJ I&R: CPT | Performed by: INTERNAL MEDICINE

## 2020-02-18 PROCEDURE — 93225 XTRNL ECG REC<48 HRS REC: CPT | Performed by: INTERNAL MEDICINE

## 2020-02-18 RX ORDER — METOPROLOL TARTRATE 50 MG/1
12.5 TABLET, FILM COATED ORAL 2 TIMES DAILY
Qty: 60 TABLET | Refills: 3 | Status: SHIPPED | OUTPATIENT
Start: 2020-02-18 | End: 2020-02-28

## 2020-02-24 PROCEDURE — 93227 XTRNL ECG REC<48 HR R&I: CPT | Performed by: INTERNAL MEDICINE

## 2020-02-26 ENCOUNTER — TELEPHONE (OUTPATIENT)
Dept: CARDIOLOGY CLINIC | Age: 80
End: 2020-02-26

## 2020-02-28 ENCOUNTER — OFFICE VISIT (OUTPATIENT)
Dept: CARDIOLOGY CLINIC | Age: 80
End: 2020-02-28
Payer: MEDICARE

## 2020-02-28 VITALS
HEIGHT: 72 IN | DIASTOLIC BLOOD PRESSURE: 70 MMHG | BODY MASS INDEX: 31.75 KG/M2 | WEIGHT: 234.4 LBS | HEART RATE: 60 BPM | SYSTOLIC BLOOD PRESSURE: 110 MMHG

## 2020-02-28 PROBLEM — I49.3 PVC (PREMATURE VENTRICULAR CONTRACTION): Status: ACTIVE | Noted: 2020-02-28

## 2020-02-28 PROCEDURE — G8417 CALC BMI ABV UP PARAM F/U: HCPCS | Performed by: INTERNAL MEDICINE

## 2020-02-28 PROCEDURE — 1123F ACP DISCUSS/DSCN MKR DOCD: CPT | Performed by: INTERNAL MEDICINE

## 2020-02-28 PROCEDURE — G8427 DOCREV CUR MEDS BY ELIG CLIN: HCPCS | Performed by: INTERNAL MEDICINE

## 2020-02-28 PROCEDURE — 1036F TOBACCO NON-USER: CPT | Performed by: INTERNAL MEDICINE

## 2020-02-28 PROCEDURE — 4040F PNEUMOC VAC/ADMIN/RCVD: CPT | Performed by: INTERNAL MEDICINE

## 2020-02-28 PROCEDURE — 99214 OFFICE O/P EST MOD 30 MIN: CPT | Performed by: INTERNAL MEDICINE

## 2020-02-28 PROCEDURE — G8482 FLU IMMUNIZE ORDER/ADMIN: HCPCS | Performed by: INTERNAL MEDICINE

## 2020-02-28 RX ORDER — METOPROLOL TARTRATE 50 MG/1
50 TABLET, FILM COATED ORAL 2 TIMES DAILY
Qty: 60 TABLET | Refills: 3 | Status: SHIPPED | OUTPATIENT
Start: 2020-02-28 | End: 2020-05-04 | Stop reason: SDUPTHER

## 2020-02-28 NOTE — PROGRESS NOTES
CARDIOLOGY   NOTE    Chief Complaint: chest Pain / SOB     HPI:   Anika Ballesteros is a 70y.o. year old who has Past medical history as noted below. He had PCI to LAd and Circ in Jan 2020 . He underwent treadmill before going to cardiac rehab and was shown to have multiple PVCs therefore he had Holter which showed 17% PVC burden. HE dose have 90 % PDA disease which we did not intervene on due o distal small vessel . He was having chest pain before cardiac cath on 2 anti anginal RX. After PCI he has no more chest pain although he still somewhat short of breath    He was actually seen by myself in June 2019 at that time he was having a lot of shortness of breath and chest pressure. Stress test was abnormal is planned for cardiac cath he had lab work which revealed severe profound anemia he went to the hospital and was actually anemic. He required multiple units of blood transfusion work-up revealed his bleeding from his colon he underwent colectomy by Dr. Jorge Diamond. EKG shows frequent PVC ( every 5th beat)  Father had MI in his 52's . HE is retired from [employer] .  Some times her gets very tired and fatigued with no energy after strenuous work Snores at night       Current Outpatient Medications   Medication Sig Dispense Refill    metoprolol tartrate (LOPRESSOR) 50 MG tablet Take 1 tablet by mouth 2 times daily Hold for SBP < 100 or HR < 60 60 tablet 3    clopidogrel (PLAVIX) 75 MG tablet Take 1 tablet by mouth daily 90 tablet 3    aspirin 81 MG chewable tablet Take 1 tablet by mouth daily 90 tablet 3    ranolazine (RANEXA) 500 MG extended release tablet Take 1 tablet by mouth 2 times daily 60 tablet 3    nitroGLYCERIN (NITROSTAT) 0.4 MG SL tablet Place 1 tablet under the tongue every 5 minutes as needed for Chest pain 25 tablet 3    gemfibrozil (LOPID) 600 MG tablet Take 1 tablet by mouth 2 times daily (before meals) 180 tablet 1    finasteride (PROSCAR) 5 MG tablet TAKE 1 TABLET DAILY 90 tablet 0    allopurinol (ZYLOPRIM) 300 MG tablet TAKE 1 TABLET DAILY 90 tablet 1    isosorbide mononitrate (IMDUR) 30 MG extended release tablet Take 1 tablet by mouth daily 90 tablet 3    Multiple Vitamins-Minerals (OCUVITE EXTRA) TABS Take 1 tablet by mouth daily      escitalopram (LEXAPRO) 10 MG tablet TAKE 1 TABLET BY MOUTH ONE TIME A DAY 90 tablet 1    venlafaxine (EFFEXOR XR) 150 MG extended release capsule TAKE 1 CAPSULE BY MOUTH IN THE MORNING 90 capsule 1     No current facility-administered medications for this visit. Allergies:   Patient has no known allergies. Patient History:  Past Medical History:   Diagnosis Date    Decreased hearing 5/4/2019    Gout 5/4/2019    H/O cardiovascular stress test 06/24/2019    EF 41%,  Mild ischemia of lateral wall involving small to medium size of left ventricle.  H/O echocardiogram 8/27/19    EF 48, Mod AR, Mild MR & TR    H/O percutaneous transluminal coronary angioplasty 02/06/2020    PCI to OM & Mid LAD,  PDA has 80-90 % tandem lesions but small vessel.     Hyperlipidemia 5/4/2019    Nocturia 5/4/2019    Osteoarthritis 5/4/2019    Restless leg 5/4/2019     Past Surgical History:   Procedure Laterality Date    BLEPHAROPLASTY  2010    CHOLECYSTECTOMY  2001    COLECTOMY  2000    COLONOSCOPY N/A 7/4/2019    COLONOSCOPY DIAGNOSTIC performed by Jose Luis Carrera MD at [place] [address]. Left 2011    EYE SURGERY Right 2011    JOINT REPLACEMENT      KNEE ARTHROSCOPY  2002    SMALL INTESTINE SURGERY N/A 7/8/2019    LAPAROTOMY EXPLORATORY RIGHT ILEOCOLECTOMY performed by Nils Medeiros MD at [place] [address] N/A 7/4/2019    EGD DIAGNOSTIC ONLY performed by Jose Luis Carrera MD at [facility] ENDOSCOPY     Family History   Problem Relation Age of Onset    Cancer Mother      Social History     Tobacco Use    Smoking status: Former Smoker     Packs/day: 2.00     Years: 25.00     Pack years: 50.00 Types: Cigarettes     Start date: 1960     Last attempt to quit: 1985     Years since quittin.1    Smokeless tobacco: Never Used   Substance Use Topics    Alcohol use: Yes     Alcohol/week: 1.0 standard drinks     Types: 1 Cans of beer per week     Comment: rarely        Review of Systems:   · Constitutional: No Fever or Weight Loss   · Eyes: No Decreased Vision  · ENT: No Headaches, Hearing Loss or Vertigo  · Cardiovascular: as per note above   · Respiratory: No cough or wheezing and as per note above. · Gastrointestinal: No abdominal pain, appetite loss, blood in stools, constipation, diarrhea or heartburn  · Genitourinary: No dysuria, trouble voiding, or hematuria  · Musculoskeletal:  denies any new  joint aches , swelling  or pain   · Integumentary: No rash or pruritis  · Neurological: No TIA or stroke symptoms  · Psychiatric: No anxiety or depression  · Endocrine: No malaise, fatigue or temperature intolerance  · Hematologic/Lymphatic: No bleeding problems, blood clots or swollen lymph nodes  · Allergic/Immunologic: No nasal congestion or hives    Objective:      Physical Exam:  /70   Pulse 60   Ht 6' (1.829 m)   Wt 234 lb 6.4 oz (106.3 kg)   BMI 31.79 kg/m²   Wt Readings from Last 3 Encounters:   20 234 lb 6.4 oz (106.3 kg)   02/10/20 237 lb 12.8 oz (107.9 kg)   20 237 lb (107.5 kg)     Body mass index is 31.79 kg/m². Vitals:    20 1116   BP: 110/70   Pulse: 60        General Appearance:  No distress, conversant  Constitutional:  Well developed, Well nourished, No acute distress, Non-toxic appearance. HENT:  Normocephalic, Atraumatic, Bilateral external ears normal, Oropharynx moist, No oral exudates, Nose normal. Neck- Normal range of motion, No tenderness, Supple, No stridor,no apical-carotid delay  Eyes:  PERRL, EOMI, Conjunctiva normal, No discharge. Respiratory:  Normal breath sounds, No respiratory distress, No wheezing, No chest tenderness. ,no use of       Echo 19  Summary   Technically difficult study with poor windows   Left ventricular systolic function is probably low normal with an ejection   fraction of 50 %.  Grade I diastolic dysfunction.   Mild concentric left ventricular hypertrophy.   Sclerotic, but non-stenotic aortic valve.   Doppler evaluation reveals moderate aortic and mild mitral and tricuspid   regurgitation.   No evidence of pericardial effusion. Cath 2020   Procedure Summary   Indication Abnormal stress test showing lateral ischemia pt on 3   anti anginal meds and having class III angina   Access L Radial   1. PCI to OM ostial lesion reduced 90 % lesion to 0 % using 2.75   X 18 LUISA   2. Mid LAD has 70-80 % lesion reduced to 0 % using 3.0 X 18 LUISA   3. PDA has 80-90 % tandem lesions but small vessel   4. LVEDP was 11 mmHG  Angiographic Findings      Diagnostic Findings      Cardiac Arteries and Lesion Findings     LMCA: Normal, Normal (no stenosis %) and No Angiographicalyl Significant  Disease. widely patent     LAD: Abnormal and Focal stenosis. Mid LAD had calcified focal 70-80 % stenosis  , large Type III LAD , 2 diag are patent       Lesion on Mid LAD: 80% stenosis. Culprit lesion. Devices used       - Runthrough  cm Wire. Number of passes: 1.       - 3.0 x 18 mm Resolute Integrity Drug Eluting Stent. Diameter: 3 mm. Length: 18 mm. 1 inflation(s) to a max pressure of: 12 stephanie. LCx: Ostial OM after Circ Av groove take off has 80-90 % lesion     Lesion on 1st Ob Claire% stenosis. Culprit lesion. RCA: Diffuse irregularity and Abnormal.Large vessel , Right Dominant system ,  PDA has 80-90 % lesion , there are 2 tandem lesions but PDA is small after  lesions   Interventional procedure  Cardiac lesions  LAD:     Lesion on Mid LAD: 80% stenosis. Culprit lesion. LCx:     Lesion on 1st Ob Claire% stenosis. Culprit lesion.        Holter 2020  Notes recorded by Angel Jean MD on 2020 at 3:19 PM

## 2020-03-05 ENCOUNTER — HOSPITAL ENCOUNTER (OUTPATIENT)
Age: 80
Discharge: HOME | End: 2020-03-05
Payer: MEDICARE

## 2020-03-05 LAB
HCT VFR BLD CALC: 39.6 % (ref 42–52)
HEMOGLOBIN: 12.8 GM/DL (ref 13.5–18)
MCH RBC QN AUTO: 31.5 PG (ref 27–31)
MCHC RBC AUTO-ENTMCNC: 32.3 % (ref 32–36)
MCV RBC AUTO: 97.5 FL (ref 78–100)
PDW BLD-RTO: 14.1 % (ref 11.7–14.9)
PLATELET # BLD: 251 K/CU MM (ref 140–440)
PMV BLD AUTO: 11.1 FL (ref 7.5–11.1)
RBC # BLD: 4.06 M/CU MM (ref 4.6–6.2)
WBC # BLD: 6.8 K/CU MM (ref 4–10.5)

## 2020-03-05 PROCEDURE — 36415 COLL VENOUS BLD VENIPUNCTURE: CPT

## 2020-03-05 PROCEDURE — 85027 COMPLETE CBC AUTOMATED: CPT

## 2020-03-11 ENCOUNTER — HOSPITAL ENCOUNTER (OUTPATIENT)
Dept: CARDIAC REHAB | Age: 80
Setting detail: THERAPIES SERIES
Discharge: HOME | End: 2020-03-11
Payer: MEDICARE

## 2020-03-11 ENCOUNTER — TELEPHONE (OUTPATIENT)
Dept: CARDIOLOGY CLINIC | Age: 80
End: 2020-03-11

## 2020-03-11 PROCEDURE — 93798 PHYS/QHP OP CAR RHAB W/ECG: CPT

## 2020-03-11 NOTE — TELEPHONE ENCOUNTER
Patient has numbness in toes, cold feet, no hair on legs. Nurse from [facility] suggested ultrasound.  Patient would rather discuss next week at [address] with Dr Naty Escalona

## 2020-03-17 ENCOUNTER — NURSE ONLY (OUTPATIENT)
Dept: CARDIOLOGY CLINIC | Age: 80
End: 2020-03-17
Payer: MEDICARE

## 2020-03-17 PROCEDURE — 93225 XTRNL ECG REC<48 HRS REC: CPT | Performed by: INTERNAL MEDICINE

## 2020-03-17 NOTE — PROGRESS NOTES
Applied @ 11:14 am, 24hr holter w/monitor# [id number]  for Dx of Irregular heart rate. Educated pt on proper holter usage; how to keep sx diary; & when to bring monitor back to office. Pt voiced understanding. Holter order,including monitor & card#, & time started, to front nurse's station in 's in-box.

## 2020-03-30 PROCEDURE — 93227 XTRNL ECG REC<48 HR R&I: CPT | Performed by: INTERNAL MEDICINE

## 2020-04-07 RX ORDER — FINASTERIDE 5 MG/1
TABLET, FILM COATED ORAL
Qty: 90 TABLET | Refills: 0 | Status: SHIPPED | OUTPATIENT
Start: 2020-04-07 | End: 2020-07-06

## 2020-04-09 RX ORDER — ESCITALOPRAM OXALATE 10 MG/1
TABLET ORAL
Qty: 90 TABLET | Refills: 1 | OUTPATIENT
Start: 2020-04-09

## 2020-04-09 RX ORDER — VENLAFAXINE HYDROCHLORIDE 150 MG/1
CAPSULE, EXTENDED RELEASE ORAL
Qty: 90 CAPSULE | Refills: 1 | OUTPATIENT
Start: 2020-04-09

## 2020-04-09 RX ORDER — GEMFIBROZIL 600 MG/1
TABLET, FILM COATED ORAL
Qty: 180 TABLET | Refills: 1 | OUTPATIENT
Start: 2020-04-09

## 2020-04-09 RX ORDER — ALLOPURINOL 300 MG/1
TABLET ORAL
Qty: 90 TABLET | Refills: 1 | OUTPATIENT
Start: 2020-04-09

## 2020-05-04 ENCOUNTER — OFFICE VISIT (OUTPATIENT)
Dept: CARDIOLOGY CLINIC | Age: 80
End: 2020-05-04
Payer: MEDICARE

## 2020-05-04 VITALS
HEART RATE: 60 BPM | DIASTOLIC BLOOD PRESSURE: 80 MMHG | WEIGHT: 237.4 LBS | HEIGHT: 72 IN | BODY MASS INDEX: 32.15 KG/M2 | SYSTOLIC BLOOD PRESSURE: 100 MMHG

## 2020-05-04 PROCEDURE — G8417 CALC BMI ABV UP PARAM F/U: HCPCS | Performed by: INTERNAL MEDICINE

## 2020-05-04 PROCEDURE — 1036F TOBACCO NON-USER: CPT | Performed by: INTERNAL MEDICINE

## 2020-05-04 PROCEDURE — 99214 OFFICE O/P EST MOD 30 MIN: CPT | Performed by: INTERNAL MEDICINE

## 2020-05-04 PROCEDURE — 1123F ACP DISCUSS/DSCN MKR DOCD: CPT | Performed by: INTERNAL MEDICINE

## 2020-05-04 PROCEDURE — 4040F PNEUMOC VAC/ADMIN/RCVD: CPT | Performed by: INTERNAL MEDICINE

## 2020-05-04 PROCEDURE — G8427 DOCREV CUR MEDS BY ELIG CLIN: HCPCS | Performed by: INTERNAL MEDICINE

## 2020-05-04 RX ORDER — CLOPIDOGREL BISULFATE 75 MG/1
75 TABLET ORAL DAILY
Qty: 30 TABLET | Refills: 0 | Status: SHIPPED | OUTPATIENT
Start: 2020-05-04 | End: 2020-07-20

## 2020-05-04 RX ORDER — VITAMIN E 268 MG
400 CAPSULE ORAL DAILY
COMMUNITY
End: 2020-11-03 | Stop reason: ALTCHOICE

## 2020-05-04 RX ORDER — RANOLAZINE 500 MG/1
500 TABLET, EXTENDED RELEASE ORAL 2 TIMES DAILY
Qty: 90 TABLET | Refills: 3 | Status: SHIPPED | OUTPATIENT
Start: 2020-05-04 | End: 2020-09-24 | Stop reason: SDUPTHER

## 2020-05-04 RX ORDER — ISOSORBIDE MONONITRATE 30 MG/1
30 TABLET, EXTENDED RELEASE ORAL DAILY
Qty: 90 TABLET | Refills: 3 | Status: SHIPPED | OUTPATIENT
Start: 2020-05-04 | End: 2021-01-20 | Stop reason: SDUPTHER

## 2020-05-04 RX ORDER — ASPIRIN 81 MG/1
81 TABLET ORAL DAILY
Qty: 90 TABLET | Refills: 3 | Status: SHIPPED | OUTPATIENT
Start: 2020-05-04 | End: 2020-11-03 | Stop reason: ALTCHOICE

## 2020-05-04 RX ORDER — METOPROLOL TARTRATE 50 MG/1
50 TABLET, FILM COATED ORAL 2 TIMES DAILY
Qty: 180 TABLET | Refills: 3 | Status: SHIPPED | OUTPATIENT
Start: 2020-05-04 | End: 2021-01-20 | Stop reason: SDUPTHER

## 2020-05-04 NOTE — PROGRESS NOTES
TAKE 1 TABLET DAILY 90 tablet 0    aspirin 81 MG chewable tablet Take 1 tablet by mouth daily 90 tablet 3    gemfibrozil (LOPID) 600 MG tablet Take 1 tablet by mouth 2 times daily (before meals) 180 tablet 1    allopurinol (ZYLOPRIM) 300 MG tablet TAKE 1 TABLET DAILY 90 tablet 1    Multiple Vitamins-Minerals (OCUVITE EXTRA) TABS Take 1 tablet by mouth daily      escitalopram (LEXAPRO) 10 MG tablet TAKE 1 TABLET BY MOUTH ONE TIME A DAY 90 tablet 1    venlafaxine (EFFEXOR XR) 150 MG extended release capsule TAKE 1 CAPSULE BY MOUTH IN THE MORNING 90 capsule 1    nitroGLYCERIN (NITROSTAT) 0.4 MG SL tablet Place 1 tablet under the tongue every 5 minutes as needed for Chest pain (Patient not taking: Reported on 5/4/2020) 25 tablet 3     No current facility-administered medications for this visit. Allergies:   Patient has no known allergies. Patient History:  Past Medical History:   Diagnosis Date    Decreased hearing 5/4/2019    Gout 5/4/2019    H/O cardiovascular stress test 06/24/2019    EF 41%,  Mild ischemia of lateral wall involving small to medium size of left ventricle.  H/O echocardiogram 8/27/19    EF 48, Mod AR, Mild MR & TR    H/O percutaneous transluminal coronary angioplasty 02/06/2020    PCI to OM & Mid LAD,  PDA has 80-90 % tandem lesions but small vessel.     Hyperlipidemia 5/4/2019    Nocturia 5/4/2019    Osteoarthritis 5/4/2019    Restless leg 5/4/2019     Past Surgical History:   Procedure Laterality Date    BLEPHAROPLASTY  2010    CHOLECYSTECTOMY  2001    COLECTOMY  2000    COLONOSCOPY N/A 7/4/2019    COLONOSCOPY DIAGNOSTIC performed by Rosa Galdamez MD at [place] [address]. Left 2011    EYE SURGERY Right 2011    JOINT REPLACEMENT      KNEE ARTHROSCOPY  2002    SMALL INTESTINE SURGERY N/A 7/8/2019    LAPAROTOMY EXPLORATORY RIGHT ILEOCOLECTOMY performed by Mohamud Gallo MD at [address] [place] [address] 7/4/2019    EGD DIAGNOSTIC ONLY performed by Ellyn Trinidad MD at [facility] ENDOSCOPY     Family History   Problem Relation Age of Onset    Cancer Mother      Social History     Tobacco Use    Smoking status: Former Smoker     Packs/day: 2.00     Years: 25.00     Pack years: 50.00     Types: Cigarettes     Start date: 1960     Last attempt to quit:      Years since quittin.3    Smokeless tobacco: Never Used   Substance Use Topics    Alcohol use: Yes     Alcohol/week: 1.0 standard drinks     Types: 1 Cans of beer per week     Comment: rarely        Review of Systems:   · Constitutional: No Fever or Weight Loss   · Eyes: No Decreased Vision  · ENT: No Headaches, Hearing Loss or Vertigo  · Cardiovascular: as per note above   · Respiratory: No cough or wheezing and as per note above. · Gastrointestinal: No abdominal pain, appetite loss, blood in stools, constipation, diarrhea or heartburn  · Genitourinary: No dysuria, trouble voiding, or hematuria  · Musculoskeletal:  denies any new  joint aches , swelling  or pain   · Integumentary: No rash or pruritis  · Neurological: No TIA or stroke symptoms  · Psychiatric: No anxiety or depression  · Endocrine: No malaise, fatigue or temperature intolerance  · Hematologic/Lymphatic: No bleeding problems, blood clots or swollen lymph nodes  · Allergic/Immunologic: No nasal congestion or hives    Objective:      Physical Exam:  /80   Pulse 60   Ht 6' (1.829 m)   Wt 237 lb 6.4 oz (107.7 kg)   BMI 32.20 kg/m²   Wt Readings from Last 3 Encounters:   20 237 lb 6.4 oz (107.7 kg)   20 234 lb 6.4 oz (106.3 kg)   02/10/20 237 lb 12.8 oz (107.9 kg)     Body mass index is 32.2 kg/m². Vitals:    20 1218   BP: 100/80   Pulse: 60        General Appearance:  No distress, conversant  Constitutional:  Well developed, Well nourished, No acute distress, Non-toxic appearance.    HENT:  Normocephalic, Atraumatic, Bilateral external ears normal, Oropharynx moist, No oral exudates, Nose normal. Neck- Normal range of motion, No tenderness, Supple, No stridor,no apical-carotid delay  Eyes:  PERRL, EOMI, Conjunctiva normal, No discharge. Respiratory:  Normal breath sounds, No respiratory distress, No wheezing, No chest tenderness. ,no use of accessory muscles, NO crackles  Cardiovascular: (PMI) apex non displaced,no lifts no thrills,S1 and S2 audible, No added heart sounds, No signs of ankle edema, or volume overload, No evidence of JVD, No crackles  GI:  Bowel sounds normal, Soft, No tenderness, No masses, No gross visceromegaly   :  No costovertebral angle tenderness   Musculoskeletal:  No edema, no tenderness, no deformities.  Back- no tenderness  Integument:  Well hydrated, no rash   Lymphatic:  No lymphadenopathy noted   Neurologic:  Alert & oriented x 3, CN 2-12 normal, normal motor function, normal sensory function, no focal deficits noted   Psychiatric:  Speech and behavior appropriate       Medical decision making and Data review:  DATA:  Lab Results   Component Value Date    TROPONINT <0.010 07/02/2019     BNP:    Lab Results   Component Value Date    PROBNP 61.38 01/28/2020     PT/INR:  No results found for: PTINR  No results found for: LABA1C  Lab Results   Component Value Date    CHOL 117 08/01/2019    TRIG 153 (H) 08/01/2019    HDL 29 (L) 08/01/2019    LDLDIRECT 76 08/01/2019     Lab Results   Component Value Date    ALT 16 01/28/2020    AST 21 01/28/2020     TSH: No results found for: TSH  Lab Results   Component Value Date    AST 21 01/28/2020    ALT 16 01/28/2020    BILIDIR 0.2 01/17/2012    BILITOT 0.3 01/28/2020    ALKPHOS 72 01/28/2020     Lab Results   Component Value Date    PROBNP 61.38 01/28/2020    PROBNP 95.08 07/02/2019     No results found for: LABA1C  Lab Results   Component Value Date    WBC 6.8 03/05/2020    HGB 12.8 (L) 03/05/2020    HCT 39.6 (L) 03/05/2020     03/05/2020     Stress test  6/24/19     [facility] physician Dr. Naa Pina portion after  lesions   Interventional procedure  Cardiac lesions  LAD:     Lesion on Mid LAD: 80% stenosis. Culprit lesion. LCx:     Lesion on 1st Ob Claire% stenosis. Culprit lesion. Holter 2020  Notes recorded by Sudha Andrade MD on 2020 at 3:19 PM EST  Abnormal 48-hour Holter monitor with average heart rate of 68 maximum heart rate of 119 minimum heart rate is 48 at 6:25 AM with sinus bradycardia.  No significant A. fib recorded longest R-R interval was 1.7 seconds.  Patient had multiple runs of ventricular ectopy with a total of 16 .2% of PVC burden multifocal PVCs bigeminy's reported,  There were also multiple PAC runs noted.  No atrial fibrillation recorded. Holter3/  48-hour Holter monitor showing 2.4% PVCs which  is significantly improved compared to previous Holter monitor a month ago.  Lowest heart rate was 34 average was 88 maximum heart rate was 90  Multifocal PVC noted no atrial fibrillation recorded. All labs, medications and tests reviewed by myself including data and history from outside source , patient and available family . Assessment & Plan:      1. CAD in native artery    2. Heart palpitations    3. Hyperlipidemia, unspecified hyperlipidemia type    4. PVC (premature ventricular contraction)    5. Precordial pain    6. Shortness of breath    7. Restless leg         Abnormal stress test / chest pain  Precordial pain  S/p PCi to LAD and Circ in 2020 for Class III angina on 3 anti anginal now . Could not go to cardiac rehab due to covid   . On imdur or metoprolol causing ortho stasis. Continue Plavix and aspirin till 2020 and then switch to aspirin alone. Hct is stable     Heart palpitations  EF is also low normal at 50% . HE is tired all the time especially after a busy day. EKG shows frequent  PVC, Holter shows 17 % PVC burden  HE snores at night . HE has less energy which improved after his anemia has resolved.  After we  Increased metoprolol to 50 mg bid

## 2020-05-15 RX ORDER — ESCITALOPRAM OXALATE 10 MG/1
TABLET ORAL
Qty: 90 TABLET | Refills: 1 | OUTPATIENT
Start: 2020-05-15

## 2020-05-15 RX ORDER — VENLAFAXINE HYDROCHLORIDE 150 MG/1
CAPSULE, EXTENDED RELEASE ORAL
Qty: 90 CAPSULE | Refills: 1 | OUTPATIENT
Start: 2020-05-15

## 2020-05-18 ENCOUNTER — HOSPITAL ENCOUNTER (OUTPATIENT)
Dept: CARDIAC REHAB | Age: 80
Setting detail: THERAPIES SERIES
Discharge: HOME | End: 2020-05-18
Payer: MEDICARE

## 2020-05-18 ENCOUNTER — APPOINTMENT (OUTPATIENT)
Dept: CARDIAC REHAB | Age: 80
End: 2020-05-18
Payer: MEDICARE

## 2020-05-18 PROCEDURE — 93798 PHYS/QHP OP CAR RHAB W/ECG: CPT

## 2020-05-19 ENCOUNTER — APPOINTMENT (OUTPATIENT)
Dept: CARDIAC REHAB | Age: 80
End: 2020-05-19
Payer: MEDICARE

## 2020-05-19 ENCOUNTER — HOSPITAL ENCOUNTER (OUTPATIENT)
Dept: CARDIAC REHAB | Age: 80
Setting detail: THERAPIES SERIES
Discharge: HOME | End: 2020-05-19
Payer: MEDICARE

## 2020-05-19 PROCEDURE — 93798 PHYS/QHP OP CAR RHAB W/ECG: CPT

## 2020-05-21 ENCOUNTER — APPOINTMENT (OUTPATIENT)
Dept: CARDIAC REHAB | Age: 80
End: 2020-05-21
Payer: MEDICARE

## 2020-05-21 ENCOUNTER — HOSPITAL ENCOUNTER (OUTPATIENT)
Dept: CARDIAC REHAB | Age: 80
Setting detail: THERAPIES SERIES
Discharge: HOME | End: 2020-05-21
Payer: MEDICARE

## 2020-05-21 PROCEDURE — 93798 PHYS/QHP OP CAR RHAB W/ECG: CPT

## 2020-05-26 ENCOUNTER — HOSPITAL ENCOUNTER (OUTPATIENT)
Dept: CARDIAC REHAB | Age: 80
Setting detail: THERAPIES SERIES
Discharge: HOME | End: 2020-05-26
Payer: MEDICARE

## 2020-05-26 PROCEDURE — 93798 PHYS/QHP OP CAR RHAB W/ECG: CPT

## 2020-05-28 ENCOUNTER — HOSPITAL ENCOUNTER (OUTPATIENT)
Dept: CARDIAC REHAB | Age: 80
Setting detail: THERAPIES SERIES
Discharge: HOME | End: 2020-05-28
Payer: MEDICARE

## 2020-05-28 PROCEDURE — 93798 PHYS/QHP OP CAR RHAB W/ECG: CPT

## 2020-06-01 ENCOUNTER — HOSPITAL ENCOUNTER (OUTPATIENT)
Dept: CARDIAC REHAB | Age: 80
Setting detail: THERAPIES SERIES
Discharge: HOME | End: 2020-06-01
Payer: MEDICARE

## 2020-06-01 PROCEDURE — 93798 PHYS/QHP OP CAR RHAB W/ECG: CPT

## 2020-06-02 ENCOUNTER — HOSPITAL ENCOUNTER (OUTPATIENT)
Dept: CARDIAC REHAB | Age: 80
Setting detail: THERAPIES SERIES
Discharge: HOME | End: 2020-06-02
Payer: MEDICARE

## 2020-06-02 PROCEDURE — 93798 PHYS/QHP OP CAR RHAB W/ECG: CPT

## 2020-06-04 ENCOUNTER — HOSPITAL ENCOUNTER (OUTPATIENT)
Dept: CARDIAC REHAB | Age: 80
Setting detail: THERAPIES SERIES
Discharge: HOME | End: 2020-06-04
Payer: MEDICARE

## 2020-06-04 PROCEDURE — 93798 PHYS/QHP OP CAR RHAB W/ECG: CPT

## 2020-06-08 ENCOUNTER — HOSPITAL ENCOUNTER (OUTPATIENT)
Dept: CARDIAC REHAB | Age: 80
Setting detail: THERAPIES SERIES
Discharge: HOME | End: 2020-06-08
Payer: MEDICARE

## 2020-06-08 PROCEDURE — 93798 PHYS/QHP OP CAR RHAB W/ECG: CPT

## 2020-06-09 ENCOUNTER — HOSPITAL ENCOUNTER (OUTPATIENT)
Dept: CARDIAC REHAB | Age: 80
Setting detail: THERAPIES SERIES
Discharge: HOME | End: 2020-06-09
Payer: MEDICARE

## 2020-06-09 PROCEDURE — 93798 PHYS/QHP OP CAR RHAB W/ECG: CPT

## 2020-06-11 ENCOUNTER — HOSPITAL ENCOUNTER (OUTPATIENT)
Dept: CARDIAC REHAB | Age: 80
Setting detail: THERAPIES SERIES
Discharge: HOME | End: 2020-06-11
Payer: MEDICARE

## 2020-06-11 PROCEDURE — 93798 PHYS/QHP OP CAR RHAB W/ECG: CPT

## 2020-06-15 ENCOUNTER — HOSPITAL ENCOUNTER (OUTPATIENT)
Dept: CARDIAC REHAB | Age: 80
Setting detail: THERAPIES SERIES
Discharge: HOME | End: 2020-06-15
Payer: MEDICARE

## 2020-06-15 PROCEDURE — 93798 PHYS/QHP OP CAR RHAB W/ECG: CPT

## 2020-06-16 ENCOUNTER — HOSPITAL ENCOUNTER (OUTPATIENT)
Dept: CARDIAC REHAB | Age: 80
Setting detail: THERAPIES SERIES
Discharge: HOME | End: 2020-06-16
Payer: MEDICARE

## 2020-06-16 PROCEDURE — 93798 PHYS/QHP OP CAR RHAB W/ECG: CPT

## 2020-06-18 ENCOUNTER — HOSPITAL ENCOUNTER (OUTPATIENT)
Dept: CARDIAC REHAB | Age: 80
Setting detail: THERAPIES SERIES
Discharge: HOME | End: 2020-06-18
Payer: MEDICARE

## 2020-06-22 ENCOUNTER — HOSPITAL ENCOUNTER (OUTPATIENT)
Dept: CARDIAC REHAB | Age: 80
Setting detail: THERAPIES SERIES
Discharge: HOME | End: 2020-06-22
Payer: MEDICARE

## 2020-06-22 PROCEDURE — 93798 PHYS/QHP OP CAR RHAB W/ECG: CPT

## 2020-06-23 ENCOUNTER — HOSPITAL ENCOUNTER (OUTPATIENT)
Dept: CARDIAC REHAB | Age: 80
Setting detail: THERAPIES SERIES
Discharge: HOME | End: 2020-06-23
Payer: MEDICARE

## 2020-06-23 PROCEDURE — 93798 PHYS/QHP OP CAR RHAB W/ECG: CPT

## 2020-06-25 ENCOUNTER — HOSPITAL ENCOUNTER (OUTPATIENT)
Dept: CARDIAC REHAB | Age: 80
Setting detail: THERAPIES SERIES
Discharge: HOME | End: 2020-06-25
Payer: MEDICARE

## 2020-06-25 PROCEDURE — 93798 PHYS/QHP OP CAR RHAB W/ECG: CPT

## 2020-06-29 ENCOUNTER — HOSPITAL ENCOUNTER (OUTPATIENT)
Dept: CARDIAC REHAB | Age: 80
Setting detail: THERAPIES SERIES
Discharge: HOME | End: 2020-06-29
Payer: MEDICARE

## 2020-06-29 PROCEDURE — 93798 PHYS/QHP OP CAR RHAB W/ECG: CPT

## 2020-06-30 ENCOUNTER — HOSPITAL ENCOUNTER (OUTPATIENT)
Dept: CARDIAC REHAB | Age: 80
Setting detail: THERAPIES SERIES
Discharge: HOME | End: 2020-06-30
Payer: MEDICARE

## 2020-06-30 PROCEDURE — 93798 PHYS/QHP OP CAR RHAB W/ECG: CPT

## 2020-07-02 ENCOUNTER — HOSPITAL ENCOUNTER (OUTPATIENT)
Dept: CARDIAC REHAB | Age: 80
Setting detail: THERAPIES SERIES
Discharge: HOME | End: 2020-07-02
Payer: MEDICARE

## 2020-07-02 PROCEDURE — 93798 PHYS/QHP OP CAR RHAB W/ECG: CPT

## 2020-07-06 ENCOUNTER — HOSPITAL ENCOUNTER (OUTPATIENT)
Dept: CARDIAC REHAB | Age: 80
Setting detail: THERAPIES SERIES
Discharge: HOME | End: 2020-07-06
Payer: MEDICARE

## 2020-07-06 PROCEDURE — 93798 PHYS/QHP OP CAR RHAB W/ECG: CPT

## 2020-07-06 RX ORDER — FINASTERIDE 5 MG/1
TABLET, FILM COATED ORAL
Qty: 30 TABLET | Refills: 0 | Status: SHIPPED | OUTPATIENT
Start: 2020-07-06 | End: 2020-09-09 | Stop reason: SDUPTHER

## 2020-07-07 ENCOUNTER — HOSPITAL ENCOUNTER (OUTPATIENT)
Dept: CARDIAC REHAB | Age: 80
Setting detail: THERAPIES SERIES
Discharge: HOME | End: 2020-07-07
Payer: MEDICARE

## 2020-07-07 PROCEDURE — 93798 PHYS/QHP OP CAR RHAB W/ECG: CPT

## 2020-07-09 ENCOUNTER — HOSPITAL ENCOUNTER (OUTPATIENT)
Dept: CARDIAC REHAB | Age: 80
Setting detail: THERAPIES SERIES
Discharge: HOME | End: 2020-07-09
Payer: MEDICARE

## 2020-07-09 PROCEDURE — 93798 PHYS/QHP OP CAR RHAB W/ECG: CPT

## 2020-07-13 ENCOUNTER — HOSPITAL ENCOUNTER (OUTPATIENT)
Dept: CARDIAC REHAB | Age: 80
Setting detail: THERAPIES SERIES
Discharge: HOME | End: 2020-07-13
Payer: MEDICARE

## 2020-07-14 ENCOUNTER — HOSPITAL ENCOUNTER (OUTPATIENT)
Dept: CARDIAC REHAB | Age: 80
Setting detail: THERAPIES SERIES
Discharge: HOME | End: 2020-07-14
Payer: MEDICARE

## 2020-07-14 PROCEDURE — 93798 PHYS/QHP OP CAR RHAB W/ECG: CPT

## 2020-07-16 ENCOUNTER — HOSPITAL ENCOUNTER (OUTPATIENT)
Dept: CARDIAC REHAB | Age: 80
Setting detail: THERAPIES SERIES
Discharge: HOME | End: 2020-07-16
Payer: MEDICARE

## 2020-07-16 PROCEDURE — 93798 PHYS/QHP OP CAR RHAB W/ECG: CPT

## 2020-07-20 ENCOUNTER — HOSPITAL ENCOUNTER (OUTPATIENT)
Dept: CARDIAC REHAB | Age: 80
Setting detail: THERAPIES SERIES
Discharge: HOME | End: 2020-07-20
Payer: MEDICARE

## 2020-07-20 PROCEDURE — 93798 PHYS/QHP OP CAR RHAB W/ECG: CPT

## 2020-07-20 RX ORDER — CLOPIDOGREL BISULFATE 75 MG/1
TABLET ORAL
Qty: 30 TABLET | Refills: 0 | Status: SHIPPED | OUTPATIENT
Start: 2020-07-20 | End: 2020-07-21

## 2020-07-20 RX ORDER — GEMFIBROZIL 600 MG/1
TABLET, FILM COATED ORAL
Qty: 180 TABLET | Refills: 1 | OUTPATIENT
Start: 2020-07-20

## 2020-07-20 RX ORDER — GEMFIBROZIL 600 MG/1
TABLET, FILM COATED ORAL
Qty: 60 TABLET | Refills: 0 | Status: SHIPPED | OUTPATIENT
Start: 2020-07-20 | End: 2020-10-30

## 2020-07-20 RX ORDER — ALLOPURINOL 300 MG/1
TABLET ORAL
Qty: 30 TABLET | Refills: 0 | Status: SHIPPED | OUTPATIENT
Start: 2020-07-20 | End: 2020-09-09 | Stop reason: SDUPTHER

## 2020-07-20 RX ORDER — ESCITALOPRAM OXALATE 10 MG/1
TABLET ORAL
Qty: 30 TABLET | Refills: 0 | Status: SHIPPED | OUTPATIENT
Start: 2020-07-20 | End: 2020-08-10

## 2020-07-20 RX ORDER — VENLAFAXINE HYDROCHLORIDE 150 MG/1
CAPSULE, EXTENDED RELEASE ORAL
Qty: 30 CAPSULE | Refills: 0 | Status: SHIPPED | OUTPATIENT
Start: 2020-07-20 | End: 2020-08-10

## 2020-07-21 ENCOUNTER — OFFICE VISIT (OUTPATIENT)
Dept: FAMILY MEDICINE CLINIC | Age: 80
End: 2020-07-21
Payer: MEDICARE

## 2020-07-21 VITALS
OXYGEN SATURATION: 96 % | WEIGHT: 239.6 LBS | DIASTOLIC BLOOD PRESSURE: 72 MMHG | TEMPERATURE: 97.7 F | HEIGHT: 72 IN | HEART RATE: 68 BPM | BODY MASS INDEX: 32.45 KG/M2 | SYSTOLIC BLOOD PRESSURE: 116 MMHG

## 2020-07-21 DIAGNOSIS — M15.9 OSTEOARTHRITIS OF MULTIPLE JOINTS, UNSPECIFIED OSTEOARTHRITIS TYPE: ICD-10-CM

## 2020-07-21 DIAGNOSIS — I25.10 CAD IN NATIVE ARTERY: ICD-10-CM

## 2020-07-21 PROCEDURE — 1036F TOBACCO NON-USER: CPT | Performed by: FAMILY MEDICINE

## 2020-07-21 PROCEDURE — 99214 OFFICE O/P EST MOD 30 MIN: CPT | Performed by: FAMILY MEDICINE

## 2020-07-21 PROCEDURE — 1123F ACP DISCUSS/DSCN MKR DOCD: CPT | Performed by: FAMILY MEDICINE

## 2020-07-21 PROCEDURE — 4040F PNEUMOC VAC/ADMIN/RCVD: CPT | Performed by: FAMILY MEDICINE

## 2020-07-21 PROCEDURE — G8427 DOCREV CUR MEDS BY ELIG CLIN: HCPCS | Performed by: FAMILY MEDICINE

## 2020-07-21 PROCEDURE — G8417 CALC BMI ABV UP PARAM F/U: HCPCS | Performed by: FAMILY MEDICINE

## 2020-07-21 ASSESSMENT — ENCOUNTER SYMPTOMS
NAUSEA: 0
DIARRHEA: 0
BLOOD IN STOOL: 0
TROUBLE SWALLOWING: 0
WHEEZING: 0
EYE PAIN: 0
VOMITING: 0
ABDOMINAL PAIN: 0
SHORTNESS OF BREATH: 0
CHEST TIGHTNESS: 0

## 2020-07-21 NOTE — PROGRESS NOTES
7/21/2020    Rohit Carrillo    Chief Complaint   Patient presents with    6 Month Follow-Up       HPI  History was obtained from the patient. Dayron Willard is a 68 y.o. male who presents today with routine follow-up on gout, coronary disease, BPH, depression, hypertension, and OA symptoms. Patient is currently going to cardiac rehab and is doing great with it- he is experiencing no chest pain or shortness of breath. His BPH symptoms are about the same with some nocturia noted. He has had no gout flare and meds are well-tolerated. His mood remains good on current medications. Lexapro is tolerated pressure today 116/72 blood pressure meds are tolerated. He states since he is increase his exercise his arthritic symptoms have definitely improved and energy levels have increased. On review he does need labs today, and reassurance to continue with healthy lifestyle and social distancing. [place] REVIEW OF SYMPTOMS    Review of Systems   Constitutional: Negative for activity change and fatigue. HENT: Negative for congestion, hearing loss, mouth sores and trouble swallowing. Eyes: Negative for pain and visual disturbance. Respiratory: Negative for chest tightness, shortness of breath and wheezing. Cardiovascular: Negative for chest pain and palpitations. Gastrointestinal: Negative for abdominal pain, blood in stool, diarrhea, nausea and vomiting. Endocrine: Negative. Genitourinary: Positive for difficulty urinating. Negative for dysuria, frequency and urgency. Musculoskeletal: Positive for arthralgias. Negative for gait problem and neck stiffness. No recent gout flares arthralgia has improved   Skin: Negative for rash. Allergic/Immunologic: Negative for environmental allergies. Neurological: Negative for dizziness, seizures, speech difficulty and weakness. Hematological: Does not bruise/bleed easily. Psychiatric/Behavioral: Positive for dysphoric mood.  Negative for agitation, confusion and clubs or organizations: Not on file     Relationship status: Not on file    Intimate partner violence     Fear of current or ex partner: Not on file     Emotionally abused: Not on file     Physically abused: Not on file     Forced sexual activity: Not on file   Other Topics Concern    Not on file   Social History Narrative    Not on file        SURGICAL HISTORY  Past Surgical History:   Procedure Laterality Date    BLEPHAROPLASTY  2010    CHOLECYSTECTOMY  2001    COLECTOMY  2000    COLONOSCOPY N/A 7/4/2019    COLONOSCOPY DIAGNOSTIC performed by Nesha Shelton MD at [place] [address]. Left 2011   [address] Right 2011   [address] ARTHROSCOPY  2002   [address] N/A 7/8/2019    LAPAROTOMY EXPLORATORY RIGHT ILEOCOLECTOMY performed by Alec Gilliam MD at [facility] 7/4/2019    EGD DIAGNOSTIC ONLY performed by Nesha Shelton MD at [facility]  Current Outpatient Medications   Medication Sig Dispense Refill    allopurinol (ZYLOPRIM) 300 MG tablet TAKE 1 TABLET DAILY 30 tablet 0    escitalopram (LEXAPRO) 10 MG tablet TAKE 1 TABLET ONCE DAILY 30 tablet 0    venlafaxine (EFFEXOR XR) 150 MG extended release capsule TAKE 1 CAPSULE IN THE      MORNING 30 capsule 0    gemfibrozil (LOPID) 600 MG tablet TAKE 1 TABLET TWICE DAILY  BEFORE MEALS 60 tablet 0    finasteride (PROSCAR) 5 MG tablet TAKE 1 TABLET DAILY 30 tablet 0    vitamin E 400 UNIT capsule Take 400 Units by mouth daily      Multiple Vitamins-Minerals (VITABASIC SENIOR PO) Take by mouth      ranolazine (RANEXA) 500 MG extended release tablet Take 1 tablet by mouth 2 times daily 90 tablet 3    metoprolol tartrate (LOPRESSOR) 50 MG tablet Take 1 tablet by mouth 2 times daily Hold for SBP < 100 or HR < 60 180 tablet 3    isosorbide mononitrate (IMDUR) 30 MG extended release tablet Take 1 tablet by mouth daily 90 tablet 3    aspirin EC 81 MG EC tablet Take 1 tablet by mouth daily 90 tablet 3    Multiple Vitamins-Minerals (OCUVITE EXTRA) TABS Take 1 tablet by mouth daily       No current facility-administered medications for this visit. ALLERGIES  No Known Allergies    PHYSICAL EXAM    /72 (Site: Right Upper Arm, Position: Sitting, Cuff Size: Large Adult)   Pulse 68   Temp 97.7 °F (36.5 °C)   Ht 6' (1.829 m)   Wt 239 lb 9.6 oz (108.7 kg)   SpO2 96%   BMI 32.50 kg/m²     Physical Exam  Vitals signs and nursing note reviewed. Constitutional:       Appearance: Normal appearance. He is well-developed. He is not ill-appearing. HENT:      Head: Normocephalic and atraumatic. Mouth/Throat:      Mouth: Mucous membranes are moist.      Pharynx: Oropharynx is clear. No posterior oropharyngeal erythema. Eyes:      Pupils: Pupils are equal, round, and reactive to light. Neck:      Musculoskeletal: Normal range of motion and neck supple. Cardiovascular:      Rate and Rhythm: Normal rate and regular rhythm. Heart sounds: Normal heart sounds. Pulmonary:      Effort: Pulmonary effort is normal.      Breath sounds: Normal breath sounds. Abdominal:      Palpations: Abdomen is soft. Musculoskeletal: Normal range of motion. Skin:     General: Skin is warm and dry. Neurological:      General: No focal deficit present. Mental Status: He is alert and oriented to person, place, and time. Mental status is at baseline. Cranial Nerves: No cranial nerve deficit. Sensory: No sensory deficit. Motor: No weakness. Psychiatric:         Mood and Affect: Mood normal.         Behavior: Behavior normal.         Thought Content: Thought content normal.         ASSESSMENT & PLAN     Diagnosis Orders   1. Episode of recurrent major depressive disorder, unspecified depression episode severity ([address]     2. CAD in native artery  LIPID PANEL   3.  Benign prostatic hyperplasia without lower urinary tract symptoms 4. Gout, unspecified cause, unspecified chronicity, unspecified site     5. Osteoarthritis of multiple joints, unspecified osteoarthritis type  CBC   Overall doing quite well no change in regimen at this point questions answered encouragement given. We will check lipid panel and CBC-he is to follow up for results. Patient to continue with healthy lifestyle, exercise, and social distancing. Keep appointment with subspecialists call with questions or problems. Return in about 6 months (around 1/21/2021).          Electronically signed by Ellie Carlton MD on 7/21/2020

## 2020-07-22 LAB
CHOLESTEROL, TOTAL: 135 MG/DL (ref 0–199)
HCT VFR BLD CALC: 41.2 % (ref 40.5–52.5)
HDLC SERPL-MCNC: 31 MG/DL (ref 40–60)
HEMOGLOBIN: 13.8 G/DL (ref 13.5–17.5)
LDL CHOLESTEROL CALCULATED: 63 MG/DL
MCH RBC QN AUTO: 32.6 PG (ref 26–34)
MCHC RBC AUTO-ENTMCNC: 33.4 G/DL (ref 31–36)
MCV RBC AUTO: 97.4 FL (ref 80–100)
PDW BLD-RTO: 14.6 % (ref 12.4–15.4)
PLATELET # BLD: 243 K/UL (ref 135–450)
PMV BLD AUTO: 9.7 FL (ref 5–10.5)
RBC # BLD: 4.23 M/UL (ref 4.2–5.9)
TRIGL SERPL-MCNC: 207 MG/DL (ref 0–150)
VLDLC SERPL CALC-MCNC: 41 MG/DL
WBC # BLD: 5.3 K/UL (ref 4–11)

## 2020-07-23 ENCOUNTER — HOSPITAL ENCOUNTER (OUTPATIENT)
Dept: CARDIAC REHAB | Age: 80
Setting detail: THERAPIES SERIES
Discharge: HOME | End: 2020-07-23
Payer: MEDICARE

## 2020-07-23 PROCEDURE — 93798 PHYS/QHP OP CAR RHAB W/ECG: CPT

## 2020-07-27 ENCOUNTER — HOSPITAL ENCOUNTER (OUTPATIENT)
Dept: CARDIAC REHAB | Age: 80
Setting detail: THERAPIES SERIES
Discharge: HOME | End: 2020-07-27
Payer: MEDICARE

## 2020-07-27 PROCEDURE — 93798 PHYS/QHP OP CAR RHAB W/ECG: CPT

## 2020-07-28 ENCOUNTER — HOSPITAL ENCOUNTER (OUTPATIENT)
Dept: CARDIAC REHAB | Age: 80
Setting detail: THERAPIES SERIES
Discharge: HOME | End: 2020-07-28
Payer: MEDICARE

## 2020-07-28 PROCEDURE — 93798 PHYS/QHP OP CAR RHAB W/ECG: CPT

## 2020-07-30 ENCOUNTER — HOSPITAL ENCOUNTER (OUTPATIENT)
Dept: CARDIAC REHAB | Age: 80
Setting detail: THERAPIES SERIES
Discharge: HOME | End: 2020-07-30
Payer: MEDICARE

## 2020-07-30 PROCEDURE — 93798 PHYS/QHP OP CAR RHAB W/ECG: CPT

## 2020-08-03 ENCOUNTER — HOSPITAL ENCOUNTER (OUTPATIENT)
Dept: CARDIAC REHAB | Age: 80
Setting detail: THERAPIES SERIES
Discharge: HOME | End: 2020-08-03
Payer: MEDICARE

## 2020-08-03 PROCEDURE — 93798 PHYS/QHP OP CAR RHAB W/ECG: CPT

## 2020-08-04 ENCOUNTER — HOSPITAL ENCOUNTER (OUTPATIENT)
Dept: CARDIAC REHAB | Age: 80
Setting detail: THERAPIES SERIES
Discharge: HOME | End: 2020-08-04
Payer: MEDICARE

## 2020-08-04 PROCEDURE — 93798 PHYS/QHP OP CAR RHAB W/ECG: CPT

## 2020-08-06 ENCOUNTER — HOSPITAL ENCOUNTER (OUTPATIENT)
Dept: CARDIAC REHAB | Age: 80
Setting detail: THERAPIES SERIES
Discharge: HOME | End: 2020-08-06
Payer: MEDICARE

## 2020-08-06 PROCEDURE — 93798 PHYS/QHP OP CAR RHAB W/ECG: CPT

## 2020-08-10 ENCOUNTER — HOSPITAL ENCOUNTER (OUTPATIENT)
Dept: CARDIAC REHAB | Age: 80
Setting detail: THERAPIES SERIES
Discharge: HOME | End: 2020-08-10
Payer: MEDICARE

## 2020-08-10 PROCEDURE — 93798 PHYS/QHP OP CAR RHAB W/ECG: CPT

## 2020-08-10 RX ORDER — VENLAFAXINE HYDROCHLORIDE 150 MG/1
CAPSULE, EXTENDED RELEASE ORAL
Qty: 90 CAPSULE | Refills: 1 | Status: SHIPPED | OUTPATIENT
Start: 2020-08-10 | End: 2021-01-11 | Stop reason: SDUPTHER

## 2020-08-10 RX ORDER — ESCITALOPRAM OXALATE 10 MG/1
TABLET ORAL
Qty: 30 TABLET | Refills: 1 | Status: SHIPPED | OUTPATIENT
Start: 2020-08-10 | End: 2020-09-22 | Stop reason: SDUPTHER

## 2020-08-10 NOTE — TELEPHONE ENCOUNTER
Requested Prescriptions     Signed Prescriptions Disp Refills    venlafaxine (EFFEXOR XR) 150 MG extended release capsule 90 capsule 1     Sig: TAKE 1 CAPSULE EVERY       MORNING     Authorizing Provider: Awilda Oneill     Ordering User: JANEL VENTURA    escitalopram (LEXAPRO) 10 MG tablet 30 tablet 1     Sig: TAKE 1 TABLET ONCE DAILY     Authorizing Provider: Awilda Oneill     Ordering User: Cristian Buckley

## 2020-08-12 ENCOUNTER — TELEPHONE (OUTPATIENT)
Dept: CARDIOLOGY CLINIC | Age: 80
End: 2020-08-12

## 2020-08-13 RX ORDER — CLOPIDOGREL BISULFATE 75 MG/1
75 TABLET ORAL DAILY
Qty: 90 TABLET | Refills: 1 | Status: SHIPPED | OUTPATIENT
Start: 2020-08-13 | End: 2020-12-07

## 2020-09-09 ENCOUNTER — OFFICE VISIT (OUTPATIENT)
Dept: FAMILY MEDICINE CLINIC | Age: 80
End: 2020-09-09
Payer: MEDICARE

## 2020-09-09 VITALS
OXYGEN SATURATION: 98 % | DIASTOLIC BLOOD PRESSURE: 70 MMHG | TEMPERATURE: 97 F | HEIGHT: 72 IN | BODY MASS INDEX: 31.79 KG/M2 | HEART RATE: 60 BPM | WEIGHT: 234.7 LBS | SYSTOLIC BLOOD PRESSURE: 112 MMHG

## 2020-09-09 PROCEDURE — 1123F ACP DISCUSS/DSCN MKR DOCD: CPT | Performed by: FAMILY MEDICINE

## 2020-09-09 PROCEDURE — 4040F PNEUMOC VAC/ADMIN/RCVD: CPT | Performed by: FAMILY MEDICINE

## 2020-09-09 PROCEDURE — 99213 OFFICE O/P EST LOW 20 MIN: CPT | Performed by: FAMILY MEDICINE

## 2020-09-09 PROCEDURE — G8427 DOCREV CUR MEDS BY ELIG CLIN: HCPCS | Performed by: FAMILY MEDICINE

## 2020-09-09 PROCEDURE — G8417 CALC BMI ABV UP PARAM F/U: HCPCS | Performed by: FAMILY MEDICINE

## 2020-09-09 PROCEDURE — 1036F TOBACCO NON-USER: CPT | Performed by: FAMILY MEDICINE

## 2020-09-09 RX ORDER — FINASTERIDE 5 MG/1
5 TABLET, FILM COATED ORAL DAILY
Qty: 90 TABLET | Refills: 2 | Status: CANCELLED | OUTPATIENT
Start: 2020-09-09

## 2020-09-09 RX ORDER — ALLOPURINOL 300 MG/1
300 TABLET ORAL DAILY
Qty: 90 TABLET | Refills: 2 | Status: CANCELLED | OUTPATIENT
Start: 2020-09-09

## 2020-09-09 RX ORDER — ALLOPURINOL 300 MG/1
TABLET ORAL
Qty: 90 TABLET | Refills: 1 | Status: SHIPPED | OUTPATIENT
Start: 2020-09-09 | End: 2020-12-10 | Stop reason: SDUPTHER

## 2020-09-09 RX ORDER — FINASTERIDE 5 MG/1
TABLET, FILM COATED ORAL
Qty: 90 TABLET | Refills: 1 | Status: SHIPPED | OUTPATIENT
Start: 2020-09-09 | End: 2021-01-20 | Stop reason: SDUPTHER

## 2020-09-09 ASSESSMENT — ENCOUNTER SYMPTOMS
SHORTNESS OF BREATH: 0
COUGH: 0
ABDOMINAL PAIN: 0

## 2020-09-09 NOTE — PROGRESS NOTES
MR & TR    H/O percutaneous transluminal coronary angioplasty 2020    PCI to OM & Mid LAD,  PDA has 80-90 % tandem lesions but small vessel.  Hyperlipidemia 2019    Nocturia 2019    Osteoarthritis 2019    Restless leg 2019       FAMILY HISTORY  Family History   Problem Relation Age of Onset    Cancer Mother        SOCIAL HISTORY  Social History     Socioeconomic History    Marital status:      Spouse name: None    Number of children: None    Years of education: None    Highest education level: None   Occupational History    None   Social Needs    Financial resource strain: None    Food insecurity     Worry: None     Inability: None    Transportation needs     Medical: None     Non-medical: None   Tobacco Use    Smoking status: Former Smoker     Packs/day: 2.00     Years: 25.00     Pack years: 50.00     Types: Cigarettes     Start date: 1960     Last attempt to quit: 1985     Years since quittin.7    Smokeless tobacco: Never Used   Substance and Sexual Activity    Alcohol use:  Yes     Alcohol/week: 1.0 standard drinks     Types: 1 Cans of beer per week     Comment: rarely    Drug use: None    Sexual activity: None   Lifestyle    Physical activity     Days per week: None     Minutes per session: None    Stress: None   Relationships    Social connections     Talks on phone: None     Gets together: None     Attends Mandaeism service: None     Active member of club or organization: None     Attends meetings of clubs or organizations: None     Relationship status: None    Intimate partner violence     Fear of current or ex partner: None     Emotionally abused: None     Physically abused: None     Forced sexual activity: None   Other Topics Concern    None   Social History Narrative    None        SURGICAL HISTORY  Past Surgical History:   Procedure Laterality Date    BLEPHAROPLASTY      CHOLECYSTECTOMY      COLECTOMY      COLONOSCOPY N/A Keara Espinoza in January 2021, unless otherwise needed in that time. Pt is to call with any questions, concerns, or increase in symptoms. Pt verbalized understanding of and agreement with current plan of care. Electronically signed by HUANG Rodriguez on 9/9/2020      Please note that this chart was generated using dragon dictation software. Although every effort was made to ensure the accuracy of this automated transcription, some errors in transcription may have occurred.

## 2020-09-22 RX ORDER — ESCITALOPRAM OXALATE 10 MG/1
10 TABLET ORAL DAILY
Qty: 90 TABLET | Refills: 0 | Status: SHIPPED | OUTPATIENT
Start: 2020-09-22 | End: 2020-12-31 | Stop reason: SDUPTHER

## 2020-09-24 RX ORDER — RANOLAZINE 500 MG/1
500 TABLET, EXTENDED RELEASE ORAL 2 TIMES DAILY
Qty: 90 TABLET | Refills: 3 | Status: SHIPPED | OUTPATIENT
Start: 2020-09-24 | End: 2021-01-20 | Stop reason: SDUPTHER

## 2020-10-14 ENCOUNTER — IMMUNIZATION (OUTPATIENT)
Dept: FAMILY MEDICINE CLINIC | Age: 80
End: 2020-10-14
Payer: MEDICARE

## 2020-10-14 DIAGNOSIS — Z23 NEEDS FLU SHOT: Primary | ICD-10-CM

## 2020-10-14 PROCEDURE — 90694 VACC AIIV4 NO PRSRV 0.5ML IM: CPT | Performed by: FAMILY MEDICINE

## 2020-10-14 PROCEDURE — G0008 ADMIN INFLUENZA VIRUS VAC: HCPCS | Performed by: FAMILY MEDICINE

## 2020-10-14 NOTE — PROGRESS NOTES
Vaccine Information Sheet, \"Influenza - Inactivated\"  given to Samira Card, or parent/legal guardian of  Samira Card and verbalized understanding. Patient responses:    Have you ever had a reaction to a flu vaccine? No  Do you have any current illness? No  Have you ever had Guillian [place] Syndrome? No  Do you have a serious allergy to any of the follow: Neomycin, Polymyxin, Thimerosal, eggs or egg products? No    Flu vaccine given per order. Please see immunization tab. Risks and benefits explained. Current VIS given.

## 2020-10-30 RX ORDER — GEMFIBROZIL 600 MG/1
TABLET, FILM COATED ORAL
Qty: 180 TABLET | Refills: 1 | Status: SHIPPED | OUTPATIENT
Start: 2020-10-30 | End: 2021-01-20 | Stop reason: SDUPTHER

## 2020-11-03 ENCOUNTER — OFFICE VISIT (OUTPATIENT)
Dept: CARDIOLOGY CLINIC | Age: 80
End: 2020-11-03
Payer: MEDICARE

## 2020-11-03 VITALS
DIASTOLIC BLOOD PRESSURE: 66 MMHG | TEMPERATURE: 97.3 F | HEART RATE: 60 BPM | BODY MASS INDEX: 32.25 KG/M2 | WEIGHT: 237.8 LBS | SYSTOLIC BLOOD PRESSURE: 112 MMHG

## 2020-11-03 PROCEDURE — 1036F TOBACCO NON-USER: CPT | Performed by: INTERNAL MEDICINE

## 2020-11-03 PROCEDURE — G8427 DOCREV CUR MEDS BY ELIG CLIN: HCPCS | Performed by: INTERNAL MEDICINE

## 2020-11-03 PROCEDURE — G8484 FLU IMMUNIZE NO ADMIN: HCPCS | Performed by: INTERNAL MEDICINE

## 2020-11-03 PROCEDURE — 1123F ACP DISCUSS/DSCN MKR DOCD: CPT | Performed by: INTERNAL MEDICINE

## 2020-11-03 PROCEDURE — 4040F PNEUMOC VAC/ADMIN/RCVD: CPT | Performed by: INTERNAL MEDICINE

## 2020-11-03 PROCEDURE — 99214 OFFICE O/P EST MOD 30 MIN: CPT | Performed by: INTERNAL MEDICINE

## 2020-11-03 PROCEDURE — G8417 CALC BMI ABV UP PARAM F/U: HCPCS | Performed by: INTERNAL MEDICINE

## 2020-11-03 RX ORDER — CYANOCOBALAMIN (VITAMIN B-12) 500 MCG
1 TABLET ORAL DAILY
Qty: 30 TABLET | Refills: 3 | Status: SHIPPED | OUTPATIENT
Start: 2020-11-03 | End: 2021-01-20 | Stop reason: SDUPTHER

## 2020-11-03 NOTE — PROGRESS NOTES
CARDIOLOGY   NOTE    Chief Complaint: chest Pain / SOB     HPI:   Shen Ponce is a 68y.o. year old who has Past medical history as noted below. Mr. Rosalva Romero is doing very well he is going to cardiac rehab and really enjoying it .he denies any chest pain but continues to have some shortness of breath . He is wondering if it is safe for him to travel is planning to go to [facility] by May. Travel plans are restricted due to Covid  Rohit  had PCI to LAd and Circ in Jan 2020 . He still has PDA disease about 90% which we are treating medically   Treadmill before going to cardiac rehab and was shown to have multiple PVCs therefore he had Holter which showed 17% PVC burden. But after starting on metoprolol and repeating Holter by March 2020 PVC burden had come down to 2.4%   HE does have 90 % PDA disease which we did not intervene on due to distal small vessel . He was having chest pain before cardiac cath  in spite of being on 2 anti anginal RX. After PCI he says that  chest pain is resolved. The PVC burden went down from 16% to 2%    He was actually seen by myself in June 2019 at that time he was having a lot of shortness of breath and chest pressure. Stress test was abnormal is planned for cardiac cath he had lab work which revealed severe profound anemia he went to the hospital and was actually anemic. He required multiple units of blood transfusion work-up revealed his bleeding from his colon he underwent colectomy by Dr. Leticia Bar. EKG shows frequent PVC ( every 5th beat). He has been tolerating antiplatelet okay since January 2020  Father had MI in his 52's . HE is retired from [employer] .   Going to rehab denies any chest pain    Current Outpatient Medications   Medication Sig Dispense Refill    Cyanocobalamin (VITAMIN B 12) 500 MCG TABS Take 1 tablet by mouth daily 30 tablet 3    gemfibrozil (LOPID) 600 MG tablet TAKE 1 TABLET TWICE DAILY  BEFORE MEALS 180 tablet 1    LAPAROTOMY EXPLORATORY RIGHT ILEOCOLECTOMY performed by Meron Ha MD at [address] [place] [address] N/A 2019    EGD DIAGNOSTIC ONLY performed by Kiera Johnson MD at [facility] ENDOSCOPY     Family History   Problem Relation Age of Onset    Cancer Mother      Social History     Tobacco Use    Smoking status: Former Smoker     Packs/day: 2.00     Years: 25.00     Pack years: 50.00     Types: Cigarettes     Start date: 1960     Last attempt to quit: 1985     Years since quittin.8    Smokeless tobacco: Never Used   Substance Use Topics    Alcohol use: Yes     Alcohol/week: 1.0 standard drinks     Types: 1 Cans of beer per week     Comment: rarely        Review of Systems:   · Constitutional: No Fever or Weight Loss   · Eyes: No Decreased Vision  · ENT: No Headaches, Hearing Loss or Vertigo  · Cardiovascular: as per note above   · Respiratory: No cough or wheezing and as per note above. · Gastrointestinal: No abdominal pain, appetite loss, blood in stools, constipation, diarrhea or heartburn  · Genitourinary: No dysuria, trouble voiding, or hematuria  · Musculoskeletal:  denies any new  joint aches , swelling  or pain   · Integumentary: No rash or pruritis  · Neurological: No TIA or stroke symptoms  · Psychiatric: No anxiety or depression  · Endocrine: No malaise, fatigue or temperature intolerance  · Hematologic/Lymphatic: No bleeding problems, blood clots or swollen lymph nodes  · Allergic/Immunologic: No nasal congestion or hives    Objective:      Physical Exam:  /66   Pulse 60   Temp 97.3 °F (36.3 °C) (Temporal)   Wt 237 lb 12.8 oz (107.9 kg)   BMI 32.25 kg/m²   Wt Readings from Last 3 Encounters:   20 237 lb 12.8 oz (107.9 kg)   20 234 lb 11.2 oz (106.5 kg)   20 239 lb 9.6 oz (108.7 kg)     Body mass index is 32.25 kg/m².   Vitals:    20 1149   BP: 112/66   Pulse: 60   Temp: 97.3 °F (36.3 °C)        General Appearance:  No distress, for: LABA1C  Lab Results   Component Value Date    WBC 5.3 07/21/2020    HGB 13.8 07/21/2020    HCT 41.2 07/21/2020     07/21/2020     Stress test  6/24/19     Arlis Opitz Dr. Rosalva Churchman .   [place] portion of stress test is negative for ischemia by diagnostic criteria.    Completed 4.6 METS and 4 Mins of exercise    Global hypokinesis with EF of 41 %    Mild ischemia of lateral wall involving small to medium size of left    ventricle    Abnormal stress test         Echo 6/27/19  Summary   Technically difficult study with poor windows   Left ventricular systolic function is probably low normal with an ejection   fraction of 50 %.  Grade I diastolic dysfunction.   Mild concentric left ventricular hypertrophy.   Sclerotic, but non-stenotic aortic valve.   Doppler evaluation reveals moderate aortic and mild mitral and tricuspid   regurgitation.   No evidence of pericardial effusion. Cath 2/6/2020   Procedure Summary   Indication Abnormal stress test showing lateral ischemia pt on 3   anti anginal meds and having class III angina   Access L Radial   1. PCI to OM ostial lesion reduced 90 % lesion to 0 % using 2.75   X 18 LUISA   2. Mid LAD has 70-80 % lesion reduced to 0 % using 3.0 X 18 LUISA   3. PDA has 80-90 % tandem lesions but small vessel   4. LVEDP was 11 mmHG  Angiographic Findings      Diagnostic Findings      Cardiac Arteries and Lesion Findings     LMCA: Normal, Normal (no stenosis %) and No Angiographicalyl Significant  Disease. widely patent     LAD: Abnormal and Focal stenosis. Mid LAD had calcified focal 70-80 % stenosis  , large Type III LAD , 2 diag are patent       Lesion on Mid LAD: 80% stenosis. Culprit lesion. Devices used       - Runthrough  cm Wire. Number of passes: 1.       - 3.0 x 18 mm Resolute Integrity Drug Eluting Stent. Diameter: 3 mm. Length: 18 mm. 1 inflation(s) to a max pressure of: 12 stephanie.      LCx: Ostial OM after Circ Av groove take off has 80-90 % lesion     Lesion on 1st Ob Claire% stenosis. Culprit lesion. RCA: Diffuse irregularity and Abnormal.Large vessel , Right Dominant system ,  PDA has 80-90 % lesion , there are 2 tandem lesions but PDA is small after  lesions   Interventional procedure  Cardiac lesions  LAD:     Lesion on Mid LAD: 80% stenosis. Culprit lesion. LCx:     Lesion on 1st Ob Claire% stenosis. Culprit lesion. Holter 2020  Notes recorded by Jennifer Hernandez MD on 2020 at 3:19 PM EST  Abnormal 48-hour Holter monitor with average heart rate of 68 maximum heart rate of 119 minimum heart rate is 48 at 6:25 AM with sinus bradycardia.  No significant A. fib recorded longest R-R interval was 1.7 seconds.  Patient had multiple runs of ventricular ectopy with a total of 16 .2% of PVC burden multifocal PVCs bigeminy's reported,  There were also multiple PAC runs noted.  No atrial fibrillation recorded. Holter3/  48-hour Holter monitor showing 2.4% PVCs which  is significantly improved compared to previous Holter monitor a month ago.  Lowest heart rate was 34 average was 88 maximum heart rate was 90  Multifocal PVC noted no atrial fibrillation recorded. All labs, medications and tests reviewed by myself including data and history from outside source , patient and available family . Assessment & Plan:      1. CAD in native artery    2. Fuchs' corneal dystrophy    3. Hyperlipidemia, unspecified hyperlipidemia type    4. Heart palpitations    5. PVC (premature ventricular contraction)    6. Shortness of breath    7. Restless leg         Abnormal stress test / chest pain  Precordial pain  S/p PCi to LAD and Circ in 2020 for Class III angina on 3 anti anginal .  Continue Ranexa.,imdur and metoprolol  Continue Plavix advised him to stop using aspirin now. Especially due to history of GI bleeding in the past and Fuchs retinopathy.   He complains of tingling in his fingers to the point that sometimes he cannot close buttons  I have asked him to stop using vitamin E and use vitamin B12 to see if it helps with his neuropathy? It is safe for him to travel to [facility] once Covid crisis is over  Heart palpitations  EF is also low normal at 50% . she had multifocal PVCs, Holter shows 17 % PVC burden  HE snores at night . HE has less energy which improved after his anemia has resolved. After pci and  Increased metoprolol to 50 mg bid the  repeat holter showed reduction in PVC from 16 % to 2% , so continue the plan      Dyslipidemia :  All available lab work was reviewed. Lipid panel is acceptable patient was advised to repeat lab work before next visit. Necessary orders were placed , instructions given by myself       Counseled extensively and medication compliance urged. We discussed that for the  prevention of ASCVD our  goal is aggressive risk modification. Patient is encouraged to exercise even a brisk walk for 30 minutes  at least 3 to 4 times a week   Various goals were discussed and questions answered. Continue current medications. Appropriate prescriptions are addressed and refills ordered. Questions answered and patient verbalizes understanding. Call for any problems, questions, or concerns. Continue all other medications of all above medical condition listed as is. Return in about 6 months (around 5/3/2021). Please note this report has been partially produced using speech recognition software and may contain errors related to that system including errors in grammar, punctuation, and spelling, as well as words and phrases that may be inappropriate. If there are any questions or concerns please feel free to contact the dictating provider for clarification.

## 2020-12-07 RX ORDER — CLOPIDOGREL BISULFATE 75 MG/1
75 TABLET ORAL DAILY
Qty: 90 TABLET | Refills: 1 | Status: SHIPPED | OUTPATIENT
Start: 2020-12-07 | End: 2021-01-20 | Stop reason: SDUPTHER

## 2020-12-10 ENCOUNTER — TELEPHONE (OUTPATIENT)
Dept: FAMILY MEDICINE CLINIC | Age: 80
End: 2020-12-10

## 2020-12-10 RX ORDER — ALLOPURINOL 300 MG/1
TABLET ORAL
Qty: 30 TABLET | Refills: 0 | Status: SHIPPED | OUTPATIENT
Start: 2020-12-10 | End: 2021-01-20 | Stop reason: SDUPTHER

## 2020-12-31 RX ORDER — ESCITALOPRAM OXALATE 10 MG/1
10 TABLET ORAL DAILY
Qty: 90 TABLET | Refills: 0 | Status: SHIPPED | OUTPATIENT
Start: 2020-12-31 | End: 2021-01-20 | Stop reason: SDUPTHER

## 2021-01-11 DIAGNOSIS — F33.9 EPISODE OF RECURRENT MAJOR DEPRESSIVE DISORDER, UNSPECIFIED DEPRESSION EPISODE SEVERITY (HCC): ICD-10-CM

## 2021-01-11 RX ORDER — ESCITALOPRAM OXALATE 10 MG/1
10 TABLET ORAL DAILY
Qty: 90 TABLET | Refills: 0 | OUTPATIENT
Start: 2021-01-11

## 2021-01-11 RX ORDER — VENLAFAXINE HYDROCHLORIDE 150 MG/1
CAPSULE, EXTENDED RELEASE ORAL
Qty: 90 CAPSULE | Refills: 1 | Status: SHIPPED | OUTPATIENT
Start: 2021-01-11 | End: 2021-01-20 | Stop reason: SDUPTHER

## 2021-01-11 RX ORDER — VENLAFAXINE HYDROCHLORIDE 150 MG/1
CAPSULE, EXTENDED RELEASE ORAL
Qty: 90 CAPSULE | Refills: 1 | OUTPATIENT
Start: 2021-01-11 | End: 2021-04-13

## 2021-01-20 ENCOUNTER — OFFICE VISIT (OUTPATIENT)
Dept: FAMILY MEDICINE CLINIC | Age: 81
End: 2021-01-20
Payer: MEDICARE

## 2021-01-20 VITALS
HEART RATE: 53 BPM | HEIGHT: 72 IN | TEMPERATURE: 97.3 F | DIASTOLIC BLOOD PRESSURE: 80 MMHG | BODY MASS INDEX: 33.29 KG/M2 | WEIGHT: 245.8 LBS | SYSTOLIC BLOOD PRESSURE: 136 MMHG | OXYGEN SATURATION: 96 %

## 2021-01-20 DIAGNOSIS — F33.9 EPISODE OF RECURRENT MAJOR DEPRESSIVE DISORDER, UNSPECIFIED DEPRESSION EPISODE SEVERITY (HCC): ICD-10-CM

## 2021-01-20 DIAGNOSIS — I25.10 CAD IN NATIVE ARTERY: Primary | ICD-10-CM

## 2021-01-20 DIAGNOSIS — E78.5 HYPERLIPIDEMIA, UNSPECIFIED HYPERLIPIDEMIA TYPE: ICD-10-CM

## 2021-01-20 DIAGNOSIS — M15.9 OSTEOARTHRITIS OF MULTIPLE JOINTS, UNSPECIFIED OSTEOARTHRITIS TYPE: ICD-10-CM

## 2021-01-20 DIAGNOSIS — M10.9 GOUT, UNSPECIFIED CAUSE, UNSPECIFIED CHRONICITY, UNSPECIFIED SITE: ICD-10-CM

## 2021-01-20 DIAGNOSIS — N40.0 BENIGN PROSTATIC HYPERPLASIA WITHOUT LOWER URINARY TRACT SYMPTOMS: ICD-10-CM

## 2021-01-20 DIAGNOSIS — I25.10 CAD IN NATIVE ARTERY: ICD-10-CM

## 2021-01-20 LAB
A/G RATIO: 1.5 (ref 1.1–2.2)
ALBUMIN SERPL-MCNC: 4 G/DL (ref 3.4–5)
ALP BLD-CCNC: 60 U/L (ref 40–129)
ALT SERPL-CCNC: 15 U/L (ref 10–40)
ANION GAP SERPL CALCULATED.3IONS-SCNC: 8 MMOL/L (ref 3–16)
AST SERPL-CCNC: 21 U/L (ref 15–37)
BILIRUB SERPL-MCNC: 0.4 MG/DL (ref 0–1)
BUN BLDV-MCNC: 16 MG/DL (ref 7–20)
CALCIUM SERPL-MCNC: 9.5 MG/DL (ref 8.3–10.6)
CHLORIDE BLD-SCNC: 99 MMOL/L (ref 99–110)
CO2: 27 MMOL/L (ref 21–32)
CREAT SERPL-MCNC: 0.8 MG/DL (ref 0.8–1.3)
GFR AFRICAN AMERICAN: >60
GFR NON-AFRICAN AMERICAN: >60
GLOBULIN: 2.7 G/DL
GLUCOSE BLD-MCNC: 86 MG/DL (ref 70–99)
POTASSIUM SERPL-SCNC: 4.6 MMOL/L (ref 3.5–5.1)
SODIUM BLD-SCNC: 134 MMOL/L (ref 136–145)
TOTAL PROTEIN: 6.7 G/DL (ref 6.4–8.2)

## 2021-01-20 PROCEDURE — 99214 OFFICE O/P EST MOD 30 MIN: CPT | Performed by: FAMILY MEDICINE

## 2021-01-20 PROCEDURE — G8417 CALC BMI ABV UP PARAM F/U: HCPCS | Performed by: FAMILY MEDICINE

## 2021-01-20 PROCEDURE — 1036F TOBACCO NON-USER: CPT | Performed by: FAMILY MEDICINE

## 2021-01-20 PROCEDURE — G8484 FLU IMMUNIZE NO ADMIN: HCPCS | Performed by: FAMILY MEDICINE

## 2021-01-20 PROCEDURE — 4040F PNEUMOC VAC/ADMIN/RCVD: CPT | Performed by: FAMILY MEDICINE

## 2021-01-20 PROCEDURE — G8427 DOCREV CUR MEDS BY ELIG CLIN: HCPCS | Performed by: FAMILY MEDICINE

## 2021-01-20 PROCEDURE — 1123F ACP DISCUSS/DSCN MKR DOCD: CPT | Performed by: FAMILY MEDICINE

## 2021-01-20 RX ORDER — FINASTERIDE 5 MG/1
TABLET, FILM COATED ORAL
Qty: 90 TABLET | Refills: 1 | Status: SHIPPED | OUTPATIENT
Start: 2021-01-20 | End: 2021-07-12

## 2021-01-20 RX ORDER — METOPROLOL TARTRATE 50 MG/1
50 TABLET, FILM COATED ORAL 2 TIMES DAILY
Qty: 180 TABLET | Refills: 3 | Status: SHIPPED | OUTPATIENT
Start: 2021-01-20 | End: 2021-05-04 | Stop reason: SDUPTHER

## 2021-01-20 RX ORDER — ISOSORBIDE MONONITRATE 30 MG/1
30 TABLET, EXTENDED RELEASE ORAL DAILY
Qty: 90 TABLET | Refills: 3 | Status: SHIPPED | OUTPATIENT
Start: 2021-01-20 | End: 2022-01-28 | Stop reason: SDUPTHER

## 2021-01-20 RX ORDER — CLOPIDOGREL BISULFATE 75 MG/1
75 TABLET ORAL DAILY
Qty: 90 TABLET | Refills: 1 | Status: SHIPPED | OUTPATIENT
Start: 2021-01-20 | End: 2021-06-02 | Stop reason: SDUPTHER

## 2021-01-20 RX ORDER — RANOLAZINE 500 MG/1
500 TABLET, EXTENDED RELEASE ORAL 2 TIMES DAILY
Qty: 90 TABLET | Refills: 3 | Status: SHIPPED | OUTPATIENT
Start: 2021-01-20 | End: 2021-10-06 | Stop reason: SDUPTHER

## 2021-01-20 RX ORDER — ESCITALOPRAM OXALATE 10 MG/1
10 TABLET ORAL DAILY
Qty: 90 TABLET | Refills: 0 | Status: SHIPPED | OUTPATIENT
Start: 2021-01-20 | End: 2021-06-02

## 2021-01-20 RX ORDER — VENLAFAXINE HYDROCHLORIDE 150 MG/1
CAPSULE, EXTENDED RELEASE ORAL
Qty: 90 CAPSULE | Refills: 1 | Status: SHIPPED | OUTPATIENT
Start: 2021-01-20 | End: 2021-08-03

## 2021-01-20 RX ORDER — ALLOPURINOL 300 MG/1
TABLET ORAL
Qty: 30 TABLET | Refills: 0 | Status: SHIPPED | OUTPATIENT
Start: 2021-01-20 | End: 2021-06-02 | Stop reason: SDUPTHER

## 2021-01-20 RX ORDER — CYANOCOBALAMIN (VITAMIN B-12) 500 MCG
1 TABLET ORAL DAILY
Qty: 30 TABLET | Refills: 3 | Status: SHIPPED | OUTPATIENT
Start: 2021-01-20

## 2021-01-20 RX ORDER — GEMFIBROZIL 600 MG/1
TABLET, FILM COATED ORAL
Qty: 180 TABLET | Refills: 1 | Status: SHIPPED | OUTPATIENT
Start: 2021-01-20 | End: 2021-09-28 | Stop reason: SDUPTHER

## 2021-01-21 ASSESSMENT — ENCOUNTER SYMPTOMS
TROUBLE SWALLOWING: 0
WHEEZING: 0
ABDOMINAL PAIN: 0
EYE PAIN: 0
VOMITING: 0
NAUSEA: 0
SHORTNESS OF BREATH: 0
CHEST TIGHTNESS: 0
BLOOD IN STOOL: 0
DIARRHEA: 0

## 2021-01-21 NOTE — PROGRESS NOTES
1/21/2021    Rohit Carrillo    Chief Complaint   Patient presents with    6 Month Follow-Up    Other     losing feeling in feeling in finger on right hand, states its been going on for a few years. HPI  History was obtained from the patient. Vanna Storm is a 68 y.o. male who presents today with routine follow-up on depression, gout, hyperlipidemia, BPH, osteoarthritis, and history of coronary disease. Labs in July 2020 were acceptable. Patient denies chest pain or shortness of breath. Mood is stable with the medication and has had no gout flares. Trying to stay active at home but is very cautious and socially distancing. BPH symptoms are stable. He is having some numbness in his fingers right hand really in the distribution of the median nerve advised to apply ice to the volar aspect right wrist and wear a nighttime wrist splint. On review he will need lab work. Lani Nicholson REVIEW OF SYMPTOMS    Review of Systems   Constitutional: Negative for activity change and fatigue. HENT: Negative for congestion, hearing loss, mouth sores and trouble swallowing. Eyes: Negative for pain and visual disturbance. Respiratory: Negative for chest tightness, shortness of breath and wheezing. Cardiovascular: Negative for chest pain and palpitations. Known history of coronary disease. Gastrointestinal: Negative for abdominal pain, blood in stool, diarrhea, nausea and vomiting. Endocrine: Negative. Genitourinary: Positive for difficulty urinating. Negative for dysuria, frequency and urgency. Musculoskeletal: Positive for arthralgias. Negative for gait problem and neck stiffness. Skin: Negative for rash. Allergic/Immunologic: Negative for environmental allergies. Neurological: Positive for numbness. Negative for dizziness, seizures, speech difficulty and weakness. Numbness fingertips median nerve distribution right hand   Hematological: Does not bruise/bleed easily.    Psychiatric/Behavioral: Negative for agitation, confusion and hallucinations. PAST MEDICAL HISTORY  Past Medical History:   Diagnosis Date    Decreased hearing 2019    Gout 2019    H/O cardiovascular stress test 2019    EF 41%,  Mild ischemia of lateral wall involving small to medium size of left ventricle.  H/O echocardiogram 19    EF 48, Mod AR, Mild MR & TR    H/O percutaneous transluminal coronary angioplasty 2020    PCI to OM & Mid LAD,  PDA has 80-90 % tandem lesions but small vessel.  Hyperlipidemia 2019    Nocturia 2019    Osteoarthritis 2019    Restless leg 2019       FAMILY HISTORY  Family History   Problem Relation Age of Onset    Cancer Mother        SOCIAL HISTORY  Social History     Socioeconomic History    Marital status:      Spouse name: Not on file    Number of children: Not on file    Years of education: Not on file    Highest education level: Not on file   Occupational History    Not on file   Social Needs    Financial resource strain: Not on file    Food insecurity     Worry: Not on file     Inability: Not on file    Transportation needs     Medical: Not on file     Non-medical: Not on file   Tobacco Use    Smoking status: Former Smoker     Packs/day: 2.00     Years: 25.00     Pack years: 50.00     Types: Cigarettes     Start date: 1960     Quit date: 1985     Years since quittin.0    Smokeless tobacco: Never Used   Substance and Sexual Activity    Alcohol use:  Yes     Alcohol/week: 1.0 standard drinks     Types: 1 Cans of beer per week     Comment: rarely    Drug use: Not on file    Sexual activity: Not on file   Lifestyle    Physical activity     Days per week: Not on file     Minutes per session: Not on file    Stress: Not on file   Relationships    Social connections     Talks on phone: Not on file     Gets together: Not on file     Attends Church service: Not on file     Active member of club or organization: Not on file     Attends meetings of clubs or organizations: Not on file     Relationship status: Not on file    Intimate partner violence     Fear of current or ex partner: Not on file     Emotionally abused: Not on file     Physically abused: Not on file     Forced sexual activity: Not on file   Other Topics Concern    Not on file   Social History Narrative    Not on file        SURGICAL HISTORY  Past Surgical History:   Procedure Laterality Date    BLEPHAROPLASTY  2010    CHOLECYSTECTOMY  2001   [address] COLONOSCOPY N/A 7/4/2019    COLONOSCOPY DIAGNOSTIC performed by Jose Alcocer MD at [place] [address]. Left 2011   [address] Right 2011   [address] ARTHROSCOPY  2002   [address]  N/A 7/8/2019    LAPAROTOMY EXPLORATORY RIGHT ILEOCOLECTOMY performed by Magda Lucero MD at [address] [place] [address] 7/4/2019    EGD DIAGNOSTIC ONLY performed [facility]coc[facility] at [facility]  Current Outpatient Medications   Medication Sig Dispense Refill    allopurinol (ZYLOPRIM) 300 MG tablet TAKE 1 TABLET DAILY 30 tablet 0    clopidogrel (PLAVIX) 75 MG tablet Take 1 tablet by mouth daily 90 tablet 1    Cyanocobalamin (VITAMIN B 12) 500 MCG TABS Take 1 tablet by mouth daily 30 tablet 3    escitalopram (LEXAPRO) 10 MG tablet Take 1 tablet by mouth daily 90 tablet 0    finasteride (PROSCAR) 5 MG tablet TAKE 1 TABLET DAILY 90 tablet 1    gemfibrozil (LOPID) 600 MG tablet TAKE 1 TABLET TWICE DAILY  BEFORE MEALS 180 tablet 1    isosorbide mononitrate (IMDUR) 30 MG extended release tablet Take 1 tablet by mouth daily 90 tablet 3    metoprolol tartrate (LOPRESSOR) 50 MG tablet Take 1 tablet by mouth 2 times daily Hold for SBP < 100 or HR < 60 180 tablet 3    ranolazine (RANEXA) 500 MG extended release tablet Take 1 tablet by mouth 2 times daily 90 tablet 3    venlafaxine (EFFEXOR XR) 150 MG extended release capsule TAKE 1 CAPSULE EVERY       MORNING 90 capsule 1    Multiple Vitamins-Minerals (VITABASIC SENIOR PO) Take by mouth      Multiple Vitamins-Minerals (OCUVITE EXTRA) TABS Take 1 tablet by mouth daily       No current facility-administered medications for this visit. ALLERGIES  No Known Allergies    PHYSICAL EXAM    /80 (Site: Right Upper Arm, Position: Sitting, Cuff Size: Medium Adult)   Pulse 53   Temp 97.3 °F (36.3 °C)   Ht 6' (1.829 m)   Wt 245 lb 12.8 oz (111.5 kg)   SpO2 96%   BMI 33.34 kg/m²     Physical Exam  Vitals signs and nursing note reviewed. Constitutional:       General: He is not in acute distress. Appearance: He is well-developed. He is not ill-appearing. HENT:      Head: Normocephalic and atraumatic. Nose: Nose normal.      Mouth/Throat:      Mouth: Mucous membranes are moist.   Eyes:      Pupils: Pupils are equal, round, and reactive to light. Neck:      Musculoskeletal: Normal range of motion and neck supple. Cardiovascular:      Rate and Rhythm: Normal rate and regular rhythm. Pulses: Normal pulses. Heart sounds: Normal heart sounds. Pulmonary:      Effort: Pulmonary effort is normal.      Breath sounds: Normal breath sounds. Abdominal:      Palpations: Abdomen is soft. Musculoskeletal: Normal range of motion. General: No deformity. Right lower leg: Edema present. Left lower leg: Edema present. Skin:     General: Skin is warm and dry. Neurological:      General: No focal deficit present. Mental Status: He is alert and oriented to person, place, and time. Mental status is at baseline. Cranial Nerves: No cranial nerve deficit. Motor: No weakness. Gait: Gait normal.   Psychiatric:         Mood and Affect: Mood normal.         Behavior: Behavior normal.         Thought Content: Thought content normal.         ASSESSMENT & PLAN     Diagnosis Orders   1.  CAD in native artery  COMPREHENSIVE METABOLIC PANEL   2. Gout, unspecified cause, unspecified chronicity, unspecified site  allopurinol (ZYLOPRIM) 300 MG tablet   3. Episode of recurrent major depressive disorder, unspecified depression episode severity (HCC)  escitalopram (LEXAPRO) 10 MG tablet    venlafaxine (EFFEXOR XR) 150 MG extended release capsule   4. Hyperlipidemia, unspecified hyperlipidemia type  gemfibrozil (LOPID) 600 MG tablet   5. Benign prostatic hyperplasia without lower urinary tract symptoms     6. Osteoarthritis of multiple joints, unspecified osteoarthritis type     At this point he is to continue with healthy lifestyle and work on increase in exercise as possible. He is to continue to socially distance as needed. We will check CMP today and was advised to get Covid vaccine which he plans to do in near future. He will need Pneumovax 23 later this year. Call with questions or problems and f/up for lab results. Multiple refills were provided. Return in about 4 months (around 5/20/2021).          Electronically signed by Abdoulaye Lam MD on 1/21/2021

## 2021-05-04 ENCOUNTER — OFFICE VISIT (OUTPATIENT)
Dept: CARDIOLOGY CLINIC | Age: 81
End: 2021-05-04
Payer: MEDICARE

## 2021-05-04 VITALS
HEART RATE: 62 BPM | DIASTOLIC BLOOD PRESSURE: 64 MMHG | HEIGHT: 72 IN | BODY MASS INDEX: 32.64 KG/M2 | SYSTOLIC BLOOD PRESSURE: 106 MMHG | WEIGHT: 241 LBS

## 2021-05-04 DIAGNOSIS — R00.2 HEART PALPITATIONS: ICD-10-CM

## 2021-05-04 DIAGNOSIS — R06.02 SHORTNESS OF BREATH: ICD-10-CM

## 2021-05-04 DIAGNOSIS — E78.5 HYPERLIPIDEMIA, UNSPECIFIED HYPERLIPIDEMIA TYPE: Primary | ICD-10-CM

## 2021-05-04 DIAGNOSIS — I25.10 CAD IN NATIVE ARTERY: ICD-10-CM

## 2021-05-04 DIAGNOSIS — I49.3 PVC (PREMATURE VENTRICULAR CONTRACTION): ICD-10-CM

## 2021-05-04 PROCEDURE — 1036F TOBACCO NON-USER: CPT | Performed by: INTERNAL MEDICINE

## 2021-05-04 PROCEDURE — G8417 CALC BMI ABV UP PARAM F/U: HCPCS | Performed by: INTERNAL MEDICINE

## 2021-05-04 PROCEDURE — 4040F PNEUMOC VAC/ADMIN/RCVD: CPT | Performed by: INTERNAL MEDICINE

## 2021-05-04 PROCEDURE — G8427 DOCREV CUR MEDS BY ELIG CLIN: HCPCS | Performed by: INTERNAL MEDICINE

## 2021-05-04 PROCEDURE — 99214 OFFICE O/P EST MOD 30 MIN: CPT | Performed by: INTERNAL MEDICINE

## 2021-05-04 PROCEDURE — 1123F ACP DISCUSS/DSCN MKR DOCD: CPT | Performed by: INTERNAL MEDICINE

## 2021-05-04 RX ORDER — METOPROLOL TARTRATE 50 MG/1
50 TABLET, FILM COATED ORAL 2 TIMES DAILY
Qty: 180 TABLET | Refills: 3 | Status: SHIPPED | OUTPATIENT
Start: 2021-05-04 | End: 2022-03-01 | Stop reason: SDUPTHER

## 2021-05-04 RX ORDER — NITROGLYCERIN 0.4 MG/1
0.4 TABLET SUBLINGUAL EVERY 5 MIN PRN
Qty: 25 TABLET | Refills: 0 | Status: SHIPPED | OUTPATIENT
Start: 2021-05-04

## 2021-05-04 NOTE — PATIENT INSTRUCTIONS
Please be informed that if you contact our office outside of normal business hours the physician on call cannot help with any scheduling or rescheduling issues, procedure instruction questions or any type of medication issue. We advise you for any urgent/emergency that you go to the nearest emergency room!     PLEASE CALL OUR OFFICE DURING NORMAL BUSINESS HOURS    Monday - Friday   8 am to 5 pm    [place]: [address]: [phone]    [place]:  [phone]

## 2021-05-04 NOTE — PROGRESS NOTES
CARDIOLOGY   NOTE    Chief Complaint: chest Pain / SOB     HPI:   Charlie Kelly is a 80y.o. year old who has Past medical history as noted below. Mr. Marzena Montemayor is doing very well he denies any chest pain but continues to have some shortness of breath . He still is planning on traveling to [facility] and [facility] due to [employer]  had PCI to [facility] and Circ in Jan 2020 . He was having chest pain before cardiac cath  in spite of being on 2 anti anginal RX. After PCI he says that  chest pain is resolved. 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 But after starting on metoprolol and repeating Holter by March 2020 PVC burden had come down to 2.4% denies any palpitations      He was actually seen by myself in June 2019 at that time he was having a lot of shortness of breath and chest pressure. Stress test was abnormal is planned for cardiac cath he had lab work which revealed severe profound anemia he went to the hospital and was actually anemic. He required multiple units of blood transfusion work-up revealed his bleeding from his colon he underwent colectomy by Dr. Sofia Olivarez. EKG shows frequent PVC ( every 5th beat). He has been tolerating antiplatelet okay since January 2020  Father had MI in his 52's . HE is retired from [employer] .   Going to rehab denies any chest pain  He is on Plavix now    Current Outpatient Medications   Medication Sig Dispense Refill    metoprolol tartrate (LOPRESSOR) 50 MG tablet Take 1 tablet by mouth 2 times daily 180 tablet 3    nitroGLYCERIN (NITROSTAT) 0.4 MG SL tablet Place 1 tablet under the tongue every 5 minutes as needed for Chest pain 25 tablet 0    allopurinol (ZYLOPRIM) 300 MG tablet TAKE 1 TABLET DAILY 30 tablet 0    clopidogrel (PLAVIX) 75 MG tablet Take 1 tablet by mouth daily 90 tablet 1    Cyanocobalamin (VITAMIN B 12) 500 MCG TABS Take 1 tablet by mouth daily 30 tablet 3    escitalopram (LEXAPRO) 10 MG tablet Take 1 tablet by mouth daily 90 tablet 0    finasteride (PROSCAR) 5 MG tablet TAKE 1 TABLET DAILY 90 tablet 1    gemfibrozil (LOPID) 600 MG tablet TAKE 1 TABLET TWICE DAILY  BEFORE MEALS 180 tablet 1    isosorbide mononitrate (IMDUR) 30 MG extended release tablet Take 1 tablet by mouth daily 90 tablet 3    ranolazine (RANEXA) 500 MG extended release tablet Take 1 tablet by mouth 2 times daily 90 tablet 3    venlafaxine (EFFEXOR XR) 150 MG extended release capsule TAKE 1 CAPSULE EVERY       MORNING 90 capsule 1    Multiple Vitamins-Minerals (VITABASIC SENIOR PO) Take by mouth      Multiple Vitamins-Minerals (OCUVITE EXTRA) TABS Take 1 tablet by mouth daily       No current facility-administered medications for this visit. Allergies:   Patient has no known allergies. Patient History:  Past Medical History:   Diagnosis Date    Decreased hearing 5/4/2019    Gout 5/4/2019    H/O cardiovascular stress test 06/24/2019    EF 41%,  Mild ischemia of lateral wall involving small to medium size of left ventricle.  H/O echocardiogram 8/27/19    EF 48, Mod AR, Mild MR & TR    H/O percutaneous transluminal coronary angioplasty 02/06/2020    PCI to OM & Mid LAD,  PDA has 80-90 % tandem lesions but small vessel.     Hyperlipidemia 5/4/2019    Nocturia 5/4/2019    Osteoarthritis 5/4/2019    Restless leg 5/4/2019     Past Surgical History:   Procedure Laterality Date    BLEPHAROPLASTY  2010    CHOLECYSTECTOMY  2001    COLECTOMY  2000    COLONOSCOPY N/A 7/4/2019    COLONOSCOPY DIAGNOSTIC performed by Latisha Sanchez MD at [facility] [address] Left 2011    EYE SURGERY Right 2011    JOINT REPLACEMENT      KNEE ARTHROSCOPY  2002    SMALL INTESTINE SURGERY N/A 7/8/2019    LAPAROTOMY appearance. HENT:  Normocephalic, Atraumatic, Bilateral external ears normal, Oropharynx moist, No oral exudates, Nose normal. Neck- Normal range of motion, No tenderness, Supple, No stridor,no apical-carotid delay  Eyes:  PERRL, EOMI, Conjunctiva normal, No discharge. Respiratory:  Normal breath sounds, No respiratory distress, No wheezing, No chest tenderness. ,no use of accessory muscles, NO crackles  Cardiovascular: (PMI) apex non displaced,no lifts no thrills,S1 and S2 audible, No added heart sounds, No signs of ankle edema, or volume overload, No evidence of JVD, No crackles  GI:  Bowel sounds normal, Soft, No tenderness, No masses, No gross visceromegaly   :  No costovertebral angle tenderness   Musculoskeletal:  No edema, no tenderness, no deformities.  Back- no tenderness  Integument:  Well hydrated, no rash   Lymphatic:  No lymphadenopathy noted   Neurologic:  Alert & oriented x 3, CN 2-12 normal, normal motor function, normal sensory function, no focal deficits noted   Psychiatric:  Speech and behavior appropriate       Medical decision making and Data review:  DATA:  Lab Results   Component Value Date    TROPONINT <0.010 07/02/2019     BNP:    Lab Results   Component Value Date    PROBNP 61.38 01/28/2020     PT/INR:  No results found for: PTINR  No results found for: LABA1C  Lab Results   Component Value Date    CHOL 135 07/21/2020    TRIG 207 (H) 07/21/2020    HDL 31 (L) 07/21/2020    LDLCALC 63 07/21/2020    LDLDIRECT 76 08/01/2019     Lab Results   Component Value Date    ALT 15 01/20/2021    AST 21 01/20/2021     TSH: No results found for: TSH  Lab Results   Component Value Date    AST 21 01/20/2021    ALT 15 01/20/2021    BILIDIR 0.2 01/17/2012    BILITOT 0.4 01/20/2021    ALKPHOS 60 01/20/2021     Lab Results   Component Value Date    PROBNP 61.38 01/28/2020    PROBNP 95.08 07/02/2019     No results found for: LABA1C  Lab Results   Component Value Date    WBC 5.3 07/21/2020    HGB 13.8 2020    HCT 41.2 2020     2020     Stress test  19     Nahun Retana .   [place] portion of stress test is negative for ischemia by diagnostic criteria.    Completed 4.6 METS and 4 Mins of exercise    Global hypokinesis with EF of 41 %    Mild ischemia of lateral wall involving small to medium size of left    ventricle    Abnormal stress test         Echo 19  Summary   Technically difficult study with poor windows   Left ventricular systolic function is probably low normal with an ejection   fraction of 50 %.  Grade I diastolic dysfunction.   Mild concentric left ventricular hypertrophy.   Sclerotic, but non-stenotic aortic valve.   Doppler evaluation reveals moderate aortic and mild mitral and tricuspid   regurgitation.   No evidence of pericardial effusion. Cath 2020   Procedure Summary   Indication Abnormal stress test showing lateral ischemia pt on 3   anti anginal meds and having class III angina   Access L Radial   1. PCI to OM ostial lesion reduced 90 % lesion to 0 % using 2.75   X 18 LUISA   2. Mid LAD has 70-80 % lesion reduced to 0 % using 3.0 X 18 LUISA   3. PDA has 80-90 % tandem lesions but small vessel   4. LVEDP was 11 mmHG  Angiographic Findings      Diagnostic Findings      Cardiac Arteries and Lesion Findings     LMCA: Normal, Normal (no stenosis %) and No Angiographicalyl Significant  Disease. widely patent     LAD: Abnormal and Focal stenosis. Mid LAD had calcified focal 70-80 % stenosis  , large Type III LAD , 2 diag are patent       Lesion on Mid LAD: 80% stenosis. Culprit lesion. Devices used       - Runthrough  cm Wire. Number of passes: 1.       - 3.0 x 18 mm Resolute Integrity Drug Eluting Stent. Diameter: 3 mm. Length: 18 mm. 1 inflation(s) to a max pressure of: 12 stephanie. LCx: Ostial OM after Circ Av groove take off has 80-90 % lesion     Lesion on 1st Ob Claire% stenosis. Culprit lesion.        RCA: Diffuse irregularity and Abnormal.Large vessel , Right Dominant system ,  PDA has 80-90 % lesion , there are 2 tandem lesions but PDA is small after  lesions   Interventional procedure  Cardiac lesions  LAD:     Lesion on Mid LAD: 80% stenosis. Culprit lesion. LCx:     Lesion on 1st Ob Claire% stenosis. Culprit lesion. Holter 2020  Notes recorded by Otha Closs, MD on 2020 at 3:19 PM EST  Abnormal 48-hour Holter monitor with average heart rate of 68 maximum heart rate of 119 minimum heart rate is 48 at 6:25 AM with sinus bradycardia.  No significant A. fib recorded longest R-R interval was 1.7 seconds.  Patient had multiple runs of ventricular ectopy with a total of 16 .2% of PVC burden multifocal PVCs bigeminy's reported,  There were also multiple PAC runs noted.  No atrial fibrillation recorded. Holter3/  48-hour Holter monitor showing 2.4% PVCs which  is significantly improved compared to previous Holter monitor a month ago.  Lowest heart rate was 34 average was 88 maximum heart rate was 90  Multifocal PVC noted no atrial fibrillation recorded. All labs, medications and tests reviewed by myself including data and history from outside source , patient and available family . Assessment & Plan:      1. Hyperlipidemia, unspecified hyperlipidemia type    2. CAD in native artery    3. PVC (premature ventricular contraction)    4. Heart palpitations         Abnormal stress test / chest pain  Precordial pain  S/p PCi to LAD and Circ in 2020 for Class III angina on 3 anti anginal .  Continue Ranexa.,imdur and metoprolol  Continue Plavix advised him to stop using aspirin . Especially due to history of GI bleeding in the past and Fuchs retinopathy. He complains of tingling in his fingers to the point that sometimes he cannot close buttons  I have asked him to stop using vitamin E and use vitamin B12 to see if it helps with his neuropathy?   It is safe for him to travel to [facility] once Covid crisis is over    Heart palpitations  EF is also low normal at 50% . she had multifocal PVCs, Holter shows 17 % PVC burden  HE snores at night . HE has less energy which improved after his anemia has resolved. After pci and  Increased metoprolol to 50 mg bid the  repeat holter showed reduction in PVC from 16 % to 2% , so continue the plan      Dyslipidemia :  All available lab work was reviewed. Lipid panel is acceptable patient was advised to repeat lab work before next visit. Necessary orders were placed , instructions given by myself   Check labs before his next visit      Counseled extensively and medication compliance urged. We discussed that for the  prevention of ASCVD our  goal is aggressive risk modification. Patient is encouraged to exercise even a brisk walk for 30 minutes  at least 3 to 4 times a week   Various goals were discussed and questions answered. Continue current medications. Appropriate prescriptions are addressed and refills ordered. Questions answered and patient verbalizes understanding. Call for any problems, questions, or concerns. Continue all other medications of all above medical condition listed as is. Return in about 6 months (around 11/4/2021). Please note this report has been partially produced using speech recognition software and may contain errors related to that system including errors in grammar, punctuation, and spelling, as well as words and phrases that may be inappropriate. If there are any questions or concerns please feel free to contact the dictating provider for clarification.

## 2021-05-04 NOTE — LETTER
Dr. Contreras [facility]  1940  [id number]    Have you had any Chest Pain that is not new? - No        Have you had any Shortness of Breath - Yes  If Yes - When on exertion    Have you had any dizziness - No    Have you had any palpitations that are not new?  - No  Do you have any edema - swelling in No          Do you have a surgery or procedure scheduled in the near future - No

## 2021-05-05 ENCOUNTER — HOSPITAL ENCOUNTER (OUTPATIENT)
Age: 81
Discharge: HOME | End: 2021-05-05
Payer: MEDICARE

## 2021-05-05 LAB
ALBUMIN SERPL-MCNC: 4.2 GM/DL (ref 3.4–5)
ALP BLD-CCNC: 62 IU/L (ref 40–128)
ALT SERPL-CCNC: 12 U/L (ref 10–40)
ANION GAP SERPL CALCULATED.3IONS-SCNC: 8 MMOL/L (ref 4–16)
AST SERPL-CCNC: 17 IU/L (ref 15–37)
BILIRUB SERPL-MCNC: 0.4 MG/DL (ref 0–1)
BUN BLDV-MCNC: 17 MG/DL (ref 6–23)
CALCIUM SERPL-MCNC: 9.1 MG/DL (ref 8.3–10.6)
CHLORIDE BLD-SCNC: 105 MMOL/L (ref 99–110)
CHOLESTEROL: 129 MG/DL
CO2: 27 MMOL/L (ref 21–32)
CREAT SERPL-MCNC: 0.8 MG/DL (ref 0.9–1.3)
GFR AFRICAN AMERICAN: >60 ML/MIN/1.73M2
GFR NON-AFRICAN AMERICAN: >60 ML/MIN/1.73M2
GLUCOSE BLD-MCNC: 89 MG/DL (ref 70–99)
HDLC SERPL-MCNC: 34 MG/DL
LDL CHOLESTEROL DIRECT: 92 MG/DL
POTASSIUM SERPL-SCNC: 4.8 MMOL/L (ref 3.5–5.1)
SODIUM BLD-SCNC: 140 MMOL/L (ref 135–145)
TOTAL PROTEIN: 6.7 GM/DL (ref 6.4–8.2)
TRIGL SERPL-MCNC: 95 MG/DL

## 2021-05-05 PROCEDURE — 83721 ASSAY OF BLOOD LIPOPROTEIN: CPT

## 2021-05-05 PROCEDURE — 80061 LIPID PANEL: CPT

## 2021-05-05 PROCEDURE — 36415 COLL VENOUS BLD VENIPUNCTURE: CPT

## 2021-05-05 PROCEDURE — 80053 COMPREHEN METABOLIC PANEL: CPT

## 2021-05-19 ENCOUNTER — OFFICE VISIT (OUTPATIENT)
Dept: FAMILY MEDICINE CLINIC | Age: 81
End: 2021-05-19
Payer: MEDICARE

## 2021-05-19 VITALS
DIASTOLIC BLOOD PRESSURE: 62 MMHG | TEMPERATURE: 97.3 F | HEART RATE: 64 BPM | BODY MASS INDEX: 31.97 KG/M2 | HEIGHT: 72 IN | SYSTOLIC BLOOD PRESSURE: 104 MMHG | OXYGEN SATURATION: 97 % | WEIGHT: 236 LBS

## 2021-05-19 VITALS
DIASTOLIC BLOOD PRESSURE: 62 MMHG | HEIGHT: 72 IN | SYSTOLIC BLOOD PRESSURE: 104 MMHG | WEIGHT: 236 LBS | BODY MASS INDEX: 31.97 KG/M2 | OXYGEN SATURATION: 97 % | HEART RATE: 64 BPM

## 2021-05-19 DIAGNOSIS — N40.0 BENIGN PROSTATIC HYPERPLASIA WITHOUT LOWER URINARY TRACT SYMPTOMS: ICD-10-CM

## 2021-05-19 DIAGNOSIS — M15.9 OSTEOARTHRITIS OF MULTIPLE JOINTS, UNSPECIFIED OSTEOARTHRITIS TYPE: ICD-10-CM

## 2021-05-19 DIAGNOSIS — M10.9 GOUT, UNSPECIFIED CAUSE, UNSPECIFIED CHRONICITY, UNSPECIFIED SITE: ICD-10-CM

## 2021-05-19 DIAGNOSIS — I25.10 CAD IN NATIVE ARTERY: Primary | ICD-10-CM

## 2021-05-19 DIAGNOSIS — F33.9 EPISODE OF RECURRENT MAJOR DEPRESSIVE DISORDER, UNSPECIFIED DEPRESSION EPISODE SEVERITY (HCC): ICD-10-CM

## 2021-05-19 DIAGNOSIS — G25.81 RESTLESS LEG: ICD-10-CM

## 2021-05-19 DIAGNOSIS — Z00.00 ROUTINE GENERAL MEDICAL EXAMINATION AT A HEALTH CARE FACILITY: Primary | ICD-10-CM

## 2021-05-19 DIAGNOSIS — I25.10 CAD IN NATIVE ARTERY: ICD-10-CM

## 2021-05-19 LAB
HCT VFR BLD CALC: 40.9 % (ref 40.5–52.5)
HEMOGLOBIN: 14 G/DL (ref 13.5–17.5)
MCH RBC QN AUTO: 32.9 PG (ref 26–34)
MCHC RBC AUTO-ENTMCNC: 34.1 G/DL (ref 31–36)
MCV RBC AUTO: 96.2 FL (ref 80–100)
PDW BLD-RTO: 14.4 % (ref 12.4–15.4)
PLATELET # BLD: 247 K/UL (ref 135–450)
PMV BLD AUTO: 9.6 FL (ref 5–10.5)
RBC # BLD: 4.25 M/UL (ref 4.2–5.9)
WBC # BLD: 5.5 K/UL (ref 4–11)

## 2021-05-19 PROCEDURE — G8417 CALC BMI ABV UP PARAM F/U: HCPCS | Performed by: FAMILY MEDICINE

## 2021-05-19 PROCEDURE — G0438 PPPS, INITIAL VISIT: HCPCS | Performed by: FAMILY MEDICINE

## 2021-05-19 PROCEDURE — G8427 DOCREV CUR MEDS BY ELIG CLIN: HCPCS | Performed by: FAMILY MEDICINE

## 2021-05-19 PROCEDURE — 1123F ACP DISCUSS/DSCN MKR DOCD: CPT | Performed by: FAMILY MEDICINE

## 2021-05-19 PROCEDURE — 4040F PNEUMOC VAC/ADMIN/RCVD: CPT | Performed by: FAMILY MEDICINE

## 2021-05-19 PROCEDURE — 99213 OFFICE O/P EST LOW 20 MIN: CPT | Performed by: FAMILY MEDICINE

## 2021-05-19 PROCEDURE — 1036F TOBACCO NON-USER: CPT | Performed by: FAMILY MEDICINE

## 2021-05-19 SDOH — ECONOMIC STABILITY: FOOD INSECURITY: WITHIN THE PAST 12 MONTHS, YOU WORRIED THAT YOUR FOOD WOULD RUN OUT BEFORE YOU GOT MONEY TO BUY MORE.: NEVER TRUE

## 2021-05-19 ASSESSMENT — SOCIAL DETERMINANTS OF HEALTH (SDOH): HOW HARD IS IT FOR YOU TO PAY FOR THE VERY BASICS LIKE FOOD, HOUSING, MEDICAL CARE, AND HEATING?: NOT HARD AT ALL

## 2021-05-19 ASSESSMENT — LIFESTYLE VARIABLES
HOW MANY STANDARD DRINKS CONTAINING ALCOHOL DO YOU HAVE ON A TYPICAL DAY: 0
HOW OFTEN DURING THE LAST YEAR HAVE YOU FOUND THAT YOU WERE NOT ABLE TO STOP DRINKING ONCE YOU HAD STARTED: 0
HOW OFTEN DO YOU HAVE A DRINK CONTAINING ALCOHOL: 1
AUDIT-C TOTAL SCORE: 1
HAS A RELATIVE, FRIEND, DOCTOR, OR ANOTHER HEALTH PROFESSIONAL EXPRESSED CONCERN ABOUT YOUR DRINKING OR SUGGESTED YOU CUT DOWN: 0
HAVE YOU OR SOMEONE ELSE BEEN INJURED AS A RESULT OF YOUR DRINKING: 0
HOW OFTEN DO YOU HAVE SIX OR MORE DRINKS ON ONE OCCASION: 0

## 2021-05-19 ASSESSMENT — PATIENT HEALTH QUESTIONNAIRE - PHQ9
2. FEELING DOWN, DEPRESSED OR HOPELESS: 0
1. LITTLE INTEREST OR PLEASURE IN DOING THINGS: 0

## 2021-05-19 NOTE — PATIENT INSTRUCTIONS
cholesterol, this type of cholesterol actually carries cholesterol away from your arteries and may, therefore, help lower your risk of having a heart attack. You want this level to be high (ideally greater than 60). It is a risk to have a level less than 40. You can raise this good cholesterol by eating olive oil, canola oil, avocados, or nuts. Exercise raises this level, too. Fat    Fat is calorie dense and packs a lot of calories into a small amount of food. Even though fats should be limited due to their high calorie content, not all fats are bad. In fact, some fats are quite healthful. Fat can be broken down into four main types. The good-for-you fats are:   Monounsaturated fat  found in oils such as olive and canola, avocados, and nuts and natural nut butters; can decrease cholesterol levels, while keeping levels of HDL cholesterol high   Polyunsaturated fat  found in oils such as safflower, sunflower, soybean, corn, and sesame; can decrease total cholesterol and LDL cholesterol   Omega-3 fatty acids  particularly those found in fatty fish (such as salmon, trout, tuna, mackerel, herring, and sardines); can decrease risk of arrhythmias, decrease triglyceride levels, and slightly lower blood pressure   The fats that you want to limit are:   Saturated fat  found in animal products, many fast foods, and a few vegetables; increases total blood cholesterol, including LDL levels   Animal fats that are saturated include: butter, lard, whole-milk dairy products, meat fat, and poultry skin   Vegetable fats that are saturated include: hydrogenated shortening, palm oil, coconut oil, cocoa butter   Hydrogenated or trans fat  found in margarine and vegetable shortening, most shelf stable snack foods, and fried foods; increases LDL and decreases HDL     It is generally recommended that you limit your total fat for the day to less than 30% of your total calories.  If you follow an 1800-calorie heart healthy diet, for example, this would mean 60 grams of fat or less per day. Saturated fat and trans fat in your diet raises your blood cholesterol the most, much more than dietary cholesterol does. For this reason, on a heart-healthy diet, less than 7% of your calories should come from saturated fat and ideally 0% from trans fat. On an 1800-calorie diet, this translates into less than 14 grams of saturated fat per day, leaving 46 grams of fat to come from mono- and polyunsaturated fats.    Food Choices on a Heart Healthy Diet   Food Category   Foods Recommended   Foods to Avoid   Grains   Breads and rolls without salted tops Most dry and cooked cereals Unsalted crackers and breadsticks Low-sodium or homemade breadcrumbs or stuffing All rice and pastas   Breads, rolls, and crackers with salted tops High-fat baked goods (eg, muffins, donuts, pastries) Quick breads, self-rising flour, and biscuit mixes Regular bread crumbs Instant hot cereals Commercially prepared rice, pasta, or stuffing mixes   Vegetables   Most fresh, frozen, and low-sodium canned vegetables Low-sodium and salt-free vegetable juices Canned vegetables if unsalted or rinsed   Regular canned vegetables and juices, including sauerkraut and pickled vegetables Frozen vegetables with sauces Commercially prepared potato and vegetable mixes   Fruits   Most fresh, frozen, and canned fruits All fruit juices   Fruits processed with salt or sodium   Milk   Nonfat or low-fat (1%) milk Nonfat or low-fat yogurt Cottage cheese, low-fat ricotta, cheeses labeled as low-fat and low-sodium   Whole milk Reduced-fat (2%) milk Malted and chocolate milk Full fat yogurt Most cheeses (unless low-fat and low salt) Buttermilk (no more than 1 cup per week)   Meats and Beans   Lean cuts of fresh or frozen beef, veal, lamb, or pork (look for the word loin) Fresh or frozen poultry without the skin Fresh or frozen fish and some shellfish Egg whites and egg substitutes (Limit whole eggs to three per substance in your blood. Our bodies make some cholesterol. It is also found in animal products, with the highest amounts in fatty meat, egg yolks, whole milk, cheese, shellfish, and organ meats. On a heart-healthy diet, you should limit your cholesterol intake to less than 200 mg per day. It is normal and important to have some cholesterol in your bloodstream. But too much cholesterol can cause plaque to build up within your arteries, which can eventually lead to a heart attack or stroke. The two types of cholesterol that are most commonly referred to are:   Low-density lipoprotein (LDL) cholesterol  Also known as bad cholesterol, this is the cholesterol that tends to build up along your arteries. Bad cholesterol levels are increased by eating fats that are saturated or hydrogenated. Optimal level of this cholesterol is less than 100. Over 130 starts to get risky for heart disease. High-density lipoprotein (HDL) cholesterol  Also known as good cholesterol, this type of cholesterol actually carries cholesterol away from your arteries and may, therefore, help lower your risk of having a heart attack. You want this level to be high (ideally greater than 60). It is a risk to have a level less than 40. You can raise this good cholesterol by eating olive oil, canola oil, avocados, or nuts. Exercise raises this level, too. Fat    Fat is calorie dense and packs a lot of calories into a small amount of food. Even though fats should be limited due to their high calorie content, not all fats are bad. In fact, some fats are quite healthful. Fat can be broken down into four main types.    The good-for-you fats are:   Monounsaturated fat  found in oils such as olive and canola, avocados, and nuts and natural nut butters; can decrease cholesterol levels, while keeping levels of HDL cholesterol high   Polyunsaturated fat  found in oils such as safflower, sunflower, soybean, corn, and sesame; can decrease total cholesterol and LDL cholesterol   Omega-3 fatty acids  particularly those found in fatty fish (such as salmon, trout, tuna, mackerel, herring, and sardines); can decrease risk of arrhythmias, decrease triglyceride levels, and slightly lower blood pressure   The fats that you want to limit are:   Saturated fat  found in animal products, many fast foods, and a few vegetables; increases total blood cholesterol, including LDL levels   Animal fats that are saturated include: butter, lard, whole-milk dairy products, meat fat, and poultry skin   Vegetable fats that are saturated include: hydrogenated shortening, palm oil, coconut oil, cocoa butter   Hydrogenated or trans fat  found in margarine and vegetable shortening, most shelf stable snack foods, and fried foods; increases LDL and decreases HDL     It is generally recommended that you limit your total fat for the day to less than 30% of your total calories. If you follow an 1800-calorie heart healthy diet, for example, this would mean 60 grams of fat or less per day. Saturated fat and trans fat in your diet raises your blood cholesterol the most, much more than dietary cholesterol does. For this reason, on a heart-healthy diet, less than 7% of your calories should come from saturated fat and ideally 0% from trans fat. On an 1800-calorie diet, this translates into less than 14 grams of saturated fat per day, leaving 46 grams of fat to come from mono- and polyunsaturated fats.    Food Choices on a Heart Healthy Diet   Food Category   Foods Recommended   Foods to Avoid   Grains   Breads and rolls without salted tops Most dry and cooked cereals Unsalted crackers and breadsticks Low-sodium or homemade breadcrumbs or stuffing All rice and pastas   Breads, rolls, and crackers with salted tops High-fat baked goods (eg, muffins, donuts, pastries) Quick breads, self-rising flour, and biscuit mixes Regular bread crumbs Instant hot cereals Commercially prepared rice, pasta, or stuffing mixes Vegetables   Most fresh, frozen, and low-sodium canned vegetables Low-sodium and salt-free vegetable juices Canned vegetables if unsalted or rinsed   Regular canned vegetables and juices, including sauerkraut and pickled vegetables Frozen vegetables with sauces Commercially prepared potato and vegetable mixes   Fruits   Most fresh, frozen, and canned fruits All fruit juices   Fruits processed with salt or sodium   Milk   Nonfat or low-fat (1%) milk Nonfat or low-fat yogurt Cottage cheese, low-fat ricotta, cheeses labeled as low-fat and low-sodium   Whole milk Reduced-fat (2%) milk Malted and chocolate milk Full fat yogurt Most cheeses (unless low-fat and low salt) Buttermilk (no more than 1 cup per week)   Meats and Beans   Lean cuts of fresh or frozen beef, veal, lamb, or pork (look for the word loin) Fresh or frozen poultry without the skin Fresh or frozen fish and some shellfish Egg whites and egg substitutes (Limit whole eggs to three per week) Tofu Nuts or seeds (unsalted, dry-roasted), low-sodium peanut butter Dried peas, beans, and lentils   Any smoked, cured, salted, or canned meat, fish, or poultry (including rodriguez, chipped beef, cold cuts, hot dogs, sausages, sardines, and anchovies) Poultry skins Breaded and/or fried fish or meats Canned peas, beans, and lentils Salted nuts   Fats and Oils   Olive oil and canola oil Low-sodium, low-fat salad dressings and mayonnaise   Butter, margarine, coconut and palm oils, rodriguez fat   Snacks, Sweets, and Condiments   Low-sodium or unsalted versions of broths, soups, soy sauce, and condiments Pepper, herbs, and spices; vinegar, lemon, or lime juice Low-fat frozen desserts (yogurt, sherbet, fruit bars) Sugar, cocoa powder, honey, syrup, jam, and preserves Low-fat, trans-fat free cookies, cakes, and pies Arjun and animal crackers, fig bars, maren snaps   High-fat desserts Broth, soups, gravies, and sauces, made from instant mixes or other high-sodium ingredients Salted snack foods Canned olives Meat tenderizers, seasoning salt, and most flavored vinegars   Beverages   Low-sodium carbonated beverages Tea and coffee in moderation Soy milk   Commercially softened water   Suggestions   Make whole grains, fruits, and vegetables the base of your diet. Choose heart-healthy fats such as canola, olive, and flaxseed oil, and foods high in heart-healthy fats, such as nuts, seeds, soybeans, tofu, and fish. Eat fish at least twice per week; the fish highest in omega-3 fatty acids and lowest in mercury include salmon, herring, mackerel, sardines, and canned chunk light tuna. If you eat fish less than twice per week or have high triglycerides, talk to your doctor about taking fish oil supplements. Read food labels. For products low in fat and cholesterol, look for fat free, low-fat, cholesterol free, saturated fat free, and trans fat freeAlso scan the Nutrition Facts Label, which lists saturated fat, trans fat, and cholesterol amounts. For products low in sodium, look for sodium free, very low sodium, low sodium, no added salt, and unsalted   Skip the salt when cooking or at the table; if food needs more flavor, get creative and try out different herbs and spices. Garlic and onion also add substantial flavor to foods. Trim any visible fat off meat and poultry before cooking, and drain the fat off after stoddard. Use cooking methods that require little or no added fat, such as grilling, boiling, baking, poaching, broiling, roasting, steaming, stir-frying, and sauting. Avoid fast food and convenience food. They tend to be high in saturated and trans fat and have a lot of added salt. Talk to a registered dietitian for individualized diet advice. Last Reviewed: March 2011 Jose A Walker MS, MPH, RD   Updated: 3/29/2011   ·     High-Fiber Diet     What Is Fiber? Dietary fiber is a form of carbohydrate found in plants that cannot be digested by humans.  All plants contain fiber, including fruits, vegetables, grains, and legumes. Fiber is often classified into two categories: soluble and insoluble. Soluble fiber draws water into the bowel and can help slow digestion. Examples of foods that are high in soluble fiber include oatmeal, oat bran, barley, legumes (eg, beans and peas), apples, and strawberries. Insoluble fiber speeds digestion and can add bulk to the stool. Examples of foods that are high in insoluble fiber include whole-wheat products, wheat bran, cauliflower, green beans, and potatoes. Why Follow a High-Fiber Diet? A high-fiber diet is often recommended to prevent and treat constipation , hemorrhoids , diverticulitis , and irritable bowel syndrome . Eating a high-fiber diet can also help improve your cholesterol levels, lower your risk of coronary heart disease , reduce your risk of type 2 diabetes , and lower your weight. For people with type 1 or 2 diabetes, a high-fiber diet can also help stabilize blood sugar levels. How Much Fiber Should I Eat? A high-fiber diet should contain  20-35 grams  of fiber a day. This is actually the amount recommended for the general adult population; however, most Americans eat only 15 grams of fiber per day. Digestion of Fiber   Eating a higher fiber diet than usual can take some getting used to by your body's digestive system. To avoid the side effects of sudden increases in dietary fiber (eg, gas, cramping, bloating, and diarrhea), increase fiber gradually and be sure to drink plenty of fluids every day. Tips for Increasing Fiber Intake   Whenever possible, choose whole grains over refined grains (eg, brown rice instead of white rice, whole-wheat bread instead of white bread). Include a variety of grains in your diet, such as wheat, rye, barley, oats, quinoa, and bulgur. Eat more vegetarian-based meals. Here are some ideas: black bean burgers, eggplant lasagna, and veggie tofu stir-root.     Choose high-fiber snacks, such as fruits, popcorn, whole-grain crackers, and nuts. Make whole-grain cereal or whole-grain toast part of your daily breakfast regime. When eating out, whether ordering a sandwich or dinner, ask for extra vegetables. When baking, replace part of the white flour with whole-wheat flour. Whole-wheat flour is particularly easy to incorporate into a recipe. High-Fiber Diet Eating Guide   Food Category   Foods Recommended   Notes   Grains   Whole-grain breads, muffins, bagels, or willem bread Rye bread Whole-wheat crackers or crisp breads Whole-grain or bran cereals Oatmeal, oat bran, or grits Wheat germ Whole-wheat pasta and brown rice   Read the ingredients list on food labels. Look for products that list \"whole\" as the first ingredient (eg, whole-wheat, whole oats). Choose cereals with at least 2 grams of fiber per serving. Vegetables   All vegetables, especially asparagus, bean sprouts, broccoli, [place] sprouts, cabbage, carrots, cauliflower, celery, corn, greens, green beans, green pepper, onions, peas, potatoes (with skin), snow peas, spinach, squash, sweet potatoes, tomatoes, zucchini   For maximum fiber intake, eat the peels of fruits and vegetablesjust be sure to wash them well first.   Fruits   All fruits, especially apples, berries, grapefruits, mangoes, nectarines, oranges, peaches, pears, dried fruits (figs, dates, prunes, raisins)   Choose raw fruits and vegetables over juice, cooked, or cannedraw fruit has more fiber. Dried fruit is also a good source of fiber. Milk   With the exception of yogurt containing inulin (a type of fiber), dairy foods provide little fiber. Add more fiber by topping your yogurt or cottage cheese with fresh fruit, whole grain or bran cereals, nuts, or seeds.    Meats and Beans   All beans and peas, especially Garbanzo beans, kidney beans, lentils, lima beans, split peas, and floerz beans All nuts and seeds, especially almonds, peanuts, [place] nuts, cashews, peanut butter, walnuts, sesame and sunflower seeds All meat, poultry, fish, and eggs   Increase fiber in meat dishes by adding florez beans, kidney beans, black-eyed peas, bran, or oatmeal. If you are following a low-fat diet, use nuts and seeds only in moderation. Fats and Oils   All in moderation   Fats and oils do not provide fiber   Snacks, Sweets, and Condiments   Fruit Nuts Popcorn, whole-wheat pretzels, or trail mix made with dried fruits, nuts, and seeds Cakes, breads, and cookies made with oatmeal or whole-wheat flour   Most snack foods do not provide much fiber. Choose snacks with at least 2 grams of fiber per serving. Last Reviewed: March 2011 Abner Richards MS, MPH, RD   Updated: 3/29/2011   ·     Keep Your Memory Havery Goncalves       Many factors can affect your ability to remembera hectic lifestyle, aging, stress, chronic disease, and certain medicines. But, there are steps you can take to sharpen your mind and help preserve your memory. Challenge Your Brain   Regularly challenging your mind may help keeps it in top shape. Good mental exercises include:   Crossword puzzlesUse a dictionary if you need it; you will learn more that way. Brainteasers Try some! Crafts, such as wood working and sewing   Hobbies, such as gardening and building model airplanes   SocializingVisit old friends or join groups to meet new ones. Reading   Learning a new language   Taking a class, whether it be art history or pat chi   TravelingExperience the food, history, and culture of your destination   Learning to use a computer   Going to museums, the theater, or thought-provoking movies   Changing things in your daily life, such as reversing your pattern in the grocery store or brushing your teeth using your nondominant hand   Use Memory Aids   There is no need to remember every detail on your own.  These memory aids can help:   Calendars and day planners   Electronic organizers to store all sorts of helpful informationThese devices can \"beep\" to remind you of appointments. A book of days to record birthdays, anniversaries, and other occasions that occur on the same date every year   Detailed \"to-do\" lists and strategically placed sticky notes   Quick \"study\" sessionsBefore a gathering, review who will be there so their names will be fresh in your mind. Establish routinesFor example, keep your keys, wallet, and umbrella in the same place all the time or take medicine with your 8:00 AM glass of juice   Live a Healthy Life   Many actions that will keep your body strong will do the same for your mind. For example:   Talk to Your Doctor About Herbs and Supplements    Malnutrition and vitamin deficiencies can impair your mental function. For example, vitamin B12 deficiency can cause a range of symptoms, including confusion. But, what if your nutritional needs are being met? Can herbs and supplements still offer a benefit? Researchers have investigated a range of natural remedies, such as ginkgo , ginseng , and the supplement phosphatidylserine (PS). So far, though, the evidence is inconsistent as to whether these products can improve memory or thinking. If you are interested in taking herbs and supplements, talk to your doctor first because they may interact with other medicines that you are taking. Exercise Regularly    Among the many benefits of regular exercise are increased blood flow to the brain and decreased risk of certain diseases that can interfere with memory function. One study found that even moderate exercise has a beneficial effect. Examples of \"moderate\" exercise include:   Playing 18 holes of golf once a week, without a cart   Playing tennis twice a week   Walking one mile per day   Manage Stress    It can be tough to remember what is important when your mind is cluttered. Make time for relaxation. Choose activities that calm you down, and make it routine.    Manage Chronic Conditions    Side effects of high blood pressure , diabetes, and heart disease can interfere with mental function. Many of the lifestyle steps discussed here can help manage these conditions. Strive to eat a healthy diet, exercise regularly, get stress under control, and follow your doctor's advice for your condition. Minimize Medications    Talk to your doctor about the medicines that you take. Some may be unnecessary. Also, healthy lifestyle habits may lower the need for certain drugs. Last Reviewed: April 2010 Concepcion Gill MD   Updated: 4/13/2010   ·     [address]       As we get older, changes in balance, gait, strength, vision, hearing, and cognition make even the most youthful senior more prone to accidents. Falls are one of the leading health risks for older people. This increased risk of falling is related to:   Aging process (eg, decreased muscle strength, slowed reflexes)   Higher incidence of chronic health problems (eg, arthritis, diabetes) that may limit mobility, agility or sensory awareness   Side effects of medicine (eg, dizziness, blurred vision)especially medicines like prescription pain medicines and drugs used to treat mental health conditions   Depending on the brittleness of your bones, the consequences of a fall can be serious and long lasting. Home Life   Research by the Association of Aging [facility]) shows that some home accidents among older adults can be prevented by making simple lifestyle changes and basic modifications and repairs to the home environment. Here are some lifestyle changes that experts recommend:   Have your hearing and vision checked regularly. Be sure to wear prescription glasses that are right for you. Speak to your doctor or pharmacist about the possible side effects of your medicines. A number of medicines can cause dizziness. If you have problems with sleep, talk to your doctor. Limit your intake of alcohol.    If necessary, use a cane or walker to help maintain your balance. Wear supportive, rubber-soled shoes, even at home. If you live in a region that gets wintry weather, you may want to put special cleats on your shoes to prevent you from slipping on the snow and ice. Exercise regularly to help maintain muscle tone, agility, and balance. Always hold the banister when going up or down stairs. Also, use  bars when getting in or out of the bath or shower, or using the toilet. To avoid dizziness, get up slowly from a lying down position. Sit up first, dangling your legs for a minute or two before rising to a standing position. Overall Home Safety Check   According to the Consumer Product Safety Commision's \"Older Consumer Home Safety Checklist,\" it is important to check for potential hazards in each room. And remember, proper lighting is an essential factor in home safety. If you cannot see clearly, you are more likely to fall. Important questions to ask yourself include:   Are lamp, electric, extension, and telephone cords placed out of the flow of traffic and maintained in good condition? Have frayed cords been replaced? Are all small rugs and runners slip resistant? If not, you can secure them to the floor with a special double-sided carpet tape. Are smoke detectors properly locatedone on every floor of your home and one outside of every sleeping area? Are they in good working order? Are batteries replaced at least once a year? Do you have a well-maintained carbon monoxide detector outside every sleeping are in your home? Does your furniture layout leave plenty of space to maneuver between and around chairs, tables, beds, and sofas? Are hallways, stairs and passages between rooms well lit? Can you reach a lamp without getting out of bed? Are floor surfaces well maintained? Shag rugs, high-pile carpeting, tile floors, and polished wood floors can be particularly slippery.  Stairs should always have handrails and be carpeted or fitted with a non-skid tread. Is your telephone easily reachable. Is the cord safely tucked away? Room by Room   According to the Association of Aging, bathrooms and emeterio are the two most potentially hazardous rooms in your home. In the Kitchen    Be sure your stove is in proper working order and always make sure burners and the oven are off before you go out or go to sleep. Keep pots on the back burners, turn handles away from the front of the stove, and keep stove clean and free of grease build-up. Kitchen ventilation systems and range exhausts should be working properly. Keep flammable objects such as towels and pot holders away from the cooking area except when in use. Make sure kitchen curtains are tied back. Move cords and appliances away from the sink and hot surfaces. If extension cords are needed, install wiring guides so they do not hang over the sink, range, or working areas. Look for coffee pots, kettles and toaster ovens with automatic shut-offs. Keep a mop handy in the kitchen so you can wipe up spills instantly. You should also have a small fire extinguisher. Arrange your kitchen with frequently used items on lower shelves to avoid the need to stand on a stepstool to reach them. Make sure countertops are well-lit to avoid injuries while cutting and preparing food. In the Bathroom    Use a non-slip mat or decals in the tub and shower, since wet, soapy tile or porcelain surfaces are extremely slippery. Make sure bathroom rugs are non-skid or tape them firmly to the floor. Bathtubs should have at least one, preferably two, grab bars, firmly attached to structural supports in the wall. (Do not use built-in soap holders or glass shower doors as grab bars.)    Tub seats fitted with non-slip material on the legs allow you to wash sitting down. For people with limited mobility, bathtub transfer benches allow you to slide safely into the tub.     Raised toilet seats and toilet safety Making Life More Livable  , by Armand Baumann, lists over 1,000 products for \"living well in the mature years,\" such as bathing and mobility aids, household security devices, ergonomically designed knives and peelers, and faucet valves and knobs for temperature control. Medical supply stores and organizations are good sources of information about products that improve your quality of life and insure your safety.      Last Reviewed: November 2009 Guillermo Gutierrez MD   Updated: 3/7/2011     ·

## 2021-05-19 NOTE — PROGRESS NOTES
Medicare Annual Wellness Visit  Name: Alayna Garcia Date: 2021   MRN: [id number] Sex: Male   Age: 80 y.o. Ethnicity: Non-/Non    : 1940 Race: Macie York is here for No chief complaint on file. Screenings for behavioral, psychosocial and functional/safety risks, and cognitive dysfunction are all negative except as indicated below. These results, as well as other patient data from the [address] form, are documented in Flowsheets linked to this Encounter. No Known Allergies    Prior to Visit Medications    Medication Sig Taking?  Authorizing Provider   metoprolol tartrate (LOPRESSOR) 50 MG tablet Take 1 tablet by mouth 2 times daily Yes Lenny Garcia MD   nitroGLYCERIN (NITROSTAT) 0.4 MG SL tablet Place 1 tablet under the tongue every 5 minutes as needed for Chest pain Yes Lenny Garcia MD   allopurinol (ZYLOPRIM) 300 MG tablet TAKE 1 TABLET DAILY Yes Tharon Gosselin, MD   clopidogrel (PLAVIX) 75 MG tablet Take 1 tablet by mouth daily Yes Tharon Gosselin, MD   Cyanocobalamin (VITAMIN B 12) 500 MCG TABS Take 1 tablet by mouth daily Yes Tharon Gosselin, MD   escitalopram (LEXAPRO) 10 MG tablet Take 1 tablet by mouth daily Yes Tharon Gosselin, MD   finasteride (PROSCAR) 5 MG tablet TAKE 1 TABLET DAILY Yes Tharon Gosselin, MD   gemfibrozil (LOPID) 600 MG tablet TAKE 1 TABLET TWICE DAILY  BEFORE MEALS Yes Tharon Gosselin, MD   isosorbide mononitrate (IMDUR) 30 MG extended release tablet Take 1 tablet by mouth daily Yes Tharon Gosselin, MD   ranolazine (RANEXA) 500 MG extended release tablet Take 1 tablet by mouth 2 times daily Yes Tharon Gosselin, MD   venlafaxine (EFFEXOR XR) 150 MG extended release capsule TAKE [address] Yes Tharon Gosselin, MD   Multiple Vitamins-Minerals (VITABASIC SENIOR PO) Take by mouth Yes Historical MD Madhuri   Multiple Vitamins-Minerals (Edyth Flow) TABS Take 1 tablet by mouth daily Yes Historical Provider, MD       Past Medical History:   Diagnosis Date    Decreased hearing 5/4/2019    Gout 5/4/2019    H/O cardiovascular stress test 06/24/2019    EF 41%,  Mild ischemia of lateral wall involving small to medium size of left ventricle.  H/O echocardiogram 8/27/19    EF 48, Mod AR, Mild MR & TR    H/O percutaneous transluminal coronary angioplasty 02/06/2020    PCI to OM & Mid LAD,  PDA has 80-90 % tandem lesions but small vessel.  Hyperlipidemia 5/4/2019    Nocturia 5/4/2019    Osteoarthritis 5/4/2019    Restless leg 5/4/2019       Past Surgical History:   Procedure Laterality Date    BLEPHAROPLASTY  2010    CHOLECYSTECTOMY  2001    COLECTOMY  2000    COLONOSCOPY N/A 7/4/2019    COLONOSCOPY DIAGNOSTIC performed by Bernadine Salazar MD at [place] [address]. Left 2011    EYE SURGERY Right 2011    JOINT REPLACEMENT      KNEE ARTHROSCOPY  2002    SMALL INTESTINE SURGERY N/A 7/8/2019    LAPAROTOMY EXPLORATORY RIGHT ILEOCOLECTOMY performed by Simin Chavez MD at [place] [address] N/A 7/4/2019    EGD DIAGNOSTIC ONLY performed by Bernadine Salazar MD at [facility] ENDOSCOPY       Family History   Problem Relation Age of Onset    Cancer Mother        CareTeam (Including outside providers/suppliers regularly involved in providing care):   Patient Care Team:  Mahendra Jose MD as PCP - General  Mahendra Jose MD as PCP - [facility] Dmitriy [facility]icon[facility] Corporationled Provider    Wt Readings from Last 3 Encounters:   05/19/21 236 lb (107 kg)   05/19/21 236 lb (107 kg)   05/04/21 241 lb (109.3 kg)     Vitals:    05/19/21 1410   BP: 104/62   Pulse: 64   Temp: 97.3 °F (36.3 °C)   SpO2: 97%   Weight: 236 lb (107 kg)   Height: 6' (1.829 m)     Body mass index is 32.01 kg/m². Based upon direct observation of the patient, evaluation of cognition reveals recent and remote memory intact.     Patient's complete Health Risk Assessment and screening values have been reviewed and are found in Flowsheets. The following problems were reviewed today and where indicated follow up appointments were made and/or referrals ordered. Positive Risk Factor Screenings with Interventions:          General Health and ACP:  General  In general, how would you say your health is?: Very Good  In the past 7 days, have you experienced any of the following?  New or Increased Pain, New or Increased Fatigue, Loneliness, Social Isolation, Stress or Anger?: None of These  Do you get the social and emotional support that you need?: Yes  Do you have a Living Will?: Yes  Advance Directives     Power of  Living Will ACP-Advance Directive ACP-Power of     Not on File Not on File Not on File Not on File      General Health Risk Interventions:  · No Living Will: ACP documents already completed- patient asked to provide copy to the office    Health Habits/Nutrition:  Health Habits/Nutrition  Do you exercise for at least 20 minutes 2-3 times per week?: Yes  Have you lost any weight without trying in the past 3 months?: No  Do you eat only one meal per day?: No  Have you seen the dentist within the past year?: Appointment is scheduled  Body mass index: (!) 32  Health Habits/Nutrition Interventions:  · Nutritional issues:  educational materials for healthy, well-balanced diet provided, educational materials to promote weight loss provided       Personalized Preventive Plan   Current Health Maintenance Status  Immunization History   Administered Date(s) Administered    Influenza, High Dose (Fluzone 65 yrs and older) 09/23/2013, 09/19/2014, 10/22/2015, 10/07/2016, 09/19/2018    Influenza, Quadv, adjuvanted, 65 yrs +, IM, PF (Fluad) 10/14/2020    Influenza, Triv, inactivated, subunit, adjuvanted, IM (Fluad 65 yrs and older) 10/09/2019    Pneumococcal Conjugate 13-valent ([id number]) 03/18/2015    Pneumococcal Polysaccharide ([id number]) 09/20/2011    Tdap (Boostrix, Adacel) 02/25/2016        Health Maintenance   Topic Date Due    Hepatitis C screen  Never done    Shingles Vaccine (1 of 2) Never done    Annual Wellness Visit (AWV)  Never done    DTaP/Tdap/Td vaccine (2 - Td) 02/25/2026    Flu vaccine  Completed    Pneumococcal 65+ years Vaccine  Completed    COVID-19 Vaccine  Completed    Hepatitis A vaccine  Aged Out    Hepatitis B vaccine  Aged Out    Hib vaccine  Aged Out    Meningococcal (ACWY) vaccine  Aged Out     Recommendations for [employer] Due: see orders and patient instructions/AVS. Discussed need for shingles vaccinations and patient is going to go to his pharmacy. Recommended screening schedule for the next 5-10 years is provided to the patient in written form: see Patient Instructions/AVS.    IMehdi LPN, 1/27/2026, performed the documented evaluation under the direct supervision of the attending physician. This encounter was performed under Clara mcdonald MDs, direct supervision, 5/19/2021.

## 2021-05-19 NOTE — PROGRESS NOTES
5/20/2021    Rohit Carrillo    Chief Complaint   Patient presents with    3 Month Follow-Up     4 month       HPI  History was obtained from the patient. Nick Fitch is a 80 y.o. male who presents today with follow-up on depression, hyperlipidemia, restless leg symptoms, gout, coronary disease, general osteoarthritis, and history of BPH. Overall been doing well he is anxious to begin to travel. He and his wife have had their Covid shots. He did have recent labs earlier this month lipid panel and CMP appeared to satisfactory. He is trying to stay active. Generalized arthritic symptoms are the same. Depression is stable with current meds. His restless leg symptoms are fairly well controlled. He has had no flare of gout. Known coronary artery disease patient without chest pain or active angina. No increased shortness of breath reported he still has some BPH symptoms remains on finasteride has occasional nocturia but does not really want to change the treatment plan. Nate Toro REVIEW OF SYMPTOMS    Review of Systems   Constitutional: Negative for activity change and fatigue. HENT: Negative for congestion, hearing loss, mouth sores and trouble swallowing. Eyes: Negative for pain and visual disturbance. Respiratory: Negative for chest tightness, shortness of breath and wheezing. Cardiovascular: Negative for chest pain and palpitations. Gastrointestinal: Negative for abdominal pain, blood in stool, diarrhea, nausea and vomiting. Genitourinary: Negative for dysuria, frequency and urgency. Some nocturia from BPH   Musculoskeletal: Positive for arthralgias. Negative for gait problem and neck stiffness. Skin: Negative for rash. Allergic/Immunologic: Negative for environmental allergies. Neurological: Negative for dizziness, seizures, speech difficulty and weakness. Restless leg symptoms doing well   Hematological: Does not bruise/bleed easily.    Psychiatric/Behavioral: Positive for dysphoric mood. Negative for agitation, confusion, hallucinations and suicidal ideas. PAST MEDICAL HISTORY  Past Medical History:   Diagnosis Date    Decreased hearing 2019    Gout 2019    H/O cardiovascular stress test 2019    EF 41%,  Mild ischemia of lateral wall involving small to medium size of left ventricle.  H/O echocardiogram 19    EF 48, Mod AR, Mild MR & TR    H/O percutaneous transluminal coronary angioplasty 2020    PCI to OM & Mid LAD,  PDA has 80-90 % tandem lesions but small vessel.  Hyperlipidemia 2019    Nocturia 2019    Osteoarthritis 2019    Restless leg 2019       FAMILY HISTORY  Family History   Problem Relation Age of Onset    Cancer Mother        SOCIAL HISTORY  Social History     Socioeconomic History    Marital status:      Spouse name: None    Number of children: None    Years of education: None    Highest education level: None   Occupational History    None   Tobacco Use    Smoking status: Former Smoker     Packs/day: 2.00     Years: 25.00     Pack years: 50.00     Types: Cigarettes     Start date: 1960     Quit date:      Years since quittin.4    Smokeless tobacco: Never Used   Substance and Sexual Activity    Alcohol use: Yes     Alcohol/week: 1.0 standard drinks     Types: 1 Cans of beer per week     Comment: rarely    Drug use: None    Sexual activity: None   Other Topics Concern    None   Social History Narrative    None     Social Determinants of Health     Financial Resource Strain: Low Risk     Difficulty of Paying Living Expenses: Not hard at all   Food Insecurity: No Food Insecurity    Worried About Running Out of Food in the Last Year: Never true    Pebbles of Food in the Last Year: Never true   Transportation Needs:     Lack of Transportation (Medical):      Lack of Transportation (Non-Medical):    Physical Activity:     Days of Exercise per Week:     Minutes of Exercise per Session:    Stress:     Feeling of Stress :    Social Connections:     Frequency of Communication with Friends and Family:     Frequency of Social Gatherings with Friends and Family:     Attends Druze Services:     Active Member of Clubs or Organizations:     Attends Club or Organization Meetings:     Marital Status:    Intimate Partner Violence:     Fear of Current or Ex-Partner:     Emotionally Abused:     Physically Abused:     Sexually Abused:         SURGICAL HISTORY  Past Surgical History:   Procedure Laterality Date    BLEPHAROPLASTY  2010    CHOLECYSTECTOMY  2001 [address]    COLONOSCOPY N/A 7/4/2019    COLONOSCOPY DIAGNOSTIC performed by Joanne Colin MD at [place] [address]. Left 2011   [address] Right 2011   [address] ARTHROSCOPY  2002   [address] N/A 7/8/2019    LAPAROTOMY EXPLORATORY RIGHT ILEOCOLECTOMY performed by Tomasa Bonilla MD at [address] [place] [address] 7/4/2019    EGD DIAGNOSTIC ONLY [facility] at [facility]  Current Outpatient Medications   Medication Sig Dispense Refill    metoprolol tartrate (LOPRESSOR) 50 MG tablet Take 1 tablet by mouth 2 times daily 180 tablet 3    nitroGLYCERIN (NITROSTAT) 0.4 MG SL tablet Place 1 tablet under the tongue every 5 minutes as needed for Chest pain 25 tablet 0    allopurinol (ZYLOPRIM) 300 MG tablet TAKE 1 TABLET DAILY 30 tablet 0    clopidogrel (PLAVIX) 75 MG tablet Take 1 tablet by mouth daily 90 tablet 1    Cyanocobalamin (VITAMIN B 12) 500 MCG TABS Take 1 tablet by mouth daily 30 tablet 3    escitalopram (LEXAPRO) 10 MG tablet Take 1 tablet by mouth daily 90 tablet 0    finasteride (PROSCAR) 5 MG tablet TAKE 1 TABLET DAILY 90 tablet 1    gemfibrozil (LOPID) 600 MG tablet TAKE 1 TABLET TWICE DAILY  BEFORE MEALS 180 tablet 1    isosorbide mononitrate (IMDUR) 30 MG extended release tablet Take 1 tablet by mouth daily 90 tablet 3    ranolazine (RANEXA) 500 MG extended release tablet Take 1 tablet by mouth 2 times daily 90 tablet 3    venlafaxine (EFFEXOR XR) 150 MG extended release capsule TAKE 1 CAPSULE EVERY       MORNING 90 capsule 1    Multiple Vitamins-Minerals (VITABASIC SENIOR PO) Take by mouth      Multiple Vitamins-Minerals (OCUVITE EXTRA) TABS Take 1 tablet by mouth daily       No current facility-administered medications for this visit. ALLERGIES  No Known Allergies    PHYSICAL EXAM    /62 (Site: Right Upper Arm, Position: Sitting, Cuff Size: Medium Adult)   Pulse 64   Ht 6' (1.829 m)   Wt 236 lb (107 kg)   SpO2 97%   BMI 32.01 kg/m²     Physical Exam  Vitals and nursing note reviewed. Constitutional:       General: He is not in acute distress. Appearance: He is well-developed. He is not ill-appearing or toxic-appearing. HENT:      Head: Normocephalic and atraumatic. Nose: Nose normal.      Mouth/Throat:      Mouth: Mucous membranes are moist.   Eyes:      Pupils: Pupils are equal, round, and reactive to light. Cardiovascular:      Rate and Rhythm: Normal rate and regular rhythm. Pulses: Normal pulses. Heart sounds: Normal heart sounds. Pulmonary:      Effort: Pulmonary effort is normal. No respiratory distress. Breath sounds: Normal breath sounds. No wheezing, rhonchi or rales. Abdominal:      Palpations: Abdomen is soft. Musculoskeletal:         General: Normal range of motion. Cervical back: Normal range of motion and neck supple. Skin:     General: Skin is warm and dry. Neurological:      General: No focal deficit present. Mental Status: He is alert and oriented to person, place, and time. Mental status is at baseline. Cranial Nerves: No cranial nerve deficit. Motor: No weakness. Psychiatric:         Mood and Affect: Mood normal.         Behavior: Behavior normal.         Thought Content:  Thought content normal.         ASSESSMENT & PLAN     Diagnosis Orders   1. CAD in native artery  CBC   2. Episode of recurrent major depressive disorder, unspecified depression episode severity ([address]     3. Osteoarthritis of multiple joints, unspecified osteoarthritis type  CBC   4. Restless leg     5. Benign prostatic hyperplasia without lower urinary tract symptoms     6. Gout, unspecified cause, unspecified chronicity, unspecified site     This point he is encouraged to continue with healthy lifestyle work on exercise and continue on same regimen will provide multiple refills. Review of meds appear appropriate encouraged to keep appointment with subspecialists he is to call with questions or problems. Recent labs appeared satisfactory    Return in about 4 months (around 9/19/2021).          Electronically signed by Rogerio Santamaria MD on 5/20/2021

## 2021-05-20 ASSESSMENT — ENCOUNTER SYMPTOMS
CHEST TIGHTNESS: 0
ABDOMINAL PAIN: 0
NAUSEA: 0
EYE PAIN: 0
DIARRHEA: 0
VOMITING: 0
SHORTNESS OF BREATH: 0
WHEEZING: 0
BLOOD IN STOOL: 0
TROUBLE SWALLOWING: 0

## 2021-06-02 DIAGNOSIS — M10.9 GOUT, UNSPECIFIED CAUSE, UNSPECIFIED CHRONICITY, UNSPECIFIED SITE: ICD-10-CM

## 2021-06-02 DIAGNOSIS — F33.9 EPISODE OF RECURRENT MAJOR DEPRESSIVE DISORDER, UNSPECIFIED DEPRESSION EPISODE SEVERITY (HCC): ICD-10-CM

## 2021-06-02 RX ORDER — ESCITALOPRAM OXALATE 10 MG/1
TABLET ORAL
Qty: 90 TABLET | Refills: 0 | Status: SHIPPED | OUTPATIENT
Start: 2021-06-02 | End: 2021-09-07

## 2021-06-02 RX ORDER — CLOPIDOGREL BISULFATE 75 MG/1
75 TABLET ORAL DAILY
Qty: 90 TABLET | Refills: 1 | Status: SHIPPED | OUTPATIENT
Start: 2021-06-02 | End: 2021-10-19

## 2021-06-02 RX ORDER — ALLOPURINOL 300 MG/1
TABLET ORAL
Qty: 90 TABLET | Refills: 1 | Status: SHIPPED | OUTPATIENT
Start: 2021-06-02 | End: 2021-12-14

## 2021-07-12 RX ORDER — FINASTERIDE 5 MG/1
TABLET, FILM COATED ORAL
Qty: 90 TABLET | Refills: 1 | Status: SHIPPED | OUTPATIENT
Start: 2021-07-12 | End: 2022-01-25 | Stop reason: SDUPTHER

## 2021-07-12 RX ORDER — CLOPIDOGREL BISULFATE 75 MG/1
TABLET ORAL
Qty: 90 TABLET | Refills: 1 | OUTPATIENT
Start: 2021-07-12

## 2021-08-01 DIAGNOSIS — F33.9 EPISODE OF RECURRENT MAJOR DEPRESSIVE DISORDER, UNSPECIFIED DEPRESSION EPISODE SEVERITY (HCC): ICD-10-CM

## 2021-08-03 RX ORDER — VENLAFAXINE HYDROCHLORIDE 150 MG/1
CAPSULE, EXTENDED RELEASE ORAL
Qty: 90 CAPSULE | Refills: 1 | Status: SHIPPED | OUTPATIENT
Start: 2021-08-03 | End: 2022-03-03 | Stop reason: SDUPTHER

## 2021-08-19 ENCOUNTER — OFFICE VISIT (OUTPATIENT)
Dept: FAMILY MEDICINE CLINIC | Age: 81
End: 2021-08-19
Payer: MEDICARE

## 2021-08-19 ENCOUNTER — HOSPITAL ENCOUNTER (OUTPATIENT)
Age: 81
Setting detail: SPECIMEN
Discharge: HOME | End: 2021-08-19
Payer: MEDICARE

## 2021-08-19 DIAGNOSIS — M79.10 MUSCULAR ACHES: ICD-10-CM

## 2021-08-19 DIAGNOSIS — R43.0 LOSS OF SMELL: ICD-10-CM

## 2021-08-19 DIAGNOSIS — R68.83 CHILLS (WITHOUT FEVER): Primary | ICD-10-CM

## 2021-08-19 DIAGNOSIS — Z20.822 CLOSE EXPOSURE TO COVID-19 VIRUS: ICD-10-CM

## 2021-08-19 DIAGNOSIS — R43.2 LOSS OF TASTE: ICD-10-CM

## 2021-08-19 PROCEDURE — 1036F TOBACCO NON-USER: CPT | Performed by: PHYSICIAN ASSISTANT

## 2021-08-19 PROCEDURE — 99213 OFFICE O/P EST LOW 20 MIN: CPT | Performed by: PHYSICIAN ASSISTANT

## 2021-08-19 PROCEDURE — U0005 INFEC AGEN DETEC AMPLI PROBE: HCPCS

## 2021-08-19 PROCEDURE — G8417 CALC BMI ABV UP PARAM F/U: HCPCS | Performed by: PHYSICIAN ASSISTANT

## 2021-08-19 PROCEDURE — G8427 DOCREV CUR MEDS BY ELIG CLIN: HCPCS | Performed by: PHYSICIAN ASSISTANT

## 2021-08-19 PROCEDURE — 1123F ACP DISCUSS/DSCN MKR DOCD: CPT | Performed by: PHYSICIAN ASSISTANT

## 2021-08-19 PROCEDURE — U0003 INFECTIOUS AGENT DETECTION BY NUCLEIC ACID (DNA OR RNA); SEVERE ACUTE RESPIRATORY SYNDROME CORONAVIRUS 2 (SARS-COV-2) (CORONAVIRUS DISEASE [COVID-19]), AMPLIFIED PROBE TECHNIQUE, MAKING USE OF HIGH THROUGHPUT TECHNOLOGIES AS DESCRIBED BY CMS-2020-01-R: HCPCS

## 2021-08-19 PROCEDURE — 4040F PNEUMOC VAC/ADMIN/RCVD: CPT | Performed by: PHYSICIAN ASSISTANT

## 2021-08-19 NOTE — PROGRESS NOTES
8/19/21  Rohit Carrillo  1940    FLU/COVID-19 CLINIC EVALUATION    HPI SYMPTOMS: Exposed to Nita (+) Wife    Employer: Retired    [] Fevers  [x] Chills  [] Cough  [] Coughing up blood  [] Chest Congestion  [] Nasal Congestion  [] Feeling short of breath  [] Sometimes  [] Frequently  [] All the time  [] Chest pain  [x] Headaches  [x]Tolerable  [] Severe  [] Sore throat  [x] Muscle aches  [] Nausea  [] Vomiting  []Unable to keep fluids down  [x] Diarrhea  []Severe    [x] OTHER SYMPTOMS: Loss of Taste and Smell, Fatigue    Symptom Duration:   [] 1  [] 2   [] 3   [] 4    [] 5   [x] 6   [] 7   [] 8   [] 9   [] 10   [] 11   [] 12   [] 13   [] 14   [] Longer than 14 days    Symptom course:   [x] Worsening     [] Stable     [] Improving    RISK FACTORS:    [] Pregnant or possibly pregnant  [x] Age over 61  [] Diabetes  [] Heart disease  [] Asthma  [] COPD/Other chronic lung diseases  [] Active Cancer  [] On Chemotherapy  [] Taking oral steroids  [] History Lymphoma/Leukemia  [x] Close contact with a lab confirmed COVID-19 patient within 14 days of symptom onset  [] History of travel from affected geographical areas within 14 days of symptom onset       VITALS:  There were no vitals filed for this visit. TESTS:    POCT FLU:  [] Positive     []Negative    ASSESSMENT:    [] Flu  [] Possible COVID-19  [] Strep    PLAN:    [] Discharge home with written instructions for:  [] Flu management  [] Possible COVID-19 infection with self-quarantine and management of symptoms  [] Follow-up with primary care physician or emergency department if worsens  [] Evaluation per physician/NP/PA in clinic  [] Sent to ER       An  electronic signature was used to authenticate this note.      --Aaron Luis LPN on 5/79/9203 at 7:70 PM

## 2021-08-19 NOTE — PATIENT INSTRUCTIONS
Your COVID 19 test can take 1-5 days for the results to come back. We ask that you make a [facility] page and view your test results this way. You will need to Self quarantine until you know your results. If you do not hear from us tomorrow, You can call us Saturday from 9 am to 1 pm and see if your results are back  [phone]    Increase fluids and rest  Saline nasal spray as needed for nasal congestion  Warm salt gargles as needed for throat discomfort  Monitor temperature twice a day  Tylenol as needed for fevers and/or discomfort. Big deep breaths periodically throughout the day  Regular Mucinex over the counter as needed for chest congestion  If symptoms worsen -Go to the ER. Follow up with your primary care provider      To Whom it May Concern:    Rohit Khan was tested for COVID-19 8/19/2021. He/she must stay home until test results are back. If test is positive, he/she must quarantine for a total of 10 days starting from day one of symptom onset. He/she must also be fever-free for 24 hours at that time, and also have improvement in symptoms. We do not recommend retesting as patients may continue to test positive for months even though no longer contagious. It is suggested you call [address] [facility] or [address] [place] [address] with any questions regarding quarantine timeframe/return to work/school details. Patient Education        Learning About Coronavirus [phone])  What is coronavirus (COVID-19)? COVID-19 is a disease caused by a new type of coronavirus. This illness was first found in December 2019. It has since spread worldwide. Coronaviruses are a large group of viruses. They cause the common cold. They also cause more serious illnesses like Middle East respiratory syndrome (MERS) and severe acute respiratory syndrome (SARS). COVID-19 is caused by a novel coronavirus. That means it's a new type that has not been seen in people before. What are the symptoms?   Coronavirus (COVID-19) cold, pale, clammy skin. Watch closely for changes in your health, and be sure to contact your doctor if:    · Your symptoms get worse.     · You are not getting better as expected. Call before you go to the doctor's office. Follow their instructions. And wear a cloth face cover. Current as of: March 26, 2021               Content Version: 12.9  © 2006-2021 [employer]. Care instructions adapted under license by [facility] ([facility]). If you have questions about a medical condition or this instruction, always ask your healthcare professional. [place] [address] any warranty or liability for your use of this information. Patient Education        Coronavirus (NTBDO-51): Care Instructions  Overview  The coronavirus disease (COVID-19) is caused by a virus. Symptoms may include a fever, a cough, and shortness of breath. It mainly spreads person-to-person through droplets from coughing and sneezing. The virus also can spread when people are in close contact with someone who is infected. Most people have mild symptoms and can take care of themselves at home. If their symptoms get worse, they may need care in a hospital. Treatment may include medicines to reduce symptoms, plus breathing support such as oxygen therapy or a ventilator. It's important to not spread the virus to others. If you have COVID-19, wear a face cover anytime you are around other people. It can help stop the spread of the virus. You need to isolate yourself while you are sick. Leave your home only if you need to get medical care or testing. Follow-up care is a key part of your treatment and safety. Be sure to make and go to all appointments, and call your doctor if you are having problems. It's also a good idea to know your test results and keep a list of the medicines you take. How can you care for yourself at home? · Get extra rest. It can help you feel better. · Drink plenty of fluids.  This helps replace fluids lost from fever. Fluids also help ease a scratchy throat. Water, soup, fruit juice, and hot tea with lemon are good choices. · Take acetaminophen (such as Tylenol) to reduce a fever. It may also help with muscle aches. Read and follow all instructions on the label. · Use petroleum jelly on sore skin. This can help if the skin around your nose and lips becomes sore from rubbing a lot with tissues. If you use oxygen, use a water-based product instead of petroleum jelly. Tips for self-isolation  · Limit contact with people in your home. If possible, stay in a separate bedroom and use a separate bathroom. · Wear a cloth face cover when you are around other people. It can help stop the spread of the virus when you cough or sneeze. · If you have to leave home, avoid crowds and try to stay at least 6 feet away from other people. · Avoid contact with pets and other animals. · Cover your mouth and nose with a tissue when you cough or sneeze. Then throw it in the trash right away. · Wash your hands often, especially after you cough or sneeze. Use soap and water, and scrub for at least 20 seconds. If soap and water aren't available, use an alcohol-based hand . · Don't share personal household items. These include bedding, towels, cups and glasses, and eating utensils. · [address] in the warmest water allowed for the fabric type, and dry it completely. It's okay to wash other people's laundry with yours. · Clean and disinfect your home every day. Use household  and disinfectant wipes or sprays. Take special care to clean things that you grab with your hands. These include doorknobs, remote controls, phones, and handles on your refrigerator and microwave. And don't forget countertops, tabletops, bathrooms, and computer keyboards.   When you can end self-isolation  · If you know or suspect that you have COVID-19, stay in self-isolation until:  ? You haven't had a fever for 24 hours while not taking medicines to lower the fever, and  ? Your symptoms have improved, and  ? It's been at least 10 days since your symptoms started. · Talk to your doctor about whether you also need testing, especially if you have a weakened immune system. When should you call for help? Call 911 anytime you think you may need emergency care. For example, call if you have life-threatening symptoms, such as:    · You have severe trouble breathing. (You can't talk at all.)     · You have constant chest pain or pressure.     · You are severely dizzy or lightheaded.     · You are confused or can't think clearly.     · Your face and lips have a blue color.     · You pass out (lose consciousness) or are very hard to wake up. Call your doctor now or seek immediate medical care if:    · You have moderate trouble breathing. (You can't speak a full sentence.)     · You are coughing up blood (more than about 1 teaspoon).     · You have signs of low blood pressure. These include feeling lightheaded; being too weak to stand; and having cold, pale, clammy skin. Watch closely for changes in your health, and be sure to contact your doctor if:    · Your symptoms get worse.     · You are not getting better as expected. Call before you go to the doctor's office. Follow their instructions. And wear a cloth face cover. Current as of: March 26, 2021               Content Version: 12.9  © 3953-7856 [employer]. Care instructions adapted under license by [facility] ([facility]). If you have questions about a medical condition or this instruction, always ask your healthcare professional. [place] [address] any warranty or liability for your use of this information.

## 2021-08-19 NOTE — PROGRESS NOTES
8/19/2021    HPI:  Chief complaint and history of present illness as per medical assistant/nurse documented today in the Flu/COVID-19 clinic. Patient complains of a 6-day history of intermittent chills, mild intermittent headache, generalized myalgias, intermittent diarrhea, loss of taste, loss of smell, and fatigue. He denies chest pain, chest tightness or heaviness, shortness of breath or wheezing, chest or nasal congestion, cough, fever, nausea, vomiting, bloody or black stools. He reports good p.o. intake and urine output. He has taken Tylenol as needed. He has been in close contact with his wife who recently tested positive for COVID-19 and is currently hospitalized. He is up-to-date on COVID-19 vaccine with 2 doses of Moderna. MEDICATIONS:  Prior to Visit Medications    Medication Sig Taking?  Authorizing Provider   venlafaxine (EFFEXOR XR) 150 MG extended release capsule TAKE 1 CAPSULE EVERY       MORNING  Yanna Kramer MD   finasteride (PROSCAR) 5 MG tablet TAKE 1 TABLET DAILY  Yanna Kramer MD   escitalopram (LEXAPRO) 10 MG tablet TAKE 1 TABLET DAILY  Lexit T Leon, DO   allopurinol (ZYLOPRIM) 300 MG tablet TAKE 1 TABLET DAILY  Beraht T Leon, DO   clopidogrel (PLAVIX) 75 MG tablet Take 1 tablet by mouth daily  Lexit T Leon, DO   metoprolol tartrate (LOPRESSOR) 50 MG tablet Take 1 tablet by mouth 2 times daily  Flower Watkins MD   nitroGLYCERIN (NITROSTAT) 0.4 MG SL tablet Place 1 tablet under the tongue every 5 minutes as needed for Chest pain  Flower Watkins MD   Cyanocobalamin (VITAMIN B 12) 500 MCG TABS Take 1 tablet by mouth daily  Yanna Kramer MD   gemfibrozil (LOPID) 600 MG tablet TAKE 1 TABLET TWICE DAILY  BEFORE MEALS  Yanna Kramer MD   isosorbide mononitrate (IMDUR) 30 MG extended release tablet Take 1 tablet by mouth daily  Yanna Kramer MD   ranolazine (RANEXA) 500 MG extended release tablet Take 1 tablet by mouth 2 times daily Michela Kaur MD   Multiple Vitamins-Minerals (VITABASIC SENIOR PO) Take by mouth  Historical Provider, MD   Multiple Vitamins-Minerals (OCUVITE EXTRA) TABS Take 1 tablet by mouth daily  Historical Provider, MD       No Known Allergies,   Past Medical History:   Diagnosis Date    Decreased hearing 5/4/2019    Gout 5/4/2019    H/O cardiovascular stress test 06/24/2019    EF 41%,  Mild ischemia of lateral wall involving small to medium size of left ventricle.  H/O echocardiogram 8/27/19    EF 48, Mod AR, Mild MR & TR    H/O percutaneous transluminal coronary angioplasty 02/06/2020    PCI to OM & Mid LAD,  PDA has 80-90 % tandem lesions but small vessel.  Hyperlipidemia 5/4/2019    Nocturia 5/4/2019    Osteoarthritis 5/4/2019    Restless leg 5/4/2019       PHYSICAL EXAM:  Physical Exam  Temp:  96.3 F  Heart Rate:  68    Pulse Ox:  96%    Constitutional:  Well developed, well nourished  HENT:  Normocephalic, atraumatic, bilateral external ears normal, bilateral ear canals normal, bilateral TMs normal, oropharynx moist, uvula midline and benign, airway patent, nose normal  Eyes:  conjunctiva normal, no discharge, no scleral icterus  Cardiovascular:  Normal heart rate, normal rhythm, no murmurs, gallops or rubs  Thorax & Lungs:  Normal breath sounds, no respiratory distress, no wheezing, no rales, no rhonchi  Skin:  Warm, dry, no erythema, no rash  Neurologic:  Alert & oriented   Psychiatric:  Affect normal, mood normal    ASSESSMENT/PLAN:  1. Chills (without fever)  - Covid-19 Ambulatory    2. Muscular aches  - Covid-19 Ambulatory    3. Loss of taste  - Covid-19 Ambulatory    4. Loss of smell  - Covid-19 Ambulatory    5. Close exposure to COVID-19 virus  - Covid-19 Ambulatory    COVID-19 test results pending. Isolation measures discussed. Test results may take 1 to 5 days to come back.   If you do not hear from our office tomorrow, please call us on Saturday from 9 AM to 1 PM.  Increase fluids and rest  Saline nasal spray as needed for nasal congestion  Warm salt gargles as needed for throat discomfort  Monitor temperature twice a day  Tylenol as needed for fevers and/or discomfort. Big deep breaths periodically throughout the day  Regular Mucinex over the counter as needed for chest congestion  If symptoms worsen -Go to the ER. Follow up with your primary care provider    FOLLOW-UP:  No follow-ups on file.     In addition to other information, the printed after visit summary provided to the patient includes:  [x] COVID-19 Self care instructions  [x] COVID-19 General patient information    [facility], JEFFREY

## 2021-08-20 ENCOUNTER — TELEPHONE (OUTPATIENT)
Dept: FAMILY MEDICINE CLINIC | Age: 81
End: 2021-08-20

## 2021-08-20 LAB
Lab: DETECTED
SARS-COV-2: NORMAL
TEST NAME: NORMAL

## 2021-08-20 NOTE — TELEPHONE ENCOUNTER
Thanks for info and agree on [facility]. Hopefully his Covid vaccinations will minimize his symptoms.

## 2021-08-20 NOTE — TELEPHONE ENCOUNTER
I called patient and spoke with him directly about his positive COVID-19 test results. He is a 79-year-old male with history of obesity, cardiovascular disease. Based on those conditions, he does meet criteria for Regeneron infusion. He is also interested in the infusion. He understands that he must get this within 10 days of symptom onset. Today is day 7 of illness. My nurse will fax the Regeneron form over to the infusion center right away. She will also call them to let them know that he is quickly approaching his day 10 estella. If he waits until Monday, Monday will be day 10 of symptom duration. Is noting, patient is vaccinated against COVID-19 with 2 doses of Moderna.     Grisel German PA-C

## 2021-08-23 ENCOUNTER — TELEPHONE (OUTPATIENT)
Dept: FAMILY MEDICINE CLINIC | Age: 81
End: 2021-08-23

## 2021-08-23 ENCOUNTER — HOSPITAL ENCOUNTER (OUTPATIENT)
Dept: INFUSION THERAPY | Age: 81
Setting detail: INFUSION SERIES
Discharge: HOME | End: 2021-08-23
Payer: MEDICARE

## 2021-08-23 VITALS
RESPIRATION RATE: 16 BRPM | DIASTOLIC BLOOD PRESSURE: 69 MMHG | HEART RATE: 60 BPM | SYSTOLIC BLOOD PRESSURE: 139 MMHG | TEMPERATURE: 97.2 F

## 2021-08-23 PROCEDURE — 6360000002 HC RX W HCPCS: Performed by: PHYSICIAN ASSISTANT

## 2021-08-23 PROCEDURE — 2580000003 HC RX 258: Performed by: PHYSICIAN ASSISTANT

## 2021-08-23 PROCEDURE — 2500000003 HC RX 250 WO HCPCS: Performed by: PHYSICIAN ASSISTANT

## 2021-08-23 PROCEDURE — M0243 CASIRIVI AND IMDEVI INFUSION: HCPCS

## 2021-08-23 RX ADMIN — IMDEVIMAB: 1332 INJECTION, SOLUTION, CONCENTRATE INTRAVENOUS at 14:45

## 2021-08-23 NOTE — TELEPHONE ENCOUNTER
Please call patient and reassure him that this monoclonal antibody is safe and frequently used now and is also very effective to lessen the symptoms of Covid and prevent hospitalization. I definitely advised him to get the monoclonal antibody infusion.

## 2021-08-23 NOTE — TELEPHONE ENCOUNTER
To Dr. Ephraim Bumpers---    Patient is scheduled to get an infusion to lessen the effects of COVID-19------patient had the COVID vaccine in Feb/Mar-----he is very nevous about getting this infusion, he doesn't know anything about it-----would you please call him and let him know if he should get the infusion-----the appointment is scheduled this afternoon at the hospital.    Phone:  [phone]

## 2021-08-23 NOTE — DISCHARGE SUMMARY
[facility] well. Reviewed discharge instructions, understanding verbalized. Copies of AVS given to take home. Patient discharged home. To per self. Orders Placed This Encounter   Medications    casirivimab ([id number]) 600 mg, imdevimab ([id number]) 600 mg in sodium chloride 0.9 % 260 mL IVPB     Order Specific Question:   Does this patient qualify for COVID-19 antibody therapy based on criteria for treatment? Answer:    Yes

## 2021-08-23 NOTE — PLAN OF CARE
Ambulatory to [address] [facility] unit room for [facility]. Orientated to unit. Procedure and plan of care explained. Questions answered. Understanding verbalized.

## 2021-08-24 ENCOUNTER — TELEPHONE (OUTPATIENT)
Dept: FAMILY MEDICINE CLINIC | Age: 81
End: 2021-08-24

## 2021-08-24 ENCOUNTER — CARE COORDINATION (OUTPATIENT)
Dept: CARE COORDINATION | Age: 81
End: 2021-08-24

## 2021-08-24 NOTE — CARE COORDINATION
[facility] ED Follow Up Call    2021    Patient: Mily Rey Patient : 1940   MRN: <[id number]>  Reason for Admission:   COVID+  Discharge Date: 21    Patient contacted regarding COVID-19 risk, exposure, diagnosis, pulse oximeter ordered at discharge and monoclonal antibody infusion follow up. Discussed COVID-19 related testing which was available at this time. Test results were positive. Patient informed of results, if available? Yes. Ambulatory Care Manager contacted the patient by telephone to perform post discharge assessment. Call within 2 business days of discharge: Yes. Verified name and  with patient as identifiers. Provided introduction to self, and explanation of the CTN/ACM role, and reason for call due to risk factors for infection and/or exposure to COVID-19. Symptoms reviewed with patient who verbalized the following symptoms: fatigue, SOB on exertion, extreme weakness, \" head feels foggy\". Patient reports that he does not feel any better than when he went in for the infusion. Encouraged rest/ hydration. Instructed patient to pace activity. Encouraged tri-pod position, fan to circulate air, pursed lip breathing. Instructed on s/s to report to MD/911. Patient reports that he is with his spouse who is also COVID+. Patient has not been contacted by the Health Department to this point. Provided patient with contact information for the Health department should support needs arise. Due to no improvement in symptoms encounter was routed to provider for escalation. Discussed follow-up appointments. If no appointment was previously scheduled, appointment scheduling offered: Yes.   [facility] follow up appointment(s):   Future Appointments   Date Time Provider Trini Garcia   2021  1:15 PM Yves Martinez MD [address] [facility]   2021 11:00 AM Padmini Madsen, APRN - CNP [facility]   2022  2:00 PM SCHEDULE, ANNE SMART LPN [address] [address] [facility]

## 2021-08-24 NOTE — TELEPHONE ENCOUNTER
Patient to rest and drink plenty of fluids. Maximize good nutrition & be sure he checks his oxygen level and if they drop into the below 90 -need to return to the emergency room. It will take a few days for antibiotics to take full effect.

## 2021-08-25 ENCOUNTER — CARE COORDINATION (OUTPATIENT)
Dept: CARE COORDINATION | Age: 81
End: 2021-08-25

## 2021-09-04 DIAGNOSIS — F33.9 EPISODE OF RECURRENT MAJOR DEPRESSIVE DISORDER, UNSPECIFIED DEPRESSION EPISODE SEVERITY (HCC): ICD-10-CM

## 2021-09-07 RX ORDER — ESCITALOPRAM OXALATE 10 MG/1
TABLET ORAL
Qty: 90 TABLET | Refills: 0 | Status: SHIPPED | OUTPATIENT
Start: 2021-09-07 | End: 2021-12-14

## 2021-09-13 ENCOUNTER — TELEPHONE (OUTPATIENT)
Dept: FAMILY MEDICINE CLINIC | Age: 81
End: 2021-09-13

## 2021-09-13 NOTE — TELEPHONE ENCOUNTER
Please discuss with patient the fact that he was + for Covid 3 weeks ago and had Regeneron therapy. The most important thing is how he is doing. There really is no benefit for getting further Covid testing. He may simply be a case of slow response or long Covid patient were symptoms persist for a while.

## 2021-09-13 NOTE — TELEPHONE ENCOUNTER
----- Message from [address] [place] [address] sent at 9/13/2021 12:44 PM EDT -----  Subject: Referral Request    QUESTIONS   Reason for referral request? Patient is requesting a referral for covid   testing, has had symptoms for a about a month now. Audra Gross his wife tested   positive. Requested a call back [phone] messages okay   Has the physician seen you for this condition before? No   Preferred Specialist (if applicable)? Do you already have an appointment scheduled? Additional Information for Provider?   ---------------------------------------------------------------------------  --------------  CALL BACK INFO  What is the best way for the office to contact you? OK to leave message on   voicemail  Preferred Call Back Phone Number?  [phone]

## 2021-09-13 NOTE — TELEPHONE ENCOUNTER
----- Message from [address] [place] [address] sent at 9/13/2021 12:44 PM EDT -----  Subject: Referral Request    QUESTIONS   Reason for referral request? Patient is requesting a referral for covid   testing, has had symptoms for a about a month now. Mateus So his wife tested   positive. Requested a call back [phone] messages okay   Has the physician seen you for this condition before? No   Preferred Specialist (if applicable)? Do you already have an appointment scheduled? Additional Information for Provider?   ---------------------------------------------------------------------------  --------------  CALL BACK INFO  What is the best way for the office to contact you? OK to leave message on   voicemail  Preferred Call Back Phone Number?  [phone]

## 2021-09-23 ENCOUNTER — CARE COORDINATION (OUTPATIENT)
Dept: CARE COORDINATION | Age: 81
End: 2021-09-23

## 2021-09-23 NOTE — CARE COORDINATION
Attempted to reach patient for ACM follow up. No answer to phone. Message left with ACM contact information and request for call back. Plan for next outreach:  SDOH, ongoing assessment of support needs.

## 2021-09-28 ENCOUNTER — OFFICE VISIT (OUTPATIENT)
Dept: FAMILY MEDICINE CLINIC | Age: 81
End: 2021-09-28
Payer: MEDICARE

## 2021-09-28 VITALS
DIASTOLIC BLOOD PRESSURE: 69 MMHG | SYSTOLIC BLOOD PRESSURE: 132 MMHG | OXYGEN SATURATION: 96 % | BODY MASS INDEX: 29.66 KG/M2 | HEART RATE: 67 BPM | WEIGHT: 219 LBS | RESPIRATION RATE: 16 BRPM | HEIGHT: 72 IN

## 2021-09-28 DIAGNOSIS — U09.9 POST-COVID SYNDROME: Primary | ICD-10-CM

## 2021-09-28 DIAGNOSIS — E78.5 HYPERLIPIDEMIA, UNSPECIFIED HYPERLIPIDEMIA TYPE: ICD-10-CM

## 2021-09-28 DIAGNOSIS — Z23 NEED FOR VACCINATION: ICD-10-CM

## 2021-09-28 PROCEDURE — 90694 VACC AIIV4 NO PRSRV 0.5ML IM: CPT | Performed by: STUDENT IN AN ORGANIZED HEALTH CARE EDUCATION/TRAINING PROGRAM

## 2021-09-28 PROCEDURE — 99213 OFFICE O/P EST LOW 20 MIN: CPT | Performed by: STUDENT IN AN ORGANIZED HEALTH CARE EDUCATION/TRAINING PROGRAM

## 2021-09-28 PROCEDURE — G0008 ADMIN INFLUENZA VIRUS VAC: HCPCS | Performed by: STUDENT IN AN ORGANIZED HEALTH CARE EDUCATION/TRAINING PROGRAM

## 2021-09-28 RX ORDER — GEMFIBROZIL 600 MG/1
TABLET, FILM COATED ORAL
Qty: 180 TABLET | Refills: 1 | Status: SHIPPED | OUTPATIENT
Start: 2021-09-28 | End: 2022-01-25

## 2021-09-28 ASSESSMENT — ENCOUNTER SYMPTOMS
SHORTNESS OF BREATH: 0
WHEEZING: 0
SORE THROAT: 0
ABDOMINAL PAIN: 0
NAUSEA: 0

## 2021-09-28 NOTE — PROGRESS NOTES
10/4/2021    Rohit Carrillo    Chief Complaint   Patient presents with   Yesenia Alley Other     Had covid in August. Still tired but no other symptoms.  Flu Vaccine     flu shot given.  Insomnia    Other     Would like PT ordered for fatigue       HPI  History was obtained from pateint. Alley Conway is a 80 y.o. male with a PMHx as listed below who presents today for follow up post covid viral infection. Patient slowly improving, patient still having some lingering fatigue. Mood stable on effexor and lexapro. Gout stable on current regimen    5/2021 labs reviewed and normal.  Excellent control cholesterol. 1. Post-COVID syndrome    2. Hyperlipidemia, unspecified hyperlipidemia type    3. Need for vaccination             REVIEW OF SYMPTOMS    Review of Systems   Constitutional: Negative for chills and fatigue. HENT: Negative for congestion and sore throat. Respiratory: Negative for shortness of breath and wheezing. Cardiovascular: Negative for chest pain and palpitations. Gastrointestinal: Negative for abdominal pain and nausea. Genitourinary: Negative for frequency and urgency. Neurological: Negative for light-headedness. PAST MEDICAL HISTORY  Past Medical History:   Diagnosis Date    Decreased hearing 5/4/2019    Gout 5/4/2019    H/O cardiovascular stress test 06/24/2019    EF 41%,  Mild ischemia of lateral wall involving small to medium size of left ventricle.  H/O echocardiogram 8/27/19    EF 48, Mod AR, Mild MR & TR    H/O percutaneous transluminal coronary angioplasty 02/06/2020    PCI to OM & Mid LAD,  PDA has 80-90 % tandem lesions but small vessel.     Hyperlipidemia 5/4/2019    Nocturia 5/4/2019    Osteoarthritis 5/4/2019    Restless leg 5/4/2019       FAMILY HISTORY  Family History   Problem Relation Age of Onset    Cancer Mother        SOCIAL HISTORY  Social History     Socioeconomic History    Marital status:      Spouse name: None    Number of children: None    Years of education: None    Highest education level: None   Occupational History    None   Tobacco Use    Smoking status: Former Smoker     Packs/day: 2.00     Years: 25.00     Pack years: 50.00     Types: Cigarettes     Start date: 1960     Quit date:      Years since quittin.7    Smokeless tobacco: Never Used   Substance and Sexual Activity    Alcohol use: Yes     Alcohol/week: 1.0 standard drinks     Types: 1 Cans of beer per week     Comment: rarely    Drug use: None    Sexual activity: None   Other Topics Concern    None   Social History Narrative    None     Social Determinants of Health     Financial Resource Strain: Low Risk     Difficulty of Paying Living Expenses: Not hard at all   Food Insecurity: No Food Insecurity    Worried About Running Out of Food in the Last Year: Never true    Pebbles of Food in the Last Year: Never true   Transportation Needs:     Lack of Transportation (Medical):      Lack of Transportation (Non-Medical):    Physical Activity:     Days of Exercise per Week:     Minutes of Exercise per Session:    Stress:     Feeling of Stress :    Social Connections:     Frequency of Communication with Friends and Family:     Frequency of Social Gatherings with Friends and Family:     Attends Worship Services:     Active Member of Clubs or Organizations:     Attends Club or Organization Meetings:     Marital Status:    Intimate Partner Violence:     Fear of Current or Ex-Partner:     Emotionally Abused:     Physically Abused:     Sexually Abused:         SURGICAL HISTORY  Past Surgical History:   Procedure Laterality Date    BLEPHAROPLASTY  2010    CHOLECYSTECTOMY  2001    COLECTOMY      COLONOSCOPY N/A 2019    COLONOSCOPY DIAGNOSTIC performed by Luanne Yap MD at [address] Left     EYE SURGERY Right    [address] ARTHROSCOPY  2002    SMALL INTESTINE SURGERY N/A 2019    LAPAROTOMY EXPLORATORY RIGHT ILEOCOLECTOMY performed by Iqra Santos MD at [address] [place] [address] N/A 7/4/2019    EGD DIAGNOSTIC ONLY performed by Roberto Morris MD at [facility] [place] by [facility]  Current Outpatient Medications   Medication Sig Dispense Refill    gemfibrozil (LOPID) 600 MG tablet TAKE 1 TABLET TWICE DAILY  BEFORE MEALS 180 tablet 1    escitalopram (LEXAPRO) 10 MG tablet TAKE 1 TABLET DAILY 90 tablet 0    venlafaxine (EFFEXOR XR) 150 MG extended release capsule TAKE 1 CAPSULE EVERY       MORNING 90 capsule 1    finasteride (PROSCAR) 5 MG tablet TAKE 1 TABLET DAILY 90 tablet 1    allopurinol (ZYLOPRIM) 300 MG tablet TAKE 1 TABLET DAILY 90 tablet 1    clopidogrel (PLAVIX) 75 MG tablet Take 1 tablet by mouth daily 90 tablet 1    metoprolol tartrate (LOPRESSOR) 50 MG tablet Take 1 tablet by mouth 2 times daily 180 tablet 3    nitroGLYCERIN (NITROSTAT) 0.4 MG SL tablet Place 1 tablet under the tongue every 5 minutes as needed for Chest pain 25 tablet 0    Cyanocobalamin (VITAMIN B 12) 500 MCG TABS Take 1 tablet by mouth daily 30 tablet 3    isosorbide mononitrate (IMDUR) 30 MG extended release tablet Take 1 tablet by mouth daily 90 tablet 3    ranolazine (RANEXA) 500 MG extended release tablet Take 1 tablet by mouth 2 times daily 90 tablet 3    Multiple Vitamins-Minerals (VITABASIC SENIOR PO) Take by mouth      Multiple Vitamins-Minerals (OCUVITE EXTRA) TABS Take 1 tablet by mouth daily       No current facility-administered medications for this visit. ALLERGIES  No Known Allergies    PHYSICAL EXAM    /69   Pulse 67   Resp 16   Ht 6' (1.829 m)   Wt 219 lb (99.3 kg)   SpO2 96%   BMI 29.70 kg/m²     Physical Exam  Constitutional:       Appearance: Normal appearance. HENT:      Head: Normocephalic and atraumatic. Eyes:      Extraocular Movements: Extraocular movements intact. Pupils: Pupils are equal, round, and reactive to light. Cardiovascular:      Rate and Rhythm: Normal rate and regular rhythm. Pulses: Normal pulses. Heart sounds: No murmur heard. No friction rub. No gallop. Pulmonary:      Effort: Pulmonary effort is normal.      Breath sounds: Normal breath sounds. Musculoskeletal:      Cervical back: Neck supple. Skin:     General: Skin is warm and dry. Neurological:      General: No focal deficit present. Mental Status: He is alert. Psychiatric:         Mood and Affect: Mood normal.         Behavior: Behavior normal.         ASSESSMENT & PLAN    1. Post-COVID syndrome  -Given persistent fatigue post Covid will refer to PT for further evaluation  - Aaron Winn 647    2. Hyperlipidemia, unspecified hyperlipidemia type  -Continue current regimen cholesterol well controlled  - gemfibrozil (LOPID) 600 MG tablet; TAKE 1 TABLET TWICE DAILY  BEFORE MEALS  Dispense: 180 tablet; Refill: 1    3. Need for vaccination  - INFLUENZA, QUADV, ADJUVANTED, 65 YRS =, IM, PF, PREFILL SYR, 0.5ML (FLUAD)      Return in about 4 months (around 1/28/2022).          Electronically signed by Masha Hernandez DO on 10/4/2021

## 2021-10-06 RX ORDER — RANOLAZINE 500 MG/1
500 TABLET, EXTENDED RELEASE ORAL 2 TIMES DAILY
Qty: 90 TABLET | Refills: 1 | Status: SHIPPED | OUTPATIENT
Start: 2021-10-06 | End: 2021-12-29 | Stop reason: SDUPTHER

## 2021-10-13 ENCOUNTER — CARE COORDINATION (OUTPATIENT)
Dept: CARE COORDINATION | Age: 81
End: 2021-10-13

## 2021-10-19 RX ORDER — CLOPIDOGREL BISULFATE 75 MG/1
TABLET ORAL
Qty: 90 TABLET | Refills: 1 | Status: SHIPPED | OUTPATIENT
Start: 2021-10-19 | End: 2022-01-25

## 2021-10-20 ENCOUNTER — HOSPITAL ENCOUNTER (OUTPATIENT)
Dept: PHYSICAL THERAPY | Age: 81
Setting detail: THERAPIES SERIES
Discharge: HOME | End: 2021-10-20
Payer: MEDICARE

## 2021-10-20 PROCEDURE — 97110 THERAPEUTIC EXERCISES: CPT

## 2021-10-20 PROCEDURE — 97162 PT EVAL MOD COMPLEX 30 MIN: CPT

## 2021-10-20 NOTE — PLAN OF CARE
Outpatient Physical Therapy                  [x] Phone: [phone]   Fax: [phone]    Pediatric Therapy                                    [] Phone: [phone]   Fax: [phone]  [facility] [place]                                      [] Phone: [phone]   Fax: [phone]      To: Referring Practitioner: Dr. Brandon Dailey    From: Candis Maharaj, PT, PT     Patient: Dana Segura       : 1940  Diagnosis: post Covid syndrome   Treatment Diagnosis: Debility, post Covid syndrome. Date: 10/20/2021    Physical Therapy Certification/Re-Certification Form  Dear Dr. Brandon Dailey  The following patient has been evaluated for physical therapy services and for therapy to continue, Please review the attached evaluation and/or summary of the patient's plan of care, and verify that you agree therapy should continue by signing the attached document and sending it back to our office.   Patient is a  79 yo male who presents with debility, decreased balance following Covid infection which impacts on adls;patient's goal is to improve strength, endurance, balance ;patient reports that weakness, debility, decreased balance  limits activities including standing, walking, stairs, transfers; PT to address patient's goals, impairments and activity limitations with skilled interventions checked in plan of care;patient's level of function prior to onset of symptoms was independent; did not observe any barriers to learning during PT eval; learning preferences include demonstration, practice, and handouts; patient expressed understanding of HEP; patient appears to be motivated to participate in an active PT program and to be compliant with HEP expectations;patient assisted in developing treatment plan and goals; no DME is currently being used;      Current functional level (based on 6min walk 817ft,    LAURENT 44 :    Assessment:  Assessment: Pt presents with complaints of debility and difficulty balancing following Covid-19 infection in 8/2021. He has functional weakness at LE's, and shows some deficits with balance testing. He relates falling at least 2x since Covid infection. PMHx:  BTKA, cardiac stents,    Plan of Care/Treatment to date:  [x] Therapeutic Exercise    [] Aquatics:  [x] Therapeutic Activity    [] Ultrasound  [] Elec Stimulation  [x] Gait Training     [] Cervical Traction [] Lumbar Traction  [x] Neuromuscular Re-education [] Cold/hotpack [] Iontophoresis   [x] Instruction in HEP       [x] Manual Therapy     [] vasopneumatic            [] Self care home management        []Dry needling trigger point point/pain management          Frequency/Duration:  # Days per week: [] 1 day # Weeks: [] 1 week [x] 5 weeks     [x] 2 days   [] 2 weeks [] 6 weeks     [] 3 days   [] 3 weeks [] 7 weeks     [] 4 days   [] 4 weeks [] 8 weeks    Rehab Potential/Progress: [] Excellent [] Good [] Fair  [] Poor     Goals:       Long term goals  Time Frame for Long term goals : 5 weeks  Long term goal 1: I in home program.  Long term goal 2: sit/stand without use of hands. Long term goal 3: tolerate standing/walking 20mins  Long term goal 4: up/down steps, reciprocal pattern, minimal difficulty  Electronically signed by:  Magdy Alfonso PT, PT, 10/20/2021, 2:09 PM              If you have any questions or concerns, please don't hesitate to call.   Thank you for your referral.      Physician Signature:_________________Date:____________Time: ________  By signing above, therapists plan is approved by physician

## 2021-10-20 NOTE — FLOWSHEET NOTE
Outpatient Physical Therapy  [facility]           [x] Phone: [phone]   Fax: [phone]  [facility]           [] Phone: [phone]   Fax: [phone]        Physical Therapy Daily Treatment Note  Date:  10/20/2021    Patient Name:  Dez Villalobos    :  1940  MRN: [id number]  Restrictions/Precautions:    Diagnosis:   Diagnosis: post Covid syndrome  Date of Injury/Surgery:   Treatment Diagnosis: Treatment Diagnosis: Debility, post Covid syndrome. Insurance/Certification information: PT Insurance Information: [facility]   Referring Physician:  Referring Practitioner: Dr. Jv Haney  Next Doctor Visit:    Plan of care signed (Y/N):  n  Outcome Measure: 6 min walk 871 ft, LAURENT 44/56  Visit# / total visits:   /  Pain level: 0/10   Goals:     Patient goals : improve strength and endurance. Long term goals  Time Frame for Long term goals : 5 weeks  Long term goal 1: I in home program.  Long term goal 2: sit/stand without use of hands. Long term goal 3: tolerate standing/walking 20mins  Long term goal 4: up/down steps, reciprocal pattern, minimal difficulty    Summary of Evaluation: Assessment: Pt presents with complaints of debility and difficulty balancing following Covid-19 infection in 2021. He has functional weakness at LE's, and shows some deficits with balance testing. He relates falling at least 2x since Covid infection. PMHx:  BTKA, cardiac stents,        Subjective:  See eval         Any changes in Ambulatory Summary Sheet? None        Objective:  See eval   COVID screening questions were asked and patient attested that there had been no contact or symptoms        Exercises: (No more than 4 columns)   Exercise/Equipment 10/20/21 Date Date           WARM UP         Nu step      6min walk      TABLE      bridge      SLR      clamshells      Sit/stands from elevated surface 2x12               STANDING      Step up/downs      Hip abd 2x10     Hip ext      Heel raises w UE support. PROPRIOCEPTION      SLS 20 sec x 3     rockerboard      Tandem stand/walk                  MODALITIES                      Other Therapeutic Activities/Education:        Home Exercise Program:  Access Code: [email or web address]  URL: [email or web address]  Date: 10/20/2021  Prepared by: Jeovany Thomson    Exercises  Squat with Chair Touch - 1 x daily - 7 x weekly - 2 sets - 10 reps  Single Leg Stance with Support - 2 x daily - 7 x weekly - 1 sets - 5 reps - 20 hold  Standing Hip Abduction with Counter Support - 1 x daily - 7 x weekly - 3 sets - 10 reps        Manual Treatments:        Modalities:        Communication with other providers:        Assessment:  (Response towards treatment session) (Pain Rating)    Assessment: Pt presents with complaints of debility and difficulty balancing following Covid-19 infection in 8/2021. He has functional weakness at LE's, and shows some deficits with balance testing. He relates falling at least 2x since Covid infection.   PMHx:  BTKA, cardiac stents,      Plan for Next Session:        Time In / Time Out:   1015/1100        Timed Code/Total Treatment Minutes:  10/45      Next Progress Note due:        Plan of Care Interventions:  [x] Therapeutic Exercise  [] Modalities:  [x] Therapeutic Activity     [] Ultrasound  [] Estim  [] Gait Training      [] Cervical Traction [] Lumbar Traction  [x] Neuromuscular Re-education    [] Cold/hotpack [] Iontophoresis   [x] Instruction in HEP      [] Vasopneumatic   [] Dry Needling    [x] Manual Therapy               [] Aquatic Therapy              Electronically signed by:  Romana Ben, PT, 10/20/2021, 2:04 PM

## 2021-10-20 NOTE — PROGRESS NOTES
Physical Therapy  Initial Assessment  Date: 10/20/2021  Patient Name: Navneet Sanford  MRN: [id number]  : 1940     Treatment Diagnosis: Debility, post Covid syndrome. Restrictions       Subjective   General  Additional Pertinent Hx: Covid -19 2021 still feeling tired and run down, weak in LE's, aching in feet, decreased balance and has fallen a couple times since illness. history of 2 heart stent placed 2019. B TKA R , L   Referring Practitioner: Dr. Padmini Figueroa  Referral Date : 21  Diagnosis: post Covid syndrome  PT Visit Information  PT Insurance Information: [facility]  Subjective  Subjective: tired and loses balance post covid  Pain Screening  Patient Currently in Pain: No  Vital Signs  Patient Currently in Pain: No    Vision/Hearing  Vision  Vision: Impaired  Hearing  Hearing: Exceptions to [facility]  Hearing Exceptions: Bilateral hearing aid    Orientation  Orientation  Overall Orientation Status: Within Functional Limits    Social/Functional History  Social/Functional History  Lives With: Spouse  Type of Home: House  Home Layout: One level  Home Access: Ramped entrance  Bathroom Shower/Tub: Walk-in shower  Bathroom Toilet:  (relates some difficulty getting off toilet since covid)  ADL Assistance: Independent  Homemaking Assistance: Independent  Homemaking Responsibilities: Yes  Ambulation Assistance: Independent  Transfer Assistance: Independent  Active : Yes  Occupation: Retired  Leisure & Hobbies: yardwork, travel, car/mechanical work, computer    Objective          AROM RLE (degrees)  RLE AROM: WFL  AROM LLE (degrees)  LLE AROM : WFL    Strength RLE  Comment: myotomes intact  Strength LLE  Comment: myotomes intact  Strength RUE  Comment: myotomes intact  Strength LUE  Comment: myotomes intact     Additional Measures  Special Tests: decreased functional strength with sit/stand must use UE and unable to perform double limb heel raise.   Sensation  Overall Sensation Status:  (dermatomes intact to lt touch)             Ambulation  Ambulation?: Yes  Ambulation 1  Surface: level tile;carpet  Device: No Device  Assistance: Independent  Quality of Gait: wide beto, increased lateral movement. Distance: 6min walk. 817ft           Assessment   Conditions Requiring Skilled Therapeutic Intervention  Assessment: Pt presents with complaints of debility and difficulty balancing following Covid-19 infection in 8/2021. He has functional weakness at LE's, and shows some deficits with balance testing. He relates falling at least 2x since Covid infection. PMHx:  BTKA, cardiac stents,  Treatment Diagnosis: Debility, post Covid syndrome. REQUIRES PT FOLLOW UP: Yes         OutComes Score  Garcia Balance Score: 44 (10/20/21 1039)                  Goals  Long term goals  Time Frame for Long term goals : 5 weeks  Long term goal 1: I in home program.  Long term goal 2: sit/stand without use of hands. Long term goal 3: tolerate standing/walking 20mins  Long term goal 4: up/down steps, reciprocal pattern, minimal difficulty  Patient Goals   Patient goals : improve strength and endurance.         Bill Lu, PT

## 2021-10-21 ENCOUNTER — CARE COORDINATION (OUTPATIENT)
Dept: CARE COORDINATION | Age: 81
End: 2021-10-21

## 2021-10-21 NOTE — CARE COORDINATION
Attempted to reach patient for ACM follow up. No answer to phone. Message left with ACM contact information and request for call back. Letter sent via My chart.

## 2021-10-22 ENCOUNTER — HOSPITAL ENCOUNTER (OUTPATIENT)
Dept: PHYSICAL THERAPY | Age: 81
Setting detail: THERAPIES SERIES
Discharge: HOME | End: 2021-10-22
Payer: MEDICARE

## 2021-10-22 PROCEDURE — 97110 THERAPEUTIC EXERCISES: CPT | Performed by: PHYSICAL THERAPY ASSISTANT

## 2021-10-25 ENCOUNTER — HOSPITAL ENCOUNTER (OUTPATIENT)
Dept: PHYSICAL THERAPY | Age: 81
Setting detail: THERAPIES SERIES
Discharge: HOME | End: 2021-10-25
Payer: MEDICARE

## 2021-10-25 PROCEDURE — 97530 THERAPEUTIC ACTIVITIES: CPT

## 2021-10-25 PROCEDURE — 97112 NEUROMUSCULAR REEDUCATION: CPT

## 2021-10-25 PROCEDURE — 97110 THERAPEUTIC EXERCISES: CPT

## 2021-10-25 NOTE — FLOWSHEET NOTE
Outpatient Physical Therapy  [place]           [x] Phone: [phone]   Fax: [phone]  [place]           [] Phone: [phone]   Fax: [phone]        Physical Therapy Daily Treatment Note  Date:  10/25/2021    Patient Name:  Tung Patterson    :  1940  MRN: [id number]  Restrictions/Precautions:    Diagnosis:   Diagnosis: post Covid syndrome  Date of Injury/Surgery:   Treatment Diagnosis: Treatment Diagnosis: Debility, post Covid syndrome. Insurance/Certification information: PT Insurance Information: [facility]   Referring Physician:  Referring Practitioner: Dr. Peg Serrano  Next Doctor Visit:    Plan of care signed (Y/N):  n  Outcome Measure: 6 min walk 871 ft, LAURENT 44/56  Visit# / total visits:  3 /10  Pain level: 0/10   Goals:     Patient goals : improve strength and endurance. Long term goals  Time Frame for Long term goals : 5 weeks  Long term goal 1: I in home program.  Long term goal 2: sit/stand without use of hands. Long term goal 3: tolerate standing/walking 20mins  Long term goal 4: up/down steps, reciprocal pattern, minimal difficulty    Summary of Evaluation: Assessment: Pt presents with complaints of debility and difficulty balancing following Covid-19 infection in 2021. He has functional weakness at LE's, and shows some deficits with balance testing. He relates falling at least 2x since Covid infection. PMHx:  BTKA, cardiac stents,        Subjective:  Pt stated that he was feeling ok today. Any changes in Ambulatory Summary Sheet?   None        Objective:     COVID screening questions were asked and patient attested that there had been no contact or symptoms  Pulse Ox/ HR:    O2 95% / 73 HR at start               97% and 69 HR after sitting briefly post 6WT    O2 98% HR 84 after nustep   O2 95% 73 after  Step ups  O2 96% 62 HR after bridges on ball  O2 97%  82 HR after STS  O2 96% 75 HR end of treatment    L fwd better balance    R leg weaker with step ups  LOB backwards w/ both     Exercises: (No more than 4 columns)   Exercise/Equipment 10/20/21 10/22/21 #2 10/25/2021 #3           WARM UP         Nu step  Lv6  S13/A11,   5' Lv6  S13/A11,   5'   6min walk  No AD, 998 feet in 6 min. No . 4 ft in 6 min      TABLE      bridge  2x10 RSB 10x2   SLR      clamshells  Supine BTB clams 2x10 Supine BTB clams 2x10   Sit/stands from elevated surface 2x12  20.5 inch x 12.  19 inch x 12. 19\"  10x2            STANDING      Step up/downs  X 5 reps each side *pt unsteady without UE support. 4\" 5x2 ea LE w/ SBA due to unsteadiness needed UE suppote   Hip abd 2x10 RTB 2x10 RTB 10x2 with UE support   Hip ext  RTB  Leaning on wall, 2x10 RTB  10x2 with support   Heel raises w UE support. 2x10 10x2                           PROPRIOCEPTION      SLS 20 sec x 3 - 30\"ea LE with PRN UE support   rockerboard  -    Tandem stand/walk  2x30\" 30\"x2 ea ft fwd               MODALITIES                      Other Therapeutic Activities/Education:  Focus on the breathing between activity to reduce the SOB and the HR. Home Exercise Program:  Access Code: [email or web address]  URL: [email or web address]  Date: 10/20/2021  Prepared by: Lamin Carvajal    Exercises  Squat with Chair Touch - 1 x daily - 7 x weekly - 2 sets - 10 reps  Single Leg Stance with Support - 2 x daily - 7 x weekly - 1 sets - 5 reps - 20 hold  Standing Hip Abduction with Counter Support - 1 x daily - 7 x weekly - 3 sets - 10 reps  New HEP 10/22/21 Bridging and the Hip clams in supine with the BTB. Manual Treatments:  none      Modalities:  none      Communication with other providers:  Waiting on the Signed POC, sent 10/20/21      Assessment:  (Response towards treatment session) (Pain Rating)  Pt tolerated treatment fairly well. Pt was able to increase activity with no issues. Pt will benefit from more balance and strengthening. Pt rated pain at 0/10 after treatment.      Plan for Next Session:   Pt would benefit from strengthening, endurance training and balance work.       Time In / Time Out:  1029/ 1114        Timed Code/Total Treatment Minutes:   45'/45' 1 TE ( 15') 1 TA (15') 1 neuro (15')       Next Progress Note due:        Plan of Care Interventions:  [x] Therapeutic Exercise  [] Modalities:  [x] Therapeutic Activity     [] Ultrasound  [] Estim  [] Gait Training      [] Cervical Traction [] Lumbar Traction  [x] Neuromuscular Re-education    [] Cold/hotpack [] Iontophoresis   [x] Instruction in HEP      [] Vasopneumatic   [] Dry Needling    [x] Manual Therapy               [] Aquatic Therapy              Electronically signed by:  Manfred Adam 10/25/2021, 9:10 AM     10/25/2021,4:23 PM

## 2021-10-29 ENCOUNTER — HOSPITAL ENCOUNTER (OUTPATIENT)
Dept: PHYSICAL THERAPY | Age: 81
Setting detail: THERAPIES SERIES
Discharge: HOME | End: 2021-10-29
Payer: MEDICARE

## 2021-10-29 PROCEDURE — 97110 THERAPEUTIC EXERCISES: CPT

## 2021-10-29 PROCEDURE — 97112 NEUROMUSCULAR REEDUCATION: CPT

## 2021-10-29 PROCEDURE — 97530 THERAPEUTIC ACTIVITIES: CPT

## 2021-10-29 NOTE — FLOWSHEET NOTE
Outpatient Physical Therapy  [place]           [x] Phone: [phone]   Fax: [phone]  [facility]           [] Phone: [phone]   Fax: [phone]        Physical Therapy Daily Treatment Note  Date:  10/29/2021    Patient Name:  Anusha Seth    :  1940  MRN: [id number]  Restrictions/Precautions:    Diagnosis:   Diagnosis: post Covid syndrome  Date of Injury/Surgery:   Treatment Diagnosis: Treatment Diagnosis: Debility, post Covid syndrome. Insurance/Certification information: PT Insurance Information: [facility]   Referring Physician:  Referring Practitioner: Dr. Vivek Shepherd  Next Doctor Visit:    Plan of care signed (Y/N):  n  Outcome Measure: 6 min walk 871 ft, MEHRAN 44/56  Visit# / total visits:  4 /10  Pain level: 0/10   Goals:     Patient goals : improve strength and endurance. Long term goals  Time Frame for Long term goals : 5 weeks  Long term goal 1: I in home program.  Long term goal 2: sit/stand without use of hands. Long term goal 3: tolerate standing/walking 20mins  Long term goal 4: up/down steps, reciprocal pattern, minimal difficulty    Summary of Evaluation: Assessment: Pt presents with complaints of debility and difficulty balancing following Covid-19 infection in 2021. He has functional weakness at LE's, and shows some deficits with balance testing. He relates falling at least 2x since Covid infection. PMHx:  BTKA, cardiac stents,        Subjective:  Pt stated that he was feeling ok today. No reports of pain and good energy level. Any changes in Ambulatory Summary Sheet? None        Objective:     COVID screening questions were asked and patient attested that there had been no contact or symptoms    Pulse Ox/ HR:  Monitored below    Ambulation no Ad with Trendelenburg like gait small step height and length.  Improves with AD    R>L leg weakness with step ups  LOB backwards w/ both       Exercises: (No more than 4 columns)   Exercise/Equipment 10/20/21 10/22/21 #2 the Signed POC, sent 10/20/21      Assessment:  (Response towards treatment session) (Pain Rating)  Pt tolerated treatment fairly well with added exercises. Lowest O2 sats were after step ups at 89% and recovered quickly no sx/sx distress or dizziness. B Hip LE weakness noted. Pt will benefit from more balance and strengthening. Pt rated pain at 0/10 after treatment. Min fatigue    Plan for Next Session:   Pt would benefit from strengthening, endurance training and balance work.       Time In / Time Out:  1120/ 1205        Timed Code/Total Treatment Minutes:   45'/45' 1 TE ( 15') 1 TA (15') 1 neuro (15')       Next Progress Note due:        Plan of Care Interventions:  [x] Therapeutic Exercise  [] Modalities:  [x] Therapeutic Activity     [] Ultrasound  [] Estim  [] Gait Training      [] Cervical Traction [] Lumbar Traction  [x] Neuromuscular Re-education    [] Cold/hotpack [] Iontophoresis   [x] Instruction in HEP      [] Vasopneumatic   [] Dry Needling    [x] Manual Therapy               [] Aquatic Therapy              Electronically signed by:  LATIA Lockwood PTA 10/29/2021, 11:20 AM     10/29/2021,11:20 AM Protocol For Photochemotherapy For Severe Photoresponsive Dermatoses: Petrolatum And Broad Band Uvb: The patient received Photochemotherapyfor severe photoresponsive dermatoses: Petrolatum and Broad Band UVB requiring at least 4 to 8 hours of care under direct physician supervision.

## 2021-11-01 ENCOUNTER — CARE COORDINATION (OUTPATIENT)
Dept: CARE COORDINATION | Age: 81
End: 2021-11-01

## 2021-11-01 ENCOUNTER — HOSPITAL ENCOUNTER (OUTPATIENT)
Dept: PHYSICAL THERAPY | Age: 81
Setting detail: INFUSION SERIES
Discharge: HOME | End: 2021-11-01
Payer: MEDICARE

## 2021-11-01 PROCEDURE — 97112 NEUROMUSCULAR REEDUCATION: CPT

## 2021-11-01 PROCEDURE — 97110 THERAPEUTIC EXERCISES: CPT

## 2021-11-01 PROCEDURE — 97530 THERAPEUTIC ACTIVITIES: CPT

## 2021-11-01 NOTE — FLOWSHEET NOTE
Outpatient Physical Therapy  [place]           [x] Phone: [phone]   Fax: [phone]  [facility]           [] Phone: [phone]   Fax: [phone]        Physical Therapy Daily Treatment Note  Date:  2021    Patient Name:  Sobia Patricia    :  1940  MRN: [id number]  Restrictions/Precautions:    Diagnosis:   Diagnosis: post Covid syndrome  Date of Injury/Surgery:   Treatment Diagnosis: Treatment Diagnosis: Debility, post Covid syndrome. Insurance/Certification information: PT Insurance Information: [facility]   Referring Physician:  Referring Practitioner: Dr. Margarito Ulloa  Next Doctor Visit:    Plan of care signed (Y/N):  n   Outcome Measure: 6 min walk 871 ft, LAURENT 44/56  Visit# / total visits:  4 /10  Pain level: 0/10   Goals:     Patient goals : improve strength and endurance. Long term goals  Time Frame for Long term goals : 5 weeks  Long term goal 1: I in home program.  Long term goal 2: sit/stand without use of hands. Long term goal 3: tolerate standing/walking 20mins  Long term goal 4: up/down steps, reciprocal pattern, minimal difficulty    Summary of Evaluation: Assessment: Pt presents with complaints of debility and difficulty balancing following Covid-19 infection in 2021. He has functional weakness at LE's, and shows some deficits with balance testing. He relates falling at least 2x since Covid infection. PMHx:  BTKA, cardiac stents,        Subjective:  Pt stated that he was feeling ok today, still loose balance during yard work. No reports of pain and good energy level. Non-compliance with HEP. Pt says he works in the yard. Any changes in Ambulatory Summary Sheet?   None        Objective:     COVID screening questions were asked and patient attested that there had been no contact or symptoms    Cued for step height, width and length throughout tx to improve balance    L>R leg weakness with step ups  LOB backwards w/ both        Exercises: (No more than 4 columns) Exercise/Equipment 10/20/21 10/22/21 #2 10/25/2021 #3 10/29/21 #4 11/1/21 #5             WARM UP           Nu step  Lv6  S13/A11,   5' Lv6  S13/A11,   5' Lv7  S13/A11,   5'  97% O2 sat 71 HR Lv7  S19/A11,   5'  97% O2 sat 71 HR   6min walk  No AD, 998 feet in 6 min. No . 4 ft in 6 min        TABLE        bridge  2x10 RSB 10x2 RSB 10x2 RSB 15x2   SLR        clamshells  Supine BTB clams 2x10 Supine BTB clams 2x10     Sit/stands from elevated surface 2x12  20.5 inch x 12.  19 inch x 12. 19\"  10x2 10 x 2 21\"  98% O2 sat 74 HR 19\"  15x2              STANDING        Step up/downs  X 5 reps each side *pt unsteady without UE support. 4\" 5x2 ea LE w/ SBA due to unsteadiness needed UE suppote 6\" step fwd    x 10 ea. UE Prn in // bars  Shuffles between steps  X 10 lat LOB throughout  89-90% O2 sat 61 HR 6\" step fwd    2 x 10 ea. UE Prn in // bars  Shuffles between steps  X 10 lat LOB throughout  89-90% O2 sat 61 HR   Hip abd 2x10 RTB 2x10 RTB 10x2 with UE support     Hip ext  RTB  Leaning on wall, 2x10 RTB  10x2 with support     Heel raises w UE support. 2x10 10x2      FM walkout all direction    17# bwd  13# fwd   13# to R Side  10 # to L side  CGA  95% O2 sat 91 HR 17# bwd/ fwd   13# to R/L Side    CGA   Gait training    SPC + B UE walking sticks  Patient prefers walking sticks to faciliate increased balance                 PROPRIOCEPTION        SLS 20 sec x 3 - 30\"ea LE with PRN UE support 10 s x 5 toe touch 5 s x 10 s   rockerboard  -      Tandem stand/walk  2x30\" 30\"x2 ea ft fwd                     MODALITIES                            Other Therapeutic Activities/Education:  Focus on the breathing between activity to reduce the SOB and the HR. Educated on gait mechanics and relation between YOKO , strength and balance. Home Exercise Program:  Access Code: [email or web address]  URL: [email or web address]  Date: 10/20/2021  Prepared by: Opal Mix    Exercises  Squat with Chair Touch - 1 x daily - 7 x weekly - 2 sets - 10 reps  Single Leg Stance with Support - 2 x daily - 7 x weekly - 1 sets - 5 reps - 20 hold  Standing Hip Abduction with Counter Support - 1 x daily - 7 x weekly - 3 sets - 10 reps  New HEP 10/22/21 Bridging and the Hip clams in supine with the BTB. 11/1/21 on compliant with HEP. HO given with all HEP. Manual Treatments:  none      Modalities:  none      Communication with other providers:  Waiting on the Signed POC, sent 10/20/21      Assessment:  (Response towards treatment session) (Pain Rating)  Pt demonstrated GOOD tolerance to tx with increased resistance. B Hip LE weakness noted. Pt would continue to benefit from skilled therapy interventions to address remaining impairments, improve mobility and strength and progress toward goal completion while reducing risk for re-injury or further decline. Pt rated pain at 0/10 after treatment. Min fatigue    Plan for Next Session:   Pt would benefit from strengthening, endurance training and balance work.       Time In / Time Out:  1300/ 1345       Timed Code/Total Treatment Minutes:   45'/45' 1 TE ( 15') 1 TA (15') 1 neuro (15')       Next Progress Note due:        Plan of Care Interventions:  [x] Therapeutic Exercise  [] Modalities:  [x] Therapeutic Activity     [] Ultrasound  [] Estim  [] Gait Training      [] Cervical Traction [] Lumbar Traction  [x] Neuromuscular Re-education    [] Cold/hotpack [] Iontophoresis   [x] Instruction in HEP      [] Vasopneumatic   [] Dry Needling    [x] Manual Therapy               [] Aquatic Therapy              Electronically signed by:  Tresa Cardenas PTA, CLT 11/1/2021, 12:58 PM

## 2021-11-03 ENCOUNTER — HOSPITAL ENCOUNTER (OUTPATIENT)
Dept: PHYSICAL THERAPY | Age: 81
Setting detail: INFUSION SERIES
Discharge: HOME | End: 2021-11-03
Payer: MEDICARE

## 2021-11-03 PROCEDURE — 97530 THERAPEUTIC ACTIVITIES: CPT

## 2021-11-03 PROCEDURE — 97110 THERAPEUTIC EXERCISES: CPT

## 2021-11-03 PROCEDURE — 97112 NEUROMUSCULAR REEDUCATION: CPT

## 2021-11-03 NOTE — FLOWSHEET NOTE
Outpatient Physical Therapy  [place]           [x] Phone: [phone]   Fax: [phone]  [facility]           [] Phone: [phone]   Fax: [phone]        Physical Therapy Daily Treatment Note  Date:  11/3/2021    Patient Name:  Tai Navarrete    :  1940  MRN: [id number]  Restrictions/Precautions:    Diagnosis:   Diagnosis: post Covid syndrome  Date of Injury/Surgery:   Treatment Diagnosis: Treatment Diagnosis: Debility, post Covid syndrome. Insurance/Certification information: PT Insurance Information: [facility]   Referring Physician:  Referring Practitioner: Dr. Alcides Foley  Next Doctor Visit:    Plan of care signed (Y/N):  n   Outcome Measure: 6 min walk 871 ft, LAURENT 44/56  Visit# / total visits:  6 /10  Pain level: 0/10   Goals:     Patient goals : improve strength and endurance. Long term goals  Time Frame for Long term goals : 5 weeks  Long term goal 1: I in home program.  Long term goal 2: sit/stand without use of hands. Long term goal 3: tolerate standing/walking 20mins  Long term goal 4: up/down steps, reciprocal pattern, minimal difficulty    Summary of Evaluation: Assessment: Pt presents with complaints of debility and difficulty balancing following Covid-19 infection in 2021. He has functional weakness at LE's, and shows some deficits with balance testing. He relates falling at least 2x since Covid infection. PMHx:  BTKA, cardiac stents,        Subjective:  Pt stated he had to run some errands with his wife. Reports not doing HEP. He reports feeling better. Better stamina and mentally. Any changes in Ambulatory Summary Sheet? None        Objective:     COVID screening questions were asked and patient attested that there had been no contact or symptoms    Cued for step height, width and length throughout tx   B LE excess Bilateral ER causing heels to touch and sometimes step on each other.   left knee buckle with step up on step down  Unable to perform HR in standing and at level 8  Increased exertion and SOB post squats and HR on TG [facility] quick recovery. Improved STS stability and eccentric control    Exercises: (No more than 4 columns)   Exercise/Equipment 10/20/21 10/22/21 #2 10/25/2021 #3 10/29/21 #4 11/1/21 #5 11/3/21 #6              WARM UP            Nu step  Lv6  S13/A11,   5' Lv6  S13/A11,   5' Lv7  S13/A11,   5'  97% O2 sat 71 HR Lv7  S19/A11,   5'  97% O2 sat 71 HR Lv7  S19/A11,   5'  97% O2 sat 71 HR   6min walk  No AD, 998 feet in 6 min. No . 4 ft in 6 min         TABLE         bridge  2x10 RSB 10x2 RSB 10x2 RSB 15x2 RSB 15x2   SLR         clamshells  Supine BTB clams 2x10 Supine BTB clams 2x10      Sit/stands from elevated surface 2x12  20.5 inch x 12.  19 inch x 12. 19\"  10x2 10 x 2 21\"  98% O2 sat 74 HR 19\"  15x2 19\"  15x2 No UE               STANDING         Step up/downs  X 5 reps each side *pt unsteady without UE support. 4\" 5x2 ea LE w/ SBA due to unsteadiness needed UE suppote 6\" step fwd    x 10 ea. UE Prn in // bars  Shuffles between steps  X 10 lat LOB throughout  89-90% O2 sat 61 HR 6\" step fwd    2 x 10 ea. UE Prn in // bars  Shuffles between steps  X 10 lat LOB throughout  89-90% O2 sat 61 HR 6\" step fwd    2 x 10 ea. UE Prn in // bars  Shuffles between steps  X 10  2x left LOB Knee buckle   Hip abd 2x10 RTB 2x10 RTB 10x2 with UE support      Hip ext  RTB  Leaning on wall, 2x10 RTB  10x2 with support      Heel raises w UE support.    2x10 10x2    unable   FM walkout all direction    17# bwd  13# fwd   13# to R Side  10 # to L side  CGA  95% O2 sat 91 HR 17# bwd/ fwd   13# to R/L Side    CGA 4 laps   17# bwd/ fwd   17# to R/L Side  Going R more difficulty    CGA   TG      HR lv 7 x 20 difficult  Squat x 15      Increased exertion    Gait training    SPC + B UE walking sticks  Patient prefers walking sticks to faciliate increased balance                   PROPRIOCEPTION         SLS 20 sec x 3 - 30\"ea LE with PRN UE support 10 s x 5 toe touch 5 s x 10 s 5 s x 10 s   rockerboard  -       Tandem stand/walk  2x30\" 30\"x2 ea ft fwd                        MODALITIES                               Other Therapeutic Activities/Education:  Focus on the breathing between activity to reduce the SOB and the HR. Educated on gait mechanics and relation between YOKO , strength and balance. Home Exercise Program:  Access Code: [email or web address]  URL: [email or web address]  Date: 10/20/2021  Prepared by: Anyi Cm    Exercises  Squat with Chair Touch - 1 x daily - 7 x weekly - 2 sets - 10 reps  Single Leg Stance with Support - 2 x daily - 7 x weekly - 1 sets - 5 reps - 20 hold  Standing Hip Abduction with Counter Support - 1 x daily - 7 x weekly - 3 sets - 10 reps  New HEP 10/22/21 Bridging and the Hip clams in supine with the BTB. 11/1/21 on compliant with HEP. HO given with all HEP. Manual Treatments:  none      Modalities:  none      Communication with other providers:  Waiting on the Signed POC, sent 10/20/21      Assessment:  (Response towards treatment session) (Pain Rating)  Pt demonstrated GOOD tolerance to tx with increased resistance on FM. Patient has difficulty with keeping feet apart with WB activities and unable to perform HR. L knee buckled a few times  During tx. With some gait instability with turning around. Pt would continue to benefit from skilled therapy interventions to address remaining impairments, improve mobility and strength and progress toward goal completion while reducing risk for re-injury or further decline. Pt rated pain at 0/10 after treatment. Min fatigue    Plan for Next Session:   Pt would benefit from strengthening, endurance training and balance work.       Time In / Time Out:  1301/ 1343       Timed Code/Total Treatment Minutes:   42'/42' 1 TE ( 15') 1 TA (15') 1 neuro (12')       Next Progress Note due:        Plan of Care Interventions:  [x] Therapeutic Exercise  [] Modalities:  [x] Therapeutic Activity     [] Ultrasound  [] Estim  [] Gait Training      [] Cervical Traction [] Lumbar Traction  [x] Neuromuscular Re-education    [] Cold/hotpack [] Iontophoresis   [x] Instruction in HEP      [] Vasopneumatic   [] Dry Needling    [x] Manual Therapy               [] Aquatic Therapy              Electronically signed by:  Senthil Frost PTA, CLT 11/3/2021, 1:04 PM

## 2021-11-04 ENCOUNTER — OFFICE VISIT (OUTPATIENT)
Dept: CARDIOLOGY CLINIC | Age: 81
End: 2021-11-04
Payer: MEDICARE

## 2021-11-04 VITALS
WEIGHT: 229.4 LBS | HEIGHT: 72 IN | HEART RATE: 56 BPM | SYSTOLIC BLOOD PRESSURE: 118 MMHG | DIASTOLIC BLOOD PRESSURE: 70 MMHG | BODY MASS INDEX: 31.07 KG/M2

## 2021-11-04 DIAGNOSIS — R06.02 SHORTNESS OF BREATH: ICD-10-CM

## 2021-11-04 DIAGNOSIS — E78.2 MIXED HYPERLIPIDEMIA: ICD-10-CM

## 2021-11-04 DIAGNOSIS — I25.10 CAD IN NATIVE ARTERY: Primary | ICD-10-CM

## 2021-11-04 DIAGNOSIS — E78.5 DYSLIPIDEMIA: ICD-10-CM

## 2021-11-04 PROCEDURE — 1123F ACP DISCUSS/DSCN MKR DOCD: CPT | Performed by: NURSE PRACTITIONER

## 2021-11-04 PROCEDURE — G8427 DOCREV CUR MEDS BY ELIG CLIN: HCPCS | Performed by: NURSE PRACTITIONER

## 2021-11-04 PROCEDURE — 99214 OFFICE O/P EST MOD 30 MIN: CPT | Performed by: NURSE PRACTITIONER

## 2021-11-04 PROCEDURE — G8484 FLU IMMUNIZE NO ADMIN: HCPCS | Performed by: NURSE PRACTITIONER

## 2021-11-04 PROCEDURE — 1036F TOBACCO NON-USER: CPT | Performed by: NURSE PRACTITIONER

## 2021-11-04 PROCEDURE — 4040F PNEUMOC VAC/ADMIN/RCVD: CPT | Performed by: NURSE PRACTITIONER

## 2021-11-04 PROCEDURE — 93000 ELECTROCARDIOGRAM COMPLETE: CPT | Performed by: NURSE PRACTITIONER

## 2021-11-04 PROCEDURE — G8417 CALC BMI ABV UP PARAM F/U: HCPCS | Performed by: NURSE PRACTITIONER

## 2021-11-04 ASSESSMENT — ENCOUNTER SYMPTOMS
COUGH: 0
SHORTNESS OF BREATH: 0

## 2021-11-04 NOTE — PROGRESS NOTES
[facility] ([facility] PHYSICAL REHABILITATION [place]  [address], [address] [facility],Third Floor  Phone: [phone]    Fax [phone]    Vaibhav Duran MD, Andre Grider MD, Brittanie Beltran MD, MD Munir Villegas MD Dorrine Nettles, MD Rudolfo Huddle, MD Rosann Sparks, MAC Dixon, MAC Cantu, MAC eVga, APRN    CARDIOLOGY  NOTE    2021    Jakub Rosales (:  1940) is a 80 y.o. male,Established patient with Dr. Sparks, here for evaluation of the following chief complaint(s):  6 Month Follow-Up (no new cardiac sx. no future procedures. just started PT because of covid is august )        SUBJECTIVE/OBJECTIVE:    Bennie Gonzales is a 80y.o. year old who has Past medical history as noted below. Mr. Catherine Gowers is doing very well he denies any chest pain but continues to have some shortness of breath. He still is planning on traveling to [facility] but was canceled due to [facility], they are hoping to go in the spring. Rohit  had PCI to LAd and Circ in 2020 . He was having chest pain before cardiac cath  in spite of being on 2 anti anginal RX. After PCI he says that  chest pain is resolved. 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 But after starting on metoprolol and repeating Holter by 2020 PVC burden had come down to 2.4% denies any palpitations  He was actually seen by myself in 2019 at that time he was having a lot of shortness of breath and chest pressure. Stress test was abnormal is planned for cardiac cath he had lab work which revealed severe profound anemia he went to the hospital and was actually anemic.   He required multiple units of blood transfusion work-up revealed his bleeding from his colon he underwent colectomy by Dr. Jojo Gupta. EKG shows frequent PVC ( every 5th beat). He has been tolerating antiplatelet okay since January 2020  Father had MI in his 52's . HE is retired from [employer]. Going to rehab. He is on Plavix now      Review of Systems   Constitutional: Negative for fatigue and fever. Respiratory: Negative for cough and shortness of breath. Cardiovascular: Negative for chest pain, palpitations and leg swelling. Musculoskeletal: Negative for arthralgias and gait problem. Neurological: Negative for dizziness, syncope, weakness, light-headedness and headaches. Vitals:    11/04/21 1101   BP: 118/70   Pulse: 56   Weight: 229 lb 6.4 oz (104.1 kg)   Height: 6' (1.829 m)       Wt Readings from Last 3 Encounters:   11/04/21 229 lb 6.4 oz (104.1 kg)   09/28/21 219 lb (99.3 kg)   05/19/21 236 lb (107 kg)       BP Readings from Last 3 Encounters:   11/04/21 118/70   09/28/21 132/69   08/23/21 139/69       Prior to Admission medications    Medication Sig Start Date End Date Taking?  Authorizing Provider   clopidogrel (PLAVIX) 75 MG tablet TAKE 1 TABLET DAILY 10/19/21   Kayla Liang MD   ranolazine [facility]) 500 MG extended release tablet Take 1 tablet by mouth 2 times daily 10/6/21   Kayla Liang MD   gemfibrozil (LOPID) 600 MG tablet TAKE 1 TABLET TWICE DAILY  BEFORE MEALS 9/28/21   Laurie Quintero,    escitalopram (LEXAPRO) 10 MG tablet TAKE 1 TABLET DAILY 9/7/21   Kayla Liang MD   venlafaxine (EFFEXOR XR) 150 MG extended release capsule TAKE 1 CAPSULE EVERY       MORNING 8/3/21   Kayla Liang MD   finasteride (PROSCAR) 5 MG tablet TAKE 1 TABLET DAILY 7/12/21   Kayla Liang MD   allopurinol (ZYLOPRIM) 300 MG tablet TAKE 1 TABLET DAILY 6/2/21   Laurie Quintero DO   metoprolol tartrate (LOPRESSOR) 50 MG tablet Take 1 tablet by mouth 2 times daily 5/4/21   Brain Gil MD   nitroGLYCERIN (NITROSTAT) 0.4 MG SL tablet Place 1 tablet under the tongue every 5 minutes as needed for Chest pain 5/4/21   Shani Laboy MD   Cyanocobalamin (VITAMIN B 12) 500 MCG TABS Take 1 tablet by mouth daily 1/20/21   Felipe Mendez MD   isosorbide mononitrate (IMDUR) 30 MG extended release tablet Take 1 tablet by mouth daily 1/20/21   Felipe Mendez MD   Multiple Vitamins-Minerals (VITABASIC SENIOR PO) Take by mouth    Historical Provider, MD   Multiple Vitamins-Minerals (OCUVITE EXTRA) TABS Take 1 tablet by mouth daily    Historical Provider, MD       Physical Exam  Vitals reviewed. Constitutional:       General: He is not in acute distress. Appearance: Normal appearance. He is obese. He is not ill-appearing. HENT:      Head: Atraumatic. Neck:      Vascular: No carotid bruit. Cardiovascular:      Rate and Rhythm: Normal rate and regular rhythm. Pulses: Normal pulses. Heart sounds: Normal heart sounds. No murmur heard. Pulmonary:      Effort: Pulmonary effort is normal. No respiratory distress. Breath sounds: Normal breath sounds. Musculoskeletal:         General: No swelling or deformity. Cervical back: Neck supple. No muscular tenderness. Neurological:      Mental Status: He is alert.          Health Maintenance   Topic Date Due    Hepatitis C screen  Never done    COVID-19 Vaccine (1) Never done    Shingles Vaccine (1 of 2) Never done    Annual Wellness Visit (AWV)  05/20/2022    DTaP/Tdap/Td vaccine (2 - Td or Tdap) 02/25/2026    Flu vaccine  Completed    Pneumococcal 65+ years Vaccine  Completed    Hepatitis A vaccine  Aged Out    Hepatitis B vaccine  Aged Out    Hib vaccine  Aged Out    Meningococcal (ACWY) vaccine  Aged Out       Lab Review   Lab Results   Component Value Date    CHOL 129 05/05/2021    CHOL 135 07/21/2020    TRIG 95 05/05/2021    HDL 34 05/05/2021    LDLDIRECT 92 05/05/2021          Stress test  6/24/19      [facility] [place] physician Dr. Arelis Hodges portion of stress test is negative for ischemia by diagnostic criteria.    Completed 4.6 METS and 4 Mins of exercise    Global hypokinesis with EF of 41 %    Mild ischemia of lateral wall involving small to medium size of left    ventricle    Abnormal stress test          Echo 19  Summary   Technically difficult study with poor windows   Left ventricular systolic function is probably low normal with an ejection   fraction of 50 %.  Grade I diastolic dysfunction.   Mild concentric left ventricular hypertrophy.   Sclerotic, but non-stenotic aortic valve.   Doppler evaluation reveals moderate aortic and mild mitral and tricuspid   regurgitation.   No evidence of pericardial effusion.     Cath 2020   Procedure Summary   Indication Abnormal stress test showing lateral ischemia pt on 3   anti anginal meds and having class III angina   Access L Radial   1. PCI to OM ostial lesion reduced 90 % lesion to 0 % using 2.75   X 18 LUISA   2. Mid LAD has 70-80 % lesion reduced to 0 % using 3.0 X 18 LUISA   3. PDA has 80-90 % tandem lesions but small vessel   4. LVEDP was 11 mmHG  Angiographic Findings      Diagnostic Findings      Cardiac Arteries and Lesion Findings     LMCA: Normal, Normal (no stenosis %) and No Angiographicalyl Significant  Disease. widely patent     LAD: Abnormal and Focal stenosis. Mid LAD had calcified focal 70-80 % stenosis  , large Type III LAD , 2 diag are patent       Lesion on Mid LAD: 80% stenosis. Culprit lesion.       Devices used       - Runthrough  cm Wire. Number of passes: 1.       - 3.0 x 18 mm Resolute Integrity Drug Eluting Stent. Diameter: 3 mm.    Length: 18 mm. 1 inflation(s) to a max pressure of: 12 stephanie.     LCx: Ostial OM after Circ Av groove take off has 80-90 % lesion     Lesion on 1st Ob Claire% stenosis. Culprit lesion.       RCA: Diffuse irregularity and Abnormal.Large vessel , Right Dominant system ,  PDA has 80-90 % lesion , there are 2 tandem lesions but PDA is small after  lesions   Interventional procedure  Cardiac lesions  LAD:     Lesion on Mid LAD: 80% stenosis. Culprit lesion.    LCx:     Lesion on 1st Ob Claire% stenosis. Culprit lesion.        Holter 2020  Notes recorded by Joe Mcconnell MD on 2020 at 3:19 PM EST  Abnormal 48-hour Holter monitor with average heart rate of 68 maximum heart rate of 119 minimum heart rate is 48 at 6:25 AM with sinus bradycardia.  No significant A. fib recorded longest R-R interval was 1.7 seconds.  Patient had multiple runs of ventricular ectopy with a total of 16 .2% of PVC burden multifocal PVCs bigeminy's reported,  There were also multiple PAC runs noted.  No atrial fibrillation recorded.     Holter3/  48-hour Holter monitor showing 2.4% PVCs which  is significantly improved compared to previous Holter monitor a month ago.  Lowest heart rate was 34 average was 88 maximum heart rate was 90  Multifocal PVC noted no atrial fibrillation recorded. ASSESSMENT/PLAN:    Abnormal stress test / chest pain  Precordial pain  S/p PCi to LAD and Circ in 2020 for Class III angina on 3 anti anginal .  Continue Ranexa.,imdur and metoprolol  Continue Plavix advised him to stop using aspirin . Especially due to history of GI bleeding in the past and Fuchs retinopathy. He complains of tingling in his fingers to the point that sometimes he cannot close buttons  Dr. Monico Clemente asked him to stop using vitamin E and use vitamin B12 to see if it helps with his neuropathy and he feels that the neuropathy is a little better. It is safe for him to travel to [facility] once Covid crisis is over     Heart palpitations  EF is also low normal at 50% . she had multifocal PVCs, Holter shows 17 % PVC burden  HE snores at night . HE has less energy which improved after his anemia has resolved. After pci and  Increased metoprolol to 50 mg bid the  repeat holter showed reduction in PVC from 16 % to 2%.    Continue metoprolol     Dyslipidemia  All available lab work was reviewed. Lipid panel is acceptable patient was advised to repeat lab work before next visit. Necessary orders were placed, instructions given by myself   Check labs before his next visit. Discussed statins, zetia, and fenofibrate. Goal is prevention. Counseled extensively and medication compliance urged. We discussed that for the  prevention of ASCVD our  goal is aggressive risk modification. Patient is encouraged to exercise even a brisk walk for 30 minutes  at least 3 to 4 times a week           Various goals were discussed and questions answered. Continue current medications. Appropriate prescriptions are addressed and refills ordered. Questions answered and patient verbalizes understanding. Call for any problems, questions, or concerns. Return in about 6 months (around 5/4/2022) for or sonner if needed. An electronic signature was used to authenticate this note.     Electronically signed by MAC Stewart CNP on 11/4/2021 at 11:12 AM

## 2021-11-04 NOTE — LETTER
Ziggy Love Jai Lenz  1940  [id number]    Have you had any Chest Pain that is not new? -   If Yes DO EKG - How does it feel -    How long does the pain last -      How long have you been having the pain -    Did you take a    And did it relieve the pain -      DO EKG IF: Patient has a Heart Rate above 100 or below 40     CAD (Coronary Artery Disease) patient should have one on file every 6 months        Have you had any Shortness of Breath - Yes  If Yes - When on exertion    Have you had any dizziness - No  If Yes DO ORTHOSTATIC BP - when do you feel dizzy    How long does it last .        Sitting wait 5 minutes do supine (laying down) wait 5 minutes then do standing - log each in \"vitals\" area in Epic   Be sure to ask what symptoms they are having if they get dizzy while completing ortho stats such as: room spinning, nausea, etc.    Have you had any palpitations that are not new? - No  If Yes DO EKG - Do you feel your heart   How long does it last - . Is the patient on any of the following medications -   If Yes DO EKG - Needs done every 6 months    Do you have any edema - swelling in No    If Yes - CHECK TO SEE IF THE EDEMA IS PITTING  How long have they been having edema -   If Yes - Have they worn compression stockings   If they have worn compression stockings      Vein \"LEG PROBLEM Questionnaire\"  1. Do you have prominent leg veins? No   2. Do you have any skin discoloration? No  3. Do you have any healed or active sores? No  4. Do you have swelling of the legs? No  5. Do you have a family history of varicose veins? No  6. Does your profession involve pro-longed        standing or heavy lifting? No  7. Have you been fighting overweight problems? no  8. Do you have restless legs? yes  9. Do you have any night time cramps? No  10.  Do you have any of the following in your legs:             When did you have your last labs drawn 5/5/2021  Where did you have them done   What doctor ordered     If we do not have these labs you are retrieve these labs for these providers! Do you have a surgery or procedure scheduled in the near future - no  If Yes- DO EKG  If Yes - Who is the surgery with?    Phone number of physician   Fax number of physician   Type of surgery   GIVE THIS INFORMATION TO ABBEY CORREA     Ask patient if they want to sign up for MyChart if they are not already signed up     Check to see if we have an E-MAIL on file for the patient     Check medication list thoroughly!!! AND RECONCILE OUTSIDE MEDICATIONS  If dose has changed change the entire order not just the [place] At check out add to every patient's \"wrap up\" the following dot phrase AFTERHOURSEDUCATION and ensure we explain this to our patients

## 2021-11-08 ENCOUNTER — HOSPITAL ENCOUNTER (OUTPATIENT)
Dept: PHYSICAL THERAPY | Age: 81
Setting detail: INFUSION SERIES
Discharge: HOME | End: 2021-11-08
Payer: MEDICARE

## 2021-11-08 PROCEDURE — 97110 THERAPEUTIC EXERCISES: CPT

## 2021-11-08 PROCEDURE — 97112 NEUROMUSCULAR REEDUCATION: CPT

## 2021-11-08 NOTE — FLOWSHEET NOTE
Outpatient Physical Therapy  [place]           [x] Phone: [phone]   Fax: [phone]  Oney[facility] herrera           [] Phone: [phone]   Fax: [phone]        Physical Therapy Daily Treatment Note  Date:  2021    Patient Name:  Emi Hughes    :  1940  MRN: [id number]  Restrictions/Precautions:    Diagnosis:   Diagnosis: post Covid syndrome  Date of Injury/Surgery:   Treatment Diagnosis: Treatment Diagnosis: Debility, post Covid syndrome. Insurance/Certification information: PT Insurance Information: [facility]   Referring Physician:  Referring Practitioner: Dr. Joanne Causey  Next Doctor Visit:    Plan of care signed (Y/N):  n   Outcome Measure: 6 min walk 871 ft, LAURENT 44/56     21 6min walk: 1044ft  Visit# / total visits:  7/10  Pain level: 0/10   Goals:     Patient goals : improve strength and endurance. Long term goals  Time Frame for Long term goals : 5 weeks  Long term goal 1: I in home program.  Long term goal 2: sit/stand without use of hands. MET for most chairs 21  Long term goal 3: tolerate standing/walking 20mins      Long term goal 4: up/down steps, reciprocal pattern, minimal difficulty       MET  21     Summary of Evaluation: Assessment: Pt presents with complaints of debility and difficulty balancing following Covid-19 infection in 2021. He has functional weakness at LE's, and shows some deficits with balance testing. He relates falling at least 2x since Covid infection. PMHx:  BTKA, cardiac stents,        Subjective:  Feeling stronger, but found out he still has a hard time getting up from the ground/floor this weekend. Was out in the yard, working on the ground lighting and really struggled to get up. He had to get into quadruped and walk himself back up. Any changes in Ambulatory Summary Sheet? None        Objective:     COVID screening questions were asked and patient attested that there had been no contact or symptoms    See goals/6min walk. Exercises: (No more than 4 columns)   Exercise/Equipment 10/29/21 #4 11/1/21 #5 11/3/21 #6 11/8/21 #7            WARM UP          Nu step Lv7  S13/A11,   5'  97% O2 sat 71 HR Lv7  S19/A11,   5'  97% O2 sat 71 HR Lv7  S19/A11,   5'  97% O2 sat 71 HR    6min walk       TABLE       bridge RSB 10x2 RSB 15x2 RSB 15x2    SLR       clamshells       Sit/stands from elevated surface 10 x 2 21\"  98% O2 sat 74 HR 19\"  15x2 19\"  15x2 No UE 18\" 2x10             STANDING       Step up/downs 6\" step fwd    x 10 ea. UE Prn in // bars  Shuffles between steps  X 10 lat LOB throughout  89-90% O2 sat 61 HR 6\" step fwd    2 x 10 ea. UE Prn in // bars  Shuffles between steps  X 10 lat LOB throughout  89-90% O2 sat 61 HR 6\" step fwd    2 x 10 ea. UE Prn in // bars  Shuffles between steps  X 10  2x left LOB Knee buckle 6\" step x10   Hip abd       Hip ext       Heel raises w UE support. unable    FM walkout all direction 17# bwd  13# fwd   13# to R Side  10 # to L side  CGA  95% O2 sat 91 HR 17# bwd/ fwd   13# to R/L Side    CGA 4 laps   17# bwd/ fwd   17# to R/L Side  Going R more difficulty    CGA    TG   HR lv 7 x 20 difficult  Squat x 15      Increased exertion     Gait training SPC + B UE walking sticks  Patient prefers walking sticks to faciliate increased balance        Side stepping       25ft x 4 each way   PROPRIOCEPTION       SLS 10 s x 5 toe touch 5 s x 10 s 5 s x 10 s 20 sec x 2 each LE UE assist    rockerboard    Fingertip support:  1 min each fwd/back and s/s balance. Tandem stand/walk                     MODALITIES                         Other Therapeutic Activities/Education:  Focus on the breathing between activity to reduce the SOB and the HR. Educated on gait mechanics and relation between YOKO , strength and balance. Home Exercise Program:  Access Code: [email or web address]  URL: [email or web address]  Date: 10/20/2021  Prepared by: Denise Score    Exercises  Squat with Chair Touch - 1 x daily - 7 x weekly

## 2021-11-10 ENCOUNTER — HOSPITAL ENCOUNTER (OUTPATIENT)
Dept: PHYSICAL THERAPY | Age: 81
Setting detail: INFUSION SERIES
Discharge: HOME | End: 2021-11-10
Payer: MEDICARE

## 2021-11-10 PROCEDURE — 97112 NEUROMUSCULAR REEDUCATION: CPT

## 2021-11-10 PROCEDURE — 97530 THERAPEUTIC ACTIVITIES: CPT

## 2021-11-10 PROCEDURE — 97110 THERAPEUTIC EXERCISES: CPT

## 2021-11-10 NOTE — FLOWSHEET NOTE
Outpatient Physical Therapy  [place]           [x] Phone: [phone]   Fax: [phone]  [facility]           [] Phone: [phone]   Fax: [phone]        Physical Therapy Daily Treatment Note  Date:  11/10/2021    Patient Name:  Jakub Rosales    :  1940  MRN: [id number]  Restrictions/Precautions:    Diagnosis:   Diagnosis: post Covid syndrome  Date of Injury/Surgery:   Treatment Diagnosis: Treatment Diagnosis: Debility, post Covid syndrome. Insurance/Certification information: PT Insurance Information: [facility]   Referring Physician:  Referring Practitioner: Dr. Lori Cosme  Next Doctor Visit:    Plan of care signed (Y/N):  n   Outcome Measure: 6 min walk 871 ft, LAURENT 44/56     21 6min walk: 1044ft  Visit# / total visits:  8/10  Pain level: 0/10   Goals:     Patient goals : improve strength and endurance. Long term goals  Time Frame for Long term goals : 5 weeks  Long term goal 1: I in home program.  Long term goal 2: sit/stand without use of hands. MET for most chairs 21  Long term goal 3: tolerate standing/walking 20mins      Long term goal 4: up/down steps, reciprocal pattern, minimal difficulty       MET  21     Summary of Evaluation: Assessment: Pt presents with complaints of debility and difficulty balancing following Covid-19 infection in 2021. He has functional weakness at LE's, and shows some deficits with balance testing. He relates falling at least 2x since Covid infection. PMHx:  BTKA, cardiac stents,        Subjective:  Pt stated he felt like he had a workout last tx. Any changes in Ambulatory Summary Sheet? None        Objective:     COVID screening questions were asked and patient attested that there had been no contact or symptoms    R hip weakness noted with step back down x1 LOB   Decreased bilat gastroc strength.  Unable to perform HR in standing    Exercises: (No more than 4 columns)   Exercise/Equipment 10/29/21 #4 21 #5 11/3/21 #6 21 #7 11/10/21 #8             WARM UP           Nu step Lv7  S13/A11,   5'  97% O2 sat 71 HR Lv7  S19/A11,   5'  97% O2 sat 71 HR Lv7  S19/A11,   5'  97% O2 sat 71 HR     6min walk        TABLE        bridge RSB 10x2 RSB 15x2 RSB 15x2     SLR        clamshells        Sit/stands from elevated surface 10 x 2 21\"  98% O2 sat 74 HR 19\"  15x2 19\"  15x2 No UE 18\" 2x10 18\" 2 x 10              STANDING        Step up/downs 6\" step fwd    x 10 ea. UE Prn in // bars  Shuffles between steps  X 10 lat LOB throughout  89-90% O2 sat 61 HR 6\" step fwd    2 x 10 ea. UE Prn in // bars  Shuffles between steps  X 10 lat LOB throughout  89-90% O2 sat 61 HR 6\" step fwd    2 x 10 ea. UE Prn in // bars  Shuffles between steps  X 10  2x left LOB Knee buckle  6\" step  x10    One large LOB with last L LE step down with PT assist  To regain midline. Need for seated RB   + x10   PF w TB     X 10 ea. Blue TB   Hip ext        Heel raises w UE support. unable     FM walkout all direction 17# bwd  13# fwd   13# to R Side  10 # to L side  CGA  95% O2 sat 91 HR 17# bwd/ fwd   13# to R/L Side    CGA 4 laps   17# bwd/ fwd   17# to R/L Side  Going R more difficulty    CGA     TG   HR lv 7 x 20 difficult  Squat x 15      Increased exertion   HR lv 7 x 20 difficult  HR lv 5 x 20 less difficult      Cued for  Increased exertion    Gait training SPC + B UE walking sticks  Patient prefers walking sticks to faciliate increased balance         Side stepping       25ft x 4 each way 50 ft x1   PROPRIOCEPTION        SLS 10 s x 5 toe touch 5 s x 10 s 5 s x 10 s 20 sec x 2 each LE UE assist  20 sec x 2 each LE UE assist    rockerboard    Fingertip support:  1 min each fwd/back and s/s balance. Fingertip support:  1 min each fwd/back and s/s balance. Tandem stand/walk        squats     X 15 no UE           MODALITIES                            Other Therapeutic Activities/Education:  Focus on the breathing between activity to reduce the SOB and the HR. Educated on gait mechanics and relation between YOKO , strength and balance. Home Exercise Program:  Access Code: [email or web address]  URL: [email or web address]  Date: 10/20/2021  Prepared by: Luisa Mcgill    Exercises  Squat with Chair Touch - 1 x daily - 7 x weekly - 2 sets - 10 reps  Single Leg Stance with Support - 2 x daily - 7 x weekly - 1 sets - 5 reps - 20 hold  Standing Hip Abduction with Counter Support - 1 x daily - 7 x weekly - 3 sets - 10 reps  New HEP 10/22/21 Bridging and the Hip clams in supine with the BTB. 11/1/21 on compliant with HEP. HO given with all HEP. Manual Treatments:  none      Modalities:  none      Communication with other providers:  Waiting on the Signed POC, sent 10/20/21      Assessment:  (Response towards treatment session) (Pain Rating)  Pt demonstrated GOOD tolerance to tx with exercises with one LOB with step back down. Hip and calf weakness noted. Pt would continue to benefit from skilled therapy interventions to address remaining impairments, improve mobility and strength and progress toward goal completion while reducing risk for re-injury or further decline. Pt rated pain at 0/10 after treatment. Min fatigue    Plan for Next Session:   Pt would benefit from strengthening, endurance training and balance work.       Time In / Time Out:  1020 pt late/1100       Timed Code/Total Treatment Minutes:   40/40 1TE 1 TA 1NR      Next Progress Note due:        Plan of Care Interventions:  [x] Therapeutic Exercise  [] Modalities:  [x] Therapeutic Activity     [] Ultrasound  [] Estim  [] Gait Training      [] Cervical Traction [] Lumbar Traction  [x] Neuromuscular Re-education    [] Cold/hotpack [] Iontophoresis   [x] Instruction in HEP      [] Vasopneumatic   [] Dry Needling    [x] Manual Therapy               [] Aquatic Therapy              Electronically signed by:  Rosangela Wylie PTA, CLT 11/10/2021, 10:14 AM

## 2021-11-15 ENCOUNTER — HOSPITAL ENCOUNTER (OUTPATIENT)
Dept: PHYSICAL THERAPY | Age: 81
Setting detail: INFUSION SERIES
Discharge: HOME | End: 2021-11-15
Payer: MEDICARE

## 2021-11-15 PROCEDURE — 97110 THERAPEUTIC EXERCISES: CPT

## 2021-11-15 PROCEDURE — 97530 THERAPEUTIC ACTIVITIES: CPT

## 2021-11-15 PROCEDURE — 97112 NEUROMUSCULAR REEDUCATION: CPT

## 2021-11-15 NOTE — FLOWSHEET NOTE
Outpatient Physical Therapy  [place]           [x] Phone: [phone]   Fax: [phone]  [place]           [] Phone: [phone]   Fax: [phone]        Physical Therapy Daily Treatment Note  Date:  11/15/2021    Patient Name:  Sobia Patricia    :  1940  MRN: [id number]  Restrictions/Precautions:    Diagnosis:   Diagnosis: post Covid syndrome  Date of Injury/Surgery:   Treatment Diagnosis: Treatment Diagnosis: Debility, post Covid syndrome. Insurance/Certification information: PT Insurance Information: [facility]   Referring Physician:  Referring Practitioner: Dr. Margarito Ulloa  Next Doctor Visit:    Plan of care signed (Y/N):  n   Outcome Measure: 6 min walk 871 ft, LAURENT 44/56     21 6min walk: 1044ft  Visit# / total visits:  9/10  Pain level: 0/10   Goals:     Patient goals : improve strength and endurance. Long term goals  Time Frame for Long term goals : 5 weeks  Long term goal 1: I in home program.  Long term goal 2: sit/stand without use of hands. MET for most chairs 21  Long term goal 3: tolerate standing/walking 20mins      Long term goal 4: up/down steps, reciprocal pattern, minimal difficulty       MET  21     Summary of Evaluation: Assessment: Pt presents with complaints of debility and difficulty balancing following Covid-19 infection in 2021. He has functional weakness at LE's, and shows some deficits with balance testing. He relates falling at least 2x since Covid infection. PMHx:  BTKA, cardiac stents,        Subjective:  Pt stated that he did not have any pain today. Pt stated that he still has balance issues. Any changes in Ambulatory Summary Sheet?   None        Objective:     COVID screening questions were asked and patient attested that there had been no contact or symptoms    Needed CGA during balance activities due to unsteadiness     Exercises: (No more than 4 columns)   Exercise/Equipment 11/3/21 #6 21 #7 11/10/21 #8 11/15/2021 #9            WARM UP          Nu step Lv7  S19/A11,   5'  97% O2 sat 71 HR      6min walk       TABLE       bridge RSB 15x2      SLR       clamshells       Sit/stands from elevated surface 19\"  15x2 No UE 18\" 2x10 18\" 2 x 10 18\" 10x2             STANDING       Step up/downs 6\" step fwd    2 x 10 ea. UE Prn in // bars  Shuffles between steps  X 10  2x left LOB Knee buckle  6\" step  x10    One large LOB with last L LE step down with PT assist  To regain midline. Need for seated RB   + x10 6\" 10x ea w/ SBA/ CGA for safety   PF w TB   X 10 ea. Blue TB    Hip ext       Heel raises w UE support. unable      FM walkout all direction 4 laps   17# bwd/ fwd   17# to R/L Side  Going R more difficulty    CGA   5x ea dir  13# ( 4 way) with CGA for safety   TG HR lv 7 x 20 difficult  Squat x 15      Increased exertion   HR lv 7 x 20 difficult  HR lv 5 x 20 less difficult      Cued for  Increased exertion  HR Level 6  10x2   Gait training         Side stepping     25ft x 4 each way 50 ft x1 50 ft x2    PROPRIOCEPTION       SLS 5 s x 10 s 20 sec x 2 each LE UE assist  20 sec x 2 each LE UE assist     rockerboard  Fingertip support:  1 min each fwd/back and s/s balance. Fingertip support:  1 min each fwd/back and s/s balance. Fingertip support:  1 min each fwd/back and s/s balance. Tandem stand/walk       squats   X 15 no UE 10x2  No UE          MODALITIES                         Other Therapeutic Activities/Education:  Focus on the breathing between activity to reduce the SOB and the HR. Educated on gait mechanics and relation between YOKO , strength and balance. Home Exercise Program:  Access Code: [email or web address]  URL: [email or web address]  Date: 10/20/2021  Prepared by: Jeovany Thomson    Exercises  Squat with Chair Touch - 1 x daily - 7 x weekly - 2 sets - 10 reps  Single Leg Stance with Support - 2 x daily - 7 x weekly - 1 sets - 5 reps - 20 hold  Standing Hip Abduction with Counter Support - 1 x daily - 7 x weekly - 3 sets - 10 reps  New HEP 10/22/21 Bridging and the Hip clams in supine with the BTB. 11/1/21 on compliant with HEP. HO given with all HEP. Manual Treatments:  none      Modalities:  none      Communication with other providers:  Waiting on the Signed POC, sent 10/20/21      Assessment:  (Response towards treatment session) (Pain Rating) Pt tolerated treatment fair today. Pt was able to perform all of his exercises and balance activities with no falls just LOB with CGA for correction. Pt rated pain at 0/10 after treatment with some reported fatigue. Plan for Next Session:   Pt would benefit from strengthening, endurance training and balance work.       Time In / Time Out: 1037/1115        Timed Code/Total Treatment Minutes:   38'/38'  1 TE(15')  1 TA (15')  1 neuro (8')       Next Progress Note due:        Plan of Care Interventions:  [x] Therapeutic Exercise  [] Modalities:  [x] Therapeutic Activity     [] Ultrasound  [] Estim  [] Gait Training      [] Cervical Traction [] Lumbar Traction  [x] Neuromuscular Re-education    [] Cold/hotpack [] Iontophoresis   [x] Instruction in HEP      [] Vasopneumatic   [] Dry Needling    [x] Manual Therapy               [] Aquatic Therapy              Electronically signed Jan Harmon 11/15/2021, 10:30 AM       11/15/2021,3:40 PM

## 2021-11-17 ENCOUNTER — HOSPITAL ENCOUNTER (OUTPATIENT)
Dept: PHYSICAL THERAPY | Age: 81
Setting detail: INFUSION SERIES
Discharge: HOME | End: 2021-11-17
Payer: MEDICARE

## 2021-11-17 PROCEDURE — 97112 NEUROMUSCULAR REEDUCATION: CPT

## 2021-11-17 PROCEDURE — 97110 THERAPEUTIC EXERCISES: CPT

## 2021-11-17 NOTE — FLOWSHEET NOTE
Outpatient Physical Therapy  [place]           [x] Phone: [phone]   Fax: [phone]  Oney[place] herrera           [] Phone: [phone]   Fax: [phone]        Physical Therapy Daily Treatment Note  Date:  2021    Patient Name:  Navneet Maria    :  1940  MRN: [id number]  Restrictions/Precautions:    Diagnosis:   Diagnosis: post Covid syndrome  Date of Injury/Surgery:   Treatment Diagnosis: Treatment Diagnosis: Debility, post Covid syndrome. Insurance/Certification information: PT Insurance Information: [facility]   Referring Physician:  Referring Practitioner: Dr. Shmuel De Anda  Next Doctor Visit:    Plan of care signed (Y/N):  n   Outcome Measure: 6 min walk 871 ft, MEHRAN 44/56     21 6min walk: 1044ft      21 MEHRAN 49  Visit# / total visits:  10/10  Pain level: 0/10   Goals:     Patient goals : improve strength and endurance. Long term goals  Time Frame for Long term goals : 5 weeks  Long term goal 1: I in home program.      MET  Long term goal 2: sit/stand without use of hands. MET for most chairs 21  Long term goal 3: tolerate standing/walking 20mins    At least 20mins  Long term goal 4: up/down steps, reciprocal pattern, minimal difficulty       MET  21     Summary of Evaluation: Assessment: Pt presents with complaints of debility and difficulty balancing following Covid-19 infection in 2021. He has functional weakness at LE's, and shows some deficits with balance testing. He relates falling at least 2x since Covid infection. PMHx:  BTKA, cardiac stents,        Subjective: Any changes in Ambulatory Summary Sheet?   None        Objective:     COVID screening questions were asked and patient attested that there had been no contact or symptoms    Needed CGA during balance activities due to unsteadiness     Exercises: (No more than 4 columns)   Exercise/Equipment 11/10/21 #8 11/15/2021 #9 21 #10           WARM UP         Nu step   lvl 6 x 6 mins   6min walk TABLE      bridge      SLR      clamshells      Sit/stands from elevated surface 18\" 2 x 10 18\" 10x2             STANDING      Step up/downs 6\" step  x10    One large LOB with last L LE step down with PT assist  To regain midline. Need for seated RB   + x10 6\" 10x ea w/ SBA/ CGA for safety    PF w TB X 10 ea. Blue TB     Hip ext      Heel raises w UE support. FM walkout all direction  5x ea dir  13# ( 4 way) with CGA for safety    TG HR lv 7 x 20 difficult  HR lv 5 x 20 less difficult      Cued for  Increased exertion  HR Level 6  10x2 lvl 10 2x15   Gait training        Side stepping    50 ft x1 50 ft x2  50 ft each way   PROPRIOCEPTION      SLS 20 sec x 2 each LE UE assist   20 sec x 2 each LE UE assist    rockerboard Fingertip support:  1 min each fwd/back and s/s balance. Fingertip support:  1 min each fwd/back and s/s balance. 30sec hold and rock x 2 fwd/back and s/s   Tandem stand/walk   50ft HHA x 2   squats X 15 no UE 10x2  No UE x10         MODALITIES                      Other Therapeutic Activities/Education:  Focus on the breathing between activity to reduce the SOB and the HR. Educated on gait mechanics and relation between YOKO , strength and balance. Home Exercise Program:  Access Code: [email or web address]  URL: [email or web address]  Date: 10/20/2021  Prepared by: Jose Pérez    Exercises  Squat with Chair Touch - 1 x daily - 7 x weekly - 2 sets - 10 reps  Single Leg Stance with Support - 2 x daily - 7 x weekly - 1 sets - 5 reps - 20 hold  Standing Hip Abduction with Counter Support - 1 x daily - 7 x weekly - 3 sets - 10 reps  New HEP 10/22/21 Bridging and the Hip clams in supine with the BTB. 11/1/21 on compliant with HEP. HO given with all HEP. Manual Treatments:  none      Modalities:  none      Communication with other providers:  Waiting on the Signed POC, sent 10/20/21      Assessment:  (Response towards treatment session) (Pain Rating) Pt tolerated treatment  Well  today.

## 2021-12-13 DIAGNOSIS — M10.9 GOUT, UNSPECIFIED CAUSE, UNSPECIFIED CHRONICITY, UNSPECIFIED SITE: ICD-10-CM

## 2021-12-13 DIAGNOSIS — F33.9 EPISODE OF RECURRENT MAJOR DEPRESSIVE DISORDER, UNSPECIFIED DEPRESSION EPISODE SEVERITY (HCC): ICD-10-CM

## 2021-12-14 RX ORDER — ESCITALOPRAM OXALATE 10 MG/1
TABLET ORAL
Qty: 90 TABLET | Refills: 0 | Status: SHIPPED | OUTPATIENT
Start: 2021-12-14 | End: 2022-03-07

## 2021-12-14 RX ORDER — ALLOPURINOL 300 MG/1
TABLET ORAL
Qty: 90 TABLET | Refills: 1 | Status: SHIPPED | OUTPATIENT
Start: 2021-12-14 | End: 2022-05-20

## 2021-12-20 RX ORDER — RANOLAZINE 500 MG/1
TABLET, EXTENDED RELEASE ORAL
Qty: 90 TABLET | Refills: 3 | OUTPATIENT
Start: 2021-12-20

## 2021-12-29 RX ORDER — RANOLAZINE 500 MG/1
500 TABLET, EXTENDED RELEASE ORAL 2 TIMES DAILY
Qty: 90 TABLET | Refills: 1 | Status: SHIPPED | OUTPATIENT
Start: 2021-12-29 | End: 2022-03-01 | Stop reason: SDUPTHER

## 2022-01-24 DIAGNOSIS — E78.5 HYPERLIPIDEMIA, UNSPECIFIED HYPERLIPIDEMIA TYPE: ICD-10-CM

## 2022-01-25 RX ORDER — CLOPIDOGREL BISULFATE 75 MG/1
TABLET ORAL
Qty: 90 TABLET | Refills: 1 | Status: SHIPPED | OUTPATIENT
Start: 2022-01-25 | End: 2022-07-21

## 2022-01-25 RX ORDER — GEMFIBROZIL 600 MG/1
TABLET, FILM COATED ORAL
Qty: 180 TABLET | Refills: 1 | Status: SHIPPED | OUTPATIENT
Start: 2022-01-25 | End: 2022-09-22

## 2022-01-25 RX ORDER — FINASTERIDE 5 MG/1
TABLET, FILM COATED ORAL
Qty: 90 TABLET | Refills: 1 | Status: SHIPPED | OUTPATIENT
Start: 2022-01-25 | End: 2022-05-04 | Stop reason: SDUPTHER

## 2022-01-28 ENCOUNTER — OFFICE VISIT (OUTPATIENT)
Dept: FAMILY MEDICINE CLINIC | Age: 82
End: 2022-01-28
Payer: MEDICARE

## 2022-01-28 VITALS
OXYGEN SATURATION: 98 % | HEART RATE: 54 BPM | WEIGHT: 234.3 LBS | HEIGHT: 73 IN | DIASTOLIC BLOOD PRESSURE: 62 MMHG | BODY MASS INDEX: 31.05 KG/M2 | SYSTOLIC BLOOD PRESSURE: 100 MMHG

## 2022-01-28 DIAGNOSIS — I25.10 CAD IN NATIVE ARTERY: ICD-10-CM

## 2022-01-28 DIAGNOSIS — M15.9 OSTEOARTHRITIS OF MULTIPLE JOINTS, UNSPECIFIED OSTEOARTHRITIS TYPE: ICD-10-CM

## 2022-01-28 DIAGNOSIS — F33.9 EPISODE OF RECURRENT MAJOR DEPRESSIVE DISORDER, UNSPECIFIED DEPRESSION EPISODE SEVERITY (HCC): Primary | ICD-10-CM

## 2022-01-28 DIAGNOSIS — N40.0 BENIGN PROSTATIC HYPERPLASIA WITHOUT LOWER URINARY TRACT SYMPTOMS: ICD-10-CM

## 2022-01-28 DIAGNOSIS — M10.9 GOUT, UNSPECIFIED CAUSE, UNSPECIFIED CHRONICITY, UNSPECIFIED SITE: ICD-10-CM

## 2022-01-28 PROCEDURE — G8427 DOCREV CUR MEDS BY ELIG CLIN: HCPCS | Performed by: FAMILY MEDICINE

## 2022-01-28 PROCEDURE — 4040F PNEUMOC VAC/ADMIN/RCVD: CPT | Performed by: FAMILY MEDICINE

## 2022-01-28 PROCEDURE — G8417 CALC BMI ABV UP PARAM F/U: HCPCS | Performed by: FAMILY MEDICINE

## 2022-01-28 PROCEDURE — 1036F TOBACCO NON-USER: CPT | Performed by: FAMILY MEDICINE

## 2022-01-28 PROCEDURE — 1123F ACP DISCUSS/DSCN MKR DOCD: CPT | Performed by: FAMILY MEDICINE

## 2022-01-28 PROCEDURE — 99213 OFFICE O/P EST LOW 20 MIN: CPT | Performed by: FAMILY MEDICINE

## 2022-01-28 PROCEDURE — G8484 FLU IMMUNIZE NO ADMIN: HCPCS | Performed by: FAMILY MEDICINE

## 2022-01-28 RX ORDER — ISOSORBIDE MONONITRATE 30 MG/1
30 TABLET, EXTENDED RELEASE ORAL DAILY
Qty: 90 TABLET | Refills: 1 | Status: SHIPPED | OUTPATIENT
Start: 2022-01-28 | End: 2022-05-04 | Stop reason: SDUPTHER

## 2022-01-28 ASSESSMENT — ENCOUNTER SYMPTOMS
BLOOD IN STOOL: 0
CHEST TIGHTNESS: 0
VOMITING: 0
WHEEZING: 0
ABDOMINAL PAIN: 0
EYE PAIN: 0
TROUBLE SWALLOWING: 0
SHORTNESS OF BREATH: 0
DIARRHEA: 0
NAUSEA: 0

## 2022-01-28 NOTE — PROGRESS NOTES
1/28/2022    Rohit Carrillo    Chief Complaint   Patient presents with    3 Month Follow-Up     4 mo/ no complaints       HPI  History was obtained from the patient. Crystal Abbott is a 80 y.o. male who presents today with follow-up on depression, gout, osteoarthritis, BPH, and coronary disease. Fatigue is actually feeling pretty well currently mood is reasonable with current medications he has had no gout flares. Arthritic symptoms are persistent but not flared. Sees cardiology has not had any chest pain or anginal symptoms. His BPH symptoms are doing fairly well. His last labs were in May 2021 these include a CBC, lipid panel and CMP and were good. Immunization including Covid shots and booster are up-to-date except could consider Shingrix shots. He does get out a bit with friends and Bible study is anxious to be able to travel again before too long. Med list was reviewed and I think at this point he should stay on current regimen. Rusty Hartley REVIEW OF SYMPTOMS    Review of Systems   Constitutional: Negative for activity change and fatigue. HENT: Negative for congestion, hearing loss, mouth sores and trouble swallowing. Eyes: Negative for pain and visual disturbance. Respiratory: Negative for chest tightness, shortness of breath and wheezing. Cardiovascular: Negative for chest pain and palpitations. Known history of coronary disease   Gastrointestinal: Negative for abdominal pain, blood in stool, diarrhea, nausea and vomiting. Endocrine: Negative for polydipsia and polyuria. Genitourinary: Positive for difficulty urinating. Negative for dysuria, frequency and urgency. Musculoskeletal: Positive for arthralgias. Negative for gait problem and neck stiffness. Skin: Negative for rash. Allergic/Immunologic: Negative for environmental allergies. Neurological: Negative for dizziness, seizures, speech difficulty and weakness. Hematological: Does not bruise/bleed easily.    Psychiatric/Behavioral: Positive for dysphoric mood. Negative for agitation, confusion, hallucinations and suicidal ideas. The patient is not nervous/anxious. PAST MEDICAL HISTORY  Past Medical History:   Diagnosis Date    Decreased hearing 2019    Gout 2019    H/O cardiovascular stress test 2019    EF 41%,  Mild ischemia of lateral wall involving small to medium size of left ventricle.  H/O echocardiogram 19    EF 48, Mod AR, Mild MR & TR    H/O percutaneous transluminal coronary angioplasty 2020    PCI to OM & Mid LAD,  PDA has 80-90 % tandem lesions but small vessel.  Hyperlipidemia 2019    Nocturia 2019    Osteoarthritis 2019    Restless leg 2019       FAMILY HISTORY  Family History   Problem Relation Age of Onset    Cancer Mother        SOCIAL HISTORY  Social History     Socioeconomic History    Marital status:      Spouse name: Not on file    Number of children: Not on file    Years of education: Not on file    Highest education level: Not on file   Occupational History    Not on file   Tobacco Use    Smoking status: Former Smoker     Packs/day: 2.00     Years: 25.00     Pack years: 50.00     Types: Cigarettes     Start date: 1960     Quit date:      Years since quittin.0    Smokeless tobacco: Never Used   Substance and Sexual Activity    Alcohol use:  Yes     Alcohol/week: 1.0 standard drink     Types: 1 Cans of beer per week     Comment: rarely, 1-2 cups of coffee     Drug use: Not on file    Sexual activity: Not on file   Other Topics Concern    Not on file   Social History Narrative    Not on file     Social Determinants of Health     Financial Resource Strain: Low Risk     Difficulty of Paying Living Expenses: Not hard at all   Food Insecurity: No Food Insecurity    Worried About Running Out of Food in the Last Year: Never true    Pebbles of Food in the Last Year: Never true   Transportation Needs:     Lack of Transportation (Medical): Not on file    Lack of Transportation (Non-Medical):  Not on file   Physical Activity:     Days of Exercise per Week: Not on file    Minutes of Exercise per Session: Not on file   Stress:     Feeling of Stress : Not on file   Social Connections:     Frequency of Communication with Friends and Family: Not on file    Frequency of Social Gatherings with Friends and Family: Not on file    Attends Hinduism Services: Not on file    Active Member of Clubs or Organizations: Not on file    Attends Club or Organization Meetings: Not on file    Marital Status: Not on file   Intimate Partner Violence:     Fear of Current or Ex-Partner: Not on file    Emotionally Abused: Not on file    Physically Abused: Not on file    Sexually Abused: Not on file   Housing Stability:     Unable to Pay for Housing in the Last Year: Not on file    Number of [place] in the Last Year: Not on file    Unstable Housing in the Last Year: Not on file        SURGICAL HISTORY  Past Surgical History:   Procedure Laterality Date    BLEPHAROPLASTY  2010    CHOLECYSTECTOMY  2001   [address] [facility] [address] COLONOSCOPY N/A 7/4/2019    COLONOSCOPY DIAGNOSTIC performed by Sunny Chinchilla MD at [address] Left 2011   [address] [facility] 12 Right 2011   [address] ARTHROSCOPY  2002   [address] N/A 7/8/2019    LAPAROTOMY EXPLORATORY RIGHT ILEOCOLECTOMY performed by Andrew Ramirez MD at [address] [place] [address] 7/4/2019    EGD DIAGNOSTIC ONLY performed by Sunny Chinchilla MD at [facility]  Current Outpatient Medications   Medication Sig Dispense Refill    isosorbide mononitrate (IMDUR) 30 MG extended release tablet Take 1 tablet by mouth daily 90 tablet 1    clopidogrel (PLAVIX) 75 MG tablet TAKE 1 TABLET DAILY 90 tablet 1    gemfibrozil (LOPID) 600 MG tablet TAKE 1 TABLET TWICE DAILY  BEFORE MEALS 180 tablet 1    finasteride (PROSCAR) 5 MG tablet Take 1 tablet daily 90 tablet 1    ranolazine (RANEXA) 500 MG extended release tablet Take 1 tablet by mouth 2 times daily 90 tablet 1    allopurinol (ZYLOPRIM) 300 MG tablet TAKE 1 TABLET DAILY 90 tablet 1    escitalopram (LEXAPRO) 10 MG tablet TAKE 1 TABLET DAILY 90 tablet 0    venlafaxine (EFFEXOR XR) 150 MG extended release capsule TAKE 1 CAPSULE EVERY       MORNING 90 capsule 1    metoprolol tartrate (LOPRESSOR) 50 MG tablet Take 1 tablet by mouth 2 times daily 180 tablet 3    nitroGLYCERIN (NITROSTAT) 0.4 MG SL tablet Place 1 tablet under the tongue every 5 minutes as needed for Chest pain 25 tablet 0    Cyanocobalamin (VITAMIN B 12) 500 MCG TABS Take 1 tablet by mouth daily 30 tablet 3    Multiple Vitamins-Minerals (VITABASIC SENIOR PO) Take by mouth      Multiple Vitamins-Minerals (OCUVITE EXTRA) TABS Take 1 tablet by mouth daily       No current facility-administered medications for this visit. ALLERGIES  No Known Allergies    PHYSICAL EXAM    /62 (Site: Left Upper Arm, Position: Sitting, Cuff Size: Medium Adult)   Pulse 54   Ht 6' 1\" (1.854 m)   Wt 234 lb 4.8 oz (106.3 kg)   SpO2 98%   BMI 30.91 kg/m²     Physical Exam  Vitals and nursing note reviewed. Constitutional:       General: He is not in acute distress. Appearance: He is well-developed. He is not ill-appearing or toxic-appearing. HENT:      Head: Normocephalic and atraumatic. Nose: Nose normal.      Mouth/Throat:      Mouth: Mucous membranes are moist.   Eyes:      Pupils: Pupils are equal, round, and reactive to light. Cardiovascular:      Rate and Rhythm: Normal rate and regular rhythm. Heart sounds: Normal heart sounds. No murmur heard. No gallop. Pulmonary:      Effort: Pulmonary effort is normal. No respiratory distress. Breath sounds: Normal breath sounds. No wheezing, rhonchi or rales. Abdominal:      Palpations: Abdomen is soft.    Musculoskeletal: General: No swelling or deformity. Normal range of motion. Cervical back: Normal range of motion and neck supple. No rigidity. Lymphadenopathy:      Cervical: No cervical adenopathy. Skin:     General: Skin is warm and dry. Coloration: Skin is not jaundiced. Findings: No bruising. Neurological:      General: No focal deficit present. Mental Status: He is alert and oriented to person, place, and time. Mental status is at baseline. Cranial Nerves: No cranial nerve deficit. Motor: No weakness. Psychiatric:         Mood and Affect: Mood normal.         Behavior: Behavior normal.         Thought Content: Thought content normal.         ASSESSMENT & PLAN     Diagnosis Orders   1. Episode of recurrent major depressive disorder, unspecified depression episode severity ([facility] [address]     2. Gout, unspecified cause, unspecified chronicity, unspecified site     3. Osteoarthritis of multiple joints, unspecified osteoarthritis type     4. CAD in native artery     5. Benign prostatic hyperplasia without lower urinary tract symptoms     Med list reviewed and refills provided as needed. Questions were answered and patient was encouraged to continue with healthy lifestyle work on increasing exercise and social distancing as needed. Keep appointments as directed by a subspecialist.  Consider getting Shingrix shot at pharmacy. Call with questions or problems. Would consider labs with next visit. Return in about 4 months (around 5/28/2022).          Electronically signed by Anahy Paige MD on 1/28/2022

## 2022-02-28 NOTE — TELEPHONE ENCOUNTER
RANOLAZINE-PT NEEDS A 2 WK @ [facility] TO HOLD HIM OVER TIL MAIL ORDER COMES    LV-1/28  NA-5/31    MULTI PHARM

## 2022-03-01 RX ORDER — RANOLAZINE 500 MG/1
500 TABLET, EXTENDED RELEASE ORAL 2 TIMES DAILY
Qty: 14 TABLET | Refills: 0 | Status: SHIPPED | OUTPATIENT
Start: 2022-03-01 | End: 2022-04-13

## 2022-03-01 RX ORDER — METOPROLOL TARTRATE 50 MG/1
50 TABLET, FILM COATED ORAL 2 TIMES DAILY
Qty: 180 TABLET | Refills: 3 | Status: SHIPPED | OUTPATIENT
Start: 2022-03-01 | End: 2022-07-12

## 2022-03-03 DIAGNOSIS — F33.9 EPISODE OF RECURRENT MAJOR DEPRESSIVE DISORDER, UNSPECIFIED DEPRESSION EPISODE SEVERITY (HCC): ICD-10-CM

## 2022-03-04 RX ORDER — VENLAFAXINE HYDROCHLORIDE 150 MG/1
CAPSULE, EXTENDED RELEASE ORAL
Qty: 90 CAPSULE | Refills: 1 | Status: SHIPPED | OUTPATIENT
Start: 2022-03-04 | End: 2022-07-20

## 2022-03-06 DIAGNOSIS — F33.9 EPISODE OF RECURRENT MAJOR DEPRESSIVE DISORDER, UNSPECIFIED DEPRESSION EPISODE SEVERITY (HCC): ICD-10-CM

## 2022-03-07 RX ORDER — ESCITALOPRAM OXALATE 10 MG/1
TABLET ORAL
Qty: 90 TABLET | Refills: 1 | Status: SHIPPED | OUTPATIENT
Start: 2022-03-07

## 2022-04-13 RX ORDER — RANOLAZINE 500 MG/1
TABLET, EXTENDED RELEASE ORAL
Qty: 90 TABLET | Refills: 1 | Status: SHIPPED | OUTPATIENT
Start: 2022-04-13 | End: 2022-07-12 | Stop reason: ALTCHOICE

## 2022-05-04 ENCOUNTER — OFFICE VISIT (OUTPATIENT)
Dept: CARDIOLOGY CLINIC | Age: 82
End: 2022-05-04
Payer: MEDICARE

## 2022-05-04 VITALS
HEART RATE: 64 BPM | WEIGHT: 235 LBS | SYSTOLIC BLOOD PRESSURE: 120 MMHG | BODY MASS INDEX: 31.14 KG/M2 | DIASTOLIC BLOOD PRESSURE: 66 MMHG | HEIGHT: 73 IN

## 2022-05-04 DIAGNOSIS — I25.10 CAD IN NATIVE ARTERY: Primary | ICD-10-CM

## 2022-05-04 DIAGNOSIS — I49.3 PVC (PREMATURE VENTRICULAR CONTRACTION): ICD-10-CM

## 2022-05-04 DIAGNOSIS — R07.2 PRECORDIAL PAIN: ICD-10-CM

## 2022-05-04 DIAGNOSIS — R00.2 HEART PALPITATIONS: ICD-10-CM

## 2022-05-04 DIAGNOSIS — E78.2 MIXED HYPERLIPIDEMIA: ICD-10-CM

## 2022-05-04 PROCEDURE — 99213 OFFICE O/P EST LOW 20 MIN: CPT | Performed by: NURSE PRACTITIONER

## 2022-05-04 RX ORDER — ISOSORBIDE MONONITRATE 30 MG/1
30 TABLET, EXTENDED RELEASE ORAL DAILY
Qty: 90 TABLET | Refills: 1 | Status: SHIPPED | OUTPATIENT
Start: 2022-05-04 | End: 2022-07-12 | Stop reason: SDUPTHER

## 2022-05-04 RX ORDER — FINASTERIDE 5 MG/1
TABLET, FILM COATED ORAL
Qty: 90 TABLET | Refills: 1 | Status: SHIPPED | OUTPATIENT
Start: 2022-05-04

## 2022-05-04 ASSESSMENT — ENCOUNTER SYMPTOMS
COUGH: 0
SHORTNESS OF BREATH: 0

## 2022-05-04 NOTE — PROGRESS NOTES
[address]  Phone: [phone]    Fax [phone]    Yasmin Morfin MD, Hiren Kurtz MD, [address] [facility], MD, MD Jessica Chery MD Eusebio Lands, MD Margene Popper, MD Luisa Ganser, APRN      Derrell Browning, APRN  Regis Miles, APRN     Kymberly Flores, APRN    CARDIOLOGY  NOTE    2022    Freddy Aguilar (:  1940) is a [de-identified] y.o. male,Established patient with Dr. Judith Gomez, here for evaluation of the following chief complaint(s):  Follow-up (Pt states he has SOB on exertion. No swelling and No surgeries or procedures scheduled. )        SUBJECTIVE/OBJECTIVE:    Eleanor Hollis is a 80y.o. year old who has Past medical history as noted below. Mr. Vani Segovia is doing very well he denies any chest pain but continues to have some shortness of breath. HE is not able to go to [place] due to his wifes health. [facility] He states that his shortness of breath ois about the same. There is not a differentwith exertion. Rohit  had PCI to LAd and Circ in 2020 . He was having chest pain before cardiac cath  in spite of being on 2 anti anginal RX. After PCI he says that  chest pain is resolved. 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 But after starting on metoprolol and repeating Holter by 2020 PVC burden had come down to 2.4% denies any palpitations  He was actually seen by myself in 2019 at that time he was having a lot of shortness of breath and chest pressure. Stress test was abnormal is planned for cardiac cath he had lab work which revealed severe profound anemia he went to the hospital and was actually anemic.   He required multiple units of blood transfusion work-up revealed his bleeding from his colon he underwent colectomy by Dr. Crystal Bentley. EKG shows frequent PVC ( every 5th beat). He has been tolerating antiplatelet okay since January 2020  Father had MI in his 52's . HE is retired from [employer]. Going to rehab. He is on Plavix now      Review of Systems   Constitutional: Negative for fatigue and fever. Respiratory: Negative for cough and shortness of breath. Cardiovascular: Negative for chest pain, palpitations and leg swelling. Musculoskeletal: Negative for arthralgias and gait problem. Neurological: Negative for dizziness, syncope, weakness, light-headedness and headaches. Vitals:    05/04/22 1110   BP: 120/66   Pulse: 64   Weight: 235 lb (106.6 kg)   Height: 6' 1\" (1.854 m)       Wt Readings from Last 3 Encounters:   05/04/22 235 lb (106.6 kg)   01/28/22 234 lb 4.8 oz (106.3 kg)   11/04/21 229 lb 6.4 oz (104.1 kg)       BP Readings from Last 3 Encounters:   05/04/22 120/66   01/28/22 100/62   11/04/21 118/70       Prior to Admission medications    Medication Sig Start Date End Date Taking?  Authorizing Provider   ranolazine (RANEXA) 500 MG extended release tablet TAKE 1 TABLET TWICE A DAY 4/13/22  Yes Karly Angel MD   escitalopram (LEXAPRO) 10 MG tablet TAKE 1 TABLET DAILY 3/7/22  Yes Sonia Correa MD   venlafaxine (EFFEXOR XR) 150 MG extended release capsule Take one Capsule every morning 3/4/22  Yes Sonia Correa MD   metoprolol tartrate (LOPRESSOR) 50 MG tablet Take 1 tablet by mouth 2 times daily 3/1/22  Yes Sonia Correa MD   isosorbide mononitrate (IMDUR) 30 MG extended release tablet Take 1 tablet by mouth daily 1/28/22  Yes Sonia Correa MD   clopidogrel (PLAVIX) 75 MG tablet TAKE 1 TABLET DAILY 1/25/22  Yes Sonia Correa MD   gemfibrozil (LOPID) 600 MG tablet TAKE 1 TABLET TWICE DAILY  BEFORE MEALS 1/25/22  Yes Sonia Correa MD finasteride (PROSCAR) 5 MG tablet Take 1 tablet daily 1/25/22  Yes Yanna Kramer MD   allopurinol (ZYLOPRIM) 300 MG tablet TAKE 1 TABLET DAILY 12/14/21  Yes Yanna Kramer MD   nitroGLYCERIN (NITROSTAT) 0.4 MG SL tablet Place 1 tablet under the tongue every 5 minutes as needed for Chest pain 5/4/21  Yes Flower Watkins MD   Cyanocobalamin (VITAMIN B 12) 500 MCG TABS Take 1 tablet by mouth daily 1/20/21  Yes Yanna Kramer MD   Multiple Vitamins-Minerals (VITABASIC SENIOR PO) Take by mouth   Yes Historical Provider, MD   Multiple Vitamins-Minerals (OCUVITE EXTRA) TABS Take 1 tablet by mouth daily   Yes Historical Provider, MD       Physical Exam  Vitals reviewed. Constitutional:       General: He is not in acute distress. Appearance: Normal appearance. He is obese. He is not ill-appearing. HENT:      Head: Atraumatic. Neck:      Vascular: No carotid bruit. Cardiovascular:      Rate and Rhythm: Normal rate and regular rhythm. Pulses: Normal pulses. Heart sounds: Normal heart sounds. No murmur heard. Pulmonary:      Effort: Pulmonary effort is normal. No respiratory distress. Breath sounds: Normal breath sounds. Musculoskeletal:         General: No swelling or deformity. Cervical back: Neck supple. No muscular tenderness. Neurological:      Mental Status: He is alert.          Health Maintenance   Topic Date Due    Shingles vaccine (1 of 2) Never done    Depression Monitoring  05/19/2022    Annual Wellness Visit (AWV)  05/20/2022    DTaP/Tdap/Td vaccine (2 - Td or Tdap) 02/25/2026    Flu vaccine  Completed    Pneumococcal 65+ years Vaccine  Completed    COVID-19 Vaccine  Completed    Hepatitis A vaccine  Aged Out    Hepatitis B vaccine  Aged Out    Hib vaccine  Aged Out    Meningococcal (ACWY) vaccine  Aged Out       Lab Review   Lab Results   Component Value Date    CHOL 129 05/05/2021    CHOL 135 07/21/2020    TRIG 95 05/05/2021    HDL 34 2021    LDLDIRECT 92 2021          Stress test  19      Kiesha Garcia .   [place] portion of stress test is negative for ischemia by diagnostic criteria.    Completed 4.6 METS and 4 Mins of exercise    Global hypokinesis with EF of 41 %    Mild ischemia of lateral wall involving small to medium size of left    ventricle    Abnormal stress test          Echo 19  Summary   Technically difficult study with poor windows   Left ventricular systolic function is probably low normal with an ejection   fraction of 50 %.  Grade I diastolic dysfunction.   Mild concentric left ventricular hypertrophy.   Sclerotic, but non-stenotic aortic valve.   Doppler evaluation reveals moderate aortic and mild mitral and tricuspid   regurgitation.   No evidence of pericardial effusion.     Cath 2020   Procedure Summary   Indication Abnormal stress test showing lateral ischemia pt on 3   anti anginal meds and having class III angina   Access L Radial   1. PCI to OM ostial lesion reduced 90 % lesion to 0 % using 2.75   X 18 LUISA   2. Mid LAD has 70-80 % lesion reduced to 0 % using 3.0 X 18 LUISA   3. PDA has 80-90 % tandem lesions but small vessel   4. LVEDP was 11 mmHG  Angiographic Findings      Diagnostic Findings      Cardiac Arteries and Lesion Findings     LMCA: Normal, Normal (no stenosis %) and No Angiographicalyl Significant  Disease. widely patent     LAD: Abnormal and Focal stenosis. Mid LAD had calcified focal 70-80 % stenosis  , large Type III LAD , 2 diag are patent       Lesion on Mid LAD: 80% stenosis. Culprit lesion.       Devices used       - Runthrough  cm Wire. Number of passes: 1.       - 3.0 x 18 mm Resolute Integrity Drug Eluting Stent. Diameter: 3 mm.    Length: 18 mm. 1 inflation(s) to a max pressure of: 12 stephanie.     LCx: Ostial OM after Circ Av groove take off has 80-90 % lesion     Lesion on 1st Ob Claire% stenosis. Culprit lesion.       RCA: Diffuse irregularity and Abnormal.Large vessel , Right Dominant system ,  PDA has 80-90 % lesion , there are 2 tandem lesions but PDA is small after  lesions   Interventional procedure  Cardiac lesions  LAD:     Lesion on Mid LAD: 80% stenosis. Culprit lesion.    LCx:     Lesion on 1st Ob Claire% stenosis. Culprit lesion.        Holter 2020  Notes recorded by Hannah Nevarez MD on 2020 at 3:19 PM EST  Abnormal 48-hour Holter monitor with average heart rate of 68 maximum heart rate of 119 minimum heart rate is 48 at 6:25 AM with sinus bradycardia.  No significant A. fib recorded longest R-R interval was 1.7 seconds.  Patient had multiple runs of ventricular ectopy with a total of 16 .2% of PVC burden multifocal PVCs bigeminy's reported,  There were also multiple PAC runs noted.  No atrial fibrillation recorded.     Holter3/  48-hour Holter monitor showing 2.4% PVCs which  is significantly improved compared to previous Holter monitor a month ago.  Lowest heart rate was 34 average was 88 maximum heart rate was 90  Multifocal PVC noted no atrial fibrillation recorded. ASSESSMENT/PLAN:    Abnormal stress test / chest pain  Precordial pain  S/p PCi to LAD and Circ in 2020 for Class III angina on 3 anti anginal.  Continue Ranexa.,imdur and metoprolol  Continue Plavix advised him to stop using aspirin . Especially due to history of GI bleeding in the past and Fuchs retinopathy. He complains of tingling in his fingers to the point that sometimes he cannot close buttons  Dr. Lisa Galeano asked him to stop using vitamin E and use vitamin B12 to see if it helps with his neuropathy and he feels that the neuropathy is a little better. He is doing very well. Denies any chest pain.      Heart palpitations  EF is also low normal at 50% . she had multifocal PVCs, Holter shows 17 % PVC burden  HE snores at night . HE has less energy which improved after his anemia has resolved.  After pci and  Increased metoprolol to 50 mg bid the repeat holter showed reduction in PVC from 16 % to 2%. Continue metoprolol     Dyslipidemia  All available lab work was reviewed. Lipid panel is acceptable patient was advised to repeat lab work before next visit. On gemfibrozil. Will review next lipid panel. Necessary orders were placed, instructions given by myself   Check labs before his next visit. Discussed statins, zetia, and fenofibrate. Goal is prevention. Counseled extensively and medication compliance urged. We discussed that for the  prevention of ASCVD our  goal is aggressive risk modification. Patient is encouraged to exercise even a brisk walk for 30 minutes  at least 3 to 4 times a week           Various goals were discussed and questions answered. Continue current medications. Appropriate prescriptions are addressed and refills ordered. Questions answered and patient verbalizes understanding. Call for any problems, questions, or concerns. Return in about 6 months (around 11/4/2022). An electronic signature was used to authenticate this note.     Electronically signed by MAC Rebolledo CNP on 5/4/2022 at 11:37 AM

## 2022-05-11 ENCOUNTER — HOSPITAL ENCOUNTER (OUTPATIENT)
Dept: GENERAL RADIOLOGY | Age: 82
Discharge: HOME | End: 2022-05-11
Payer: MEDICARE

## 2022-05-11 ENCOUNTER — TELEMEDICINE (OUTPATIENT)
Dept: FAMILY MEDICINE CLINIC | Age: 82
End: 2022-05-11
Payer: MEDICARE

## 2022-05-11 ENCOUNTER — HOSPITAL ENCOUNTER (OUTPATIENT)
Age: 82
Discharge: HOME | End: 2022-05-11
Payer: MEDICARE

## 2022-05-11 DIAGNOSIS — S79.911A HIP INJURY, RIGHT, INITIAL ENCOUNTER: ICD-10-CM

## 2022-05-11 DIAGNOSIS — S49.91XA INJURY OF RIGHT SHOULDER, INITIAL ENCOUNTER: ICD-10-CM

## 2022-05-11 DIAGNOSIS — S49.91XA INJURY OF RIGHT SHOULDER, INITIAL ENCOUNTER: Primary | ICD-10-CM

## 2022-05-11 PROCEDURE — 1123F ACP DISCUSS/DSCN MKR DOCD: CPT | Performed by: PHYSICIAN ASSISTANT

## 2022-05-11 PROCEDURE — 73030 X-RAY EXAM OF SHOULDER: CPT

## 2022-05-11 PROCEDURE — 99213 OFFICE O/P EST LOW 20 MIN: CPT | Performed by: PHYSICIAN ASSISTANT

## 2022-05-11 PROCEDURE — 73502 X-RAY EXAM HIP UNI 2-3 VIEWS: CPT

## 2022-05-11 PROCEDURE — G8428 CUR MEDS NOT DOCUMENT: HCPCS | Performed by: PHYSICIAN ASSISTANT

## 2022-05-11 PROCEDURE — 4040F PNEUMOC VAC/ADMIN/RCVD: CPT | Performed by: PHYSICIAN ASSISTANT
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
## 2022-05-20 DIAGNOSIS — M10.9 GOUT, UNSPECIFIED CAUSE, UNSPECIFIED CHRONICITY, UNSPECIFIED SITE: ICD-10-CM

## 2022-05-20 RX ORDER — ALLOPURINOL 300 MG/1
TABLET ORAL
Qty: 90 TABLET | Refills: 1 | Status: SHIPPED | OUTPATIENT
Start: 2022-05-20

## 2022-05-25 ENCOUNTER — TELEMEDICINE (OUTPATIENT)
Dept: FAMILY MEDICINE CLINIC | Age: 82
End: 2022-05-25
Payer: MEDICARE

## 2022-05-25 DIAGNOSIS — Z00.00 MEDICARE ANNUAL WELLNESS VISIT, SUBSEQUENT: Primary | ICD-10-CM

## 2022-05-25 PROCEDURE — 1123F ACP DISCUSS/DSCN MKR DOCD: CPT | Performed by: FAMILY MEDICINE

## 2022-05-25 PROCEDURE — G0439 PPPS, SUBSEQ VISIT: HCPCS | Performed by: FAMILY MEDICINE

## 2022-05-25 SDOH — ECONOMIC STABILITY: FOOD INSECURITY: WITHIN THE PAST 12 MONTHS, YOU WORRIED THAT YOUR FOOD WOULD RUN OUT BEFORE YOU GOT MONEY TO BUY MORE.: NEVER TRUE

## 2022-05-25 SDOH — ECONOMIC STABILITY: FOOD INSECURITY: WITHIN THE PAST 12 MONTHS, THE FOOD YOU BOUGHT JUST DIDN'T LAST AND YOU DIDN'T HAVE MONEY TO GET MORE.: NEVER TRUE

## 2022-05-25 ASSESSMENT — PATIENT HEALTH QUESTIONNAIRE - PHQ9
8. MOVING OR SPEAKING SO SLOWLY THAT OTHER PEOPLE COULD HAVE NOTICED. OR THE OPPOSITE, BEING SO FIGETY OR RESTLESS THAT YOU HAVE BEEN MOVING AROUND A LOT MORE THAN USUAL: 0
SUM OF ALL RESPONSES TO PHQ QUESTIONS 1-9: 1
SUM OF ALL RESPONSES TO PHQ QUESTIONS 1-9: 1
9. THOUGHTS THAT YOU WOULD BE BETTER OFF DEAD, OR OF HURTING YOURSELF: 0
SUM OF ALL RESPONSES TO PHQ QUESTIONS 1-9: 1
SUM OF ALL RESPONSES TO PHQ9 QUESTIONS 1 & 2: 0
6. FEELING BAD ABOUT YOURSELF - OR THAT YOU ARE A FAILURE OR HAVE LET YOURSELF OR YOUR FAMILY DOWN: 0
10. IF YOU CHECKED OFF ANY PROBLEMS, HOW DIFFICULT HAVE THESE PROBLEMS MADE IT FOR YOU TO DO YOUR WORK, TAKE CARE OF THINGS AT HOME, OR GET ALONG WITH OTHER PEOPLE: 0
3. TROUBLE FALLING OR STAYING ASLEEP: 0
2. FEELING DOWN, DEPRESSED OR HOPELESS: 0
1. LITTLE INTEREST OR PLEASURE IN DOING THINGS: 0
4. FEELING TIRED OR HAVING LITTLE ENERGY: 1
5. POOR APPETITE OR OVEREATING: 0
SUM OF ALL RESPONSES TO PHQ QUESTIONS 1-9: 1
7. TROUBLE CONCENTRATING ON THINGS, SUCH AS READING THE NEWSPAPER OR WATCHING TELEVISION: 0

## 2022-05-25 ASSESSMENT — SOCIAL DETERMINANTS OF HEALTH (SDOH): HOW HARD IS IT FOR YOU TO PAY FOR THE VERY BASICS LIKE FOOD, HOUSING, MEDICAL CARE, AND HEATING?: NOT HARD AT ALL

## 2022-05-25 ASSESSMENT — LIFESTYLE VARIABLES: HOW OFTEN DO YOU HAVE A DRINK CONTAINING ALCOHOL: NEVER

## 2022-05-25 NOTE — PROGRESS NOTES
Medicare Annual Wellness Visit    Rohit Anderson is here for Medicare AWV    Assessment & Plan   Medicare annual wellness visit, subsequent      Recommendations for Preventive Services Due: see orders and patient instructions/AVS.  Recommended screening schedule for the next 5-10 years is provided to the patient in written form: see Patient Instructions/AVS.     No follow-ups on file. Subjective       Patient's complete Health Risk Assessment and screening values have been reviewed and are found in Flowsheets. The following problems were reviewed today and where indicated follow up appointments were made and/or referrals ordered.     Positive Risk Factor Screenings with Interventions:    Fall Risk:  Do you feel unsteady or are you worried about falling? : (!) yes  2 or more falls in past year?: (!) yes  Fall with injury in past year?: (!) yes (injured shoulder in recent, starting rehab this Friday)     Fall Risk Interventions:    · Patient declines any further evaluation/treatment for this issue  · patient recently injured his shoulder due to a fall, begins rehab this Friday            General Health and ACP:  General  In general, how would you say your health is?: Good  In the past 7 days, have you experienced any of the following: New or Increased Pain, New or Increased Fatigue, Loneliness, Social Isolation, Stress or Anger?: (!) Yes  Select all that apply: (!) New or Increased Pain (shoulder injury)  Do you get the social and emotional support that you need?: Yes  Do you have a Living Will?: Yes    Advance Directives     Power of  Living Will ACP-Advance Directive ACP-Power of     Not on File Not on File Not on File Not on File      General Health Risk Interventions:  · Pain issues: patient declines any further evaluation/treatment for this issue, patient begins rehab for recent shoulder injury this Friday  · No Living Will: ACP documents already completed- patient asked to provide copy to the office    Health Habits/Nutrition:     Physical Activity: Inactive    Days of Exercise per Week: 0 days    Minutes of Exercise per Session: 0 min     Have you lost any weight without trying in the past 3 months?: No     Have you seen the dentist within the past year?: Yes    Health Habits/Nutrition Interventions:  · Inadequate physical activity:  patient is not ready to increase his/her physical activity level at this time  · Nutritional issues:  patient is not ready to address his/her nutritional/weight issues at this time    Hearing/Vision:  Do you or your family notice any trouble with your hearing that hasn't been managed with hearing aids?: No  Do you have difficulty driving, watching TV, or doing any of your daily activities because of your eyesight?: (!) Yes (states he has noticed some change due to macular degeneration)  Have you had an eye exam within the past year?: Yes  No exam data present    Hearing/Vision Interventions:  · Vision concerns:  patient declines any further evaluation/treatment for this issue, states he sees his eye specialist regularly for macular degeneration            Objective      Patient-Reported Vitals  No data recorded     Unable to obtain 3 vital signs due to patient not having equipment to take blood pressure/temperature. No Known Allergies  Prior to Visit Medications    Medication Sig Taking?  Authorizing Provider   UNABLE TO FIND Indications: pharma fluorometholone-eye drops to prevent corneal implant rejections  Yes Historical Provider, MD   allopurinol (ZYLOPRIM) 300 MG tablet TAKE 1 TABLET DAILY Yes Arlene Drummond MD   finasteride (PROSCAR) 5 MG tablet Take 1 tablet daily Yes MAC Ohara CNP   isosorbide mononitrate (IMDUR) 30 MG extended release tablet Take 1 tablet by mouth daily Yes MAC Ohara CNP   ranolazine (RANEXA) 500 MG extended release tablet TAKE 1 TABLET TWICE A DAY Yes Mino Delacruz MD   escitalopram (Rodena Flick) 10 MG tablet TAKE 1 TABLET DAILY Yes Junior Wick MD   venlafaxine (EFFEXOR XR) 150 MG extended release capsule Take one Capsule every morning Yes Junior Wick MD   metoprolol tartrate (LOPRESSOR) 50 MG tablet Take 1 tablet by mouth 2 times daily Yes Junior Wick MD   clopidogrel (PLAVIX) 75 MG tablet TAKE 1 TABLET DAILY Yes Junior Wick MD   gemfibrozil (LOPID) 600 MG tablet TAKE 1 TABLET TWICE DAILY  BEFORE MEALS Yes Junior Wick MD   Cyanocobalamin (VITAMIN B 12) 500 MCG TABS Take 1 tablet by mouth daily Yes Junior Wick MD   Multiple Vitamins-Minerals (VITABASIC SENIOR PO) Take by mouth Yes Historical Provider, MD   Multiple Vitamins-Minerals (OCUVITE EXTRA) TABS Take 1 tablet by mouth daily Yes Historical Provider, MD   nitroGLYCERIN (NITROSTAT) 0.4 MG SL tablet Place 1 tablet under the tongue every 5 minutes as needed for Chest pain  Patient not taking: Reported on 5/25/2022  Ryan Agustin MD       [facility] (Including outside providers/suppliers regularly involved in providing care):   Patient Care Team:  Junior Wick MD as PCP - General  Junior Wick MD as PCP - [facility] Empaneled Provider     Reviewed and updated this visit:  Tobacco  Allergies  Meds  Med Hx  Surg Hx  Soc Hx  Fam Hx             I, Jay Jay Morris LPN, 6/52/[birth year]93, performed the documented evaluation under the direct supervision of the attending physician. Rohit Carrillo, was evaluated through a synchronous (real-time) audio encounter. The patient (or guardian if applicable) is aware that this is a billable service, which includes applicable co-pays. This Virtual Visit was conducted with patient's (and/or legal guardian's) consent. The visit was conducted pursuant to the emergency declaration under the [facility], [address] [place] [address] waiver authority and the [employer] Act.   Patient identification was verified, and a caregiver was present when appropriate. [place] [facility] [place] [address]. Provider was [facility] [address] [place] [address]. [place] [address]. Total time spent for this encounter: Not billed by time    --Sp Trimble LPN on 7/25/0096 at 5:50 PM    An electronic signature was used to authenticate this note. This encounter was performed under Yesy mcdonald MDs, direct supervision, 5/25/2022.

## 2022-05-25 NOTE — PATIENT INSTRUCTIONS
Personalized Preventive Plan for Fernando Charles - 5/25/2022  Medicare offers a range of preventive health benefits. Some of the tests and screenings are paid in full while other may be subject to a deductible, co-insurance, and/or copay. Some of these benefits include a comprehensive review of your medical history including lifestyle, illnesses that may run in your family, and various assessments and screenings as appropriate. After reviewing your medical record and screening and assessments performed today your provider may have ordered immunizations, labs, imaging, and/or referrals for you. A list of these orders (if applicable) as well as your Preventive Care list are included within your After Visit Summary for your review. Other Preventive Recommendations:    · A preventive eye exam performed by an eye specialist is recommended every 1-2 years to screen for glaucoma; cataracts, macular degeneration, and other eye disorders. · A preventive dental visit is recommended every 6 months. · Try to get at least 150 minutes of exercise per week or 10,000 steps per day on a pedometer . · Order or download the FREE \"Exercise & Physical Activity: Your Everyday Guide\" from [employer]. Call 4-952.707.4873 or search [employer] online. · You need 1757-1689 mg of calcium and 4958-7962 IU of vitamin D per day. It is possible to meet your calcium requirement with diet alone, but a vitamin D supplement is usually necessary to meet this goal.  · When exposed to the sun, use a sunscreen that protects against both UVA and UVB radiation with an SPF of 30 or greater. Reapply every 2 to 3 hours or after sweating, drying off with a towel, or swimming. · Always wear a seat belt when traveling in a car. Always wear a helmet when riding a bicycle or motorcycle.

## 2022-05-31 ENCOUNTER — HOSPITAL ENCOUNTER (OUTPATIENT)
Dept: PHYSICAL THERAPY | Age: 82
Setting detail: THERAPIES SERIES
Discharge: HOME | End: 2022-05-31
Payer: MEDICARE

## 2022-05-31 ENCOUNTER — OFFICE VISIT (OUTPATIENT)
Dept: FAMILY MEDICINE CLINIC | Age: 82
End: 2022-05-31
Payer: MEDICARE

## 2022-05-31 VITALS
DIASTOLIC BLOOD PRESSURE: 62 MMHG | WEIGHT: 228.6 LBS | HEART RATE: 69 BPM | SYSTOLIC BLOOD PRESSURE: 116 MMHG | OXYGEN SATURATION: 97 % | HEIGHT: 72 IN | BODY MASS INDEX: 30.96 KG/M2

## 2022-05-31 DIAGNOSIS — M10.9 GOUT, UNSPECIFIED CAUSE, UNSPECIFIED CHRONICITY, UNSPECIFIED SITE: ICD-10-CM

## 2022-05-31 DIAGNOSIS — R00.2 HEART PALPITATIONS: ICD-10-CM

## 2022-05-31 DIAGNOSIS — I25.10 CAD IN NATIVE ARTERY: ICD-10-CM

## 2022-05-31 DIAGNOSIS — M15.9 OSTEOARTHRITIS OF MULTIPLE JOINTS, UNSPECIFIED OSTEOARTHRITIS TYPE: ICD-10-CM

## 2022-05-31 DIAGNOSIS — Z13.29 THYROID DISORDER SCREEN: ICD-10-CM

## 2022-05-31 DIAGNOSIS — F33.9 EPISODE OF RECURRENT MAJOR DEPRESSIVE DISORDER, UNSPECIFIED DEPRESSION EPISODE SEVERITY (HCC): Primary | ICD-10-CM

## 2022-05-31 DIAGNOSIS — N40.0 BENIGN PROSTATIC HYPERPLASIA WITHOUT LOWER URINARY TRACT SYMPTOMS: ICD-10-CM

## 2022-05-31 LAB
A/G RATIO: 1.6 (ref 1.1–2.2)
ALBUMIN SERPL-MCNC: 4.3 G/DL (ref 3.4–5)
ALP BLD-CCNC: 109 U/L (ref 40–129)
ALT SERPL-CCNC: 12 U/L (ref 10–40)
ANION GAP SERPL CALCULATED.3IONS-SCNC: 15 MMOL/L (ref 3–16)
AST SERPL-CCNC: 21 U/L (ref 15–37)
BILIRUB SERPL-MCNC: 0.5 MG/DL (ref 0–1)
BUN BLDV-MCNC: 25 MG/DL (ref 7–20)
CALCIUM SERPL-MCNC: 9.8 MG/DL (ref 8.3–10.6)
CHLORIDE BLD-SCNC: 101 MMOL/L (ref 99–110)
CHOLESTEROL, TOTAL: 139 MG/DL (ref 0–199)
CO2: 23 MMOL/L (ref 21–32)
CREAT SERPL-MCNC: 1 MG/DL (ref 0.8–1.3)
GFR AFRICAN AMERICAN: >60
GFR NON-AFRICAN AMERICAN: >60
GLUCOSE BLD-MCNC: 105 MG/DL (ref 70–99)
HCT VFR BLD CALC: 41.3 % (ref 40.5–52.5)
HDLC SERPL-MCNC: 31 MG/DL (ref 40–60)
HEMOGLOBIN: 13.9 G/DL (ref 13.5–17.5)
LDL CHOLESTEROL CALCULATED: 75 MG/DL
MCH RBC QN AUTO: 32.3 PG (ref 26–34)
MCHC RBC AUTO-ENTMCNC: 33.8 G/DL (ref 31–36)
MCV RBC AUTO: 95.6 FL (ref 80–100)
PDW BLD-RTO: 14.6 % (ref 12.4–15.4)
PLATELET # BLD: 263 K/UL (ref 135–450)
PMV BLD AUTO: 9.4 FL (ref 5–10.5)
POTASSIUM SERPL-SCNC: 4.6 MMOL/L (ref 3.5–5.1)
RBC # BLD: 4.32 M/UL (ref 4.2–5.9)
SODIUM BLD-SCNC: 139 MMOL/L (ref 136–145)
TOTAL PROTEIN: 7 G/DL (ref 6.4–8.2)
TRIGL SERPL-MCNC: 165 MG/DL (ref 0–150)
TSH SERPL DL<=0.05 MIU/L-ACNC: 1.61 UIU/ML (ref 0.27–4.2)
URIC ACID, SERUM: 5.1 MG/DL (ref 3.5–7.2)
VLDLC SERPL CALC-MCNC: 33 MG/DL
WBC # BLD: 6.5 K/UL (ref 4–11)

## 2022-05-31 PROCEDURE — 97161 PT EVAL LOW COMPLEX 20 MIN: CPT

## 2022-05-31 PROCEDURE — G8427 DOCREV CUR MEDS BY ELIG CLIN: HCPCS | Performed by: FAMILY MEDICINE

## 2022-05-31 PROCEDURE — 97110 THERAPEUTIC EXERCISES: CPT

## 2022-05-31 PROCEDURE — 1123F ACP DISCUSS/DSCN MKR DOCD: CPT | Performed by: FAMILY MEDICINE

## 2022-05-31 PROCEDURE — 99214 OFFICE O/P EST MOD 30 MIN: CPT | Performed by: FAMILY MEDICINE

## 2022-05-31 PROCEDURE — 1036F TOBACCO NON-USER: CPT | Performed by: FAMILY MEDICINE

## 2022-05-31 PROCEDURE — G8417 CALC BMI ABV UP PARAM F/U: HCPCS | Performed by: FAMILY MEDICINE

## 2022-05-31 ASSESSMENT — ENCOUNTER SYMPTOMS
VOMITING: 0
BLOOD IN STOOL: 0
NAUSEA: 0
CHEST TIGHTNESS: 0
DIARRHEA: 0
WHEEZING: 0
TROUBLE SWALLOWING: 0
SHORTNESS OF BREATH: 0
EYE PAIN: 0
ABDOMINAL PAIN: 0

## 2022-05-31 ASSESSMENT — PAIN DESCRIPTION - LOCATION: LOCATION: ARM;SHOULDER

## 2022-05-31 ASSESSMENT — PAIN DESCRIPTION - FREQUENCY: FREQUENCY: CONTINUOUS

## 2022-05-31 ASSESSMENT — PAIN DESCRIPTION - PAIN TYPE: TYPE: ACUTE PAIN

## 2022-05-31 ASSESSMENT — PAIN DESCRIPTION - DESCRIPTORS: DESCRIPTORS: ACHING;DULL

## 2022-05-31 ASSESSMENT — PAIN DESCRIPTION - ONSET: ONSET: AWAKENED FROM SLEEP

## 2022-05-31 ASSESSMENT — PAIN SCALES - GENERAL: PAINLEVEL_OUTOF10: 0

## 2022-05-31 ASSESSMENT — PAIN - FUNCTIONAL ASSESSMENT: PAIN_FUNCTIONAL_ASSESSMENT: PREVENTS OR INTERFERES SOME ACTIVE ACTIVITIES AND ADLS

## 2022-05-31 NOTE — PLAN OF CARE
Outpatient Physical Therapy           [place]           [] Phone: [phone]   Fax: [phone]  [place]           [] Phone: [phone]   Fax: [phone]     To: HUANG Chan   From: Milvia Champion PT, PT     Patient: Fernando Charles       : 1940  Diagnosis: Unspecified injury of right shoulder and upper arm, initial encounter [S49.91XA] Diagnosis: shoulder pain  Treatment Diagnosis: Decreased RUE mobility and strength  Date: 2022    Physical Therapy Certification/Re-Certification Form  Dear Smith Lo PA,  The following patient has been evaluated for physical therapy services and for therapy to continue, insurance requires physician review of the treatment plan initially and every 90 days. Please review the attached evaluation and/or summary of the patient's plan of care, and verify that you agree therapy should continue by signing the attached document and sending it back to our office. Assessment:    Assessment: Pt is [de-identified]year old male with sudden onset of R shoulder pain following a fall where his shoulder landed on the side of a car as he was stepping off a curb. Pt now has difficulties reaching overhead, sleeping on his R arm, and carrying/pushing. Pt demo deficits this date that include reduced active shoulder ROM, especially flexion/abduction, limited rotator cuff/scapular strength, pain with GH inferior glide, (+) empty can, tenderness along supraspinatus, hypertonicity of biceps/triceps. Testing this date indicate signs and symptoms of partial rotator cuff tearing. Pt will benefit with PT services with shoulder ROM, GH joint mobilizations, rotator cuff/scapuar strengthening, vasocompression, modified activity, home exercise program to return to PLOF. Pt prior to onset of current condition had no pain with able to complete full ADLs.     Plan of Care/Treatment to date:  [x] Therapeutic Exercise  [] Modalities:  [x] Therapeutic Activity     [] Ultrasound  [] Electrical Stimulation  [] Gait Training      [] Cervical Traction [] Lumbar Traction  [x] Neuromuscular Re-education    [] Cold/hotpack [] Iontophoresis   [x] Instruction in HEP      [x] Vasopneumatic    [] Dry Needling  [x] Manual Therapy               [] Aquatic Therapy       Other:          Frequency/Duration:  # Days per week: [] 1 day # Weeks: [] 1 week [x] 5 weeks     [x] 2 days   [] 2 weeks [] 6 weeks     [] 3 days   [] 3 weeks [] 7 weeks     [] 4 days   [] 4 weeks [] 8 weeks         [] 9 weeks [] 10 weeks         [] 11 weeks [] 12 weeks    Rehab Potential/Progress: [] Excellent [x] Good [] Fair  [] Poor     Goals:    Patient goals: improve reaching and pain  Short term goals  Time Frame for Short term goals: 5 weeks  Pt demo I w/ HEP and symptom management  Pt demo active shoulder flexion >150 deg and pain reported <2/10  Pt demo active shoulder abduction >110 deg and pain reported <2/10  Pt demo >4/5 shoulder strength in all directions with pain reported <2/10  Pt demo ability to lift approx. 1-2 lbs weight overhead actively to mimic putting dishes away in overhead cabinets      Electronically signed by:  Nhi Tolliver PT, DPT, [facility] 5/31/2022, 9:58 AM        If you have any questions or concerns, please don't hesitate to call.   Thank you for your referral.      Physician Signature:________________________________Date:_________ TIME: _____  By signing above, therapists plan is approved by physician

## 2022-05-31 NOTE — FLOWSHEET NOTE
Outpatient Physical Therapy  [place]           [x] Phone: [phone]   Fax: [phone]  [place]           [] Phone: [phone]   Fax: [phone]        Physical Therapy Daily Treatment Note  Date:  2022    Patient Name:  Anaid Rangel    :  1940  MRN: [id number]  Restrictions/Precautions: NONE  Diagnosis:   Unspecified injury of right shoulder and upper arm, initial encounter [S49.91XA] Diagnosis: shoulder pain  Date of Injury/Surgery: --  Treatment Diagnosis:  Decreased RUE mobility and strength  Insurance/Certification information: [facility] Medicare  Referring Physician:  HUANG Vasquez   PCP: Saira Deleon MD  Next Doctor Visit:  --  Plan of care signed (Y/N):  N, sent 22   Outcome Measure: QUICK DASH: 34%  Visit# / total visits:   1/10  Pain level: 0/10   Goals:     Patient goals: improve reaching and pain  Short term goals  Time Frame for Short term goals: 5 weeks  Pt demo I w/ HEP and symptom management  Pt demo active shoulder flexion >150 deg and pain reported <2/10  Pt demo active shoulder abduction >110 deg and pain reported <2/10  Pt demo >4/5 shoulder strength in all directions with pain reported <2/10  Pt demo ability to lift approx. 1-2 lbs weight overhead actively to mimic putting dishes away in overhead cabinets      Summary of Evaluation:  Assessment: Pt is [de-identified]year old male with sudden onset of R shoulder pain following a fall where his shoulder landed on the side of a car as he was stepping off a curb. Pt now has difficulties reaching overhead, sleeping on his R arm, and carrying/pushing. Pt demo deficits this date that include reduced active shoulder ROM, especially flexion/abduction, limited rotator cuff/scapular strength, pain with GH inferior glide, (+) empty can, tenderness along supraspinatus, hypertonicity of biceps/triceps. Testing this date indicate signs and symptoms of partial rotator cuff tearing.  Pt will benefit with PT services with shoulder ROM, GH joint mobilizations, rotator cuff/scapuar strengthening, vasocompression, modified activity, home exercise program to return to PLOF. Pt prior to onset of current condition had no pain with able to complete full ADLs. Subjective:  See natividad         Any changes in Ambulatory Summary Sheet? None        Objective:  See natividad   COVID screening questions were asked and patient attested that there had been no contact or symptoms        Exercises: (No more than 4 columns)   Exercise/Equipment 5/31/22 #1 Date Date           WARM UP      Pulley's    Flex/scap next           TABLE      *Cane Flexion      Supine punch       SL ER      SL FLEX/ABD               STANDING      *Wall Walks AAROM      *Isometrics deltoid Flex/ext, add ER next                                        PROPRIOCEPTION                                    MODALITIES      vaso                Other Therapeutic Activities/Education:  Patient received education on their current pathology and how their condition effects them with their functional activities. Patient understood discussion and questions were answered. Patient understands their activity limitations and understands rational for treatment progression. Home Exercise Program:  HO issued, reviewed and discussed with patient. Pt agreed to comply. Manual Treatments:  --      Modalities:  --      Communication with other providers:  natividad sent 5/31/22      Assessment:  (Response towards treatment session) (Pain Rating)  Assessment: Pt is [de-identified]year old male with sudden onset of R shoulder pain following a fall where his shoulder landed on the side of a car as he was stepping off a curb. Pt now has difficulties reaching overhead, sleeping on his R arm, and carrying/pushing.  Pt demo deficits this date that include reduced active shoulder ROM, especially flexion/abduction, limited rotator cuff/scapular strength, pain with GH inferior glide, (+) empty can, tenderness along supraspinatus, hypertonicity of biceps/triceps. Testing this date indicate signs and symptoms of partial rotator cuff tearing. Pt will benefit with PT services with shoulder ROM, GH joint mobilizations, rotator cuff/scapuar strengthening, vasocompression, modified activity, home exercise program to return to PLOF. Pt prior to onset of current condition had no pain with able to complete full ADLs.       Plan for Next Session: --       Time In / Time Out:  7814-2516        Timed Code/Total Treatment Minutes:  36'   (1) PT eval   (1) TE      Next Progress Note due:  10th visit      Plan of Care Interventions:  [x] Therapeutic Exercise  [] Modalities:  [x] Therapeutic Activity     [] Ultrasound  [] Estim  [] Gait Training      [] Cervical Traction [] Lumbar Traction  [x] Neuromuscular Re-education    [] Cold/hotpack [] Iontophoresis   [x] Instruction in HEP      [x] Vasopneumatic   [] Dry Needling    [x] Manual Therapy               [] Aquatic Therapy              Electronically signed by:  Maryam Oneill PT, DPT, CSCS 5/31/2022, 9:58 AM

## 2022-05-31 NOTE — PROGRESS NOTES
5/31/2022    Rohit Carrillo    Chief Complaint   Patient presents with    3 Month Follow-Up     4 mo    Numbness     Numbness in feet and fingers. X 1 mo.  Other     Maya Gareth a month ago. Receiving treatment for right shoulder. PT       HPI  History was obtained from the patient. Sebastian Correa is a [de-identified] y.o. male who presents today with routine follow-up on depression, arthritis, gout, coronary disease, BPH, and palpitations. Overall he is doing well he is still rehabbing from right shoulder injury following a fall that was mechanical fall about a month ago. He sees his cardiologist regularly and has had no recent changes or problems. Denies chest pain little bit of shortness of breath but admits he is not as physically active as he should be urinary flow has been good without change in BPH symptoms. He has had no recent gout flare or change in arthritis mood remains stable and tolerating his antidepressant well. Denies symptomatic change or palpitations. Overall he is holding his own he wished he felt a bit better and his wife felt better so he could travel more he has some labs about a year ago and these will need to be repeated. Patient is due for [employer] booster #2. [facility] Billing REVIEW OF SYMPTOMS    Review of Systems   Constitutional: Negative for activity change and fatigue. HENT: Negative for congestion, hearing loss, mouth sores and trouble swallowing. Eyes: Negative for pain and visual disturbance. Respiratory: Negative for chest tightness, shortness of breath and wheezing. Cardiovascular: Negative for chest pain and palpitations. Patient with known coronary disease currently doing well other than a little bit of increased shortness of breath   Gastrointestinal: Negative for abdominal pain, blood in stool, diarrhea, nausea and vomiting. Endocrine: Negative for polydipsia and polyuria. Genitourinary: Positive for difficulty urinating. Negative for dysuria, frequency and urgency.    Musculoskeletal: Positive for arthralgias. Negative for gait problem and neck stiffness. Patient with right shoulder pain but improving pain and range of motion with PT. This started after recent mechanical fall. Skin: Negative for rash. Allergic/Immunologic: Negative for environmental allergies. Neurological: Negative for dizziness, seizures, speech difficulty and weakness. Hematological: Does not bruise/bleed easily. Psychiatric/Behavioral: Positive for dysphoric mood. Negative for agitation, confusion, hallucinations, self-injury and suicidal ideas. The patient is not hyperactive. PAST MEDICAL HISTORY  Past Medical History:   Diagnosis Date    Decreased hearing 2019    Gout 2019    H/O cardiovascular stress test 2019    EF 41%,  Mild ischemia of lateral wall involving small to medium size of left ventricle.  H/O echocardiogram 19    EF 48, Mod AR, Mild MR & TR    H/O percutaneous transluminal coronary angioplasty 2020    PCI to OM & Mid LAD,  PDA has 80-90 % tandem lesions but small vessel.  Hyperlipidemia 2019    Nocturia 2019    Osteoarthritis 2019    Restless leg 2019       FAMILY HISTORY  Family History   Problem Relation Age of Onset    Colon Cancer Mother     Heart Disease Father     Lung Disease Father     Seizures Sister     High Blood Pressure Brother     No Known Problems Brother        SOCIAL HISTORY  Social History     Socioeconomic History    Marital status:      Spouse name: Not on file    Number of children: Not on file    Years of education: Not on file    Highest education level: Not on file   Occupational History    Not on file   Tobacco Use    Smoking status: Former Smoker     Packs/day: 2.00     Years: 25.00     Pack years: 50.00     Types: Cigarettes     Start date: 1960     Quit date:      Years since quittin.4    Smokeless tobacco: Never Used   Substance and Sexual Activity    Alcohol use:  Yes Alcohol/week: 1.0 standard drink     Types: 1 Cans of beer per week     Comment: rarely, 1-2 cups of coffee     Drug use: Not on file    Sexual activity: Not on file   Other Topics Concern    Not on file   Social History Narrative    Not on file     Social Determinants of Health     Financial Resource Strain: Low Risk     Difficulty of Paying Living Expenses: Not hard at all   Food Insecurity: No Food Insecurity    Worried About [address] in the Last Year: Never true    [address] [facility] [address] in the Last Year: Never true   Transportation Needs:     Lack of Transportation (Medical): Not on file    Lack of Transportation (Non-Medical):  Not on file   Physical Activity: Inactive    Days of Exercise per Week: 0 days    Minutes of Exercise per Session: 0 min   Stress:     Feeling of Stress : Not on file   Social Connections:     Frequency of Communication with Friends and Family: Not on file    Frequency of Social Gatherings with Friends and Family: Not on file    Attends Jewish Services: Not on file    Active Member of Clubs or Organizations: Not on file    Attends Club or Organization Meetings: Not on file    Marital Status: Not on file   Intimate Partner Violence:     Fear of Current or Ex-Partner: Not on file    Emotionally Abused: Not on file    Physically Abused: Not on file    Sexually Abused: Not on file   Housing Stability:     Unable to Pay for Housing in the Last Year: Not on file    Number of [place] in the Last Year: Not on file    Unstable Housing in the Last Year: Not on file        SURGICAL HISTORY  Past Surgical History:   Procedure Laterality Date    BLEPHAROPLASTY  2010    CHOLECYSTECTOMY  2001    COLECTOMY  2000    COLONOSCOPY N/A 7/4/2019    COLONOSCOPY DIAGNOSTIC performed by Taylor Schroeder MD at [place] [address]. Left 2011   [address] Right 2011   [address] ARTHROSCOPY  2002   [address] N/A 7/8/2019 LAPAROTOMY EXPLORATORY RIGHT ILEOCOLECTOMY performed by Eloisa Clay MD at [facility] N/A 7/4/2019    EGD DIAGNOSTIC ONLY performed by Tomás Cam MD at [facility] ENDOSCOPY                 CURRENT MEDICATIONS  Current Outpatient Medications   Medication Sig Dispense Refill    UNABLE TO FIND Indications: pharma fluorometholone-eye drops to prevent corneal implant rejections       allopurinol (ZYLOPRIM) 300 MG tablet TAKE 1 TABLET DAILY 90 tablet 1    finasteride (PROSCAR) 5 MG tablet Take 1 tablet daily 90 tablet 1    isosorbide mononitrate (IMDUR) 30 MG extended release tablet Take 1 tablet by mouth daily 90 tablet 1    ranolazine (RANEXA) 500 MG extended release tablet TAKE 1 TABLET TWICE A DAY 90 tablet 1    escitalopram (LEXAPRO) 10 MG tablet TAKE 1 TABLET DAILY 90 tablet 1    venlafaxine (EFFEXOR XR) 150 MG extended release capsule Take one Capsule every morning 90 capsule 1    metoprolol tartrate (LOPRESSOR) 50 MG tablet Take 1 tablet by mouth 2 times daily 180 tablet 3    clopidogrel (PLAVIX) 75 MG tablet TAKE 1 TABLET DAILY 90 tablet 1    gemfibrozil (LOPID) 600 MG tablet TAKE 1 TABLET TWICE DAILY  BEFORE MEALS 180 tablet 1    nitroGLYCERIN (NITROSTAT) 0.4 MG SL tablet Place 1 tablet under the tongue every 5 minutes as needed for Chest pain 25 tablet 0    Cyanocobalamin (VITAMIN B 12) 500 MCG TABS Take 1 tablet by mouth daily 30 tablet 3    Multiple Vitamins-Minerals (VITABASIC SENIOR PO) Take by mouth      Multiple Vitamins-Minerals (OCUVITE EXTRA) TABS Take 1 tablet by mouth daily       No current facility-administered medications for this visit. ALLERGIES  No Known Allergies    PHYSICAL EXAM    /62 (Site: Left Upper Arm, Position: Sitting, Cuff Size: Medium Adult)   Pulse 69   Ht 6' (1.829 m)   Wt 228 lb 9.6 oz (103.7 kg)   SpO2 97%   BMI 31.00 kg/m²     Physical Exam  Vitals and nursing note reviewed.    Constitutional:       General: He is not in acute distress. Appearance: He is well-developed. He is not ill-appearing, toxic-appearing or diaphoretic. HENT:      Head: Normocephalic and atraumatic. Nose: Nose normal.      Mouth/Throat:      Mouth: Mucous membranes are moist.      Pharynx: Oropharynx is clear. Eyes:      Pupils: Pupils are equal, round, and reactive to light. Cardiovascular:      Rate and Rhythm: Normal rate and regular rhythm. Heart sounds: Normal heart sounds. No murmur heard. No gallop. Pulmonary:      Effort: Pulmonary effort is normal. No respiratory distress. Breath sounds: Normal breath sounds. No wheezing, rhonchi or rales. Abdominal:      Palpations: Abdomen is soft. Musculoskeletal:         General: No swelling or deformity. Normal range of motion. Cervical back: Normal range of motion and neck supple. No rigidity. Lymphadenopathy:      Cervical: No cervical adenopathy. Skin:     General: Skin is warm and dry. Coloration: Skin is not jaundiced. Findings: No bruising. Neurological:      General: No focal deficit present. Mental Status: He is alert and oriented to person, place, and time. Mental status is at baseline. Cranial Nerves: No cranial nerve deficit. Motor: No weakness. Psychiatric:         Mood and Affect: Mood normal.         Behavior: Behavior normal.         Thought Content: Thought content normal.         ASSESSMENT & PLAN     Diagnosis Orders   1. Episode of recurrent major depressive disorder, unspecified depression episode severity ([address]     2. Osteoarthritis of multiple joints, unspecified osteoarthritis type  CBC   3. Gout, unspecified cause, unspecified chronicity, unspecified site  Uric Acid   4. CAD in native artery  CBC    Comprehensive Metabolic Panel    LIPID PANEL   5. Benign prostatic hyperplasia without lower urinary tract symptoms     6. Heart palpitations     7.  Thyroid disorder screen  TSH     At this point patient advised to get COVID booster in near future. Continue with healthy lifestyle and same regimen. We will check CBC, CMP, lipid panel, TSH, and uric acid level. Patient to follow-up for all results. If shortness of breath persists or worsens he is to contact cardiology -may need further cardiac eval and Rx. If shortness of breath is acutely worse he is to go to the emergency department. Call with other changes or problems. Return in about 4 months (around 9/30/2022).          Electronically signed by Sarita Cano MD on 5/31/2022

## 2022-05-31 NOTE — PROGRESS NOTES
Physical Therapy: Initial Evaluation    Patient: Deborah Benz (34 y.o. male)   Examination Date:   Plan of Care Certification Period: 2022 to        :  1940 ;    Confirmed: Yes MRN: [id number]  CSN: [id number]   Insurance: Payor: Sierra Jacome / Plan: [facility] SOLUTIONS / Product Type: *No Product type* /   Insurance ID: [id number] - (Medicare Managed) Secondary Insurance (if applicable):    Referring Physician: Di Velásquez, [address] [place] [address] [place]   PCP: Jarred Manrique MD Visits to Date/Visits Approved:   /      No Show/Cancelled Appts:   /       Medical Diagnosis: Unspecified injury of right shoulder and upper arm, initial encounter [S49.91XA] shoulder pain  Treatment Diagnosis: Decreased RUE mobility and strength     PERTINENT MEDICAL HISTORY   Patient Assessed for Rehabilitation Services: Yes  Self reported health status[de-identified] Fair    Medical History: Chart Reviewed: Yes   Past Medical History:   Diagnosis Date    Decreased hearing 2019    Gout 2019    H/O cardiovascular stress test 2019    EF 41%,  Mild ischemia of lateral wall involving small to medium size of left ventricle.  H/O echocardiogram 19    EF 48, Mod AR, Mild MR & TR    H/O percutaneous transluminal coronary angioplasty 2020    PCI to OM & Mid LAD,  PDA has 80-90 % tandem lesions but small vessel.     Hyperlipidemia 2019    Nocturia 2019    Osteoarthritis 2019    Restless leg 2019     Surgical History:   Past Surgical History:   Procedure Laterality Date    BLEPHAROPLASTY  2010    CHOLECYSTECTOMY      COLECTOMY      COLONOSCOPY N/A 2019    COLONOSCOPY DIAGNOSTIC performed by Malinda Doss MD at [address] Left 2011    EYE SURGERY Right     JOINT REPLACEMENT      KNEE ARTHROSCOPY      SMALL INTESTINE SURGERY N/A 2019    LAPAROTOMY EXPLORATORY RIGHT ILEOCOLECTOMY performed by Sara Du MD at [facility] GASTROINTESTINAL ENDOSCOPY N/A 7/4/2019    EGD DIAGNOSTIC ONLY performed by Bhavana Marinelli MD at [facility] ENDOSCOPY       Medications:   Current Outpatient Medications:     UNABLE TO FIND, Indications: pharma fluorometholone-eye drops to prevent corneal implant rejections , Disp: , Rfl:     allopurinol (ZYLOPRIM) 300 MG tablet, TAKE 1 TABLET DAILY, Disp: 90 tablet, Rfl: 1    finasteride (PROSCAR) 5 MG tablet, Take 1 tablet daily, Disp: 90 tablet, Rfl: 1    isosorbide mononitrate (IMDUR) 30 MG extended release tablet, Take 1 tablet by mouth daily, Disp: 90 tablet, Rfl: 1    ranolazine (RANEXA) 500 MG extended release tablet, TAKE 1 TABLET TWICE A DAY, Disp: 90 tablet, Rfl: 1    escitalopram (LEXAPRO) 10 MG tablet, TAKE 1 TABLET DAILY, Disp: 90 tablet, Rfl: 1    venlafaxine (EFFEXOR XR) 150 MG extended release capsule, Take one Capsule every morning, Disp: 90 capsule, Rfl: 1    metoprolol tartrate (LOPRESSOR) 50 MG tablet, Take 1 tablet by mouth 2 times daily, Disp: 180 tablet, Rfl: 3    clopidogrel (PLAVIX) 75 MG tablet, TAKE 1 TABLET DAILY, Disp: 90 tablet, Rfl: 1    gemfibrozil (LOPID) 600 MG tablet, TAKE 1 TABLET TWICE DAILY  BEFORE MEALS, Disp: 180 tablet, Rfl: 1    nitroGLYCERIN (NITROSTAT) 0.4 MG SL tablet, Place 1 tablet under the tongue every 5 minutes as needed for Chest pain (Patient not taking: Reported on 5/25/2022), Disp: 25 tablet, Rfl: 0    Cyanocobalamin (VITAMIN B 12) 500 MCG TABS, Take 1 tablet by mouth daily, Disp: 30 tablet, Rfl: 3    Multiple Vitamins-Minerals (VITABASIC SENIOR PO), Take by mouth, Disp: , Rfl:     Multiple Vitamins-Minerals (OCUVITE EXTRA) TABS, Take 1 tablet by mouth daily, Disp: , Rfl:   Allergies: Patient has no known allergies.       SUBJECTIVE EXAMINATION     History obtained from[de-identified] Chart Review,Patient,      Family/Caregiver Present: No    Subjective History:    Subjective: Pt reports he fell out traveling in [place], walking out of [facility] gas station stepped down of a small ale[employer]rina [employer] los this balance and fell against the car, fell directly on his shoulder. Reports it's been aggravating, dull/achy. Difficulty lifitng his arm overhead, sleeping on his arm is painful, He has not tried any ice or stretching. Had x-rays completed, no significant abnormalities. He has fallen on the other arm, no previous surgies. Pain located along the poster to anterior aspec tof [address] joint.   Additional Pertinent Hx (if applicable):     Prior diagnostic testing[de-identified] X-ray  Any changes to allergies, medications, or have you had any medical procedures/ER visits since your last visit?: No      Learning/Language: Learning  Does the patient/guardian have any barriers to learning?: No barriers  Will there be a co-learner?: No  What is the preferred language of the patient/guardian?: English  Is an  required?: No  How does the patient/guardian prefer to learn new concepts?: Listening,Reading,Demonstration     Pain Screening   Pain Screening  Patient Currently in Pain: Yes  Pain Assessment: 0-10  Pain Level: 0  Best Pain Level: 0  Worst Pain Level: 7  Patient's Stated Pain Goal: 0 - No pain  Pain Type: Acute pain  Pain Location: Arm,Shoulder  Pain Descriptors: Aching,Dull  Pain Frequency: Continuous  Pain Onset: Awakened from sleep  Functional Pain Assessment: Prevents or interferes some active activities and ADLs    Functional Status    Dominant Hand: : Right    Occupation/Interests:  Occupation: Retired    Prior Level of Function:    Independent     Current Level of Function:  independent      ADL Assistance: Independent  Homemaking Assistance: Independent  Ambulation Assistance: Independent  Transfer Assistance: Independent  Active : Yes  Mode of Transportation: Car    OBJECTIVE EXAMINATION   Restrictions:  NONE    Review of Systems:  Vision: Within Functional Limits  Hearing: Within functional limits  Follows Commands: Within Functional Limits    Observations:  General Observations  Description: Patient ambulates without AD, noted some LOB when getting up/down from a chair    Palpation:   Right Shoulder Palpation: noted tenderness along supraspinatus tendon, hypertonicity of triceps/biceps region  Left Shoulder Palpation: WNL  Cervical/Occiput Palpation: WNL      Left AROM  Right AROM       WNL       AROM RUE (degrees)  R Shoulder Flexion 0-180: 94 deg  R Shoulder Extension 0-45: 50 deg  R Shoulder ABduction 0-180: 70 deg  R Shoulder Int Rotation  0-70: able to reach L3  R Shoulder Ext Rotation 0-90: able to reach C2     Left PROM  Right PROM      General PROM UE: Right WFL,Left WFL    General PROM UE: Right WFL,Left WFL        Left Strength  Right Strength         Strength LUE  L Shoulder Flexion: 5/5  L Shoulder Extension: 5/5  L Shoulder ABduction: 5/5  L Shoulder Internal Rotation: 5/5  L Shoulder External Rotation: 5/5  L Elbow Flexion: 5/5  L Elbow Extension: 5/5    Strength RUE  R Shoulder Flexion: 3-/5  R Shoulder Extension: 4+/5  R Shoulder ABduction: 3-/5  R Shoulder Internal Rotation: 4-/5  R Shoulder External Rotation: 3+/5  R Elbow Flexion: 5/5  R Elbow Extension: 5/5     Joint Mobility (if applicable):   Joint Integrity Shoulder  General Joint Integrity - Shoulder: Left WNL (WNL posterior and anterior, increased pain with inferior glide)    Special Tests:   Special Tests: Performed  Impingement Tests  Neer Test: L (-),R (+)  Hawkins Micha Test: L (-),R (+)  Drop Arm: L (-),R (-)  Empty Can: L (-),R (+)       ASSESSMENT     Impression: Assessment: Pt is [de-identified]year old male with sudden onset of R shoulder pain following a fall where his shoulder landed on the side of a car as he was stepping off a curb. Pt now has difficulties reaching overhead, sleeping on his R arm, and carrying/pushing.  Pt demo deficits this date that include reduced active shoulder ROM, especially flexion/abduction, limited rotator cuff/scapular strength, pain with GH inferior glide, (+) empty can, tenderness along supraspinatus, hypertonicity of biceps/triceps. Testing this date indicate signs and symptoms of partial rotator cuff tearing. Pt will benefit with PT services with shoulder ROM, GH joint mobilizations, rotator cuff/scapuar strengthening, vasocompression, modified activity, home exercise program to return to PLOF. Pt prior to onset of current condition had no pain with able to complete full ADLs. Body Structures, Functions, Activity Limitations Requiring Skilled Therapeutic Intervention: Decreased functional mobility ,Decreased ADL status,Increased pain,Decreased ROM,Decreased strength,Decreased posture    Statement of Medical Necessity: Physical Therapy is both indicated and medically necessary as outlined in the POC to increase the likelihood of meeting the functionally related goals stated below. Patient's Activity Tolerance: Patient tolerated evaluation without incident      Patient's rehabilitation potential/prognosis is considered to be: Good    Factors which may impact rehabilitation potential include: None  Measures taken to address barrier(s): N/A     GOALS   Patient Goal(s): improve reaching and pain  Short Term Goals Completed by 5 weeks Goal Status   Pt demo I w/ HEP and symptom management New   Pt demo active shoulder flexion >150 deg and pain reported <2/10 New   Pt demo active shoulder abduction >110 deg and pain reported <2/10 New   Pt demo >4/5 shoulder strength in all directions with pain reported <2/10 New   Pt demo ability to lift approx.  1-2 lbs weight overhead actively to mimic putting dishes away in overhead cabinets New          TREATMENT PLAN       Requires PT Follow-Up: Yes    Pt. actively involved in establishing Plan of Care and Goals: Yes  Patient/ Caregiver education and instruction: Radha Brianp of Care,Evaluative findings,Pressure Relief,Home Exercise Program,Anatomy of condition,Disease Specific Education             Treatment may include any combination of the following: Strengthening,ROM,Modalities,Home exercise program,Neuromuscular re-education,Manual Therapy - Soft Tissue Mobilization,Manual Therapy - Joint Manipulation,Therapeutic activities     Frequency / Duration:  Patient to be seen 2x for 5 weeks weeks      Eval Complexity:    Decision Making: Low Complexity     Therapist Signature: Shahla Sterling, PT    Date: 3/42/8946     I certify that the above Therapy Services are being furnished while the patient is under my care. I agree with the treatment plan and certify that this therapy is necessary. [de-identified] Signature:  ___________________________   Date:_______                                                                   HUANG Vazquez        Physician Comments: _______________________________________________    Please sign and return to   [facility]. Please fax to the location listed below.  Karina Ruth for this referral!    [address] [facility] [address], [address] [address]  Dept: [phone]  Loc: [phone]

## 2022-06-03 ENCOUNTER — HOSPITAL ENCOUNTER (OUTPATIENT)
Dept: PHYSICAL THERAPY | Age: 82
Setting detail: THERAPIES SERIES
Discharge: HOME | End: 2022-06-03
Payer: MEDICARE

## 2022-06-03 PROCEDURE — 97110 THERAPEUTIC EXERCISES: CPT | Performed by: PHYSICAL THERAPY ASSISTANT

## 2022-06-03 NOTE — FLOWSHEET NOTE
Outpatient Physical Therapy  [place]           [x] Phone: [phone]   Fax: [phone]  [place]           [] Phone: [phone]   Fax: [phone]        Physical Therapy Daily Treatment Note  Date:  6/3/2022    Patient Name:  Ankur Piedra    :  1940  MRN: [id number]  Restrictions/Precautions: NONE  Diagnosis:   Unspecified injury of right shoulder and upper arm, initial encounter [S49.91XA] Diagnosis: shoulder pain  Date of Injury/Surgery: --  Treatment Diagnosis:  Decreased RUE mobility and strength  Insurance/Certification information: [facility]  Referring Physician:  HUANG Echevarria   PCP: Ashley Scott MD  Next Doctor Visit:  --  Plan of care signed (Y/N):  N, sent 22   Outcome Measure: QUICK DASH: 34%  Visit# / total visits:   2/10  Pain level: activity 5/10 At rest 0/10  Goals:     Patient goals: improve reaching and pain  Short term goals  Time Frame for Short term goals: 5 weeks  Pt demo I w/ HEP and symptom management  Pt demo active shoulder flexion >150 deg and pain reported <2/10  Pt demo active shoulder abduction >110 deg and pain reported <2/10  Pt demo >4/5 shoulder strength in all directions with pain reported <2/10  Pt demo ability to lift approx. 1-2 lbs weight overhead actively to mimic putting dishes away in overhead cabinets      Summary of Evaluation:  Assessment: Pt is [de-identified]year old male with sudden onset of R shoulder pain following a fall where his shoulder landed on the side of a car as he was stepping off a curb. Pt now has difficulties reaching overhead, sleeping on his R arm, and carrying/pushing. Pt demo deficits this date that include reduced active shoulder ROM, especially flexion/abduction, limited rotator cuff/scapular strength, pain with GH inferior glide, (+) empty can, tenderness along supraspinatus, hypertonicity of biceps/triceps. Testing this date indicate signs and symptoms of partial rotator cuff tearing.  Pt will benefit with PT services with shoulder ROM, GH joint mobilizations, rotator cuff/scapuar strengthening, vasocompression, modified activity, home exercise program to return to PLOF. Pt prior to onset of current condition had no pain with able to complete full ADLs. Subjective:  Pt stated the right shoulder is getting slowly better along with the sleep improving but still not able to sleep on it because the pain gets worse the next day. Next day the use of the shoulder gets more painful. The pain lasts about 24 hours. HEP yes doing without issues. Any changes in Ambulatory Summary Sheet? None        Objective:     COVID screening questions were asked and patient attested that there had been no contact or symptoms    Shoulder ROM: right flexion AAROM 126* today. Exercises: (No more than 4 columns)   Exercise/Equipment 5/31/22 #1 6/3/22 #2 Date           WARM UP      Pulley's    Flex/scap next Flex/scap x20 each          TABLE      *Cane Flexion  X 10 reps    Supine punch   x10 palms together    SL ER  x10 reps    SL FLEX/ABD  ABD x10 reps. STANDING      *Wall Walks AAROM  Red swiss up/down Dbl. UE x5    *Isometrics deltoid Flex/ext, add ER next Flex/ext., ER x10 holding 3\" each. PROPRIOCEPTION                                    MODALITIES      vaso  none              Other Therapeutic Activities/Education:  Patient received education on their current pathology and how their condition effects them with their functional activities. Patient understood discussion and questions were answered. Patient understands their activity limitations and understands rational for treatment progression. Home Exercise Program:  HO issued, reviewed and discussed with patient. Pt agreed to comply. 6/3/22: new with HO provided. Supine punch, Side lying ER, and ABduction, Isometrics flex/ext. And ER.       Manual Treatments:  --      Modalities: --      Communication with other providers:  natividad sent 5/31/22      Assessment:  (Response towards treatment session) (Pain Rating)  Assessment: Pt is [de-identified]year old male with sudden onset of R shoulder pain following a fall where his shoulder landed on the side of a car as he was stepping off a curb. Pt now has difficulties reaching overhead, sleeping on his R arm, and carrying/pushing. Pt demo deficits this date that include reduced active shoulder ROM, especially flexion/abduction, limited rotator cuff/scapular strength, pain with GH inferior glide, (+) empty can, tenderness along supraspinatus, hypertonicity of biceps/triceps. Testing this date indicate signs and symptoms of partial rotator cuff tearing. Pt will benefit with PT services with shoulder ROM, GH joint mobilizations, rotator cuff/scapuar strengthening, vasocompression, modified activity, home exercise program to return to PLOF. Pt prior to onset of current condition had no pain with able to complete full ADLs. ROM and strengthening remains the most important focus on the remaining visits to return to prior function. Post exercise rated pain: 2/10     Plan for Next Session: --continue working the ROM and [facility] mobility, strengthening for the Rotator cuff within the pt. Tolerance. Time In / Time Out:    1055/1123    Timed Code/Total Treatment Minutes:   29 '       ( 2 ) TE      Next Progress Note due:  10th visit      Plan of Care Interventions:  [x] Therapeutic Exercise  [] Modalities:  [x] Therapeutic Activity     [] Ultrasound  [] Estim  [] Gait Training      [] Cervical Traction [] Lumbar Traction  [x] Neuromuscular Re-education    [] Cold/hotpack [] Iontophoresis   [x] Instruction in HEP      [x] Vasopneumatic   [] Dry Needling    [x] Manual Therapy               [] Aquatic Therapy              Electronically signed by:   Raul Chaudhry PTA  6/3/2022, 9:39 AM

## 2022-06-09 ENCOUNTER — HOSPITAL ENCOUNTER (OUTPATIENT)
Dept: PHYSICAL THERAPY | Age: 82
Setting detail: THERAPIES SERIES
Discharge: HOME | End: 2022-06-09
Payer: MEDICARE

## 2022-06-09 PROCEDURE — 97112 NEUROMUSCULAR REEDUCATION: CPT

## 2022-06-09 PROCEDURE — 97110 THERAPEUTIC EXERCISES: CPT

## 2022-06-09 PROCEDURE — 97140 MANUAL THERAPY 1/> REGIONS: CPT

## 2022-06-09 NOTE — FLOWSHEET NOTE
Outpatient Physical Therapy  [place]           [x] Phone: [phone]   Fax: [phone]  [place]           [] Phone: [phone]   Fax: [phone]        Physical Therapy Daily Treatment Note  Date:  2022    Patient Name:  Clarisse Obando    :  1940  MRN: [id number]  Restrictions/Precautions: NONE  Diagnosis:   Unspecified injury of right shoulder and upper arm, initial encounter [S49.91XA] Diagnosis: shoulder pain  Date of Injury/Surgery: --  Treatment Diagnosis:  Decreased RUE mobility and strength  Insurance/Certification information: [facility]  Referring Physician:  Crawford Bushy, PA Gerhard Dance PA   PCP: Dipak Red MD  Next Doctor Visit:  --  Plan of care signed (Y/N):  N, sent 22   Outcome Measure: QUICK DASH: 34%  Visit# / total visits:   3/10  Pain level: 210   Goals:     Patient goals: improve reaching and pain  Short term goals  Time Frame for Short term goals: 5 weeks  Pt demo I w/ HEP and symptom management  Pt demo active shoulder flexion >150 deg and pain reported <2/10  Pt demo active shoulder abduction >110 deg and pain reported <2/10  Pt demo >4/5 shoulder strength in all directions with pain reported <2/10  Pt demo ability to lift approx. 1-2 lbs weight overhead actively to mimic putting dishes away in overhead cabinets      Summary of Evaluation:  Assessment: Pt is [de-identified]year old male with sudden onset of R shoulder pain following a fall where his shoulder landed on the side of a car as he was stepping off a curb. Pt now has difficulties reaching overhead, sleeping on his R arm, and carrying/pushing. Pt demo deficits this date that include reduced active shoulder ROM, especially flexion/abduction, limited rotator cuff/scapular strength, pain with GH inferior glide, (+) empty can, tenderness along supraspinatus, hypertonicity of biceps/triceps. Testing this date indicate signs and symptoms of partial rotator cuff tearing.  Pt will benefit with PT services with shoulder ROM, GH joint mobilizations, rotator cuff/scapuar strengthening, vasocompression, modified activity, home exercise program to return to OF. Pt prior to onset of current condition had no pain with able to complete full ADLs. Subjective:  Pt stated that he his pain wasn't too bad currently. Pt stated that he can lift his arm up ok, but gets pain lowering it. Pt stated that when he lays on his R side through the night, his pain is worse throughout the day. Any changes in Ambulatory Summary Sheet? None        Objective:     COVID screening questions were asked and patient attested that there had been no contact or symptoms    Shoulder ROM:    Shoulder flexion PROM 153°  Using a towel to keep patient at neutral helped with report pain  Breaking down ROM into small controlled segments helped with reported pain      Exercises: (No more than 4 columns)   Exercise/Equipment 5/31/22 #1 6/3/22 #2 6/9/2022 #3           WARM UP      Pulley's    Flex/scap next Flex/scap x20 each Flexion and scaption x 20 ea         TABLE      *Cane Flexion  X 10 reps    Supine punch   x10 palms together 10x2 palms together   RS    30\" x2   Concentric circles    10x2 ea dir   SL ER  x10 reps 10x2    SL FLEX/ABD  ABD x10 reps. S/L flexion  10x2  S/L abd small ROM multiple angles 10x2      *Isometrics deltoid   Flex, abduction, IR, ext, and ER 10x2 5\" ea against PTA   STANDING      *Wall Walks AAROM  Red swiss up/down Dbl. UE x5 10x   *Isometrics deltoid Flex/ext, add ER next Flex/ext., ER x10 holding 3\" each. PROPRIOCEPTION                                    MODALITIES      vaso  none              Other Therapeutic Activities/Education:  Patient received education on their current pathology and how their condition effects them with their functional activities. Patient understood discussion and questions were answered.  Patient understands their activity limitations and understands rational for treatment progression. Home Exercise Program:  HO issued, reviewed and discussed with patient. Pt agreed to comply. 6/3/22: new with HO provided. Supine punch, Side lying ER, and ABduction, Isometrics flex/ext. And ER. Manual Treatments: [address] mob, scapular assist with S/ L motion and with PROM x 15       Modalities:  --      Communication with other providers:  eval sent 5/31/22      Assessment:  (Response towards treatment session) (Pain Rating)Pt tolerated treatment fairly well. Pt was able to progress activity today. Pt did much better with manually assisted scapular control and small ROM. Pt rated pain at 1-2/10,but stated that he had less pain with a lot of the exercises that normally caused pain. Assessment: Pt is [de-identified]year old male with sudden onset of R shoulder pain following a fall where his shoulder landed on the side of a car as he was stepping off a curb. Pt now has difficulties reaching overhead, sleeping on his R arm, and carrying/pushing. Pt demo deficits this date that include reduced active shoulder ROM, especially flexion/abduction, limited rotator cuff/scapular strength, pain with GH inferior glide, (+) empty can, tenderness along supraspinatus, hypertonicity of biceps/triceps. Testing this date indicate signs and symptoms of partial rotator cuff tearing. Pt will benefit with PT services with shoulder ROM, GH joint mobilizations, rotator cuff/scapuar strengthening, vasocompression, modified activity, home exercise program to return to PLOF. Pt prior to onset of current condition had no pain with able to complete full ADLs. Plan for Next Session: --continue working the ROM and [address] mobility, strengthening for the Rotator cuff within the pt. Tolerance.          Time In / Time Out:   1715/1800      Timed Code/Total Treatment Minutes:   45'/45'  Man (15') 1 TE (20') 1 neuro (10')       Next Progress Note due:  10th visit      Plan of Care Interventions:  [x] Therapeutic Exercise  [] Modalities:  [x] Therapeutic Activity     [] Ultrasound  [] Estim  [] Gait Training      [] Cervical Traction [] Lumbar Traction  [x] Neuromuscular Re-education    [] Cold/hotpack [] Iontophoresis   [x] Instruction in HEP      [x] Vasopneumatic   [] Dry Needling    [x] Manual Therapy               [] Aquatic Therapy              Electronically signed by:  Robin Carlson PTA  6/9/2022, 3:34 PM     6/9/2022,7:13 PM

## 2022-06-13 ENCOUNTER — HOSPITAL ENCOUNTER (OUTPATIENT)
Dept: PHYSICAL THERAPY | Age: 82
Setting detail: THERAPIES SERIES
Discharge: HOME | End: 2022-06-13
Payer: MEDICARE

## 2022-06-13 PROCEDURE — 97112 NEUROMUSCULAR REEDUCATION: CPT

## 2022-06-13 PROCEDURE — 97110 THERAPEUTIC EXERCISES: CPT

## 2022-06-13 NOTE — FLOWSHEET NOTE
understands their activity limitations and understands rational for treatment progression. Home Exercise Program:  HO issued, reviewed and discussed with patient. Pt agreed to comply. 6/3/22: new with HO provided. Supine punch, Side lying ER, and ABduction, Isometrics flex/ext. And ER. Manual Treatments:       Modalities:  --      Communication with other providers:  natividad sent 5/31/22      Assessment:  Steeg demonstrates fair tolerance to today's session. Able to tolerate all exercises without significant increases in pain. Continue to progress strengthening and ROM within pain tolerance. End of session:1/10    Assessment: Pt is [de-identified]year old male with sudden onset of R shoulder pain following a fall where his shoulder landed on the side of a car as he was stepping off a curb. Pt now has difficulties reaching overhead, sleeping on his R arm, and carrying/pushing. Pt demo deficits this date that include reduced active shoulder ROM, especially flexion/abduction, limited rotator cuff/scapular strength, pain with GH inferior glide, (+) empty can, tenderness along supraspinatus, hypertonicity of biceps/triceps. Testing this date indicate signs and symptoms of partial rotator cuff tearing. Pt will benefit with PT services with shoulder ROM, GH joint mobilizations, rotator cuff/scapuar strengthening, vasocompression, modified activity, home exercise program to return to [facility]. Pt prior to onset of current condition had no pain with able to complete full ADLs. Plan for Next Session: --continue working the ROM and [address] mobility, strengthening for the Rotator cuff within the pt. Tolerance.          Time In / Time Out:   3515-1941     Timed Code/Total Treatment Minutes:   45'/45'   2 TE (28') 1 neuro (16')       Next Progress Note due:  10th visit      Plan of Care Interventions:  [x] Therapeutic Exercise  [] Modalities:  [x] Therapeutic Activity     [] Ultrasound  [] Estim  [] Gait Training      [] Cervical Traction [] Lumbar Traction  [x] Neuromuscular Re-education    [] Cold/hotpack [] Iontophoresis   [x] Instruction in HEP      [x] Vasopneumatic   [] Dry Needling    [x] Manual Therapy               [] Aquatic Therapy              Electronically signed by:  Rick Hyman, PT, DPT, CSCS  6/13/2022, 8:30 AM    6/13/2022,8:30 AM

## 2022-06-14 ENCOUNTER — OFFICE VISIT (OUTPATIENT)
Dept: CARDIOLOGY CLINIC | Age: 82
End: 2022-06-14
Payer: MEDICARE

## 2022-06-14 VITALS
WEIGHT: 231 LBS | HEART RATE: 60 BPM | SYSTOLIC BLOOD PRESSURE: 112 MMHG | DIASTOLIC BLOOD PRESSURE: 62 MMHG | BODY MASS INDEX: 31.29 KG/M2 | HEIGHT: 72 IN

## 2022-06-14 DIAGNOSIS — R26.89 LOSS OF BALANCE: ICD-10-CM

## 2022-06-14 DIAGNOSIS — I25.10 CAD IN NATIVE ARTERY: ICD-10-CM

## 2022-06-14 DIAGNOSIS — F33.9 EPISODE OF RECURRENT MAJOR DEPRESSIVE DISORDER, UNSPECIFIED DEPRESSION EPISODE SEVERITY (HCC): ICD-10-CM

## 2022-06-14 DIAGNOSIS — R00.2 HEART PALPITATIONS: ICD-10-CM

## 2022-06-14 DIAGNOSIS — R07.2 PRECORDIAL PAIN: ICD-10-CM

## 2022-06-14 DIAGNOSIS — I49.3 PVC (PREMATURE VENTRICULAR CONTRACTION): ICD-10-CM

## 2022-06-14 DIAGNOSIS — E78.5 DYSLIPIDEMIA: ICD-10-CM

## 2022-06-14 DIAGNOSIS — R53.83 OTHER FATIGUE: Primary | ICD-10-CM

## 2022-06-14 DIAGNOSIS — R06.02 SHORTNESS OF BREATH: ICD-10-CM

## 2022-06-14 DIAGNOSIS — E78.2 MIXED HYPERLIPIDEMIA: ICD-10-CM

## 2022-06-14 DIAGNOSIS — R26.89 BALANCE DISORDER: ICD-10-CM

## 2022-06-14 PROCEDURE — 1036F TOBACCO NON-USER: CPT | Performed by: INTERNAL MEDICINE

## 2022-06-14 PROCEDURE — G8428 CUR MEDS NOT DOCUMENT: HCPCS | Performed by: INTERNAL MEDICINE

## 2022-06-14 PROCEDURE — 93000 ELECTROCARDIOGRAM COMPLETE: CPT | Performed by: INTERNAL MEDICINE

## 2022-06-14 PROCEDURE — 1123F ACP DISCUSS/DSCN MKR DOCD: CPT | Performed by: INTERNAL MEDICINE

## 2022-06-14 PROCEDURE — G8417 CALC BMI ABV UP PARAM F/U: HCPCS | Performed by: INTERNAL MEDICINE

## 2022-06-14 PROCEDURE — 99214 OFFICE O/P EST MOD 30 MIN: CPT | Performed by: INTERNAL MEDICINE

## 2022-06-14 NOTE — PATIENT INSTRUCTIONS
Please be informed that if you contact our office outside of normal business hours the physician on call cannot help with any scheduling or rescheduling issues, procedure instruction questions or any type of medication issue. We advise you for any urgent/emergency that you go to the nearest emergency room! PLEASE CALL OUR OFFICE DURING NORMAL BUSINESS HOURS    Monday - Friday   8 am to 5 pm    [address] [place]: [phone]    [place]:  [phone]  **It is YOUR responsibilty to bring medication bottles and/or updated medication list to [address] [place] [address]. This will allow us to better serve you and all your healthcare needs**  [facility] Locations - No appointment necessary. Sites open Monday to Friday. Call your preferred location for test preparation, business hours and other information you need. [employer] accepts [employer]. [facility] [address] Rufus Conteh, [address] [facility]  Phone: [phone] [facility] [address] [facility]  [address].   [facility], [address] [facility]  Phone: [phone]

## 2022-06-14 NOTE — PROGRESS NOTES
CARDIOLOGY   NOTE    Chief Complaint: chest Pain / SOB     HPI:   Rick Cordon is a [de-identified]y.o. year old who has Past medical history as noted below. . Nish Chacon he is feeling un balanced he has had few falls in office today he cannot stand with his eyes closed, He cannot walk in a straight line  This is on gioing for few months he is tired and exhausted   He denies any chest pain but continues to have some shortness of breath . Rohit  had PCI to LAd and Circ in Jan 2020 . He was having chest pain before cardiac cath  in spite of being on 2 anti anginal RX. After PCI he says that  chest pain is resolved. 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 But after starting on metoprolol and repeating Holter by March 2020 PVC burden had come down to 2.4% denies any palpitations      He was actually seen by myself in June 2019 at that time he was having a lot of shortness of breath and chest pressure. Stress test was abnormal is planned for cardiac cath he had lab work which revealed severe profound anemia he went to the hospital and was actually anemic. He required multiple units of blood transfusion work-up revealed his bleeding from his colon he underwent colectomy by Dr. Sonia Abreu. EKG shows frequent PVC ( every 5th beat). He has been tolerating antiplatelet okay since January 2020  Father had MI in his 52's . HE is retired from [employer] .   Going to rehab denies any chest pain  He is on Plavix now    Current Outpatient Medications   Medication Sig Dispense Refill    UNABLE TO FIND Indications: pharma fluorometholone-eye drops to prevent corneal implant rejections       allopurinol (ZYLOPRIM) 300 MG tablet TAKE 1 TABLET DAILY 90 tablet 1    finasteride (PROSCAR) 5 MG tablet Take 1 tablet daily 90 tablet 1    isosorbide mononitrate (IMDUR) 30 MG extended release tablet Take 1 tablet by mouth daily 90 tablet 1    ranolazine (RANEXA) 500 MG extended release tablet TAKE 1 TABLET TWICE A DAY 90 tablet 1    escitalopram (LEXAPRO) 10 MG tablet TAKE 1 TABLET DAILY 90 tablet 1    venlafaxine (EFFEXOR XR) 150 MG extended release capsule Take one Capsule every morning 90 capsule 1    metoprolol tartrate (LOPRESSOR) 50 MG tablet Take 1 tablet by mouth 2 times daily 180 tablet 3    clopidogrel (PLAVIX) 75 MG tablet TAKE 1 TABLET DAILY 90 tablet 1    gemfibrozil (LOPID) 600 MG tablet TAKE 1 TABLET TWICE DAILY  BEFORE MEALS 180 tablet 1    nitroGLYCERIN (NITROSTAT) 0.4 MG SL tablet Place 1 tablet under the tongue every 5 minutes as needed for Chest pain 25 tablet 0    Cyanocobalamin (VITAMIN B 12) 500 MCG TABS Take 1 tablet by mouth daily 30 tablet 3    Multiple Vitamins-Minerals (VITABASIC SENIOR PO) Take by mouth      Multiple Vitamins-Minerals (OCUVITE EXTRA) TABS Take 1 tablet by mouth daily       No current facility-administered medications for this visit. Allergies:   Patient has no known allergies. Patient History:  Past Medical History:   Diagnosis Date    Decreased hearing 5/4/2019    Gout 5/4/2019    H/O cardiovascular stress test 06/24/2019    EF 41%,  Mild ischemia of lateral wall involving small to medium size of left ventricle.  H/O echocardiogram 8/27/19    EF 48, Mod AR, Mild MR & TR    H/O percutaneous transluminal coronary angioplasty 02/06/2020    PCI to OM & Mid LAD,  PDA has 80-90 % tandem lesions but small vessel.     Hyperlipidemia 5/4/2019    Nocturia 5/4/2019    Osteoarthritis 5/4/2019    Restless leg 5/4/2019     Past Surgical History:   Procedure Laterality Date    BLEPHAROPLASTY  2010    CHOLECYSTECTOMY  2001    COLECTOMY  2000    COLONOSCOPY N/A 7/4/2019    COLONOSCOPY DIAGNOSTIC performed by Kylah Kerr, [facility] at [facility]. Left 2011    EYE SURGERY Right 2011    JOINT REPLACEMENT      KNEE ARTHROSCOPY  2002    SMALL INTESTINE SURGERY N/A 2019    LAPAROTOMY EXPLORATORY RIGHT ILEOCOLECTOMY performed by Pricila Lei MD at [facility] 14. N/A 2019    EGD DIAGNOSTIC ONLY performed by General Pita MD at [facility] ENDOSCOPY     Family History   Problem Relation Age of Onset    Colon Cancer Mother     Heart Disease Father     Lung Disease Father     Seizures Sister     High Blood Pressure Brother     No Known Problems Brother      Social History     Tobacco Use    Smoking status: Former Smoker     Packs/day: 2.00     Years: 25.00     Pack years: 50.00     Types: Cigarettes     Start date: 1960     Quit date:      Years since quittin.4    Smokeless tobacco: Never Used   Substance Use Topics    Alcohol use: Yes     Alcohol/week: 1.0 standard drink     Types: 1 Cans of beer per week     Comment: rarely, 1-2 cups of coffee         Review of Systems:   · Constitutional: No Fever or Weight Loss   · Eyes: No Decreased Vision  · ENT: No Headaches, Hearing Loss or Vertigo  · Cardiovascular: as per note above   · Respiratory: No cough or wheezing and as per note above.    · Gastrointestinal: No abdominal pain, appetite loss, blood in stools, constipation, diarrhea or heartburn  · Genitourinary: No dysuria, trouble voiding, or hematuria  · Musculoskeletal:  denies any new  joint aches , swelling  or pain   · Integumentary: No rash or pruritis  · Neurological: No TIA or stroke symptoms  · Psychiatric: No anxiety or depression  · Endocrine: No malaise, fatigue or temperature intolerance  · Hematologic/Lymphatic: No bleeding problems, blood clots or swollen lymph nodes  · Allergic/Immunologic: No nasal congestion or hives    Objective:      Physical Exam:  /62   Pulse 60   Ht 6' (1.829 m)   Wt 231 lb (104.8 kg)   BMI 31.33 kg/m²   Wt Readings from Last 3 Encounters:   06/14/22 231 lb (104.8 kg)   05/31/22 228 lb 9.6 oz (103.7 kg)   05/04/22 235 lb (106.6 kg)     Body mass index is 31.33 kg/m². Vitals:    06/14/22 1114   BP: 112/62   Pulse: 60        General Appearance:  No distress, conversant  Constitutional:  Well developed, Well nourished, No acute distress, Non-toxic appearance. HENT:  Normocephalic, Atraumatic, Bilateral external ears normal, Oropharynx moist, No oral exudates, Nose normal. Neck- Normal range of motion, No tenderness, Supple, No stridor,no apical-carotid delay  Eyes:  PERRL, EOMI, Conjunctiva normal, No discharge. Respiratory:  Normal breath sounds, No respiratory distress, No wheezing, No chest tenderness. ,no use of accessory muscles, NO crackles  Cardiovascular: (PMI) apex non displaced,no lifts no thrills,S1 and S2 audible, No added heart sounds, No signs of ankle edema, or volume overload, No evidence of JVD, No crackles  GI:  Bowel sounds normal, Soft, No tenderness, No masses, No gross visceromegaly   :  No costovertebral angle tenderness   Musculoskeletal:  No edema, no tenderness, no deformities.  Back- no tenderness  Integument:  Well hydrated, no rash   Lymphatic:  No lymphadenopathy noted   Neurologic:  Alert & oriented x 3, CN 2-12 normal, normal motor function, normal sensory function, no focal deficits noted   Psychiatric:  Speech and behavior appropriate       Medical decision making and Data review:  DATA:  Lab Results   Component Value Date    TROPONINT <0.010 07/02/2019     BNP:    Lab Results   Component Value Date    PROBNP 61.38 01/28/2020     PT/INR:  No results found for: PTINR  No results found for: LABA1C  Lab Results   Component Value Date    CHOL 139 05/31/2022    TRIG 165 (H) 05/31/2022    HDL 31 (L) 05/31/2022    LDLCALC 75 05/31/2022    LDLDIRECT 92 05/05/2021     Lab Results   Component Value Date    ALT 12 05/31/2022    AST 21 05/31/2022     TSH:   Lab Results   Component Value Date    TSH 1.61 05/31/2022     Lab Results   Component Value Date    AST 21 05/31/2022    ALT 12 05/31/2022    BILIDIR 0.2 01/17/2012    BILITOT 0.5 05/31/2022    ALKPHOS 109 05/31/2022     Lab Results   Component Value Date    PROBNP 61.38 01/28/2020    PROBNP 95.08 07/02/2019     No results found for: LABA1C  Lab Results   Component Value Date    WBC 6.5 05/31/2022    HGB 13.9 05/31/2022    HCT 41.3 05/31/2022     05/31/2022     Stress test  6/24/19     [place] Samuel portion of stress test is negative for ischemia by diagnostic criteria.    Completed 4.6 METS and 4 Mins of exercise    Global hypokinesis with EF of 41 %    Mild ischemia of lateral wall involving small to medium size of left    ventricle    Abnormal stress test         Echo 6/27/19  Summary   Technically difficult study with poor windows   Left ventricular systolic function is probably low normal with an ejection   fraction of 50 %.  Grade I diastolic dysfunction.   Mild concentric left ventricular hypertrophy.   Sclerotic, but non-stenotic aortic valve.   Doppler evaluation reveals moderate aortic and mild mitral and tricuspid   regurgitation.   No evidence of pericardial effusion. Cath 2/6/2020   Procedure Summary   Indication Abnormal stress test showing lateral ischemia pt on 3   anti anginal meds and having class III angina   Access L Radial   1. PCI to OM ostial lesion reduced 90 % lesion to 0 % using 2.75   X 18 LUISA   2. Mid LAD has 70-80 % lesion reduced to 0 % using 3.0 X 18 LUISA   3. PDA has 80-90 % tandem lesions but small vessel   4. LVEDP was 11 mmHG  Angiographic Findings      Diagnostic Findings      Cardiac Arteries and Lesion Findings     LMCA: Normal, Normal (no stenosis %) and No Angiographicalyl Significant  Disease. widely patent     LAD: Abnormal and Focal stenosis. Mid LAD had calcified focal 70-80 % stenosis  , large Type III LAD , 2 diag are patent       Lesion on Mid LAD: 80% stenosis. Culprit lesion. Devices used       - Runthrough  cm Wire. Number of passes: 1.       - 3.0 x 18 mm Resolute Integrity Drug Eluting Stent. Diameter: 3 mm. Length: 18 mm. 1 inflation(s) to a max pressure of: 12 stephanie. LCx: Ostial OM after Circ Av groove take off has 80-90 % lesion     Lesion on 1st Ob Claire% stenosis. Culprit lesion. RCA: Diffuse irregularity and Abnormal.Large vessel , Right Dominant system ,  PDA has 80-90 % lesion , there are 2 tandem lesions but PDA is small after  lesions   Interventional procedure  Cardiac lesions  LAD:     Lesion on Mid LAD: 80% stenosis. Culprit lesion. LCx:     Lesion on 1st Ob Claire% stenosis. Culprit lesion. Holter 2020  Notes recorded by Fahad Santos MD on 2020 at 3:19 PM EST  Abnormal 48-hour Holter monitor with average heart rate of 68 maximum heart rate of 119 minimum heart rate is 48 at 6:25 AM with sinus bradycardia.  No significant A. fib recorded longest R-R interval was 1.7 seconds.  Patient had multiple runs of ventricular ectopy with a total of 16 .2% of PVC burden multifocal PVCs bigeminy's reported,  There were also multiple PAC runs noted.  No atrial fibrillation recorded. Holter3/  48-hour Holter monitor showing 2.4% PVCs which  is significantly improved compared to previous Holter monitor a month ago.  Lowest heart rate was 34 average was 88 maximum heart rate was 90  Multifocal PVC noted no atrial fibrillation recorded. All labs, medications and tests reviewed by myself including data and history from outside source , patient and available family . Assessment & Plan:      1. Other fatigue    2. Mixed hyperlipidemia    3. CAD in native artery    4. Episode of recurrent major depressive disorder, unspecified depression episode severity ([address]    5. Dyslipidemia    6. Heart palpitations    7. Precordial pain    8. PVC (premature ventricular contraction)    9. Shortness of breath    10.  Balance disorder 11. Loss of balance         Abnormal stress test / chest pain  Precordial pain  S/p PCi to LAD and Circ in Jan 2020 for Class III angina on 3 anti anginal .  Continue Ranexa.,imdur and metoprolol  Continue Plavix advised him to stop using aspirin . Especially due to history of GI bleeding in the past and Fuchs retinopathy. He complains of tingling in his fingers to the point that sometimes he cannot close buttons  I have asked him to stop using vitamin E and use vitamin B12 to see if it helps with his neuropathy? Loss of balance    Positive Romberg's sign , possible CVA? Get MRI brain, get carotid and also refer to neurology     Fatigue  Check tsh, check for sleep apnea and holter       Heart palpitations  EF is also low normal at 50% . she had multifocal PVCs, Holter shows 17 % PVC burden  HE snores at night . HE has less energy which improved after his anemia has resolved. After pci and  Increased metoprolol to 50 mg bid the  repeat holter showed reduction in PVC from 16 % to 2% , so continue the plan      Dyslipidemia :  All available lab work was reviewed. Lipid panel is acceptable patient was advised to repeat lab work before next visit. Necessary orders were placed , instructions given by myself   Check labs before his next visit      Counseled extensively and medication compliance urged. We discussed that for the  prevention of ASCVD our  goal is aggressive risk modification. Patient is encouraged to exercise even a brisk walk for 30 minutes  at least 3 to 4 times a week   Various goals were discussed and questions answered. Continue current medications. Appropriate prescriptions are addressed and refills ordered. Questions answered and patient verbalizes understanding. Call for any problems, questions, or concerns. Continue all other medications of all above medical condition listed as is. Return in about 1 month (around 7/14/2022).     Please note this report has been partially produced using speech recognition software and may contain errors related to that system including errors in grammar, punctuation, and spelling, as well as words and phrases that may be inappropriate. If there are any questions or concerns please feel free to contact the dictating provider for clarification.

## 2022-06-15 ENCOUNTER — HOSPITAL ENCOUNTER (OUTPATIENT)
Dept: PHYSICAL THERAPY | Age: 82
Setting detail: THERAPIES SERIES
Discharge: HOME | End: 2022-06-15
Payer: MEDICARE

## 2022-06-15 PROCEDURE — 97112 NEUROMUSCULAR REEDUCATION: CPT

## 2022-06-15 PROCEDURE — 97110 THERAPEUTIC EXERCISES: CPT

## 2022-06-15 NOTE — FLOWSHEET NOTE
Outpatient Physical Therapy  [place]           [x] Phone: [phone]   Fax: [phone]  [place]           [] Phone: [phone]   Fax: [phone]        Physical Therapy Daily Treatment Note  Date:  6/15/2022    Patient Name:  Ankur Piedra    :  1940  MRN: [id number]  Restrictions/Precautions: NONE  Diagnosis:   Unspecified injury of right shoulder and upper arm, initial encounter [S49.91XA] Diagnosis: shoulder pain  Date of Injury/Surgery: --  Treatment Diagnosis:  Decreased RUE mobility and strength  Insurance/Certification information: [facility]  Referring Physician:  Robbi Morel, PA Lazarus Plenty PA   PCP: Ashley Scott MD  Next Doctor Visit:  --  Plan of care signed (Y/N):  Y, sent 22   Outcome Measure: QUICK DASH: 34%  Visit# / total visits:   5/10  Pain level: 0/10   Goals:     Patient goals: improve reaching and pain  Short term goals  Time Frame for Short term goals: 5 weeks  Pt demo I w/ HEP and symptom management  Pt demo active shoulder flexion >150 deg and pain reported <2/10  Pt demo active shoulder abduction >110 deg and pain reported <2/10  Pt demo >4/5 shoulder strength in all directions with pain reported <2/10  Pt demo ability to lift approx. 1-2 lbs weight overhead actively to mimic putting dishes away in overhead cabinets      Summary of Evaluation:  Assessment: Pt is [de-identified]year old male with sudden onset of R shoulder pain following a fall where his shoulder landed on the side of a car as he was stepping off a curb. Pt now has difficulties reaching overhead, sleeping on his R arm, and carrying/pushing. Pt demo deficits this date that include reduced active shoulder ROM, especially flexion/abduction, limited rotator cuff/scapular strength, pain with GH inferior glide, (+) empty can, tenderness along supraspinatus, hypertonicity of biceps/triceps. Testing this date indicate signs and symptoms of partial rotator cuff tearing.  Pt will benefit with PT services with shoulder ROM, GH joint mobilizations, rotator cuff/scapuar strengthening, vasocompression, modified activity, home exercise program to return to PLOF. Pt prior to onset of current condition had no pain with able to complete full ADLs. Subjective: Hesham Her Pt states that he feels his shoulder is improved with increased ability and decreased pain. Pt states that he can sleep now on R shoulder for a little while before needing to roll over to the L      Any changes in Ambulatory Summary Sheet? None        Objective:     COVID screening questions were asked and patient attested that there had been no contact or symptoms    Shoulder ROM:    Shoulder flexion AAROM 160°, AROM supine 135  Using a towel to keep patient at neutral helped with report pain  Breaking down ROM into small controlled segments helped with reported pain      Exercises: (No more than 4 columns)   Exercise/Equipment 6/3/22 #2 6/9/2022 #3 6/13/22 #4 6/15/22  #5            WARM UP       Pulley's    Flex/scap x20 each Flexion and scaption x 20 ea Flexion and scaption x20 ea Flexion and scaption x20 ea          TABLE       *Cane Flexion X 10 reps  x10  1# x 10   Supine punch  x10 palms together 10x2 palms together 2x10 palms together 1# x 10   RS   30\" x2 --    Concentric circles   10x2 ea dir 2x10 CW &CCW 1x20 CW &CCW   SL ER x10 reps 10x2  2x10 2X20   SL FLEX/ABD ABD x10 reps. S/L flexion  10x2  S/L abd small ROM multiple angles 10x2 2x10 ea S/L flexion  20x2  S/L abd small ROM multiple angles 10x2      *Isometrics deltoid  Flex, abduction, IR, ext, and ER 10x2 5\" ea against PTA Flex/ext/IR/ER 2x10 5\" ea standing   STANDING       *Wall Walks AAROM Red swiss up/down Dbl. UE x5 10x x10 x10   *Isometrics deltoid Flex/ext., ER x10 holding 3\" each. - Flex/ext., ER x10 holding 5\" each.    TB Rows   2x10 YTB 2x10 YTB                                    PROPRIOCEPTION                                          MODALITIES       vaso none  -- -- Other Therapeutic Activities/Education:  Patient received education on their current pathology and how their condition effects them with their functional activities. Patient understood discussion and questions were answered. Patient understands their activity limitations and understands rational for treatment progression. Home Exercise Program:  HO issued, reviewed and discussed with patient. Pt agreed to comply. 6/3/22: new with HO provided. Supine punch, Side lying ER, and ABduction, Isometrics flex/ext. And ER. Manual Treatments:     Modalities:  --    Communication with other providers:  orlandoal sent 5/31/22      Assessment: Pt with good form throughout session today with minor changes with isometric to not lean into wall with body and use strength of arm to push. Pt voiced understanding. Increases as noted with flow sheet and no increased pain per report. No sig changes in ROM for AAROM. AROM also measured today. End of session:1/10    Assessment: Pt is [de-identified]year old male with sudden onset of R shoulder pain following a fall where his shoulder landed on the side of a car as he was stepping off a curb. Pt now has difficulties reaching overhead, sleeping on his R arm, and carrying/pushing. Pt demo deficits this date that include reduced active shoulder ROM, especially flexion/abduction, limited rotator cuff/scapular strength, pain with GH inferior glide, (+) empty can, tenderness along supraspinatus, hypertonicity of biceps/triceps. Testing this date indicate signs and symptoms of partial rotator cuff tearing. Pt will benefit with PT services with shoulder ROM, GH joint mobilizations, rotator cuff/scapuar strengthening, vasocompression, modified activity, home exercise program to return to OF. Pt prior to onset of current condition had no pain with able to complete full ADLs.         Plan for Next Session: --continue working the [facility] and [address] mobility, strengthening for the Rotator cuff within the pt. Tolerance.          Time In / Time Out:   [id number]     Timed Code/Total Treatment Minutes:   45'/45'   2 TE (28') 1 neuro (17')       Next Progress Note due:  10th visit      Plan of Care Interventions:  [x] Therapeutic Exercise  [] Modalities:  [x] Therapeutic Activity     [] Ultrasound  [] Estim  [] Gait Training      [] Cervical Traction [] Lumbar Traction  [x] Neuromuscular Re-education    [] Cold/hotpack [] Iontophoresis   [x] Instruction in HEP      [x] Vasopneumatic   [] Dry Needling    [x] Manual Therapy               [] Aquatic Therapy              Electronically signed by:  Jaleel Capone PTA,   6/15/2022, 12:48 PM    6/15/2022,12:48 PM

## 2022-06-21 ENCOUNTER — HOSPITAL ENCOUNTER (OUTPATIENT)
Dept: PHYSICAL THERAPY | Age: 82
Setting detail: THERAPIES SERIES
Discharge: HOME | End: 2022-06-21
Payer: MEDICARE

## 2022-06-21 PROCEDURE — 97112 NEUROMUSCULAR REEDUCATION: CPT

## 2022-06-21 PROCEDURE — 97110 THERAPEUTIC EXERCISES: CPT

## 2022-06-21 NOTE — FLOWSHEET NOTE
Outpatient Physical Therapy  [facility]           [x] Phone: [phone]   Fax: [phone]  Oneyda herrera           [] Phone: [phone]   Fax: [phone]        Physical Therapy Daily Treatment Note  Date:  2022    Patient Name:  Burtis Gosselin    :  1940  MRN: [id number]  Restrictions/Precautions: NONE  Diagnosis:   Unspecified injury of right shoulder and upper arm, initial encounter [S49.91XA] Diagnosis: shoulder pain  Date of Injury/Surgery: --  Treatment Diagnosis:  Decreased RUE mobility and strength  Insurance/Certification information: [facility]  Referring Physician:  HUANG Grullon   PCP: Fredy Olivarez MD  Next Doctor Visit:  --  Plan of care signed (Y/N):  Y, sent 22   Outcome Measure: QUICK DASH: 34%  Visit# / total visits:   6/10  Pain level: 0/10   Goals:     Patient goals: improve reaching and pain  Short term goals  Time Frame for Short term goals: 5 weeks  Pt demo I w/ HEP and symptom management reports compliance  Pt demo active shoulder flexion >150 deg and pain reported <2/10  Pt demo active shoulder abduction >110 deg and pain reported <2/10  Pt demo >4/5 shoulder strength in all directions with pain reported <2/10  Pt demo ability to lift approx. 1-2 lbs weight overhead actively to mimic putting dishes away in overhead cabinets      Summary of Evaluation:  Assessment: Pt is [de-identified]year old male with sudden onset of R shoulder pain following a fall where his shoulder landed on the side of a car as he was stepping off a curb. Pt now has difficulties reaching overhead, sleeping on his R arm, and carrying/pushing. Pt demo deficits this date that include reduced active shoulder ROM, especially flexion/abduction, limited rotator cuff/scapular strength, pain with GH inferior glide, (+) empty can, tenderness along supraspinatus, hypertonicity of biceps/triceps. Testing this date indicate signs and symptoms of partial rotator cuff tearing.  Pt will benefit with PT services with shoulder ROM, GH joint mobilizations, rotator cuff/scapuar strengthening, vasocompression, modified activity, home exercise program to return to [facility]. Pt prior to onset of current condition had no pain with able to complete full ADLs. Subjective: Rupesh Hernandez Pt reports no pain upon arrival and has been having a good week. Can lay on the shoulder for a little, but then has to move. Any changes in Ambulatory Summary Sheet? None      Objective:     COVID screening questions were asked and patient attested that there had been no contact or symptoms    Shoulder flexion AROM 170 deg standing      Exercises: (No more than 4 columns)   Exercise/Equipment 6/13/22 #4 6/15/22  #5 6/21/22 #6           WARM UP      Jordan's    Flexion and scaption x20 ea Flexion and scaption x20 ea Flexion x20   UBE   4'   TABLE      *Cane Flexion x10  1# x 10 --   Supine punch  2x10 palms together 1# x 10 2# 2x10   Concentric circles  2x10 CW &CCW 1x20 CW &CCW --   SL ER 2x10 2X20 2x10   SL FLEX/ABD 2x10 ea S/L flexion  20x2  S/L abd small ROM multiple angles 10x2 2x10 ea dir  *add weight next   STANDING      *Wall Walks AAROM x10 x10 x15 active into finger walk    *Isometrics deltoid - Flex/ext., ER x10 holding 5\" each. --   TB Rows 2x10 YTB 2x10 YTB 2x10 RTB   TB Shoulder Extension   2x10 RTB   Scaption Raise   2x10 no weight  *add weight next   Taffy Pull   2x10 YTB seated              PROPRIOCEPTION                                    MODALITIES      vaso -- --              Other Therapeutic Activities/Education:  Patient received education on their current pathology and how their condition effects them with their functional activities. Patient understood discussion and questions were answered. Patient understands their activity limitations and understands rational for treatment progression. Home Exercise Program:  HO issued, reviewed and discussed with patient. Pt agreed to comply.     6/3/22: new with HO provided. Supine punch, Side lying ER, and ABduction, Isometrics flex/ext. And ER. Manual Treatments:     Modalities:  --    Communication with other providers:  natividad sent 5/31/22      Assessment: Rohit demonstrates good tolerance to today's session. He is able to slowly increase resistance with rotator cuff/scapular strengthening. Continues to have discomfort at end range flexion. Continue to progress strengthening and ROM as tolerated. End of session:1/10    Assessment: Pt is [de-identified]year old male with sudden onset of R shoulder pain following a fall where his shoulder landed on the side of a car as he was stepping off a curb. Pt now has difficulties reaching overhead, sleeping on his R arm, and carrying/pushing. Pt demo deficits this date that include reduced active shoulder ROM, especially flexion/abduction, limited rotator cuff/scapular strength, pain with GH inferior glide, (+) empty can, tenderness along supraspinatus, hypertonicity of biceps/triceps. Testing this date indicate signs and symptoms of partial rotator cuff tearing. Pt will benefit with PT services with shoulder ROM, GH joint mobilizations, rotator cuff/scapuar strengthening, vasocompression, modified activity, home exercise program to return to [facility]. Pt prior to onset of current condition had no pain with able to complete full ADLs. Plan for Next Session: --continue working the [facility] and [facility] mobility, strengthening for the Rotator cuff within the pt. Tolerance.          Time In / Time Out:   6656-1810       Timed Code/Total Treatment Minutes:   40'  2 TE (28') 1 neuro (12')       Next Progress Note due:  10th visit      Plan of Care Interventions:  [x] Therapeutic Exercise  [] Modalities:  [x] Therapeutic Activity     [] Ultrasound  [] Estim  [] Gait Training      [] Cervical Traction [] Lumbar Traction  [x] Neuromuscular Re-education    [] Cold/hotpack [] Iontophoresis   [x] Instruction in HEP      [x] Vasopneumatic   [] Dry Needling [x] Manual Therapy               [] Aquatic Therapy              Electronically signed by:  Nkechi Schultz, PT, DPT, [facility]  6/21/2022, 6:35 AM    6/21/2022,6:35 AM

## 2022-06-24 ENCOUNTER — HOSPITAL ENCOUNTER (OUTPATIENT)
Dept: PHYSICAL THERAPY | Age: 82
Setting detail: THERAPIES SERIES
Discharge: HOME | End: 2022-06-24
Payer: MEDICARE

## 2022-06-24 ENCOUNTER — NURSE ONLY (OUTPATIENT)
Dept: CARDIOLOGY CLINIC | Age: 82
End: 2022-06-24
Payer: MEDICARE

## 2022-06-24 ENCOUNTER — PROCEDURE VISIT (OUTPATIENT)
Dept: CARDIOLOGY CLINIC | Age: 82
End: 2022-06-24
Payer: MEDICARE

## 2022-06-24 DIAGNOSIS — R26.89 BALANCE DISORDER: ICD-10-CM

## 2022-06-24 DIAGNOSIS — R00.1 BRADYCARDIA: Primary | ICD-10-CM

## 2022-06-24 DIAGNOSIS — I49.3 PVC (PREMATURE VENTRICULAR CONTRACTION): ICD-10-CM

## 2022-06-24 DIAGNOSIS — R00.2 HEART PALPITATIONS: ICD-10-CM

## 2022-06-24 DIAGNOSIS — E78.2 MIXED HYPERLIPIDEMIA: ICD-10-CM

## 2022-06-24 PROCEDURE — 93225 XTRNL ECG REC<48 HRS REC: CPT | Performed by: INTERNAL MEDICINE

## 2022-06-24 PROCEDURE — 97110 THERAPEUTIC EXERCISES: CPT

## 2022-06-24 PROCEDURE — 93880 EXTRACRANIAL BILAT STUDY: CPT | Performed by: INTERNAL MEDICINE

## 2022-06-24 PROCEDURE — 97112 NEUROMUSCULAR REEDUCATION: CPT

## 2022-06-24 NOTE — PROGRESS NOTES
48 hour holter monitor applied [email or web address] AM  for Bradycardia  Serial # [id number] . Instructed patient on monitor and proper use. Instructed on diary. When to remove and bring it back. Must leave the holter monitor on  without removing for the duration of time ordered. Answered all questions the patient had. Instructed patient to call [facility] at [phone] with any questions or concerns with the monitor.

## 2022-06-24 NOTE — FLOWSHEET NOTE
Outpatient Physical Therapy  [place]           [x] Phone: [phone]   Fax: [phone]  Belkis Mehta           [] Phone: [phone]   Fax: [phone]        Physical Therapy Daily Treatment Note  Date:  2022    Patient Name:  Traci Lawton    :  1940  MRN: [id number]  Restrictions/Precautions: NONE  Diagnosis:   Unspecified injury of right shoulder and upper arm, initial encounter [S49.91XA] Diagnosis: shoulder pain  Date of Injury/Surgery: --  Treatment Diagnosis:  Decreased RUE mobility and strength  Insurance/Certification information: [facility] Medicare  Referring Physician:  HUANG Barton [employer]   PCP: Wes Forrester MD  Next Doctor Visit:  --  Plan of care signed (Y/N):  Y, sent 22   Outcome Measure: QUICK DASH: 34%  Visit# / total visits:   7/10  Pain level: 0/10   Goals:     Patient goals: improve reaching and pain  Short term goals  Time Frame for Short term goals: 5 weeks  Pt demo I w/ HEP and symptom management reports compliance  Pt demo active shoulder flexion >150 deg and pain reported <2/10  Pt demo active shoulder abduction >110 deg and pain reported <2/10  Pt demo >4/5 shoulder strength in all directions with pain reported <2/10  Pt demo ability to lift approx. 1-2 lbs weight overhead actively to mimic putting dishes away in overhead cabinets      Summary of Evaluation:  Assessment: Pt is [de-identified]year old male with sudden onset of R shoulder pain following a fall where his shoulder landed on the side of a car as he was stepping off a curb. Pt now has difficulties reaching overhead, sleeping on his R arm, and carrying/pushing. Pt demo deficits this date that include reduced active shoulder ROM, especially flexion/abduction, limited rotator cuff/scapular strength, pain with GH inferior glide, (+) empty can, tenderness along supraspinatus, hypertonicity of biceps/triceps. Testing this date indicate signs and symptoms of partial rotator cuff tearing.  Pt will benefit with PT services with shoulder ROM, GH joint mobilizations, rotator cuff/scapuar strengthening, vasocompression, modified activity, home exercise program to return to [facility]. Pt prior to onset of current condition had no pain with able to complete full ADLs. Subjective: [place] [facility] PT reports that pain is only localized in right supraspinatus tendon and reports palpable pain with this. Pt received heart monitor at cardiologist this morning and reports that laying supine at that moment his shoulder was aching throughout the anterior and superior surfaces. But, when he was able to move it, it was much improved. Any changes in Ambulatory Summary Sheet?   None      Objective:     COVID screening questions were asked and patient attested that there had been no contact or symptoms    Shoulder flexion AROM 170 deg standing      Exercises: (No more than 4 columns)   Exercise/Equipment 6/13/22 #4 6/15/22  #5 6/21/22 #6 6/24/22  #7            WARM UP       Jordan's    Flexion and scaption x20 ea Flexion and scaption x20 ea Flexion x20 Flexion x20   UBE   4' 4'   TABLE       *Cane Flexion x10  1# x 10 -- --   Supine punch  2x10 palms together 1# x 10 2# 2x10 2# 2x10   Concentric circles  2x10 CW &CCW 1x20 CW &CCW -- --   SL ER 2x10 2X20 2x10 1# 2x10   SL FLEX/ABD 2x10 ea S/L flexion  20x2  S/L abd small ROM multiple angles 10x2 2x10 ea dir  *add weight next 2# x 10   STANDING       *Wall Walks AAROM x10 x10 x15 active into finger walk  x20   *Isometrics deltoid - Flex/ext., ER x10 holding 5\" each. -- --   TB Rows 2x10 YTB 2x10 YTB 2x10 RTB x20 RTB   TB Shoulder Extension   2x10 RTB x20 RTB   Scaption Raise   2x10 no weight  *add weight next 2# x 10 reps   Taffy Pull   2x10 YTB seated 2x10 YTB seated               PROPRIOCEPTION                                          MODALITIES       vaso -- --                Other Therapeutic Activities/Education:  Patient received education on their current pathology and how their condition effects them with their functional activities. Patient understood discussion and questions were answered. Patient understands their activity limitations and understands rational for treatment progression. Home Exercise Program:  HO issued, reviewed and discussed with patient. Pt agreed to comply. 6/3/22: new with HO provided. Supine punch, Side lying ER, and ABduction, Isometrics flex/ext. And ER. Manual Treatments:     Modalities:  --    Communication with other providers:  eval sent 5/31/22      Assessment: Steeg demonstrates good tolerance to today's session. He is able to slowly increase resistance with rotator cuff/scapular strengthening. Continues to have discomfort at end range flexion. Continue to progress strengthening and ROM as tolerated. Minor complaints of pain with increases as noted in RTC region. Pt states he has appt in Sept with neurologist to look into balance issues. End of session:1/10    Assessment: Pt is [de-identified]year old male with sudden onset of R shoulder pain following a fall where his shoulder landed on the side of a car as he was stepping off a curb. Pt now has difficulties reaching overhead, sleeping on his R arm, and carrying/pushing. Pt demo deficits this date that include reduced active shoulder ROM, especially flexion/abduction, limited rotator cuff/scapular strength, pain with GH inferior glide, (+) empty can, tenderness along supraspinatus, hypertonicity of biceps/triceps. Testing this date indicate signs and symptoms of partial rotator cuff tearing. Pt will benefit with PT services with shoulder ROM, GH joint mobilizations, rotator cuff/scapuar strengthening, vasocompression, modified activity, home exercise program to return to OF. Pt prior to onset of current condition had no pain with able to complete full ADLs. Plan for Next Session: --continue working the ROM and [address] mobility, strengthening for the Rotator cuff within the pt. Tolerance. Time In / Time Out:   5580-4824       Timed Code/Total Treatment Minutes:   39'  2 TE (30') 1 neuro (15')       Next Progress Note due:  10th visit      Plan of Care Interventions:  [x] Therapeutic Exercise  [] Modalities:  [x] Therapeutic Activity     [] Ultrasound  [] Estim  [] Gait Training      [] Cervical Traction [] Lumbar Traction  [x] Neuromuscular Re-education    [] Cold/hotpack [] Iontophoresis   [x] Instruction in HEP      [x] Vasopneumatic   [] Dry Needling    [x] Manual Therapy               [] Aquatic Therapy              Electronically signed by:  Harinder Hawkins, LATIA  6/24/2022, 12:50 PM    6/24/2022,12:50 PM

## 2022-06-28 ENCOUNTER — HOSPITAL ENCOUNTER (OUTPATIENT)
Dept: PHYSICAL THERAPY | Age: 82
Setting detail: THERAPIES SERIES
Discharge: HOME | End: 2022-06-28
Payer: MEDICARE

## 2022-06-28 PROCEDURE — 97112 NEUROMUSCULAR REEDUCATION: CPT

## 2022-06-28 PROCEDURE — 97110 THERAPEUTIC EXERCISES: CPT

## 2022-06-28 NOTE — FLOWSHEET NOTE
Outpatient Physical Therapy  [facility]           [x] Phone: [phone]   Fax: [phone]  Dhiraj Virk           [] Phone: [phone]   Fax: [phone]        Physical Therapy Daily Treatment Note  Date:  2022    Patient Name:  Anthony Duncan    :  1940  MRN: [id number]  Restrictions/Precautions: NONE  Diagnosis:   Unspecified injury of right shoulder and upper arm, initial encounter [S49.91XA] Diagnosis: shoulder pain  Date of Injury/Surgery: --  Treatment Diagnosis:  Decreased RUE mobility and strength  Insurance/Certification information: [facility] Medicare  Referring Physician:  HUANG Peñaloza   PCP: Zulay Uriarte MD  Next Doctor Visit:  --  Plan of care signed (Y/N):  Y, sent 22   Outcome Measure: QUICK DASH: 34%  Visit# / total visits:  8/10  Pain level: 0/10   Goals:     Patient goals: improve reaching and pain  Short term goals  Time Frame for Short term goals: 5 weeks  Pt demo I w/ HEP and symptom management reports compliance  Pt demo active shoulder flexion >150 deg and pain reported <2/10  Pt demo active shoulder abduction >110 deg and pain reported <2/10  Pt demo >4/5 shoulder strength in all directions with pain reported <2/10  Pt demo ability to lift approx. 1-2 lbs weight overhead actively to mimic putting dishes away in overhead cabinets      Summary of Evaluation:  Assessment: Pt is [de-identified]year old male with sudden onset of R shoulder pain following a fall where his shoulder landed on the side of a car as he was stepping off a curb. Pt now has difficulties reaching overhead, sleeping on his R arm, and carrying/pushing. Pt demo deficits this date that include reduced active shoulder ROM, especially flexion/abduction, limited rotator cuff/scapular strength, pain with GH inferior glide, (+) empty can, tenderness along supraspinatus, hypertonicity of biceps/triceps. Testing this date indicate signs and symptoms of partial rotator cuff tearing.  Pt will benefit with PT services with shoulder ROM, GH joint mobilizations, rotator cuff/scapuar strengthening, vasocompression, modified activity, home exercise program to return to OF. Pt prior to onset of current condition had no pain with able to complete full ADLs. Subjective: Pt stated that his pain was 0/10. Pt stated that he feels like he is getting stronger. Any changes in Ambulatory Summary Sheet? None      Objective:     COVID screening questions were asked and patient attested that there had been no contact or symptoms  Needed cueing for form with exercises       Exercises: (No more than 4 columns)   Exercise/Equipment 6/15/22  #5 6/21/22 #6 6/24/22  #7 6/28/2022 #8            WARM UP       Jordan's    Flexion and scaption x20 ea Flexion x20 Flexion x20 Flex  X 20   UBE  4' 4'    TABLE       *Cane Flexion 1# x 10 -- --    Supine punch  1# x 10 2# 2x10 2# 2x10 2# 10x2   Concentric circles  1x20 CW &CCW -- --    SL ER 2X20 2x10 1# 2x10 1# 10x2   SL FLEX/ABD S/L flexion  20x2  S/L abd small ROM multiple angles 10x2 2x10 ea dir  *add weight next 2# x 10 2# 10X2 ea dir   STANDING       *Wall Walks AAROM x10 x15 active into finger walk  x20 x15   *Isometrics deltoid Flex/ext., ER x10 holding 5\" each. -- --    TB Rows 2x10 YTB 2x10 RTB x20 RTB GTB 10x2   TB Shoulder Extension  2x10 RTB x20 RTB GTB 10x2   Scaption Raise  2x10 no weight  *add weight next 2# x 10 reps 2# 10x  2# 5x2   Taffy Pull  2x10 YTB seated 2x10 YTB seated Standing YTB 10x2               PROPRIOCEPTION                                          MODALITIES       vaso --                 Other Therapeutic Activities/Education:  Patient received education on their current pathology and how their condition effects them with their functional activities. Patient understood discussion and questions were answered. Patient understands their activity limitations and understands rational for treatment progression.        Home Exercise Program:  HO issued, reviewed and discussed with patient. Pt agreed to comply. 6/3/22: new with HO provided. Supine punch, Side lying ER, and ABduction, Isometrics flex/ext. And ER. Manual Treatments:     Modalities:  --    Communication with other providers:  natividad sent 5/31/22      Assessment: Pt tolerated treatment fairly well. Pt was able to add  resistance to some of his activity. Pt fatigued quickly with standing scap raises. Pt rated pain at 0/10 after treatment. Assessment: Pt is [de-identified]year old male with sudden onset of R shoulder pain following a fall where his shoulder landed on the side of a car as he was stepping off a curb. Pt now has difficulties reaching overhead, sleeping on his R arm, and carrying/pushing. Pt demo deficits this date that include reduced active shoulder ROM, especially flexion/abduction, limited rotator cuff/scapular strength, pain with GH inferior glide, (+) empty can, tenderness along supraspinatus, hypertonicity of biceps/triceps. Testing this date indicate signs and symptoms of partial rotator cuff tearing. Pt will benefit with PT services with shoulder ROM, GH joint mobilizations, rotator cuff/scapuar strengthening, vasocompression, modified activity, home exercise program to return to [facility]. Pt prior to onset of current condition had no pain with able to complete full ADLs. Plan for Next Session: --continue working the [facility] and [facility] mobility, strengthening for the Rotator cuff within the pt. Tolerance.          Time In / Time Out:  1515/ 1553      Timed Code/Total Treatment Minutes:  38'/38' 1 neuro (10') 2 TE (28')       Next Progress Note due:  10th visit      Plan of Care Interventions:  [x] Therapeutic Exercise  [] Modalities:  [x] Therapeutic Activity     [] Ultrasound  [] Estim  [] Gait Training      [] Cervical Traction [] Lumbar Traction  [x] Neuromuscular Re-education    [] Cold/hotpack [] Iontophoresis   [x] Instruction in HEP      [x] Vasopneumatic   [] Dry Needling [x] Manual Therapy               [] Aquatic Therapy              Electronically signed by:  Marti Castro PTA  6/28/2022, 12:37 PM     6/28/2022,7:12 PM

## 2022-06-29 RX ORDER — ISOSORBIDE MONONITRATE 30 MG/1
TABLET, EXTENDED RELEASE ORAL
Qty: 90 TABLET | Refills: 1 | OUTPATIENT
Start: 2022-06-29

## 2022-06-30 ENCOUNTER — HOSPITAL ENCOUNTER (OUTPATIENT)
Dept: PHYSICAL THERAPY | Age: 82
Setting detail: THERAPIES SERIES
Discharge: HOME | End: 2022-06-30
Payer: MEDICARE

## 2022-06-30 ENCOUNTER — HOSPITAL ENCOUNTER (OUTPATIENT)
Dept: SLEEP CENTER | Age: 82
Discharge: HOME | End: 2022-06-30
Payer: MEDICARE

## 2022-06-30 VITALS — WEIGHT: 220 LBS | HEIGHT: 72 IN | BODY MASS INDEX: 29.8 KG/M2

## 2022-06-30 DIAGNOSIS — E66.3 OVERWEIGHT (BMI 25.0-29.9): ICD-10-CM

## 2022-06-30 DIAGNOSIS — G47.10 HYPERSOMNIA: ICD-10-CM

## 2022-06-30 DIAGNOSIS — Z87.891 EX-SMOKER: ICD-10-CM

## 2022-06-30 DIAGNOSIS — G47.33 OSA (OBSTRUCTIVE SLEEP APNEA): ICD-10-CM

## 2022-06-30 PROCEDURE — 99204 OFFICE O/P NEW MOD 45 MIN: CPT | Performed by: INTERNAL MEDICINE

## 2022-06-30 PROCEDURE — 99211 OFF/OP EST MAY X REQ PHY/QHP: CPT

## 2022-06-30 PROCEDURE — 97110 THERAPEUTIC EXERCISES: CPT

## 2022-06-30 ASSESSMENT — SLEEP AND FATIGUE QUESTIONNAIRES
HOW LIKELY ARE YOU TO NOD OFF OR FALL ASLEEP WHILE SITTING QUIETLY AFTER LUNCH WITHOUT ALCOHOL: 3
HOW LIKELY ARE YOU TO NOD OFF OR FALL ASLEEP IN A CAR, WHILE STOPPED FOR A FEW MINUTES IN TRAFFIC: 0
HOW LIKELY ARE YOU TO NOD OFF OR FALL ASLEEP WHILE SITTING AND READING: 2
HOW LIKELY ARE YOU TO NOD OFF OR FALL ASLEEP WHILE SITTING AND TALKING TO SOMEONE: 0
ESS TOTAL SCORE: 12
HOW LIKELY ARE YOU TO NOD OFF OR FALL ASLEEP WHILE WATCHING TV: 2
HOW LIKELY ARE YOU TO NOD OFF OR FALL ASLEEP WHEN YOU ARE A PASSENGER IN A CAR FOR AN HOUR WITHOUT A BREAK: 2
HOW LIKELY ARE YOU TO NOD OFF OR FALL ASLEEP WHILE LYING DOWN TO REST IN THE AFTERNOON WHEN CIRCUMSTANCES PERMIT: 1
NECK CIRCUMFERENCE (INCHES): 17.25
HOW LIKELY ARE YOU TO NOD OFF OR FALL ASLEEP WHILE SITTING INACTIVE IN A PUBLIC PLACE: 2

## 2022-06-30 NOTE — FLOWSHEET NOTE
Outpatient Physical Therapy  [place]           [x] Phone: [phone]   Fax: [phone]  [facility]           [] Phone: [phone]   Fax: [phone]        Physical Therapy Daily Treatment Note  Date:  2022    Patient Name:  Tung Patterson    :  1940  MRN: [id number]  Restrictions/Precautions: NONE  Diagnosis:   Unspecified injury of right shoulder and upper arm, initial encounter [S49.91XA] Diagnosis: shoulder pain  Date of Injury/Surgery: --  Treatment Diagnosis:  Decreased RUE mobility and strength  Insurance/Certification information: [facility] Medicare  Referring Physician:  HUANG Rios   PCP: Shanel Curry MD  Next Doctor Visit:  --  Plan of care signed (Y/N):  Y, sent 22   Outcome Measure: QUICK DASH: 34%  Visit# / total visits:  9/10  Pain level: 0/10   Goals:     Patient goals: improve reaching and pain  Short term goals  Time Frame for Short term goals: 5 weeks  Pt demo I w/ HEP and symptom management reports compliance  Pt demo active shoulder flexion >150 deg and pain reported <2/10 MET 22  Pt demo active shoulder abduction >110 deg and pain reported <2/10  Pt demo >4/5 shoulder strength in all directions with pain reported <2/10  Pt demo ability to lift approx. 1-2 lbs weight overhead actively to mimic putting dishes away in overhead cabinets MET 22      Summary of Evaluation:  Assessment: Pt is [de-identified]year old male with sudden onset of R shoulder pain following a fall where his shoulder landed on the side of a car as he was stepping off a curb. Pt now has difficulties reaching overhead, sleeping on his R arm, and carrying/pushing. Pt demo deficits this date that include reduced active shoulder ROM, especially flexion/abduction, limited rotator cuff/scapular strength, pain with GH inferior glide, (+) empty can, tenderness along supraspinatus, hypertonicity of biceps/triceps.   Testing this date indicate signs and symptoms of partial rotator cuff tearing. Pt will benefit with PT services with shoulder ROM, GH joint mobilizations, rotator cuff/scapuar strengthening, vasocompression, modified activity, home exercise program to return to OF. Pt prior to onset of current condition had no pain with able to complete full ADLs. Subjective: Pt reports no pain upon arrival, no increased soreness following last session. Any changes in Ambulatory Summary Sheet? None      Objective:     COVID screening questions were asked and patient attested that there had been no contact or symptoms  Needed cueing for form with exercises       Exercises: (No more than 4 columns)   Exercise/Equipment 6/24/22  #7 6/28/2022 #8 6/30/22 #9           WARM UP      Jordan's    Flexion x20 Flex  X 20    UBE 4'  4'   TABLE      *Cane Flexion --     Supine punch  2# 2x10 2# 10x2    Concentric circles  --     SL ER 1# 2x10 1# 10x2 --   SL FLEX/ABD 2# x 10 2# 10X2 ea dir 2# 2x10 ea dir               STANDING      *Wall Walks AAROM x20 x15 TRX Walk-Outs x15   *Isometrics deltoid --     TB Rows x20 RTB GTB 10x2 GTB 2x10   TB Shoulder Extension x20 RTB GTB 10x2 GTB 2x10   Scaption Raise 2# x 10 reps 2# 10x  2# 5x2 2x10 2#   Taffy Pull 2x10 YTB seated Standing YTB 10x2 Standing RTB 2x10   TB ER        2x10 w/ towel under arm    Biceps Curl   2x10 5#   Bent Over Row   2x10 5#   OH reach on Wall   2x10 1#               PROPRIOCEPTION                                    MODALITIES      vaso                Other Therapeutic Activities/Education:  Patient received education on their current pathology and how their condition effects them with their functional activities. Patient understood discussion and questions were answered. Patient understands their activity limitations and understands rational for treatment progression. Home Exercise Program:  HO issued, reviewed and discussed with patient. Pt agreed to comply. 6/3/22: new with HO provided.  Supine punch, Side lying ER, and ABduction, Isometrics flex/ext. And ER. Manual Treatments:     Modalities:  --    Communication with other providers:  orlandoal sent 5/31/22      Assessment:  Rohit demonstrates good tolerance to today's session and no pain was reported throughout the session. Patient has been making good progress with his exercises therefore continued to increase activity in the gym. Will continue to progress home program prior to discharge in a couple of weeks. End of session: 0/10      Assessment: Pt is [de-identified]year old male with sudden onset of R shoulder pain following a fall where his shoulder landed on the side of a car as he was stepping off a curb. Pt now has difficulties reaching overhead, sleeping on his R arm, and carrying/pushing. Pt demo deficits this date that include reduced active shoulder ROM, especially flexion/abduction, limited rotator cuff/scapular strength, pain with GH inferior glide, (+) empty can, tenderness along supraspinatus, hypertonicity of biceps/triceps. Testing this date indicate signs and symptoms of partial rotator cuff tearing. Pt will benefit with PT services with shoulder ROM, GH joint mobilizations, rotator cuff/scapuar strengthening, vasocompression, modified activity, home exercise program to return to OF. Pt prior to onset of current condition had no pain with able to complete full ADLs. Plan for Next Session: --continue working the Count includes the [facility] mobility, strengthening for the Rotator cuff within the pt. Tolerance.          Time In / Time Out:  [id number]      Timed Code/Total Treatment Minutes:  35'   (2) TE      Next Progress Note due:  10th visit      Plan of Care Interventions:  [x] Therapeutic Exercise  [] Modalities:  [x] Therapeutic Activity     [] Ultrasound  [] Estim  [] Gait Training      [] Cervical Traction [] Lumbar Traction  [x] Neuromuscular Re-education    [] Cold/hotpack [] Iontophoresis   [x] Instruction in HEP      [x] Vasopneumatic   [] Dry Needling    [x] Manual Therapy               [] Aquatic Therapy              Electronically signed by:  Claudia Carcamo, PT, DPT, CSCS

## 2022-06-30 NOTE — CONSULTS
Adriana Chacon MD, Acie Gottron MD, Benton Jones MD, [facility]      [address], [address] [facility]   PH: [phone]  F: [phone]     Subjective:     Patient ID: Priti Hanna is a [de-identified] y.o. male, referred to the sleep center for   Chief Complaint   Patient presents with    Fatigue   .     Referring physician:  Medina Ferrer MD    History: has been referred for JANA    Symptoms:   [x]  Snoring                                                                    [x]  Dry Mouth  []  Choking                                                                   []  Morning Headaches  []  Gasping for Air                                                        []  Trouble Falling asleep  [x]  Tired during the daytime                                         []  Trouble Staying Asleep  [x]  Tired when you wake up                                         []  Weight Gain in Last 5 Years  [x]  Wake up frequently at night                                    []  Weight Loss in Last 5 Years  []  Shortness Of Breath                                               []  Shift Worker  []  Coughing                                                                [x]  Smoker (Previous or Current) 2.5 pk/day x 28 yrs quit in 1985  []  Chest Pain                                                              []  Anxiety  []  Trouble keeping your legs still at night                   [x]  Depression  []  Kicking your legs in your sleep                               []  Insomnia     [] Palpitation  []   Stop breathing      []  Other:     Significant Co-morbidities:  []  Congestive Heart Failure     []  COPD         []  Stroke (Past 30 Days)      []  Supplemental Oxygen Usage       []  Cognitive Impairment      []  Neuromuscular Problems  []  Epilepsy/Neurological Disorders       Has PVC's      Social History     Socioeconomic History    Marital status:      Spouse name: Not on file    Number of children: Not on file    Years of education: Not on file    Highest education level: Not on file   Occupational History    Not on file   Tobacco Use    Smoking status: Former Smoker     Packs/day: 2.00     Years: 25.00     Pack years: 50.00     Types: Cigarettes     Start date: 1960     Quit date:      Years since quittin.5    Smokeless tobacco: Never Used   Substance and Sexual Activity    Alcohol use: Yes     Alcohol/week: 1.0 standard drink     Types: 1 Cans of beer per week     Comment: rarely, 1-2 cups of coffee     Drug use: Not on file    Sexual activity: Not on file   Other Topics Concern    Not on file   Social History Narrative    Not on file     Social Determinants of Health     Financial Resource Strain: Low Risk     Difficulty of Paying Living Expenses: Not hard at all   Food Insecurity: No Food Insecurity    Worried About [address] [employer] in the Last Year: Never true    [facility] in the Last Year: Never true   Transportation Needs:     Lack of Transportation (Medical): Not on file    Lack of Transportation (Non-Medical):  Not on file   Physical Activity: Inactive    Days of Exercise per Week: 0 days    Minutes of Exercise per Session: 0 min   Stress:     Feeling of Stress : Not on file   Social Connections:     Frequency of Communication with Friends and Family: Not on file    Frequency of Social Gatherings with Friends and Family: Not on file    Attends Faith Services: Not on file    Active Member of Clubs or Organizations: Not on file    Attends Club or Organization Meetings: Not on file    Marital Status: Not on file   Intimate Partner Violence:     Fear of Current or Ex-Partner: Not on file    Emotionally Abused: Not on file    Physically Abused: Not on file    Sexually Abused: Not on file   Housing Stability:     Unable to Pay for Housing in the Last Year: Not on file    Number of Places Lived in the Last Year: Not on file    Unstable Housing in the Last Year: Not on file       Prior to Admission medications    Medication Sig Start Date End Date Taking?  Authorizing Provider   UNABLE TO FIND Indications: pharma fluorometholone-eye drops to prevent corneal implant rejections    Yes Historical Provider, MD   allopurinol (ZYLOPRIM) 300 MG tablet TAKE 1 TABLET DAILY 5/20/22  Yes Kayla Liang MD   finasteride (PROSCAR) 5 MG tablet Take 1 tablet daily 5/4/22  Yes MAC Andrews CNP   isosorbide mononitrate (IMDUR) 30 MG extended release tablet Take 1 tablet by mouth daily 5/4/22  Yes MAC Andrews CNP   ranolazine (RANEXA) 500 MG extended release tablet TAKE 1 TABLET TWICE A DAY 4/13/22  Yes Brain Gil MD   escitalopram (LEXAPRO) 10 MG tablet TAKE 1 TABLET DAILY 3/7/22  Yes Kayla Liang MD   venlafaxine (EFFEXOR XR) 150 MG extended release capsule Take one Capsule every morning 3/4/22  Yes Kayla Liang MD   metoprolol tartrate (LOPRESSOR) 50 MG tablet Take 1 tablet by mouth 2 times daily 3/1/22  Yes Kayla Liang MD   clopidogrel (PLAVIX) 75 MG tablet TAKE 1 TABLET DAILY 1/25/22  Yes Kayla Liang MD   gemfibrozil (LOPID) 600 MG tablet TAKE 1 TABLET TWICE DAILY  BEFORE MEALS 1/25/22  Yes Kayla Liang MD   nitroGLYCERIN (NITROSTAT) 0.4 MG SL tablet Place 1 tablet under the tongue every 5 minutes as needed for Chest pain 5/4/21  Yes Brain Gil MD   Cyanocobalamin (VITAMIN B 12) 500 MCG TABS Take 1 tablet by mouth daily 1/20/21  Yes Kayla Liang MD   Multiple Vitamins-Minerals (VITABASIC SENIOR PO) Take by mouth   Yes Historical Provider, MD   Multiple Vitamins-Minerals (OCUVITE EXTRA) TABS Take 1 tablet by mouth daily   Yes Historical Provider, MD       Allergies as of 06/30/2022    (No Known Allergies)       Patient Active Problem List   Diagnosis    Dyslipidemia    Depression    Osteoarthritis    Nocturia    Decreased hearing    Restless leg    Gout    Benign neoplasm of right choroid    Fuchs' corneal dystrophy    Left posterior capsular opacification    Macular pigment deposit    Macular scar    Post corneal transplant    Osteoarthritis of left knee    Nonexudative age-related macular degeneration, bilateral, intermediate dry stage    Heart palpitations    Precordial pain    Shortness of breath    GIB (gastrointestinal bleeding)    Fatigue    Benign prostatic hyperplasia without lower urinary tract symptoms    CAD in native artery    PVC (premature ventricular contraction)    Loss of balance    JANA (obstructive sleep apnea)    Hypersomnia    Overweight (BMI 25.0-29. 9)    Ex-smoker       Past Medical History:   Diagnosis Date    Decreased hearing 5/4/2019    Gout 5/4/2019    H/O cardiovascular stress test 06/24/2019    EF 41%,  Mild ischemia of lateral wall involving small to medium size of left ventricle.  H/O echocardiogram 8/27/19    EF 48, Mod AR, Mild MR & TR    H/O percutaneous transluminal coronary angioplasty 02/06/2020    PCI to OM & Mid LAD,  PDA has 80-90 % tandem lesions but small vessel.     Hyperlipidemia 5/4/2019    Nocturia 5/4/2019    Osteoarthritis 5/4/2019    Restless leg 5/4/2019       Past Surgical History:   Procedure Laterality Date    BLEPHAROPLASTY  2010    CHOLECYSTECTOMY  2001    COLECTOMY  2000    COLONOSCOPY N/A 7/4/2019    COLONOSCOPY DIAGNOSTIC performed by Joanna Fuentes MD at [place] [address]. Left 2011    EYE SURGERY Right 2011    JOINT REPLACEMENT      KNEE ARTHROSCOPY  2002    SMALL INTESTINE SURGERY N/A 7/8/2019    LAPAROTOMY EXPLORATORY RIGHT ILEOCOLECTOMY performed by Xavier Ackerman MD at [facility] N/A 7/4/2019    EGD DIAGNOSTIC ONLY performed by Joanna Fuentes MD at [facility] ENDOSCOPY       Family History   Problem Relation Age of Onset    Colon Cancer Mother     Heart Disease Father     Lung Disease Father     Seizures Sister     High Blood Pressure Brother     No Known Problems Brother          Objective:   Ht 6' (1.829 m)   Wt 220 lb (99.8 kg)   BMI 29.84 kg/m²   Body mass index is 29.84 kg/m². Sleep Medicine 6/30/2022   Sitting and reading 2   Watching TV 2   Sitting, inactive in a public place (e.g. a theatre or a meeting) 2   As a passenger in a car for an hour without a break 2   Lying down to rest in the afternoon when circumstances permit 1   Sitting and talking to someone 0   Sitting quietly after a lunch without alcohol 3   In a car, while stopped for a few minutes in traffic 0   Total score 12   Neck circumference (Inches) 17.25     Mallampati 3    Vitals:    06/30/22 1140   Weight: 220 lb (99.8 kg)   Height: 6' (1.829 m)     Neck circumference (Inches): 17.25  Inches  [place] - Total score: 12    Gen: No distress. Eyes: PERRL. No sclera icterus. No conjunctival injection. ENT: No discharge. Pharynx clear. External appearance of ears and nose normal.MILD OVERJET  Neck: Trachea midline. No obvious mass. Resp: No accessory muscle use. No crackles. No wheezes. No rhonchi. No dullness on percussion. CV: Regular rate. Regular rhythm. No murmur or rub. No edema. GI: Non-tender. Non-distended. No hernia. Skin: Warm, dry, normal texture and turgor. No nodule on exposed extremities. Lymph: No cervical LAD. No supraclavicular LAD. M/S: No cyanosis. No clubbing. No joint deformity. Psych: Oriented x 3. No anxiety. Awake. Alert. Intact judgement and insight. Mallampati Airway Classification:   []1 []2 [x]3 []4        Assessment and Plan     Diagnosis:    Problem List        Respiratory    JANA (obstructive sleep apnea)      He has symptoms of JANA  Advised to go for the PSG  Loose weight         Relevant Orders    Baseline Diagnostic Sleep Study       Other    Hypersomnia      Advised to go for the PSG  Loose weight         Overweight (BMI 25.0-29. 9) Advised to loose weight         Ex-smoker      Advised to c/w quitting smoking                     Additional Plan:     [x]  Sleep hygiene/ relaxation methods & CBTi principles review with patient   [x]  Avoid supine/back sleep until sleep study   [x]  Driving precautions   [x]  Medical consequences of untreated JANA   [x]  Weight loss recommendations   [x]  Diet recommendations   [x]  Exercise   [x]  Advised to quit smoking       []  PFT referral   []  Bariatric Program referral      Follow-Up:    No follow-ups on file.     Electronically signed by Pankaj Petty MD on 6/30/2022 at 11:49 AM

## 2022-07-06 ENCOUNTER — HOSPITAL ENCOUNTER (OUTPATIENT)
Dept: PHYSICAL THERAPY | Age: 82
Setting detail: THERAPIES SERIES
Discharge: HOME | End: 2022-07-06
Payer: MEDICARE

## 2022-07-06 PROCEDURE — 97110 THERAPEUTIC EXERCISES: CPT

## 2022-07-06 NOTE — PROGRESS NOTES
Outpatient Physical Therapy           [place]           [] Phone: [phone]   Fax: [phone]  [place] [facility]           [] Phone: [phone]   Fax: [phone]      To: HUANG Bird     From: Abeba Medina PT, PT     Patient: Priti Hanna                    : 1940  Diagnosis:  Unspecified injury of right shoulder and upper arm, initial encounter [S49.91XA]        Treatment Diagnosis:       Date: 2022  [x]  Progress Note                []  Discharge Note    Evaluation Date:  22   Total Visits to date:  10  Cancels/No-shows to date:  1    Subjective:  Steeg reports no pain upon arrival. States his shoulder did keep him up all night last night, but this has not happened for a while. Patient reports feeling 90% back to PLOF. Plan of Care/Treatment to date:  [x] Therapeutic Exercise    [] Modalities:  [x] Therapeutic Activity     [] Ultrasound  [] Electrical Stimulation  [] Gait Training      [] Cervical Traction   [] Lumbar Traction  [x] Neuromuscular Re-education  [] Cold/hotpack [] Iontophoresis  [x] Instruction in HEP      Other:  [x] Manual Therapy       [x]  Vasopneumatic  [] Aquatic Therapy       []   Dry Needle Therapy                      Objective/Significant Findings At Last Visit/Comments:      Palpation:   Right Shoulder Palpation: noted tenderness along supraspinatus tendon, hypertonicity of triceps/biceps region  Left Shoulder Palpation: WNL  Cervical/Occiput Palpation:  WNL     Left AROM  Right AROM       WNL   AROM RUE (degrees)  R Shoulder Flexion 0-180: 150 deg  R Shoulder Extension 0-45: 50 deg  R Shoulder ABduction 0-180: 145 deg  R Shoulder Int Rotation  0-70: L2-L3  R Shoulder Ext Rotation 0-90: T2-3      Left PROM  Right PROM      General PROM UE: Right WFL,Left WFL   General PROM UE: Right WFL,Left WFL        Left Strength  Right Strength      Strength LUE  L Shoulder Flexion: 5/5  L Shoulder Extension: 5/5  L Shoulder ABduction: 5/5  L Shoulder Internal Rotation: 5/5  L Shoulder External Rotation: 5/5  L Elbow Flexion: 5/5  L Elbow Extension: 5/5 Strength RUE  R Shoulder Flexion: 5/5  R Shoulder Extension: 5/5  R Shoulder ABduction: 5/5  R Shoulder Internal Rotation: 5/5  R Shoulder External Rotation: 5/5  R Elbow Flexion: 5/5  R Elbow Extension: 5/5      Joint Integrity Shoulder: Left WNL (WNL posterior and anterior, increased pain with inferior glide)    Quick DASH: 9%    OH Reach: able to lift 7 lbs overhead without significant limitation and increased pain    Assessment:   Brian Allen has completed 10 visits since the start of therapy on 5/31/22. He demonstrates good improvements in shoulder ROM, full strength, WNL GH joint mobility. Patient has returned to full ADL's and slow progression back into yard work. At this time, patient has met all his goals and will be discharged from therapy. Goal Status:  [x] Achieved [] Partially Achieved  [] Not Achieved     Patient goals: improve reaching and pain  Short term goals  Time Frame for Short term goals: 5 weeks  Pt demo I w/ HEP and symptom management reports compliance  Pt demo active shoulder flexion >150 deg and pain reported <2/10 MET 6/30/22  Pt demo active shoulder abduction >110 deg and pain reported <2/10 MET  Pt demo >4/5 shoulder strength in all directions with pain reported <2/10 MET  Pt demo ability to lift approx.  1-2 lbs weight overhead actively to mimic putting dishes away in overhead cabinets MET 6/30/22    Frequency/Duration:  # Days per week: [] 1 day # Weeks: [] 1 week [] 4 weeks [] 8 weeks     [x] 2 days   [] 2 weeks [x] 5 weeks [] 10 weeks     [] 3 days   [] 3 weeks [] 6 weeks [] 12 weeks       Rehab Potential: [] Excellent [x] Good [] Fair  [] Poor    Patient Status: [x] Continue per initial plan of Care     [] Patient now discharged     [] Additional visits requested, Please re-certify for additional visits: 1x per week for 4 weeks    If we are requesting more visits, we fully anticipate the patient's condition is expected to improve within the treatment timeframe we are requesting. Electronically signed by:  Lidya Zayas PT, DPT, CSCS 7/6/2022, 8:35 AM    If you have any questions or concerns, please don't hesitate to call.   Thank you for your referral.    Physician Signature:______________________ Date:______ Time: ________  By signing above, therapists plan is approved by physician

## 2022-07-06 NOTE — FLOWSHEET NOTE
Outpatient Physical Therapy  [place]           [x] Phone: [phone]   Fax: [phone]  Cayetano Loyola           [] Phone: [phone]   Fax: [phone]        Physical Therapy Daily Treatment Note  Date:  2022    Patient Name:  Megan Breaux    :  1940  MRN: [id number]  Restrictions/Precautions: NONE  Diagnosis:   Unspecified injury of right shoulder and upper arm, initial encounter [S49.91XA] Diagnosis: shoulder pain  Date of Injury/Surgery: --  Treatment Diagnosis:  Decreased RUE mobility and strength  Insurance/Certification information: [facility] Medicare  Referring Physician:  HUANG Colon   PCP: Em Beltran MD  Next Doctor Visit:  --  Plan of care signed (Y/N):  Y, sent 22   Outcome Measure: QUICK DASH: 34%  Visit# / total visits:  10/10  Pain level: 0/10   Goals:     Patient goals: improve reaching and pain  Short term goals  Time Frame for Short term goals: 5 weeks  Pt demo I w/ HEP and symptom management reports compliance  Pt demo active shoulder flexion >150 deg and pain reported <2/10 MET 22  Pt demo active shoulder abduction >110 deg and pain reported <2/10 MET  Pt demo >4/5 shoulder strength in all directions with pain reported <2/10 MET  Pt demo ability to lift approx. 1-2 lbs weight overhead actively to mimic putting dishes away in overhead cabinets MET 22      Summary of Evaluation:  Assessment: Pt is [de-identified]year old male with sudden onset of R shoulder pain following a fall where his shoulder landed on the side of a car as he was stepping off a curb. Pt now has difficulties reaching overhead, sleeping on his R arm, and carrying/pushing. Pt demo deficits this date that include reduced active shoulder ROM, especially flexion/abduction, limited rotator cuff/scapular strength, pain with GH inferior glide, (+) empty can, tenderness along supraspinatus, hypertonicity of biceps/triceps.   Testing this date indicate signs and symptoms of partial rotator cuff tearing. Pt will benefit with PT services with shoulder ROM, GH joint mobilizations, rotator cuff/scapuar strengthening, vasocompression, modified activity, home exercise program to return to PLOF. Pt prior to onset of current condition had no pain with able to complete full ADLs. Subjective: Steeg reports no pain upon arrival. States his shoulder did keep him up all night last night, but this has not happened for a while. Patient reports feeling 90% back to PLOF. Any changes in Ambulatory Summary Sheet? None      Objective:     COVID screening questions were asked and patient attested that there had been no contact or symptoms  Needed cueing for form with exercises       Exercises: (No more than 4 columns)   Exercise/Equipment 6/24/22  #7 6/28/2022 #8 6/30/22 #9 7/6/22 #10            WARM UP       Jordan's    Flexion x20 Flex  X 20     UBE 4'  4' 4'   TABLE       *Cane Flexion --      Supine punch  2# 2x10 2# 10x2     Concentric circles  --      SL ER 1# 2x10 1# 10x2 --    SL FLEX/ABD 2# x 10 2# 10X2 ea dir 2# 2x10 ea dir                  STANDING       *Wall Walks AAROM x20 x15 TRX Walk-Outs x15    *Isometrics deltoid --      TB Rows x20 RTB GTB 10x2 GTB 2x10    TB Shoulder Extension x20 RTB GTB 10x2 GTB 2x10    Scaption Raise 2# x 10 reps 2# 10x  2# 5x2 2x10 2#    Taffy Pull 2x10 YTB seated Standing YTB 10x2 Standing RTB 2x10    TB ER        2x10 w/ towel under arm     Biceps Curl   2x10 5#    Bent Over Row   2x10 5#    OH reach on Wall   2x10 1#                  PROPRIOCEPTION                                          MODALITIES       vaso                  Other Therapeutic Activities/Education:  Patient received education on their current pathology and how their condition effects them with their functional activities. Patient understood discussion and questions were answered. Patient understands their activity limitations and understands rational for treatment progression.        Home Exercise Program:  HO issued, reviewed and discussed with patient. Pt agreed to comply. 6/3/22: new with HO provided. Supine punch, Side lying ER, and ABduction, Isometrics flex/ext. And ER. Manual Treatments:     Modalities:  --    Communication with other providers:  natividad sent 5/31/22      Assessment:  Scottie Linda has completed 10 visits since the start of therapy on 5/31/22. He demonstrates good improvements in shoulder ROM, full strength, WNL GH joint mobility. Patient has returned to full ADL's and slow progression back into yard work. At this time, patient has met all his goals and will be discharged from therapy. End of session: 0/10      Assessment: Pt is [de-identified]year old male with sudden onset of R shoulder pain following a fall where his shoulder landed on the side of a car as he was stepping off a curb. Pt now has difficulties reaching overhead, sleeping on his R arm, and carrying/pushing. Pt demo deficits this date that include reduced active shoulder ROM, especially flexion/abduction, limited rotator cuff/scapular strength, pain with GH inferior glide, (+) empty can, tenderness along supraspinatus, hypertonicity of biceps/triceps. Testing this date indicate signs and symptoms of partial rotator cuff tearing. Pt will benefit with PT services with shoulder ROM, GH joint mobilizations, rotator cuff/scapuar strengthening, vasocompression, modified activity, home exercise program to return to PLOF. Pt prior to onset of current condition had no pain with able to complete full ADLs. Plan for Next Session: --continue working the [facility] and [address] [facility] [place] [place] mobility, strengthening for the Rotator cuff within the pt. Tolerance.          Time In / Time Out:  [id number]      Timed Code/Total Treatment Minutes:  35'   (2) TE      Next Progress Note due:  10th visit      Plan of Care Interventions:  [x] Therapeutic Exercise  [] Modalities:  [x] Therapeutic Activity     [] Ultrasound  [] Estim  [] Gait Training      [] Cervical Traction [] Lumbar Traction  [x] Neuromuscular Re-education    [] Cold/hotpack [] Iontophoresis   [x] Instruction in HEP      [x] Vasopneumatic   [] Dry Needling    [x] Manual Therapy               [] Aquatic Therapy              Electronically signed by:  Trey Busby, PT, DPT, CSCS

## 2022-07-08 PROCEDURE — 93227 XTRNL ECG REC<48 HR R&I: CPT | Performed by: INTERNAL MEDICINE

## 2022-07-12 ENCOUNTER — OFFICE VISIT (OUTPATIENT)
Dept: CARDIOLOGY CLINIC | Age: 82
End: 2022-07-12
Payer: MEDICARE

## 2022-07-12 VITALS
WEIGHT: 221.2 LBS | BODY MASS INDEX: 29.96 KG/M2 | DIASTOLIC BLOOD PRESSURE: 70 MMHG | OXYGEN SATURATION: 97 % | HEART RATE: 57 BPM | SYSTOLIC BLOOD PRESSURE: 122 MMHG | HEIGHT: 72 IN

## 2022-07-12 DIAGNOSIS — R26.89 LOSS OF BALANCE: ICD-10-CM

## 2022-07-12 DIAGNOSIS — I95.1 ORTHOSTATIC HYPOTENSION: ICD-10-CM

## 2022-07-12 DIAGNOSIS — R07.2 PRECORDIAL PAIN: ICD-10-CM

## 2022-07-12 DIAGNOSIS — R06.02 SHORTNESS OF BREATH: ICD-10-CM

## 2022-07-12 DIAGNOSIS — R00.2 HEART PALPITATIONS: ICD-10-CM

## 2022-07-12 DIAGNOSIS — H91.90 DECREASED HEARING, UNSPECIFIED LATERALITY: ICD-10-CM

## 2022-07-12 DIAGNOSIS — F33.9 EPISODE OF RECURRENT MAJOR DEPRESSIVE DISORDER, UNSPECIFIED DEPRESSION EPISODE SEVERITY (HCC): ICD-10-CM

## 2022-07-12 DIAGNOSIS — I25.10 CAD IN NATIVE ARTERY: Primary | ICD-10-CM

## 2022-07-12 DIAGNOSIS — I49.3 PVC (PREMATURE VENTRICULAR CONTRACTION): ICD-10-CM

## 2022-07-12 DIAGNOSIS — E78.5 DYSLIPIDEMIA: ICD-10-CM

## 2022-07-12 DIAGNOSIS — Z87.891 EX-SMOKER: ICD-10-CM

## 2022-07-12 PROCEDURE — G8427 DOCREV CUR MEDS BY ELIG CLIN: HCPCS | Performed by: INTERNAL MEDICINE

## 2022-07-12 PROCEDURE — 1123F ACP DISCUSS/DSCN MKR DOCD: CPT | Performed by: INTERNAL MEDICINE

## 2022-07-12 PROCEDURE — 99214 OFFICE O/P EST MOD 30 MIN: CPT | Performed by: INTERNAL MEDICINE

## 2022-07-12 PROCEDURE — 1036F TOBACCO NON-USER: CPT | Performed by: INTERNAL MEDICINE

## 2022-07-12 PROCEDURE — G8417 CALC BMI ABV UP PARAM F/U: HCPCS | Performed by: INTERNAL MEDICINE

## 2022-07-12 RX ORDER — ISOSORBIDE MONONITRATE 30 MG/1
30 TABLET, EXTENDED RELEASE ORAL DAILY
Qty: 90 TABLET | Refills: 1 | Status: SHIPPED | OUTPATIENT
Start: 2022-07-12

## 2022-07-12 NOTE — PROGRESS NOTES
 metoprolol tartrate (LOPRESSOR) 25 MG tablet Take 1 tablet by mouth 2 times daily 90 tablet 3    Compression Stockings MISC by Does not apply route 20-25 mmHg 1 each 0    UNABLE TO FIND Indications: pharma fluorometholone-eye drops to prevent corneal implant rejections       allopurinol (ZYLOPRIM) 300 MG tablet TAKE 1 TABLET DAILY 90 tablet 1    finasteride (PROSCAR) 5 MG tablet Take 1 tablet daily 90 tablet 1    escitalopram (LEXAPRO) 10 MG tablet TAKE 1 TABLET DAILY 90 tablet 1    venlafaxine (EFFEXOR XR) 150 MG extended release capsule Take one Capsule every morning 90 capsule 1    clopidogrel (PLAVIX) 75 MG tablet TAKE 1 TABLET DAILY 90 tablet 1    gemfibrozil (LOPID) 600 MG tablet TAKE 1 TABLET TWICE DAILY  BEFORE MEALS 180 tablet 1    nitroGLYCERIN (NITROSTAT) 0.4 MG SL tablet Place 1 tablet under the tongue every 5 minutes as needed for Chest pain 25 tablet 0    Cyanocobalamin (VITAMIN B 12) 500 MCG TABS Take 1 tablet by mouth daily 30 tablet 3    Multiple Vitamins-Minerals (VITABASIC SENIOR PO) Take by mouth      Multiple Vitamins-Minerals (OCUVITE EXTRA) TABS Take 1 tablet by mouth daily       No current facility-administered medications for this visit. Allergies:   Patient has no known allergies. Patient History:  Past Medical History:   Diagnosis Date    Decreased hearing 5/4/2019    Gout 5/4/2019    H/O cardiovascular stress test 06/24/2019    EF 41%,  Mild ischemia of lateral wall involving small to medium size of left ventricle.  H/O echocardiogram 8/27/19    EF 48, Mod AR, Mild MR & TR    H/O percutaneous transluminal coronary angioplasty 02/06/2020    PCI to OM & Mid LAD,  PDA has 80-90 % tandem lesions but small vessel.     Hyperlipidemia 5/4/2019    Nocturia 5/4/2019    Osteoarthritis 5/4/2019    Restless leg 5/4/2019     Past Surgical History:   Procedure Laterality Date    BLEPHAROPLASTY  2010    CHOLECYSTECTOMY  2001    COLECTOMY  2000    COLONOSCOPY N/A 2019    COLONOSCOPY DIAGNOSTIC performed by Luciano Hoover MD at [facility]. Left 2011    EYE SURGERY Right 2011    JOINT REPLACEMENT      KNEE ARTHROSCOPY  2002    SMALL INTESTINE SURGERY N/A 2019    LAPAROTOMY EXPLORATORY RIGHT ILEOCOLECTOMY performed by [facility]ve [facility]. N/A 2019    EGD DIAGNOSTIC ONLY [facility] at [facility] ENDOSCOPY     Family History   Problem Relation Age of Onset    Colon Cancer Mother     Heart Disease Father     Lung Disease Father     Seizures Sister     High Blood Pressure Brother     No Known Problems Brother      Social History     Tobacco Use    Smoking status: Former Smoker     Packs/day: 2.00     Years: 25.00     Pack years: 50.00     Types: Cigarettes     Start date: 1960     Quit date:      Years since quittin.5    Smokeless tobacco: Never Used   Substance Use Topics    Alcohol use: Yes     Alcohol/week: 1.0 standard drink     Types: 1 Cans of beer per week     Comment: rarely, 1-2 cups of coffee         Review of Systems:   · Constitutional: No Fever or Weight Loss   · Eyes: No Decreased Vision  · ENT: No Headaches, Hearing Loss or Vertigo  · Cardiovascular: as per note above   · Respiratory: No cough or wheezing and as per note above.    · Gastrointestinal: No abdominal pain, appetite loss, blood in stools, constipation, diarrhea or heartburn  · Genitourinary: No dysuria, trouble voiding, or hematuria  · Musculoskeletal:  denies any new  joint aches , swelling  or pain   · Integumentary: No rash or pruritis  · Neurological: No TIA or stroke symptoms  · Psychiatric: No anxiety or depression  · Endocrine: No malaise, fatigue or temperature intolerance  · Hematologic/Lymphatic: No bleeding problems, blood clots or swollen lymph nodes  · Allergic/Immunologic: No nasal congestion or hives    Objective:      Physical Exam:  /70 (Site: Left Upper Arm, Position: Supine, Cuff Size: Large Adult)   Pulse 57   Ht 6' (1.829 m)   Wt 221 lb 3.2 oz (100.3 kg)   SpO2 97%   BMI 30.00 kg/m²   Wt Readings from Last 3 Encounters:   07/12/22 221 lb 3.2 oz (100.3 kg)   06/30/22 220 lb (99.8 kg)   06/14/22 231 lb (104.8 kg)     Body mass index is 30 kg/m². Vitals:    07/12/22 1136   BP: 122/70   Pulse: 57   SpO2: 97%        General Appearance:  No distress, conversant  Constitutional:  Well developed, Well nourished, No acute distress, Non-toxic appearance. HENT:  Normocephalic, Atraumatic, Bilateral external ears normal, Oropharynx moist, No oral exudates, Nose normal. Neck- Normal range of motion, No tenderness, Supple, No stridor,no apical-carotid delay  Eyes:  PERRL, EOMI, Conjunctiva normal, No discharge. Respiratory:  Normal breath sounds, No respiratory distress, No wheezing, No chest tenderness. ,no use of accessory muscles, NO crackles  Cardiovascular: (PMI) apex non displaced,no lifts no thrills,S1 and S2 audible, No added heart sounds, No signs of ankle edema, or volume overload, No evidence of JVD, No crackles  GI:  Bowel sounds normal, Soft, No tenderness, No masses, No gross visceromegaly   :  No costovertebral angle tenderness   Musculoskeletal:  No edema, no tenderness, no deformities.  Back- no tenderness  Integument:  Well hydrated, no rash   Lymphatic:  No lymphadenopathy noted   Neurologic:  Alert & oriented x 3, CN 2-12 normal, normal motor function, normal sensory function, no focal deficits noted   Psychiatric:  Speech and behavior appropriate       Medical decision making and Data review:  DATA:  Lab Results   Component Value Date    TROPONINT <0.010 07/02/2019     BNP:    Lab Results   Component Value Date    PROBNP 61.38 01/28/2020     PT/INR:  No results found for: PTINR  No results found for: LABA1C  Lab Results   Component Value Date    CHOL 139 05/31/2022    TRIG 165 (H) 05/31/2022    HDL 31 (L) 05/31/2022    LDLCALC 75 05/31/2022    LDLDIRECT 92 and Focal stenosis. Mid LAD had calcified focal 70-80 % stenosis  , large Type III LAD , 2 diag are patent       Lesion on Mid LAD: 80% stenosis. Culprit lesion. Devices used       - Runthrough  cm Wire. Number of passes: 1.       - 3.0 x 18 mm Resolute Integrity Drug Eluting Stent. Diameter: 3 mm. Length: 18 mm. 1 inflation(s) to a max pressure of: 12 stephanie. LCx: Ostial OM after Circ Av groove take off has 80-90 % lesion     Lesion on 1st Ob Claire% stenosis. Culprit lesion. RCA: Diffuse irregularity and Abnormal.Large vessel , Right Dominant system ,  PDA has 80-90 % lesion , there are 2 tandem lesions but PDA is small after  lesions   Interventional procedure  Cardiac lesions  LAD:     Lesion on Mid LAD: 80% stenosis. Culprit lesion. LCx:     Lesion on 1st Ob Claire% stenosis. Culprit lesion. Holter 2020  Notes recorded by Elzbieta Yoon MD on 2020 at 3:19 PM EST  Abnormal 48-hour Holter monitor with average heart rate of 68 maximum heart rate of 119 minimum heart rate is 48 at 6:25 AM with sinus bradycardia.  No significant A. fib recorded longest R-R interval was 1.7 seconds.  Patient had multiple runs of ventricular ectopy with a total of 16 .2% of PVC burden multifocal PVCs bigeminy's reported,  There were also multiple PAC runs noted.  No atrial fibrillation recorded. Holter3/  48-hour Holter monitor showing 2.4% PVCs which  is significantly improved compared to previous Holter monitor a month ago.  Lowest heart rate was 34 average was 88 maximum heart rate was 90  Multifocal PVC noted no atrial fibrillation recorded. Carotid 2022      Mild (0-49%) disease of the Bilateral proximal Internal carotid artery.    Normal vertebral flow.      Holter 2022  48-hour Holter monitor with short runs of SVT but no A. fib episodes Short run of wide-complex tachycardia.  Primarily in sinus rhythm lowest heart rate was 47 at 1041 maximum heart rate 143 average heart rate was 65 bpm.  We will 0.85% supraventricular ectopy and 1.5% ventricular ectopy.  1 occurrence of ventricular tachycardia of 5 beats at 3 5:05 PM patient did not report any symptoms it was at 143 bpm polymorphic PVC runs.  Few episodes of polymorphic PVCs and ventricular runs noted no sustained SVT or A. fib recorded clinical correlation needed for wide-complex tachycardia which could be aberrant conduction        All labs, medications and tests reviewed by myself including data and history from outside source , patient and available family . Assessment & Plan:      1. CAD in native artery    2. Decreased hearing, unspecified laterality    3. Episode of recurrent major depressive disorder, unspecified depression episode severity ([address]    4. Dyslipidemia    5. Ex-smoker    6. Loss of balance    7. Heart palpitations    8. Precordial pain    9. PVC (premature ventricular contraction)    10. Shortness of breath    11. Orthostatic hypotension         Abnormal stress test / chest pain  Precordial pain  S/p PCi to LAD and Circ in Jan 2020 for Class III angina on 3 anti anginal .  Currently  Ranexa.,imdur and metoprolol  Continue Plavix advised him to stop using aspirin . Especially due to history of GI bleeding in the past and Fuchs retinopathy. He complains of tingling in his fingers to the point that sometimes he cannot close buttons  I have asked him to stop using vitamin E and use vitamin B12 to see if it helps with his neuropathy? Orthostatic  Back off metoprolol 25 mg bid and stop ranexa ( may cause dizziness) also try compression stockings  His bp drops from 120s to 90's on standing     Loss of balance    Positive Romberg's sign , possible CVA? He is supposed to have  MRI brain but he lost the appointment , carotid a re normal. He was supposed to see neurology but lost appointment ?   Holter is normal , back off metoprolol     Fatigue  Check tsh, check for sleep apnea       Heart palpitations  EF is also low normal at 50% . she had multifocal PVCs, Holter shows 17 % PVC burden  HE snores at night . HE has less energy which improved after his anemia has resolved. After pci and  Increased metoprolol to 50 mg bid the  repeat holter showed reduction in PVC from 16 % to 2% , so continue the plan      Dyslipidemia :  All available lab work was reviewed. Lipid panel is acceptable patient was advised to repeat lab work before next visit. Necessary orders were placed , instructions given by myself   Check labs before his next visit      Counseled extensively and medication compliance urged. We discussed that for the  prevention of ASCVD our  goal is aggressive risk modification. Patient is encouraged to exercise even a brisk walk for 30 minutes  at least 3 to 4 times a week   Various goals were discussed and questions answered. Continue current medications. Appropriate prescriptions are addressed and refills ordered. Questions answered and patient verbalizes understanding. Call for any problems, questions, or concerns. Continue all other medications of all above medical condition listed as is. Return in about 3 months (around 10/12/2022). Please note this report has been partially produced using speech recognition software and may contain errors related to that system including errors in grammar, punctuation, and spelling, as well as words and phrases that may be inappropriate. If there are any questions or concerns please feel free to contact the dictating provider for clarification.

## 2022-07-12 NOTE — PATIENT INSTRUCTIONS
Please be informed that if you contact our office outside of normal business hours the physician on call cannot help with any scheduling or rescheduling issues, procedure instruction questions or any type of medication issue. We advise you for any urgent/emergency that you go to the nearest emergency room! PLEASE CALL OUR OFFICE DURING NORMAL BUSINESS HOURS    Monday - Friday   8 am to 5 pm    [place]: [address]: [phone]    [place]:  [phone]    **It is YOUR responsibilty to bring medication bottles and/or updated medication list to [address] [place] [address].  This will allow us to better serve you and all your healthcare needs**

## 2022-07-20 ENCOUNTER — HOSPITAL ENCOUNTER (OUTPATIENT)
Dept: MRI IMAGING | Age: 82
Discharge: HOME | End: 2022-07-20
Payer: MEDICARE

## 2022-07-20 ENCOUNTER — HOSPITAL ENCOUNTER (OUTPATIENT)
Age: 82
Setting detail: OBSERVATION
Discharge: HOME HEALTH | End: 2022-07-21
Attending: EMERGENCY MEDICINE | Admitting: STUDENT IN AN ORGANIZED HEALTH CARE EDUCATION/TRAINING PROGRAM
Payer: MEDICARE

## 2022-07-20 ENCOUNTER — HOSPITAL ENCOUNTER (OUTPATIENT)
Dept: CT IMAGING | Age: 82
Discharge: HOME | End: 2022-07-20
Payer: MEDICARE

## 2022-07-20 DIAGNOSIS — I49.3 PVC (PREMATURE VENTRICULAR CONTRACTION): ICD-10-CM

## 2022-07-20 DIAGNOSIS — I62.9 INTRACRANIAL BLEED (HCC): ICD-10-CM

## 2022-07-20 DIAGNOSIS — R93.89 ABNORMAL CT SCAN: Primary | ICD-10-CM

## 2022-07-20 DIAGNOSIS — F33.9 EPISODE OF RECURRENT MAJOR DEPRESSIVE DISORDER, UNSPECIFIED DEPRESSION EPISODE SEVERITY (HCC): ICD-10-CM

## 2022-07-20 DIAGNOSIS — R26.89 LOSS OF BALANCE: ICD-10-CM

## 2022-07-20 DIAGNOSIS — R26.89 BALANCE DISORDER: ICD-10-CM

## 2022-07-20 PROBLEM — S06.5XAA SUBDURAL HEMATOMA (HCC): Status: ACTIVE | Noted: 2022-07-20

## 2022-07-20 LAB
ALBUMIN SERPL-MCNC: 4 GM/DL (ref 3.4–5)
ALP BLD-CCNC: 87 IU/L (ref 40–129)
ALT SERPL-CCNC: 11 U/L (ref 10–40)
ANION GAP SERPL CALCULATED.3IONS-SCNC: 10 MMOL/L (ref 4–16)
APTT: 65.5 SECONDS (ref 25.1–37.1)
AST SERPL-CCNC: 19 IU/L (ref 15–37)
BASOPHILS ABSOLUTE: 0.1 K/CU MM
BASOPHILS RELATIVE PERCENT: 1 % (ref 0–1)
BILIRUB SERPL-MCNC: 0.3 MG/DL (ref 0–1)
BUN BLDV-MCNC: 24 MG/DL (ref 6–23)
CALCIUM SERPL-MCNC: 9.6 MG/DL (ref 8.3–10.6)
CHLORIDE BLD-SCNC: 101 MMOL/L (ref 99–110)
CO2: 26 MMOL/L (ref 21–32)
CREAT SERPL-MCNC: 0.7 MG/DL (ref 0.9–1.3)
DIFFERENTIAL TYPE: ABNORMAL
EOSINOPHILS ABSOLUTE: 0.3 K/CU MM
EOSINOPHILS RELATIVE PERCENT: 4.8 % (ref 0–3)
GFR AFRICAN AMERICAN: >60 ML/MIN/1.73M2
GFR AFRICAN AMERICAN: >60 ML/MIN/1.73M2
GFR NON-AFRICAN AMERICAN: >60 ML/MIN/1.73M2
GFR NON-AFRICAN AMERICAN: >60 ML/MIN/1.73M2
GLUCOSE BLD-MCNC: 86 MG/DL (ref 70–99)
HCT VFR BLD CALC: 39.2 % (ref 42–52)
HEMOGLOBIN: 13.2 GM/DL (ref 13.5–18)
IMMATURE NEUTROPHIL %: 0.2 % (ref 0–0.43)
INR BLD: 0.98 INDEX
LYMPHOCYTES ABSOLUTE: 1.9 K/CU MM
LYMPHOCYTES RELATIVE PERCENT: 30.2 % (ref 24–44)
MCH RBC QN AUTO: 32.4 PG (ref 27–31)
MCHC RBC AUTO-ENTMCNC: 33.7 % (ref 32–36)
MCV RBC AUTO: 96.3 FL (ref 78–100)
MONOCYTES ABSOLUTE: 0.7 K/CU MM
MONOCYTES RELATIVE PERCENT: 10.6 % (ref 0–4)
NUCLEATED RBC %: 0 %
PDW BLD-RTO: 13.9 % (ref 11.7–14.9)
PLATELET # BLD: 230 K/CU MM (ref 140–440)
PMV BLD AUTO: 11.2 FL (ref 7.5–11.1)
POC CREATININE: 0.7 MG/DL (ref 0.9–1.3)
POTASSIUM SERPL-SCNC: 4 MMOL/L (ref 3.5–5.1)
PRO-BNP: 93.58 PG/ML
PROTHROMBIN TIME: 12.6 SECONDS (ref 11.7–14.5)
RBC # BLD: 4.07 M/CU MM (ref 4.6–6.2)
SEGMENTED NEUTROPHILS ABSOLUTE COUNT: 3.3 K/CU MM
SEGMENTED NEUTROPHILS RELATIVE PERCENT: 53.2 % (ref 36–66)
SODIUM BLD-SCNC: 137 MMOL/L (ref 135–145)
TOTAL IMMATURE NEUTOROPHIL: 0.01 K/CU MM
TOTAL NUCLEATED RBC: 0 K/CU MM
TOTAL PROTEIN: 7 GM/DL (ref 6.4–8.2)
TROPONIN T: <0.01 NG/ML
WBC # BLD: 6.3 K/CU MM (ref 4–10.5)

## 2022-07-20 PROCEDURE — G0378 HOSPITAL OBSERVATION PER HR: HCPCS

## 2022-07-20 PROCEDURE — 2000000000 HC ICU R&B

## 2022-07-20 PROCEDURE — 84484 ASSAY OF TROPONIN QUANT: CPT

## 2022-07-20 PROCEDURE — 80053 COMPREHEN METABOLIC PANEL: CPT

## 2022-07-20 PROCEDURE — 70498 CT ANGIOGRAPHY NECK: CPT

## 2022-07-20 PROCEDURE — 85025 COMPLETE CBC W/AUTO DIFF WBC: CPT

## 2022-07-20 PROCEDURE — 70496 CT ANGIOGRAPHY HEAD: CPT

## 2022-07-20 PROCEDURE — 85730 THROMBOPLASTIN TIME PARTIAL: CPT

## 2022-07-20 PROCEDURE — 6370000000 HC RX 637 (ALT 250 FOR IP): Performed by: STUDENT IN AN ORGANIZED HEALTH CARE EDUCATION/TRAINING PROGRAM

## 2022-07-20 PROCEDURE — 93005 ELECTROCARDIOGRAM TRACING: CPT | Performed by: EMERGENCY MEDICINE

## 2022-07-20 PROCEDURE — 85610 PROTHROMBIN TIME: CPT

## 2022-07-20 PROCEDURE — 99285 EMERGENCY DEPT VISIT HI MDM: CPT

## 2022-07-20 PROCEDURE — 83880 ASSAY OF NATRIURETIC PEPTIDE: CPT

## 2022-07-20 PROCEDURE — 70551 MRI BRAIN STEM W/O DYE: CPT

## 2022-07-20 PROCEDURE — 6360000004 HC RX CONTRAST MEDICATION: Performed by: INTERNAL MEDICINE

## 2022-07-20 RX ORDER — ACETAMINOPHEN 650 MG/1
650 SUPPOSITORY RECTAL EVERY 6 HOURS PRN
Status: DISCONTINUED | OUTPATIENT
Start: 2022-07-20 | End: 2022-07-21 | Stop reason: HOSPADM

## 2022-07-20 RX ORDER — ISOSORBIDE MONONITRATE 30 MG/1
30 TABLET, EXTENDED RELEASE ORAL DAILY
Status: DISCONTINUED | OUTPATIENT
Start: 2022-07-21 | End: 2022-07-21 | Stop reason: HOSPADM

## 2022-07-20 RX ORDER — ALLOPURINOL 300 MG/1
300 TABLET ORAL DAILY
Status: DISCONTINUED | OUTPATIENT
Start: 2022-07-21 | End: 2022-07-21 | Stop reason: HOSPADM

## 2022-07-20 RX ORDER — SODIUM CHLORIDE 0.9 % (FLUSH) 0.9 %
5-40 SYRINGE (ML) INJECTION PRN
Status: DISCONTINUED | OUTPATIENT
Start: 2022-07-20 | End: 2022-07-21 | Stop reason: HOSPADM

## 2022-07-20 RX ORDER — FINASTERIDE 5 MG/1
5 TABLET, FILM COATED ORAL DAILY
Status: DISCONTINUED | OUTPATIENT
Start: 2022-07-21 | End: 2022-07-21 | Stop reason: HOSPADM

## 2022-07-20 RX ORDER — ONDANSETRON 4 MG/1
4 TABLET, ORALLY DISINTEGRATING ORAL EVERY 8 HOURS PRN
Status: DISCONTINUED | OUTPATIENT
Start: 2022-07-20 | End: 2022-07-21 | Stop reason: HOSPADM

## 2022-07-20 RX ORDER — SODIUM CHLORIDE 0.9 % (FLUSH) 0.9 %
5-40 SYRINGE (ML) INJECTION EVERY 12 HOURS SCHEDULED
Status: DISCONTINUED | OUTPATIENT
Start: 2022-07-20 | End: 2022-07-21 | Stop reason: HOSPADM

## 2022-07-20 RX ORDER — SODIUM CHLORIDE 9 MG/ML
INJECTION, SOLUTION INTRAVENOUS PRN
Status: DISCONTINUED | OUTPATIENT
Start: 2022-07-20 | End: 2022-07-21 | Stop reason: HOSPADM

## 2022-07-20 RX ORDER — POLYETHYLENE GLYCOL 3350 17 G/17G
17 POWDER, FOR SOLUTION ORAL DAILY PRN
Status: DISCONTINUED | OUTPATIENT
Start: 2022-07-20 | End: 2022-07-21 | Stop reason: HOSPADM

## 2022-07-20 RX ORDER — ESCITALOPRAM OXALATE 10 MG/1
10 TABLET ORAL DAILY
Status: DISCONTINUED | OUTPATIENT
Start: 2022-07-21 | End: 2022-07-21 | Stop reason: HOSPADM

## 2022-07-20 RX ORDER — ACETAMINOPHEN 325 MG/1
650 TABLET ORAL EVERY 6 HOURS PRN
Status: DISCONTINUED | OUTPATIENT
Start: 2022-07-20 | End: 2022-07-21 | Stop reason: HOSPADM

## 2022-07-20 RX ORDER — GEMFIBROZIL 600 MG/1
600 TABLET, FILM COATED ORAL
Status: DISCONTINUED | OUTPATIENT
Start: 2022-07-21 | End: 2022-07-21 | Stop reason: HOSPADM

## 2022-07-20 RX ORDER — ONDANSETRON 2 MG/ML
4 INJECTION INTRAMUSCULAR; INTRAVENOUS EVERY 6 HOURS PRN
Status: DISCONTINUED | OUTPATIENT
Start: 2022-07-20 | End: 2022-07-21 | Stop reason: HOSPADM

## 2022-07-20 RX ADMIN — IOPAMIDOL 95 ML: 755 INJECTION, SOLUTION INTRAVENOUS at 12:30

## 2022-07-20 RX ADMIN — METOPROLOL TARTRATE 25 MG: 25 TABLET, FILM COATED ORAL at 23:33

## 2022-07-20 ASSESSMENT — ENCOUNTER SYMPTOMS
RESPIRATORY NEGATIVE: 1
EYES NEGATIVE: 1
GASTROINTESTINAL NEGATIVE: 1
ALLERGIC/IMMUNOLOGIC NEGATIVE: 1

## 2022-07-21 ENCOUNTER — APPOINTMENT (OUTPATIENT)
Dept: MRI IMAGING | Age: 82
End: 2022-07-21
Payer: MEDICARE

## 2022-07-21 ENCOUNTER — APPOINTMENT (OUTPATIENT)
Dept: CT IMAGING | Age: 82
End: 2022-07-21
Payer: MEDICARE

## 2022-07-21 VITALS
BODY MASS INDEX: 31.85 KG/M2 | DIASTOLIC BLOOD PRESSURE: 58 MMHG | SYSTOLIC BLOOD PRESSURE: 113 MMHG | OXYGEN SATURATION: 98 % | HEIGHT: 70 IN | HEART RATE: 57 BPM | TEMPERATURE: 98.4 F | RESPIRATION RATE: 18 BRPM | WEIGHT: 222.44 LBS

## 2022-07-21 LAB
EKG ATRIAL RATE: 75 BPM
EKG DIAGNOSIS: NORMAL
EKG P AXIS: 17 DEGREES
EKG P-R INTERVAL: 164 MS
EKG Q-T INTERVAL: 398 MS
EKG QRS DURATION: 90 MS
EKG QTC CALCULATION (BAZETT): 444 MS
EKG R AXIS: -40 DEGREES
EKG T AXIS: 67 DEGREES
EKG VENTRICULAR RATE: 75 BPM

## 2022-07-21 PROCEDURE — 97166 OT EVAL MOD COMPLEX 45 MIN: CPT

## 2022-07-21 PROCEDURE — 2580000003 HC RX 258: Performed by: NURSE PRACTITIONER

## 2022-07-21 PROCEDURE — 97162 PT EVAL MOD COMPLEX 30 MIN: CPT

## 2022-07-21 PROCEDURE — 2580000003 HC RX 258: Performed by: STUDENT IN AN ORGANIZED HEALTH CARE EDUCATION/TRAINING PROGRAM

## 2022-07-21 PROCEDURE — A4216 STERILE WATER/SALINE, 10 ML: HCPCS | Performed by: NURSE PRACTITIONER

## 2022-07-21 PROCEDURE — 94761 N-INVAS EAR/PLS OXIMETRY MLT: CPT

## 2022-07-21 PROCEDURE — 80048 BASIC METABOLIC PNL TOTAL CA: CPT

## 2022-07-21 PROCEDURE — G0378 HOSPITAL OBSERVATION PER HR: HCPCS

## 2022-07-21 PROCEDURE — 97530 THERAPEUTIC ACTIVITIES: CPT

## 2022-07-21 PROCEDURE — 72141 MRI NECK SPINE W/O DYE: CPT

## 2022-07-21 PROCEDURE — 99222 1ST HOSP IP/OBS MODERATE 55: CPT | Performed by: PHYSICIAN ASSISTANT

## 2022-07-21 PROCEDURE — 6370000000 HC RX 637 (ALT 250 FOR IP): Performed by: STUDENT IN AN ORGANIZED HEALTH CARE EDUCATION/TRAINING PROGRAM

## 2022-07-21 PROCEDURE — 93010 ELECTROCARDIOGRAM REPORT: CPT | Performed by: INTERNAL MEDICINE

## 2022-07-21 PROCEDURE — 2500000003 HC RX 250 WO HCPCS: Performed by: NURSE PRACTITIONER

## 2022-07-21 PROCEDURE — 96374 THER/PROPH/DIAG INJ IV PUSH: CPT

## 2022-07-21 PROCEDURE — 70450 CT HEAD/BRAIN W/O DYE: CPT

## 2022-07-21 PROCEDURE — 97116 GAIT TRAINING THERAPY: CPT

## 2022-07-21 PROCEDURE — 2700000000 HC OXYGEN THERAPY PER DAY

## 2022-07-21 RX ORDER — VENLAFAXINE HYDROCHLORIDE 150 MG/1
CAPSULE, EXTENDED RELEASE ORAL
Qty: 90 CAPSULE | Refills: 1 | Status: SHIPPED | OUTPATIENT
Start: 2022-07-21

## 2022-07-21 RX ADMIN — ISOSORBIDE MONONITRATE 30 MG: 30 TABLET, EXTENDED RELEASE ORAL at 09:09

## 2022-07-21 RX ADMIN — ESCITALOPRAM OXALATE 10 MG: 10 TABLET, FILM COATED ORAL at 09:00

## 2022-07-21 RX ADMIN — GEMFIBROZIL 600 MG: 600 TABLET ORAL at 06:11

## 2022-07-21 RX ADMIN — FINASTERIDE 5 MG: 5 TABLET, FILM COATED ORAL at 09:00

## 2022-07-21 RX ADMIN — SODIUM CHLORIDE, PRESERVATIVE FREE 10 ML: 5 INJECTION INTRAVENOUS at 00:20

## 2022-07-21 RX ADMIN — FAMOTIDINE 20 MG: 10 INJECTION, SOLUTION INTRAVENOUS at 09:00

## 2022-07-21 RX ADMIN — SODIUM CHLORIDE, PRESERVATIVE FREE 10 ML: 5 INJECTION INTRAVENOUS at 09:00

## 2022-07-21 RX ADMIN — METOPROLOL TARTRATE 25 MG: 25 TABLET, FILM COATED ORAL at 09:09

## 2022-07-21 RX ADMIN — ALLOPURINOL 300 MG: 300 TABLET ORAL at 09:00

## 2022-07-21 ASSESSMENT — PAIN SCALES - GENERAL
PAINLEVEL_OUTOF10: 0

## 2022-07-21 NOTE — PROGRESS NOTES
Patient discharged home via wife in car. MRI completed prior to discharge. He is to call for fup with neurosurgery. PT/OT eval completed and recommends outpatient PT/OT. Patient and wife states that they will fup with PCP to get orders and therapy company referral. Patient is anxious to get home. He denies any complaints at this time and does not appear to be in any acute distress. AVS reviewed with patient and signed. He verbalizes understanding.

## 2022-07-21 NOTE — ACP (ADVANCE CARE PLANNING)
Patient does not have any ACP documents/Medical Power of . LSW notes hospital will follow [place]'s Next of Kin hierarchy in the following descending order for priority:    Guardian  Spouse  [de-identified] of adult Children  Parents  [de-identified] of adult Siblings  Nearest Relative not described above    Per [place] Next of Kin hierarchy: Patients' spouse will be Primary Healthcare Decision Maker. Patients' child will be Secondary Healthcare Decision Maker.

## 2022-07-21 NOTE — PROGRESS NOTES
NAD. Sitting up in bed   Eyes: EOMI  ENT: neck supple  Cardiovascular: Regular rate and rhythm   Respiratory: Clear to auscultation  Gastrointestinal: Soft, non tender  Genitourinary: no suprapubic tenderness  Musculoskeletal: No edema  Skin: warm, dry  Neuro: Alert. Moves all extremities X4. Psych: Mood appropriate.      Medications:   Medications:    famotidine (PEPCID) injection  20 mg IntraVENous BID    allopurinol  300 mg Oral Daily    escitalopram  10 mg Oral Daily    finasteride  5 mg Oral Daily    gemfibrozil  600 mg Oral BID AC    isosorbide mononitrate  30 mg Oral Daily    metoprolol tartrate  25 mg Oral BID    sodium chloride flush  5-40 mL IntraVENous 2 times per day      Infusions:    sodium chloride       PRN Meds: sodium chloride flush, 5-40 mL, PRN  sodium chloride, , PRN  ondansetron, 4 mg, Q8H PRN   Or  ondansetron, 4 mg, Q6H PRN  polyethylene glycol, 17 g, Daily PRN  acetaminophen, 650 mg, Q6H PRN   Or  acetaminophen, 650 mg, Q6H PRN        Labs      Recent Results (from the past 24 hour(s))   EKG 12 Lead    Collection Time: 07/20/22  9:39 PM   Result Value Ref Range    Ventricular Rate 75 BPM    Atrial Rate 75 BPM    P-R Interval 164 ms    QRS Duration 90 ms    Q-T Interval 398 ms    QTc Calculation (Bazett) 444 ms    P Axis 17 degrees    R Axis -40 degrees    T Axis 67 degrees    Diagnosis       Sinus rhythm with sinus arrhythmia with occasional and consecutive premature ventricular complexes  Left axis deviation  Anterior infarct , age undetermined  Abnormal ECG  When compared with ECG of 04-FEB-2020 11:31,  No significant change was found     CBC with Auto Differential    Collection Time: 07/20/22  9:55 PM   Result Value Ref Range    WBC 6.3 4.0 - 10.5 K/CU MM    RBC 4.07 (L) 4.6 - 6.2 M/CU MM    Hemoglobin 13.2 (L) 13.5 - 18.0 GM/DL    Hematocrit 39.2 (L) 42 - 52 %    MCV 96.3 78 - 100 FL    MCH 32.4 (H) 27 - 31 PG    MCHC 33.7 32.0 - 36.0 %    RDW 13.9 11.7 - 14.9 %    Platelets 871 466 - 440 K/CU MM    MPV 11.2 (H) 7.5 - 11.1 FL    Differential Type AUTOMATED DIFFERENTIAL     Segs Relative 53.2 36 - 66 %    Lymphocytes % 30.2 24 - 44 %    Monocytes % 10.6 (H) 0 - 4 %    Eosinophils % 4.8 (H) 0 - 3 %    Basophils % 1.0 0 - 1 %    Segs Absolute 3.3 K/CU MM    Lymphocytes Absolute 1.9 K/CU MM    Monocytes Absolute 0.7 K/CU MM    Eosinophils Absolute 0.3 K/CU MM    Basophils Absolute 0.1 K/CU MM    Nucleated RBC % 0.0 %    Total Nucleated RBC 0.0 K/CU MM    Total Immature Neutrophil 0.01 K/CU MM    Immature Neutrophil % 0.2 0 - 0.43 %   Comprehensive Metabolic Panel    Collection Time: 07/20/22  9:55 PM   Result Value Ref Range    Sodium 137 135 - 145 MMOL/L    Potassium 4.0 3.5 - 5.1 MMOL/L    Chloride 101 99 - 110 mMol/L    CO2 26 21 - 32 MMOL/L    BUN 24 (H) 6 - 23 MG/DL    Creatinine 0.7 (L) 0.9 - 1.3 MG/DL    Glucose 86 70 - 99 MG/DL    Calcium 9.6 8.3 - 10.6 MG/DL    Albumin 4.0 3.4 - 5.0 GM/DL    Total Protein 7.0 6.4 - 8.2 GM/DL    Total Bilirubin 0.3 0.0 - 1.0 MG/DL    ALT 11 10 - 40 U/L    AST 19 15 - 37 IU/L    Alkaline Phosphatase 87 40 - 129 IU/L    GFR Non-African American >60 >60 mL/min/1.73m2    GFR African American >60 >60 mL/min/1.73m2    Anion Gap 10 4 - 16   Troponin    Collection Time: 07/20/22  9:55 PM   Result Value Ref Range    Troponin T <0.010 <0.01 NG/ML   Brain Natriuretic Peptide    Collection Time: 07/20/22  9:55 PM   Result Value Ref Range    Pro-BNP 93.58 <300 PG/ML   Protime/INR & PTT    Collection Time: 07/20/22  9:55 PM   Result Value Ref Range    Protime 12.6 11.7 - 14.5 SECONDS    INR 0.98 INDEX    aPTT 65.5 (H) 25.1 - 37.1 SECONDS        Imaging/Diagnostics Last 24 Hours   CTA HEAD W WO CONTRAST    Result Date: 7/20/2022  EXAMINATION: CTA OF THE NECK; CTA OF THE HEAD WITHOUT AND WITH CONTRAST 7/20/2022 12:17 pm: TECHNIQUE: CTA of the neck was performed with the administration of intravenous contrast. Multiplanar reformatted images are provided for review.   MIP images are provided for review. Stenosis of the internal carotid arteries measured using NASCET criteria. Automated exposure control, iterative reconstruction, and/or weight based adjustment of the mA/kV was utilized to reduce the radiation dose to as low as reasonably achievable.; CTA of the head/brain was performed without and with the administration of intravenous contrast. Multiplanar reformatted images are provided for review. MIP images are provided for review. Automated exposure control, iterative reconstruction, and/or weight based adjustment of the mA/kV was utilized to reduce the radiation dose to as low as reasonably achievable. Noncontrast CT of the head with reconstructed 2-D images are also provided for review. COMPARISON: None. HISTORY: ORDERING SYSTEM PROVIDED HISTORY: PVC (premature ventricular contraction) TECHNOLOGIST PROVIDED HISTORY: STAT Creatinine as needed:->Yes Reason for Exam: CVA, PVC (premature ventricular contraction), Balance disorder, Loss of balance Additional signs and symptoms: patient states he is having a lot of balance issues Relevant Medical/Surgical History: patient states he is having a lot of balance issues; ORDERING SYSTEM PROVIDED HISTORY: PVC (premature ventricular contraction) TECHNOLOGIST PROVIDED HISTORY: STAT Creatinine as needed:->Yes Reason for exam:->CVA Reason for Exam: CVA, PVC (premature ventricular contraction), Balance disorder, Loss of balance Additional signs and symptoms: patient states he is having a lot of balance issues Relevant Medical/Surgical History: patient states he is having a lot of balance issues FINDINGS: CT HEAD: BRAIN/VENTRICLES:  Trace right subdural isodense fluid collection measures up to 2 mm in thickness in the right frontal convexity. Grey-white differentiation is maintained. No evidence of mass, mass effect or midline shift. No evidence of hydrocephalus. ORBITS: The visualized portion of the orbits demonstrate no acute abnormality. SINUSES:  The visualized paranasal sinuses and mastoid air cells demonstrate no acute abnormality. SOFT TISSUES/SKULL: Right parietooccipital scalp soft tissue swelling. No acute displaced skull fracture. CTA NECK: AORTIC ARCH/ARCH VESSELS: No dissection or arterial injury. No significant stenosis of the brachiocephalic or subclavian arteries. Atherosclerosis in the visualized thoracic aorta. CAROTID ARTERIES: No dissection, arterial injury, or hemodynamically significant stenosis by NASCET criteria. Bilateral carotid bulb atherosclerotic plaque VERTEBRAL ARTERIES: No dissection, arterial injury, or significant stenosis. SOFT TISSUES: The visualized lung apices demonstrate respiratory motion and subsegmental atelectasis/scarring. No cervical or superior mediastinal lymphadenopathy. The larynx and pharynx are unremarkable. No acute abnormality of the salivary and thyroid glands. Coronary artery calcification. BONES: No acute osseous abnormality. Multilevel degenerative changes in the visualized spine. Diffusely heterogeneous marrow suggests osteopenia. CTA HEAD: ANTERIOR CIRCULATION: No significant stenosis of the intracranial internal carotid, anterior cerebral, or middle cerebral arteries. No aneurysm. POSTERIOR CIRCULATION: No significant stenosis of the vertebral, basilar, or posterior cerebral arteries. No aneurysm. OTHER: No dural venous sinus thrombosis on this non-dedicated study. 1. No focal hemodynamically significant stenosis or occlusion in the large arteries of the head and neck. 2. Trace right subdural isodense fluid collection may represent a small subdural hematoma of indeterminate age, possibly subacute to chronic. Acute bleed is difficult to entirely exclude. The findings were sent to the Radiology Results [id number] at 5:30 pm on 7/20/2022 to be communicated to a licensed caregiver.      CT HEAD WO CONTRAST    Result Date: 7/21/2022  EXAMINATION: CT OF THE HEAD WITHOUT CONTRAST  7/21/2022 8:26 am TECHNIQUE: CT of the head was performed without the administration of intravenous contrast. Automated exposure control, iterative reconstruction, and/or weight based adjustment of the mA/kV was utilized to reduce the radiation dose to as low as reasonably achievable. COMPARISON: 07/21/2022 HISTORY: ORDERING SYSTEM PROVIDED HISTORY: Follow-up right subdural TECHNOLOGIST PROVIDED HISTORY: Reason for exam:->Follow-up right subdural Has a \"code stroke\" or \"stroke alert\" been called? ->No Reason for Exam: Follow-up right subdural Additional signs and symptoms: none Relevant Medical/Surgical History: none FINDINGS: BRAIN/VENTRICLES: A small volume of subdural hemorrhage along the tentorium to the right of midline is not significantly changed compared with the previous exam.  There is no new hemorrhage or acute infarct. There is no mass effect or midline shift. There is no ventriculomegaly. There is a remote lacunar infarct within the anterior limb of the right internal capsule. ORBITS: Limited evaluation of the orbits is unremarkable. SINUSES: The paranasal sinuses and mastoid air cells are clear. SOFT TISSUES/SKULL:  No lytic or blastic osseous lesions are identified. Stable subdural hemorrhage lying along the tentorium to the right of midline. No new hemorrhage or acute infarct. CT HEAD WO CONTRAST    Result Date: 7/21/2022  EXAMINATION: CT OF THE HEAD WITHOUT CONTRAST  7/21/2022 1:26 am TECHNIQUE: CT of the head was performed without the administration of intravenous contrast. Automated exposure control, iterative reconstruction, and/or weight based adjustment of the mA/kV was utilized to reduce the radiation dose to as low as reasonably achievable.  COMPARISON: CT angio head 07/20/2022, brain MRI 07/20/2022 HISTORY: ORDERING SYSTEM PROVIDED HISTORY: Evaluate evolution of SDH TECHNOLOGIST PROVIDED HISTORY: Reason for exam:->Evaluate evolution of SDH Has a \"code stroke\" or \"stroke alert\" been called? ->No Decision Support Exception - unselect if not a suspected or confirmed emergency medical condition->Emergency Medical Condition (MA) Reason for Exam: Evaluate evolution of SDH FINDINGS: BRAIN/VENTRICLES: Small subdural hematoma along the right tentorium cerebelli,  4 mm maximal diameter, previously 3 mm, stable to minimally increased in size (2:23, 303:68). There is no mass effect or midline shift. The gray-white differentiation is maintained without evidence of an acute infarct. There is no evidence of hydrocephalus. ORBITS: The visualized portion of the orbits demonstrate no acute abnormality. Bilateral cataract extraction. SINUSES: The visualized paranasal sinuses and mastoid air cells demonstrate no acute abnormality. SOFT TISSUES/SKULL:  No acute abnormality of the visualized skull. Small right parietal scalp hematoma, unchanged     stable to minimally increased size of subdural hematoma along the right tentorium cerebelli, unchanged. No midline shift. CTA NECK W CONTRAST    Result Date: 7/20/2022  EXAMINATION: CTA OF THE NECK; CTA OF THE HEAD WITHOUT AND WITH CONTRAST 7/20/2022 12:17 pm: TECHNIQUE: CTA of the neck was performed with the administration of intravenous contrast. Multiplanar reformatted images are provided for review. MIP images are provided for review. Stenosis of the internal carotid arteries measured using NASCET criteria. Automated exposure control, iterative reconstruction, and/or weight based adjustment of the mA/kV was utilized to reduce the radiation dose to as low as reasonably achievable.; CTA of the head/brain was performed without and with the administration of intravenous contrast. Multiplanar reformatted images are provided for review. MIP images are provided for review. Automated exposure control, iterative reconstruction, and/or weight based adjustment of the mA/kV was utilized to reduce the radiation dose to as low as reasonably achievable. Noncontrast CT of the head with reconstructed 2-D images are also provided for review. COMPARISON: None. HISTORY: ORDERING SYSTEM PROVIDED HISTORY: PVC (premature ventricular contraction) TECHNOLOGIST PROVIDED HISTORY: STAT Creatinine as needed:->Yes Reason for Exam: CVA, PVC (premature ventricular contraction), Balance disorder, Loss of balance Additional signs and symptoms: patient states he is having a lot of balance issues Relevant Medical/Surgical History: patient states he is having a lot of balance issues; ORDERING SYSTEM PROVIDED HISTORY: PVC (premature ventricular contraction) TECHNOLOGIST PROVIDED HISTORY: STAT Creatinine as needed:->Yes Reason for exam:->CVA Reason for Exam: CVA, PVC (premature ventricular contraction), Balance disorder, Loss of balance Additional signs and symptoms: patient states he is having a lot of balance issues Relevant Medical/Surgical History: patient states he is having a lot of balance issues FINDINGS: CT HEAD: BRAIN/VENTRICLES:  Trace right subdural isodense fluid collection measures up to 2 mm in thickness in the right frontal convexity. Grey-white differentiation is maintained. No evidence of mass, mass effect or midline shift. No evidence of hydrocephalus. ORBITS: The visualized portion of the orbits demonstrate no acute abnormality. SINUSES:  The visualized paranasal sinuses and mastoid air cells demonstrate no acute abnormality. SOFT TISSUES/SKULL: Right parietooccipital scalp soft tissue swelling. No acute displaced skull fracture. CTA NECK: AORTIC ARCH/ARCH VESSELS: No dissection or arterial injury. No significant stenosis of the brachiocephalic or subclavian arteries. Atherosclerosis in the visualized thoracic aorta. CAROTID ARTERIES: No dissection, arterial injury, or hemodynamically significant stenosis by NASCET criteria. Bilateral carotid bulb atherosclerotic plaque VERTEBRAL ARTERIES: No dissection, arterial injury, or significant stenosis.  SOFT TISSUES: The visualized lung apices demonstrate respiratory motion and subsegmental atelectasis/scarring. No cervical or superior mediastinal lymphadenopathy. The larynx and pharynx are unremarkable. No acute abnormality of the salivary and thyroid glands. Coronary artery calcification. BONES: No acute osseous abnormality. Multilevel degenerative changes in the visualized spine. Diffusely heterogeneous marrow suggests osteopenia. CTA HEAD: ANTERIOR CIRCULATION: No significant stenosis of the intracranial internal carotid, anterior cerebral, or middle cerebral arteries. No aneurysm. POSTERIOR CIRCULATION: No significant stenosis of the vertebral, basilar, or posterior cerebral arteries. No aneurysm. OTHER: No dural venous sinus thrombosis on this non-dedicated study. 1. No focal hemodynamically significant stenosis or occlusion in the large arteries of the head and neck. 2. Trace right subdural isodense fluid collection may represent a small subdural hematoma of indeterminate age, possibly subacute to chronic. Acute bleed is difficult to entirely exclude. The findings were sent to the Radiology Results [id number] at 5:30 pm on 7/20/2022 to be communicated to a licensed caregiver. MRI BRAIN WO CONTRAST    Result Date: 7/20/2022  EXAMINATION: MRI OF THE BRAIN WITHOUT CONTRAST  7/20/2022 11:44 am TECHNIQUE: Multiplanar multisequence MRI of the brain was performed without the administration of intravenous contrast. COMPARISON: CT angiogram head and neck done same day. HISTORY: ORDERING SYSTEM PROVIDED HISTORY: Balance disorder TECHNOLOGIST PROVIDED HISTORY: Reason for exam:->CVA? Reason for Exam: fall FINDINGS: INTRACRANIAL STRUCTURES/VENTRICLES: There is no acute infarct. No mass effect or midline shift. Trace right subdural hematoma measures 2 mm in thickness. Generalized cerebral and cerebellar volume loss. Diffuse ventricular prominence may relate to central volume loss.   The sellar/suprasellar regions appear unremarkable. The normal signal voids within the major intracranial vessels appear maintained. The axial FLAIR images demonstrate mild bilateral periventricular and deep white matter signal hyperintensities which are nonspecific but commonly seen in the setting of chronic small vessel ischemic disease. ORBITS: The visualized portion of the orbits demonstrate no acute abnormality. SINUSES: The visualized paranasal sinuses and mastoid air cells demonstrate no acute abnormality. BONES/SOFT TISSUES: Right parieto-occipital scalp soft tissue swelling. Incompletely characterized degenerative changes in the visualized spine. 1.  Trace right subdural hematoma measures 2 mm in thickness and is age indeterminate. This could represent subacute to chronic blood. Acute bleed is difficult to entirely exclude. No significant mass effect or midline shift. 2.  No acute stroke. 3.  Mild chronic small vessel ischemic disease. The findings were sent to the Radiology Results [id number] at 5:34 pm on 7/20/2022 to be communicated to a licensed caregiver.        Electronically signed by MAC Ge CNP on 7/21/2022 at 2:01 PM

## 2022-07-21 NOTE — ED NOTES
Report given to Xi Madrigal on floor. Pt to be transported to floor.       Jordan Lowe RN  07/20/22 1350

## 2022-07-21 NOTE — PLAN OF CARE
Problem: Discharge Planning  Goal: Discharge to home or other facility with appropriate resources  Outcome: Progressing  Flowsheets (Taken 7/21/2022 8962)  Discharge to home or other facility with appropriate resources: Identify barriers to discharge with patient and caregiver     Problem: Pain  Goal: Verbalizes/displays adequate comfort level or baseline comfort level  Outcome: Progressing     Problem: Safety - Adult  Goal: Free from fall injury  Outcome: Progressing     Problem: Pain  Goal: Verbalizes/displays adequate comfort level or baseline comfort level  Outcome: Progressing

## 2022-07-21 NOTE — CONSULTS
[facility] PHYSICAL THERAPY EVALUATION  Vicki Price, 1940, 2110/2110-A, 7/21/2022    History  [place]:  The primary encounter diagnosis was Abnormal CT scan. A diagnosis of Intracranial bleed (HCC) was also pertinent to this visit. Patient  has a past medical history of Decreased hearing, Gout, H/O cardiovascular stress test, H/O echocardiogram, H/O percutaneous transluminal coronary angioplasty, Hyperlipidemia, Nocturia, Osteoarthritis, and Restless leg. Patient  has a past surgical history that includes colectomy (2000); Knee arthroscopy (2002); joint replacement; Cholecystectomy (2001); Blepharoplasty (2010); Eye surgery (Left, 2011); Eye surgery (Right, 2011); Upper gastrointestinal endoscopy (N/A, 7/4/2019); Colonoscopy (N/A, 7/4/2019); and Small intestine surgery (N/A, 7/8/2019). Subjective:  Patient states:  \"I'm tired of being in here, I'm ready to go home\". Pain:  Pt without c/o. Communication with other providers:  Handoff to RN, co-eval with Edelmira Waterman. Discussed with RN. Restrictions: Fall risk, tele    Home Setup/Prior level of function  Social/Functional History  Lives With: Spouse  Type of Home: House  Home Layout: One level  Home Access: Ramped entrance, Stairs to enter with rails  Entrance Stairs - Number of Steps: 2-3  Entrance Stairs - Rails: Left  Bathroom Shower/Tub: Walk-in shower, Shower chair with back  Bathroom Toilet: Standard  Bathroom Equipment: Grab bars in [address]: [employer], Walker, rolling (knee scooter)  Has the patient had two or more falls in the past year or any fall with injury in the past year?: Yes  ADL Assistance: [facility]: Independent  Homemaking Responsibilities: Yes  Ambulation Assistance: Independent  Transfer Assistance: Independent  Active :  Yes  Additional Comments: h/o knee replacements    Examination of body systems (includes body structures/functions, activity/participation limitations):  Observation:  Pt is awake, seated in office rolling chair at EOB with LE propped on EOB, wife present upon arrival  Vision:  glasses, WFL  Hearing:  [place]  Cardiopulmonary:  On RA, stable  Cognition: WFL, min decreased safety insight, see OT/SLP note for further evaluation. Musculoskeletal  ROM R/L:  WFL BLE. Strength R/L:  Generally 4+/5, fair in function and endurance. Neuro:  decreased balance, subdural hematoma- see imaging    Gait pattern: Pt demonstrates step-through gait pattern with decreased stance time RLE, wide YOKO with increased lateral trunk excursion, decreased stability. Mobility:  *Pt min impulsive with transfers  Transfers: CGA  Sitting balance: Mod I.    Standing balance:  CGA- SBA  Gait: CGA    AMPA 6 Clicks Inpatient Mobility:  AM-PAC Inpatient Mobility Raw Score : 19    Treatment:  *end of session PT emphasized safety concern of pt seated in rolling chair, fall risks- however pt insists on sitting in rolling chair. Wife present. Sitting balance: Upon entry pt demos seated Mod I. Seated in rolling chair pt demonstrates fair balance. Seated at room couch pt demonstrates fair- balance during dynamic LE tasks for MMT. No significant LOB. Sit<>Stand: Pt performed STS from chair to upright with CGA, PT and OT blocking wheels of chair for safety, pt with initial failed STS attempt d/t poor WS and LE power. Pt self-corrects and successfully with CGA, v/c for proper sequencing. Pt demonstrates increased sway and time to upright. Return to seated at couch pt demonstrates fair- control, v/c for safe sequencing. Sit>Stand from couch to upright, CGA with increased trunk sway. Standing balance: Pt demonstrates increased postural sway without overt LOB, PT encouraged use of SPC, pt demonstrates improvement, CGA    Gait: Pt AMB x350 ft with SPC RW, step-through gait pattern with decreased stance time RLE, wide YOKO with increased lateral trunk excursion, decreased stability. Pt with decreased stability in dynamic turning, cues for sequential turn. Education: PT emphsized importance of slowed/controlled movement for prevention of falls, rec 24/7 use of cane and wife supervision for safety. PT encouraged OP PT for balance deficits. End of session pt left in chair with lines managed, call light, phone, tray, all needs, RN aware. Assessment:    Pt is an [de-identified] y/o male admitted 7/20 with c/o  subdural hematoma. Patient with significant h/o falls, HLD, OA, gout, see chart. Per pt report pt has been performing ADLs/IADLs Mod I, no use of AD for gait. At this time pt appears to be functioning slightly below baseline. Pt is now presenting with impairments in LE strength, functional endurance, safety awareness, balance. Pt would benefit from skilled PT services in order to address impairments and promote return to PLOF. PT to recommend d/c to Home with 24/7 supervision, OP PT. Per RN pt is to d/c today. PT will d/c pt from caseload.      Complexity: Moderate    Recommendations for NURSING mobility: AMB with SPC and CGA    Time:   Time in: 15:07  Time out: 15:34  Timed treatment minutes: 15  Total time: 27    Electronically signed by:    Nimo Dick, PT  5/52/9380, 6:07 PM  PT Lic #: [id number]

## 2022-07-21 NOTE — DISCHARGE SUMMARY
Compression Stockings Misc  by Does not apply route 20-25 mmHg            STOP taking these medications      venlafaxine 150 MG extended release capsule  Commonly known as: EFFEXOR XR            ASK your doctor about these medications      allopurinol 300 MG tablet  Commonly known as: ZYLOPRIM  TAKE 1 TABLET DAILY     clopidogrel 75 MG tablet  Commonly known as: PLAVIX  TAKE 1 TABLET DAILY     escitalopram 10 MG tablet  Commonly known as: LEXAPRO  TAKE 1 TABLET DAILY     finasteride 5 MG tablet  Commonly known as: PROSCAR  Take 1 tablet daily     gemfibrozil 600 MG tablet  Commonly known as: LOPID  TAKE 1 TABLET TWICE DAILY  BEFORE MEALS     isosorbide mononitrate 30 MG extended release tablet  Commonly known as: IMDUR  Take 1 tablet by mouth daily     metoprolol tartrate 25 MG tablet  Commonly known as: LOPRESSOR  Take 1 tablet by mouth 2 times daily     nitroGLYCERIN 0.4 MG SL tablet  Commonly known as: Nitrostat  Place 1 tablet under the tongue every 5 minutes as needed for Chest pain     * Ocuvite Extra Tabs     * VITABASIC SENIOR PO     UNABLE TO FIND     Vitamin B 12 500 MCG Tabs  Take 1 tablet by mouth daily           * This list has 2 medication(s) that are the same as other medications prescribed for you. Read the directions carefully, and ask your doctor or other care provider to review them with you. Medication Instructions: Will need to be off Plavix for two weeks until follow up head CT is completed. Objective Findings at Discharge:   Pt was personally examined by me on the day of discharge with the following findings:   Vitals:    07/21/22 0925 07/21/22 1000 07/21/22 1100 07/21/22 1200   BP: 128/82 130/70 129/80 126/75   Pulse: 61 66 57 54   Resp: 16 19 17 10   Temp:       TempSrc:       SpO2:  97% 95% 98%   Weight:       Height:           Physical Exam:  General: Well appearing, and in no distress. Normal body habitus. Eyes: AMEYA. Normal conjunctiva.  extraocular motors are intact conjunctiva clear, sclerae white, there is no injection no icterus. ENT/Mouth: Normal appearing jaw and neck, no cervical lymphadenopathy Cardiovascular:  normal rate, regular rhythm, normal S1, S2, no murmurs, rubs, clicks or gallops. Pedal pulses 2+ bilaterally. Respiratory:CTA, no wheezes, rales or rhonchi, symmetric air movement. Gastrointestinal: Soft, nontender, nondistended, no masses or organomegaly. Genitourinary:  No CVA tenderness. Musculoskletal: No joint swelling, warmth, or tenderness. 5 out of 5 strength in all 4 extremities full flexion extension abduction and adduction supination pronation of all extremities and all digits. No obvious muscle atrophy is noted. No focal muscle deficits are appreciated  Skin: Warm, Dry, Normal coloration and turgor, no rashes, no suspicious skin lesions noted. Neurologic: Normal speech, No focal findings or movement disorder noted. No evidence of incontinence or loss of bowel or bladder. Mental status:  Level of consciousness is normal. Alert, Oriented x3, Coherent, Appropriate Affect, No agitation. Hematologic/Lymphatic: No cervical lymphadenopathy.        Procedures:  none     Significant Diagnostic Studies at discharge:   Recent Labs     07/20/22  2155   WBC 6.3   HGB 13.2*   HCT 39.2*         Recent Labs     07/20/22  1224 07/20/22  2155   NA  --  137   K  --  4.0   CL  --  101   CO2  --  26   BUN  --  24*   CREATININE 0.7* 0.7*       Patient Instructions:     Medication List        CONTINUE taking these medications      Compression Stockings Misc  by Does not apply route 20-25 mmHg            STOP taking these medications      venlafaxine 150 MG extended release capsule  Commonly known as: EFFEXOR XR            ASK your doctor about these medications      allopurinol 300 MG tablet  Commonly known as: ZYLOPRIM  TAKE 1 TABLET DAILY     clopidogrel 75 MG tablet  Commonly known as: PLAVIX  TAKE 1 TABLET DAILY     escitalopram 10 MG tablet  Commonly known as: LEXAPRO  TAKE 1 TABLET DAILY     finasteride 5 MG tablet  Commonly known as: PROSCAR  Take 1 tablet daily     gemfibrozil 600 MG tablet  Commonly known as: LOPID  TAKE 1 TABLET TWICE DAILY  BEFORE MEALS     isosorbide mononitrate 30 MG extended release tablet  Commonly known as: IMDUR  Take 1 tablet by mouth daily     metoprolol tartrate 25 MG tablet  Commonly known as: LOPRESSOR  Take 1 tablet by mouth 2 times daily     nitroGLYCERIN 0.4 MG SL tablet  Commonly known as: Nitrostat  Place 1 tablet under the tongue every 5 minutes as needed for Chest pain     * Ocuvite Extra Tabs     * VITABASIC SENIOR PO     UNABLE TO FIND     Vitamin B 12 500 MCG Tabs  Take 1 tablet by mouth daily           * This list has 2 medication(s) that are the same as other medications prescribed for you. Read the directions carefully, and ask your doctor or other care provider to review them with you. Medication Instructions: Will need to be off Plavix for two weeks until follow up head CT is completed. Code Status: Full Code     Consults:   IP CONSULT TO NEUROSURGERY  IP CONSULT TO HOSPITALIST  IP CONSULT TO CASE MANAGEMENT    Diet: Regular  diet    Activity: Activity as tolerated    Discharged Condition: Fair    Prognosis: Fair    Disposition: Home    Follow-up with   1. PCP within   5-7 Days  2. Follow up labs:        Discharging provider Signed: The patient was seen and examined on day of discharge and this discharge summary is in conjunction with any daily progress note from day of discharge.   Time spent on discharge in the examination, evaluation, counseling and review of medications and discharge plan: 30 minutes

## 2022-07-21 NOTE — DISCHARGE INSTR - MEDS
Will need to be off Plavix for two weeks until follow up head CT is completed and you are cleared by Neurosurgery

## 2022-07-21 NOTE — H&P
History and Physical      Name:  Jakub Rosales /Age/Sex: 1940  ([de-identified] y.o. male)   MRN & CSN:  [id number] & [id number] Encounter Date/Time: 2022 10:36 PM EDT   Location:  [facility] PCP: Nicole Vizcarra MD       Hospital Day: 1    Assessment and Plan:     Patient is a 82-year-old male who presented after outpatient CTA showed trace right subdural hematoma of indeterminate age. No focal weaknesses or complaints. NIH 0.    # Trace right SDH, indeterminate age  - Outpatient CTA ordered to evaluate for imbalance. - Patient had fall while gardening 2 days prior with trauma to the posterior skull. Reported mild dizziness and visual changes for few minutes which were resolved. - Not on anticoagulants or antiplatelets besides Clopidogrel.  - NSY following, recommendation for repeat CT head in 6 hours with strict blood pressure control.  - INR 0.98. H/H stable and at baseline.  - ICU admission for strict BP control with Cardene gtt (goal SBP <140 mmHg) and frequent neurochecks. Chronic conditions:  # CAD - continued Lopressor and Imdur. # BPH - continued Finasteride. # Gout - continued Allopurinol. # MDD - continued Lexapro. Checklist:  DVT ppx: SCDs  Catheter: N/A  Advanced directive: full  Nutrition/Diet: cardiac, NPO past midnight  Pain / BMs: Tylenol prn    Disposition: patient requires continued admission due to trace right SDH. MDM: moderate. History of Present Illness:     Chief Complaint: Subdural hematoma ([address]    Patient is a 82-year-old female with a PMHx of CAD, HTN, HLD, Gout, BPH and MDD who presented to the ED after called back for abnormal head CT. Patient's cardiologist ordered head CT to evaluate for loss of balance found right trace subdural hematoma of indeterminate age. Patient was called and advised to present to the ED immediately.   In the ED, patient reported that he had fall 2 days prior while gardening and hitting the back of his head with mild dizziness and visual changes that resolved after few minutes. He denies taking any anticoagulants or antiplatelets besides Clopidogrel. He denied any weaknesses, focal neurological deficits, dysarthria or any new difficulty with balance. Denied any other complaints. ROS:     Ten point ROS reviewed negative, unless as noted above. Objective:     No intake or output data in the 24 hours ending 07/20/22 2247     Vitals:   Vitals:    07/20/22 2132 07/20/22 2145 07/20/22 2202 07/20/22 2218   BP: (!) 147/91  (!) 154/83    Pulse: 82 76 73 71   Resp: 21 13 (!) 7 11   Temp:       SpO2: 97% 97% 98% 98%     BMI: There is no height or weight on file to calculate BMI. General: Awake. WDWN. AAOx3. NAD. HEENT: NCAT. PERRLA. Vision grossly intact. Hearing grossly intact. Nasal turbinates clear. Oropharynx clear. MMM. Neck: Supple. No JVP/JVD. CV: RRR. NL O5/F3. 2/6 systolic murmur. CR <2 secs. No peripheral edema. Pulm: NL effort on RA. CTAB. No R/R/W. CW NTTP. GI: +BS x4. Soft. NT/ND. : No CVA tenderness. No Brito catheter. Ext: No edema. Peripheral pulses intact. Skin: Intact. Normal coloration, warm, dry. MSK: No gross joint deformities. Full ROM. Muscle strength is 5/5 B/L. Neuro: CNs grossly intact. Normal speech. No focal deficit. Psych: Good judgement and reason. Past History: PMHx:   Past Medical History:   Diagnosis Date    Decreased hearing 5/4/2019    Gout 5/4/2019    H/O cardiovascular stress test 06/24/2019    EF 41%,  Mild ischemia of lateral wall involving small to medium size of left ventricle. H/O echocardiogram 8/27/19    EF 48, Mod AR, Mild MR & TR    H/O percutaneous transluminal coronary angioplasty 02/06/2020    PCI to OM & Mid LAD,  PDA has 80-90 % tandem lesions but small vessel. Hyperlipidemia 5/4/2019    Nocturia 5/4/2019    Osteoarthritis 5/4/2019    Restless leg 5/4/2019       PSHx:  has a past surgical history that includes colectomy (2000);  Knee arthroscopy (2002); joint replacement; Cholecystectomy (); Blepharoplasty (); Eye surgery (Left, ); Eye surgery (Right, ); Upper gastrointestinal endoscopy (N/A, 2019); Colonoscopy (N/A, 2019); and Small intestine surgery (N/A, 2019). Allergies: No Known Allergies    FHx: family history includes Colon Cancer in his mother; Heart Disease in his father; High Blood Pressure in his brother; Lung Disease in his father; No Known Problems in his brother; Seizures in his sister. SHx:   Social History     Socioeconomic History    Marital status:      Spouse name: None    Number of children: None    Years of education: None    Highest education level: None   Tobacco Use    Smoking status: Former     Packs/day: 2.00     Years: 25.00     Pack years: 50.00     Types: Cigarettes     Start date: 1960     Quit date:      Years since quittin.5    Smokeless tobacco: Never   Substance and Sexual Activity    Alcohol use: Yes     Alcohol/week: 1.0 standard drink     Types: 1 Cans of beer per week     Comment: rarely, 1-2 cups of coffee      Social Determinants of Health     Financial Resource Strain: Low Risk     Difficulty of Paying Living Expenses: Not hard at all   Food Insecurity: No Food Insecurity    Worried About Running Out of Food in the Last Year: Never true    Ran Out of Food in the Last Year: Never true   Physical Activity: Inactive    Days of Exercise per Week: 0 days    Minutes of Exercise per Session: 0 min       Medications Prior to Admission     Prior to Admission medications    Medication Sig Start Date End Date Taking?  Authorizing Provider   isosorbide mononitrate (IMDUR) 30 MG extended release tablet Take 1 tablet by mouth daily 22  Yes Ramu Patricia MD   metoprolol tartrate (LOPRESSOR) 25 MG tablet Take 1 tablet by mouth 2 times daily 22  Yes Ramu Patricia MD   Compression Stockings MISC by Does not apply route 20-25 mmHg 22  Yes Ramu Patricia MD   UNABLE TO FIND Indications: pharma fluorometholone-eye drops to prevent corneal implant rejections    Yes Historical Provider, MD   allopurinol (ZYLOPRIM) 300 MG tablet TAKE 1 TABLET DAILY 5/20/22  Yes Nikky Sims MD   finasteride (PROSCAR) 5 MG tablet Take 1 tablet daily 5/4/22  Yes MAC Martinez - CNP   escitalopram (LEXAPRO) 10 MG tablet TAKE 1 TABLET DAILY 3/7/22  Yes Nikky Sims MD   clopidogrel (PLAVIX) 75 MG tablet TAKE 1 TABLET DAILY 1/25/22  Yes Nikky Sims MD   gemfibrozil (LOPID) 600 MG tablet TAKE 1 TABLET TWICE DAILY  BEFORE MEALS 1/25/22  Yes Nikky Sims MD   nitroGLYCERIN (NITROSTAT) 0.4 MG SL tablet Place 1 tablet under the tongue every 5 minutes as needed for Chest pain 5/4/21  Yes Meena Mack MD   Cyanocobalamin (VITAMIN B 12) 500 MCG TABS Take 1 tablet by mouth daily 1/20/21  Yes Nikky Sims MD   Multiple Vitamins-Minerals (VITABASIC SENIOR PO) Take by mouth   Yes Historical Provider, MD   Multiple Vitamins-Minerals (OCUVITE EXTRA) TABS Take 1 tablet by mouth daily   Yes Historical Provider, MD   venlafaxine (EFFEXOR XR) 150 MG extended release capsule Take one Capsule every morning 3/4/22 7/20/22  Nikky Sims MD       Data:     CBC:   Recent Labs     07/20/22  2155   WBC 6.3   HGB 13.2*      MCV 96.3   RDW 13.9   LYMPHOPCT 30.2   MONOPCT 10.6*   BASOPCT 1.0   MONOSABS 0.7   LYMPHSABS 1.9   EOSABS 0.3   BASOSABS 0.1     CMP:    Recent Labs     07/20/22  1224 07/20/22  2155   NA  --  137   K  --  4.0   CL  --  101   CO2  --  26   BUN  --  24*   CREATININE 0.7* 0.7*   GFRAA >60 >60   GLUCOSE  --  86   LABALBU  --  4.0   CALCIUM  --  9.6   BILITOT  --  0.3   ALKPHOS  --  87   AST  --  19   ALT  --  11     Lipids:   Lab Results   Component Value Date/Time    CHOL 139 05/31/2022 10:42 AM    HDL 31 05/31/2022 10:42 AM    TRIG 165 05/31/2022 10:42 AM     Hemoglobin A1C: No results found for: LABA1C  TSH:   Lab Results   Component Value Date/Time    TSH 1.61 05/31/2022 10:42 AM     Troponin:   Lab Results   Component Value Date/Time    TROPONINT <0.010 07/20/2022 09:55 PM    TROPONINT <0.010 07/02/2019 03:55 PM     BNP:   Recent Labs     07/20/22  2155   PROBNP 93.58     Lactic Acid: No results for input(s): LACTA in the last 72 hours.   UA:No results found for: Poppy Burows, PHUR, LABCAST, WBCUA, RBCUA, MUCUS, TRICHOMONAS, YEAST, BACTERIA, CLARITYU, SPECGRAV, LEUKOCYTESUR, UROBILINOGEN, BILIRUBINUR, BLOODU, GLUCOSEU, KETUA, AMORPHOUS  Urine Cultures: No results found for: LABURIN  Blood Cultures: No results found for: BC  No results found for: BLOODCULT2  Organism: No results found for: [facility]    Radiology results:  No orders to display       Medications:     Medications:        Infusions:    niCARdipine Stopped (07/20/22 2233)       PRN Meds:        Devon Gordon MD  07/20/22 10:47 PM

## 2022-07-21 NOTE — DISCHARGE INSTR - DIET

## 2022-07-21 NOTE — CONSULTS
Neurosurgery   Consult Note      Reason for Consult: Right subdural along tentorium  Consulting Physician: Jassi Kidd DO  Attending Physician: Meghann Jones MD  Date of Admission: 7/20/2022  Subjective:   CHIEF COMPLAINT: Abnormal brain imaging    HPI:  [de-identified] y.o. 1940  Who presented to the ED 7/20/22 after being advised by their PCP due to abnormal brain imaging. Patient had an MRI brain and CTA of the head and neck performed outpatient yesterday. Imaging was obtained due to patient complaining of worsening unsteadiness with his gait and fatigue over the past 6 months that is gotten worse. On imaging a 4 mm subdural collection along the right tentorium was noted. Difficult to gauge if it was subacute versus chronic. Patient was admitted for neurosurgical evaluation to the ICU. He had a repeat head CT this morning at 1 AM showing slight enlargement of the subdural bleed, his repeat head CT 6 hours from that shows stability in the size. He tells me that he did fall and strike the back of his head on Monday while he was gardening. He does have a small scab over the right occipital skull. The patient is alert and oriented x4, has no headaches, no nausea or vomiting. He states that his unsteadiness and fatigue have him worried. He does complain of vision changes, he has macular degeneration. He denies any numbness/tingling in his upper or lower extremities but does report weakness in his bilateral hands and that he has been dropping things more frequently. He does complain of urinary urgency at night. Patient is on plavix. PMHx positive for decreased hearing, gout, lipidemia, osteoarthritis. Past surgical history positive for Lofaro plasty, cholecystectomy, colectomy, bilateral eye surgery, joint replacement surgery, small intestine surgery, coronary angioplasty with stent.                   Past Medical and Surgical History:       Diagnosis Date    Decreased hearing 5/4/2019    Gout 5/4/2019 H/O cardiovascular stress test 06/24/2019    EF 41%,  Mild ischemia of lateral wall involving small to medium size of left ventricle. H/O echocardiogram 8/27/19    EF 48, Mod AR, Mild MR & TR    H/O percutaneous transluminal coronary angioplasty 02/06/2020    PCI to OM & Mid LAD,  PDA has 80-90 % tandem lesions but small vessel. Hyperlipidemia 5/4/2019    Nocturia 5/4/2019    Osteoarthritis 5/4/2019    Restless leg 5/4/2019         Procedure Laterality Date    BLEPHAROPLASTY  2010    CHOLECYSTECTOMY  2001    COLECTOMY  2000    COLONOSCOPY N/A 7/4/2019    COLONOSCOPY DIAGNOSTIC performed by Sandra Mcneill MD at [address] Left 2011    EYE SURGERY Right 2011    JOINT REPLACEMENT      KNEE ARTHROSCOPY  2002    SMALL INTESTINE SURGERY N/A 7/8/2019    LAPAROTOMY EXPLORATORY RIGHT ILEOCOLECTOMY performed by Vidal Urban MD at [address] N/A 7/4/2019    EGD DIAGNOSTIC ONLY performed by Sandra Mcneill MD at [facility] ENDOSCOPY       Social History:    TOBACCO:   reports that he quit smoking about 37 years ago. His smoking use included cigarettes. He started smoking about 62 years ago. He has a 50.00 pack-year smoking history. He has never used smokeless tobacco.  ETOH:   reports current alcohol use of about 1.0 standard drink per week.     Family History:       Problem Relation Age of Onset    Colon Cancer Mother     Heart Disease Father     Lung Disease Father     Seizures Sister     High Blood Pressure Brother     No Known Problems Brother        Current Medications:    Current Facility-Administered Medications: famotidine (PEPCID) 20 mg in sodium chloride (PF) 10 mL injection, 20 mg, IntraVENous, BID  niCARdipine (CARDENE) 50 mg in dextrose 5 % 250 mL infusion, 2.5-15 mg/hr, IntraVENous, Continuous  allopurinol (ZYLOPRIM) tablet 300 mg, 300 mg, Oral, Daily  escitalopram (LEXAPRO) tablet 10 mg, 10 mg, Oral, Daily  finasteride (PROSCAR) tablet 5 mg, 5 mg, Oral, Daily  gemfibrozil (LOPID) tablet 600 mg, 600 mg, Oral, BID AC  isosorbide mononitrate (IMDUR) extended release tablet 30 mg, 30 mg, Oral, Daily  metoprolol tartrate (LOPRESSOR) tablet 25 mg, 25 mg, Oral, BID  sodium chloride flush 0.9 % injection 5-40 mL, 5-40 mL, IntraVENous, 2 times per day  sodium chloride flush 0.9 % injection 5-40 mL, 5-40 mL, IntraVENous, PRN  0.9 % sodium chloride infusion, , IntraVENous, PRN  ondansetron (ZOFRAN-ODT) disintegrating tablet 4 mg, 4 mg, Oral, Q8H PRN **OR** ondansetron (ZOFRAN) injection 4 mg, 4 mg, IntraVENous, Q6H PRN  polyethylene glycol (GLYCOLAX) packet 17 g, 17 g, Oral, Daily PRN  acetaminophen (TYLENOL) tablet 650 mg, 650 mg, Oral, Q6H PRN **OR** acetaminophen (TYLENOL) suppository 650 mg, 650 mg, Rectal, Q6H PRN    No Known Allergies     REVIEW OF SYSTEMS:    CONSTITUTIONAL: Positive for fatigue, negative for fevers, chills, diaphoresis, activity change, appetite change  EYES:  negative for blurred vision, eye discharge, visual disturbance and icterus  HEENT:  negative for hearing loss, tinnitus, ear drainage, sinus pressure, nasal congestion  RESPIRATORY:  No cough, shortness of breath, hemoptysis  GASTROINTESTINAL:  negative for nausea, vomiting, diarrhea, constipation  GENITOURINARY:  negative for frequency, dysuria, urinary incontinence, decreased urine volume, and hematuria  HEMATOLOGIC/LYMPHATIC:  negative for easy bruising, bleeding and lymphadenopathy  MUSCULOSKELETAL:  negative for pain, joint swelling, decreased range of motion and muscle weakness  NEUROLOGICAL:  negative for headaches, slurred speech, unilateral weakness  PSYCHIATRIC/BEHAVIORAL: negative for hallucinations, behavioral problems, confusion and agitation.        Objective:   PHYSICAL EXAM:      VITALS:  BP (!) 141/78   Pulse 78   Temp 98.4 °F (36.9 °C) (Oral)   Resp 21   Ht 5' 10\" (1.778 m)   Wt 222 lb 7.1 oz (100.9 kg)   SpO2 97%   BMI 31.92 kg/m²      24HR INTAKE/OUTPUT: Intake/Output Summary (Last 24 hours) at 7/21/2022 0829  Last data filed at 7/21/2022 1827  Gross per 24 hour   Intake --   Output 1050 ml   Net -1050 ml     CONSTITUTIONAL:  Awake, alert, cooperative, no apparent distress, and appears stated age  [de-identified]: Janna Sago at 4mm bilaterally, EOMI  tongue protrudes midline  PSYCHIATRIC: Oriented to person place and time. No obvious depression or anxiety. MUSCULOSKELETAL: No obvious misalignment or effusion of the joints. No clubbing, cyanosis of the digits. SKIN:  normal skin color, texture, turgor and no redness, warmth, or swelling. NEUROLOGIC: Alert and oriented x4, face symmetrical, no obvious droop, speech clear and coherent, no significant finger to nose dysmetria, sensation intact to light touch and pinprick sensation. Upper extremity strength: Bilateral upper extremities 5/5 throughout, except for right interosseous 4/5, slight kidd's on the right, 1-2 beats of clonus on the right  Lower extremity strength: Bilateral lower extremities 5/5 throughout, bilateral DTR 2+    DATA:    Old records have been reviewed    CBC:  Recent Labs     07/20/22  2155   WBC 6.3   RBC 4.07*   HGB 13.2*   HCT 39.2*      MCV 96.3   MCH 32.4*   MCHC 33.7   RDW 13.9   SEGSPCT 53.2      BMP:  Recent Labs     07/20/22  1224 07/20/22  2155   NA  --  137   K  --  4.0   CL  --  101   CO2  --  26   BUN  --  24*   CREATININE 0.7* 0.7*   CALCIUM  --  9.6   GLUCOSE  --  86          Radiology Review:  All pertinent images / reports were reviewed as a part of this visit. CT head 7/21/22 #2  Impression   Stable subdural hemorrhage lying along the tentorium to the right of midline. No new hemorrhage or acute infarct. CT head 7/21/22 #1  Impression   stable to minimally increased size of subdural hematoma along the right   tentorium cerebelli, unchanged. No midline shift. CTA head w/wo contrast 7/20/22  Impression   1.  No focal hemodynamically significant stenosis or occlusion in the large   arteries of the head and neck. 2. Trace right subdural isodense fluid collection may represent a small   subdural hematoma of indeterminate age, possibly subacute to chronic. Acute   bleed is difficult to entirely exclude. The findings were sent to the Radiology Results [address] at 5:30   pm on 7/20/2022 to be communicated to a licensed caregiver. MRI brain wo contrast 7/20/22  Impression   1. Trace right subdural hematoma measures 2 mm in thickness and is age   indeterminate. This could represent subacute to chronic blood. Acute bleed   is difficult to entirely exclude. No significant mass effect or midline   shift. 2.  No acute stroke. 3.  Mild chronic small vessel ischemic disease. The findings were sent to the Radiology Results [id number] at 5:34   pm on 7/20/2022 to be communicated to a licensed caregiver. Assessment:   Subdural hematoma along the right tentorium  Hyperlipidemia  CAD  Plan:   Patient presents to the ER 7/20/2022 earlier advisement of their PCP after abnormal brain imaging was obtained. MRI brain and a CTA of the head and neck were ordered outpatient for complaints of fatigue and worsening unsteadiness with ambulation. The symptoms have been present for roughly 6 months now and seem to be getting worse. On MRI brain and CTA of the head and neck a trace right subdural hematoma was found to be 2 mm along the right tentorium. Difficulty to say if blood is subacute versus chronic. He did report to the ED, CT of the head this morning shows slight enlargement of the subdural collection, he has had 1 repeat scan after that showing stability of the bleed. The subdural collection is isodense. Recommend that patient hold Plavix for 2 weeks and follow-up with my office with a repeat head CT. No Keppra needed.   Concerning patient's worsening gait abnormality, he does complain of decreased  strength and has some hyperreflexia in the right upper extremity. His CTA of the neck shows degenerative changes in the neck with anterior osteophytes. I have ordered an MRI cervical spine for further evaluation. Patient okay to discharge from neurosurgical standpoint, if MRI is not obtained today can be obtained outpatient in follow-up. Electronically signed by Rozina Marcos PA-C on 7/21/2022 at 8:29 AM    Supervising physician: Siva Jenkins. Farzaneh Lau MD.  Dr. Farzaneh Lau was readily and continuously available by phone for direct consultation regarding the care of this patient. Time spent with patient in consultation, education, and collaboration with medical time is >50% of total time spent on case, including time spent in chart review and dictation. Total time spent: 40 minutes    Thank you for the opportunity to participate in the care of your patient.

## 2022-07-21 NOTE — ED PROVIDER NOTES
[facility]      TRIAGE CHIEF COMPLAINT:   Abnormal CT (States he has a brain bleed)      [place]:  Rohit Rao is a [de-identified] y.o. male that presents with complaint of abnormal head CT. Patient states he has been feeling off balance for the past while he does follow with cardiology family doctor etc.  He states he fell in his garden on Monday he did hit his head. No blood thinners. He had a head CT performed outpatient today by cardiology due to dizziness off balance and he was called later this evening and told he had a possible head bleed to come to the ER. He states he otherwise feels fine he has no headache chest pain shortness of breath no weakness or new numbness or tingling no other questions or concerns no history of brain bleed. No new vision changes no other questions he would not be here but his doctor told him to come in immediately. REVIEW OF SYSTEMS:  At least 10 systems reviewed and otherwise acutely negative except as in the [address] Review of Systems   Constitutional:  Positive for fatigue. HENT: Negative. Eyes: Negative. Respiratory: Negative. Cardiovascular: Negative. Gastrointestinal: Negative. Endocrine: Negative. Genitourinary: Negative. Musculoskeletal: Negative. Skin: Negative. Allergic/Immunologic: Negative. Neurological:  Positive for dizziness and light-headedness. Hematological: Negative. Psychiatric/Behavioral: Negative. All other systems reviewed and are negative. Past Medical History:   Diagnosis Date    Decreased hearing 5/4/2019    Gout 5/4/2019    H/O cardiovascular stress test 06/24/2019    EF 41%,  Mild ischemia of lateral wall involving small to medium size of left ventricle. H/O echocardiogram 8/27/19    EF 48, Mod AR, Mild MR & TR    H/O percutaneous transluminal coronary angioplasty 02/06/2020    PCI to OM & Mid LAD,  PDA has 80-90 % tandem lesions but small vessel.     Hyperlipidemia 5/4/2019    Nocturia 2019    Osteoarthritis 2019    Restless leg 2019     Past Surgical History:   Procedure Laterality Date    BLEPHAROPLASTY  2010    CHOLECYSTECTOMY      COLECTOMY      COLONOSCOPY N/A 2019    COLONOSCOPY DIAGNOSTIC performed by Joanna Fuentes MD at [address] Left 2011    EYE SURGERY Right 2011    JOINT REPLACEMENT      KNEE ARTHROSCOPY  2002    SMALL INTESTINE SURGERY N/A 2019    LAPAROTOMY EXPLORATORY RIGHT ILEOCOLECTOMY performed by Xavier Ackerman MD at [address] N/A 2019    EGD DIAGNOSTIC ONLY performed by Joanna Fuentes MD at [facility] ENDOSCOPY     Family History   Problem Relation Age of Onset    Colon Cancer Mother     Heart Disease Father     Lung Disease Father     Seizures Sister     High Blood Pressure Brother     No Known Problems Brother      Social History     Socioeconomic History    Marital status:      Spouse name: Not on file    Number of children: Not on file    Years of education: Not on file    Highest education level: Not on file   Occupational History    Not on file   Tobacco Use    Smoking status: Former     Packs/day: 2.00     Years: 25.00     Pack years: 50.00     Types: Cigarettes     Start date: 1960     Quit date:      Years since quittin.5    Smokeless tobacco: Never   Substance and Sexual Activity    Alcohol use:  Yes     Alcohol/week: 1.0 standard drink     Types: 1 Cans of beer per week     Comment: rarely, 1-2 cups of coffee     Drug use: Not on file    Sexual activity: Not on file   Other Topics Concern    Not on file   Social History Narrative    Not on file     Social Determinants of Health     Financial Resource Strain: Low Risk     Difficulty of Paying Living Expenses: Not hard at all   Food Insecurity: No Food Insecurity    Worried About Running Out of Food in the Last Year: Never true    Ran Out of Food in the Last Year: Never true   Transportation Needs: Not on file   Physical Activity: Inactive    Days of Exercise per Week: 0 days    Minutes of Exercise per Session: 0 min   Stress: Not on file   Social Connections: Not on file   Intimate Partner Violence: Not on file   Housing Stability: Not on file     Current Facility-Administered Medications   Medication Dose Route Frequency Provider Last Rate Last Admin    niCARdipine (CARDENE) 50 mg in dextrose 5 % 250 mL infusion  2.5-15 mg/hr IntraVENous Continuous Zavala Ralphs, DO         Current Outpatient Medications   Medication Sig Dispense Refill    isosorbide mononitrate (IMDUR) 30 MG extended release tablet Take 1 tablet by mouth daily 90 tablet 1    metoprolol tartrate (LOPRESSOR) 25 MG tablet Take 1 tablet by mouth 2 times daily 90 tablet 3    Compression Stockings MISC by Does not apply route 20-25 mmHg 1 each 0    UNABLE TO FIND Indications: pharma fluorometholone-eye drops to prevent corneal implant rejections       allopurinol (ZYLOPRIM) 300 MG tablet TAKE 1 TABLET DAILY 90 tablet 1    finasteride (PROSCAR) 5 MG tablet Take 1 tablet daily 90 tablet 1    escitalopram (LEXAPRO) 10 MG tablet TAKE 1 TABLET DAILY 90 tablet 1    venlafaxine (EFFEXOR XR) 150 MG extended release capsule Take one Capsule every morning 90 capsule 1    clopidogrel (PLAVIX) 75 MG tablet TAKE 1 TABLET DAILY 90 tablet 1    gemfibrozil (LOPID) 600 MG tablet TAKE 1 TABLET TWICE DAILY  BEFORE MEALS 180 tablet 1    nitroGLYCERIN (NITROSTAT) 0.4 MG SL tablet Place 1 tablet under the tongue every 5 minutes as needed for Chest pain 25 tablet 0    Cyanocobalamin (VITAMIN B 12) 500 MCG TABS Take 1 tablet by mouth daily 30 tablet 3    Multiple Vitamins-Minerals (VITABASIC SENIOR PO) Take by mouth      Multiple Vitamins-Minerals (OCUVITE EXTRA) TABS Take 1 tablet by mouth daily       Facility-Administered Medications Ordered in Other Encounters   Medication Dose Route Frequency Provider Last Rate Last Admin    sodium chloride flush 0.9 % injection 5-40 mL  5-40 mL IntraVENous PRN Angela Ochoa MD        sodium chloride flush 0.9 % injection 5-40 mL  5-40 mL IntraVENous PRN Angela Ochoa MD          No Known Allergies  Current Facility-Administered Medications   Medication Dose Route Frequency Provider Last Rate Last Admin    niCARdipine (CARDENE) 50 mg in dextrose 5 % 250 mL infusion  2.5-15 mg/hr IntraVENous Continuous Tomer Jessica, DO         Current Outpatient Medications   Medication Sig Dispense Refill    isosorbide mononitrate (IMDUR) 30 MG extended release tablet Take 1 tablet by mouth daily 90 tablet 1    metoprolol tartrate (LOPRESSOR) 25 MG tablet Take 1 tablet by mouth 2 times daily 90 tablet 3    Compression Stockings MISC by Does not apply route 20-25 mmHg 1 each 0    UNABLE TO FIND Indications: pharma fluorometholone-eye drops to prevent corneal implant rejections       allopurinol (ZYLOPRIM) 300 MG tablet TAKE 1 TABLET DAILY 90 tablet 1    finasteride (PROSCAR) 5 MG tablet Take 1 tablet daily 90 tablet 1    escitalopram (LEXAPRO) 10 MG tablet TAKE 1 TABLET DAILY 90 tablet 1    venlafaxine (EFFEXOR XR) 150 MG extended release capsule Take one Capsule every morning 90 capsule 1    clopidogrel (PLAVIX) 75 MG tablet TAKE 1 TABLET DAILY 90 tablet 1    gemfibrozil (LOPID) 600 MG tablet TAKE 1 TABLET TWICE DAILY  BEFORE MEALS 180 tablet 1    nitroGLYCERIN (NITROSTAT) 0.4 MG SL tablet Place 1 tablet under the tongue every 5 minutes as needed for Chest pain 25 tablet 0    Cyanocobalamin (VITAMIN B 12) 500 MCG TABS Take 1 tablet by mouth daily 30 tablet 3    Multiple Vitamins-Minerals (VITABASIC SENIOR PO) Take by mouth      Multiple Vitamins-Minerals (OCUVITE EXTRA) TABS Take 1 tablet by mouth daily       Facility-Administered Medications Ordered in Other Encounters   Medication Dose Route Frequency Provider Last Rate Last Admin    sodium chloride flush 0.9 % injection 5-40 mL  5-40 mL IntraVENous PRN Angela Ochoa MD        sodium chloride flush 0.9 % injection 5-40 mL  5-40 mL IntraVENous PRN Rosa Gaspar MD           Nursing Notes Reviewed    VITAL SIGNS:  ED Triage Vitals   Enc Vitals Group      BP       Pulse       Resp       Temp       Temp src       SpO2       Weight       Height       Head Circumference       Peak Flow       Pain Score       Pain Loc       Pain Edu? Excl. in [address]? PHYSICAL EXAM:  Physical Exam  Vitals and nursing note reviewed. Constitutional:       General: He is not in acute distress. Appearance: Normal appearance. He is well-developed and well-groomed. He is not ill-appearing, toxic-appearing or diaphoretic. HENT:      Head: Normocephalic and atraumatic. Right Ear: External ear normal.      Left Ear: External ear normal.      Nose: No congestion or rhinorrhea. Eyes:      General: No scleral icterus. Right eye: No discharge. Left eye: No discharge. Extraocular Movements: Extraocular movements intact. Conjunctiva/sclera: Conjunctivae normal.   Cardiovascular:      Rate and Rhythm: Normal rate and regular rhythm. Pulses: Normal pulses. Heart sounds: Normal heart sounds. No murmur heard. No friction rub. No gallop. Pulmonary:      Effort: Pulmonary effort is normal. No respiratory distress. Breath sounds: Normal breath sounds. No stridor. No wheezing, rhonchi or rales. Abdominal:      General: Bowel sounds are normal. There is no distension. Palpations: Abdomen is soft. There is no mass. Tenderness: There is no abdominal tenderness. There is no guarding or rebound. Negative signs include Melo's sign, Rovsing's sign and McBurney's sign. Hernia: No hernia is present. Musculoskeletal:         General: No swelling, tenderness, deformity or signs of injury. Normal range of motion. Cervical back: Normal range of motion. No rigidity. Right lower leg: No edema. Left lower leg: No edema. Skin:     General: Skin is warm.       Coloration: Skin is not jaundiced or pale. Findings: No bruising, erythema, lesion or rash. Neurological:      General: No focal deficit present. Mental Status: He is alert and oriented to person, place, and time. GCS: GCS eye subscore is 4. GCS verbal subscore is 5. GCS motor subscore is 6. Cranial Nerves: Cranial nerves are intact. No cranial nerve deficit, dysarthria or facial asymmetry. Sensory: Sensation is intact. No sensory deficit. Motor: Motor function is intact. No weakness, tremor, atrophy, abnormal muscle tone or seizure activity. Coordination: Coordination normal.   Psychiatric:         Mood and Affect: Mood normal.         Behavior: Behavior normal. Behavior is cooperative. Thought Content:  Thought content normal.         Judgment: Judgment normal.         I have reviewed andinterpreted all of the currently available lab results from this visit (if applicable):    Results for orders placed or performed during the hospital encounter of 07/20/22   EKG 12 Lead   Result Value Ref Range    Ventricular Rate 75 BPM    Atrial Rate 75 BPM    P-R Interval 164 ms    QRS Duration 90 ms    Q-T Interval 398 ms    QTc Calculation (Bazett) 444 ms    P Axis 17 degrees    R Axis -40 degrees    T Axis 67 degrees    Diagnosis       Sinus rhythm with sinus arrhythmia with occasional and consecutive premature ventricular complexes  Left axis deviation  Anterior infarct , age undetermined  Abnormal ECG  When compared with ECG of 04-FEB-2020 11:31,  No significant change was found          Radiographs (if obtained):  [] The following radiograph was interpreted by myself in the absence of a radiologist:  [x] Radiologist's Report Reviewed:    CTA HEAD W WO CONTRAST    Result Date: 7/20/2022  EXAMINATION: CTA OF THE NECK; CTA OF THE HEAD WITHOUT AND WITH CONTRAST 7/20/2022 12:17 pm: TECHNIQUE: CTA of the neck was performed with the administration of intravenous contrast. Multiplanar reformatted images are provided for review. MIP images are provided for review. Stenosis of the internal carotid arteries measured using NASCET criteria. Automated exposure control, iterative reconstruction, and/or weight based adjustment of the mA/kV was utilized to reduce the radiation dose to as low as reasonably achievable.; CTA of the head/brain was performed without and with the administration of intravenous contrast. Multiplanar reformatted images are provided for review. MIP images are provided for review. Automated exposure control, iterative reconstruction, and/or weight based adjustment of the mA/kV was utilized to reduce the radiation dose to as low as reasonably achievable. Noncontrast CT of the head with reconstructed 2-D images are also provided for review. COMPARISON: None. HISTORY: ORDERING SYSTEM PROVIDED HISTORY: PVC (premature ventricular contraction) TECHNOLOGIST PROVIDED HISTORY: STAT Creatinine as needed:->Yes Reason for Exam: CVA, PVC (premature ventricular contraction), Balance disorder, Loss of balance Additional signs and symptoms: patient states he is having a lot of balance issues Relevant Medical/Surgical History: patient states he is having a lot of balance issues; ORDERING SYSTEM PROVIDED HISTORY: PVC (premature ventricular contraction) TECHNOLOGIST PROVIDED HISTORY: STAT Creatinine as needed:->Yes Reason for exam:->CVA Reason for Exam: CVA, PVC (premature ventricular contraction), Balance disorder, Loss of balance Additional signs and symptoms: patient states he is having a lot of balance issues Relevant Medical/Surgical History: patient states he is having a lot of balance issues FINDINGS: CT HEAD: BRAIN/VENTRICLES:  Trace right subdural isodense fluid collection measures up to 2 mm in thickness in the right frontal convexity. Grey-white differentiation is maintained. No evidence of mass, mass effect or midline shift. No evidence of hydrocephalus.  ORBITS: The visualized portion of the orbits demonstrate no acute abnormality. SINUSES:  The visualized paranasal sinuses and mastoid air cells demonstrate no acute abnormality. SOFT TISSUES/SKULL: Right parietooccipital scalp soft tissue swelling. No acute displaced skull fracture. CTA NECK: AORTIC ARCH/ARCH VESSELS: No dissection or arterial injury. No significant stenosis of the brachiocephalic or subclavian arteries. Atherosclerosis in the visualized thoracic aorta. CAROTID ARTERIES: No dissection, arterial injury, or hemodynamically significant stenosis by NASCET criteria. Bilateral carotid bulb atherosclerotic plaque VERTEBRAL ARTERIES: No dissection, arterial injury, or significant stenosis. SOFT TISSUES: The visualized lung apices demonstrate respiratory motion and subsegmental atelectasis/scarring. No cervical or superior mediastinal lymphadenopathy. The larynx and pharynx are unremarkable. No acute abnormality of the salivary and thyroid glands. Coronary artery calcification. BONES: No acute osseous abnormality. Multilevel degenerative changes in the visualized spine. Diffusely heterogeneous marrow suggests osteopenia. CTA HEAD: ANTERIOR CIRCULATION: No significant stenosis of the intracranial internal carotid, anterior cerebral, or middle cerebral arteries. No aneurysm. POSTERIOR CIRCULATION: No significant stenosis of the vertebral, basilar, or posterior cerebral arteries. No aneurysm. OTHER: No dural venous sinus thrombosis on this non-dedicated study. 1. No focal hemodynamically significant stenosis or occlusion in the large arteries of the head and neck. 2. Trace right subdural isodense fluid collection may represent a small subdural hematoma of indeterminate age, possibly subacute to chronic. Acute bleed is difficult to entirely exclude. The findings were sent to the Radiology Results [id number] at 5:30 pm on 7/20/2022 to be communicated to a licensed caregiver.      VL DUP CAROTID BILATERAL    Result Date: 6/24/2022  Carotid Duplex Study Demographics   Patient Name       OLGA REYES      Date of study       06/24/2022   Date of Birth      1940         Gender              Male   Age                [de-identified]                 Race                   Patient Number     [id number]           Room Number   Visit Number       [id number]          Height   Corporate ID       [id number]           Weight   Accession Number   [id number]   Ordering Physician Marzena Matute MD                                     RDCS, RVT                                         Interpreting        Yosvany Lawson MD  Procedure Type of Study:   Cerebral:Carotid, VL CAROTID BILATERAL. Indications for Study:Unsteadiness. Conclusions   Summary   Mild (0-49%) disease of the Bilateral proximal Internal carotid artery. Normal vertebral flow. Signature   ------------------------------------------------------------------  Electronically signed by Yosvany Pete MD (Interpreting  physician) on 06/24/2022 at 04:34 PM  ------------------------------------------------------------------  Impressions Right Impression The Right Proximal internal carotid artery exhibits 0-49% stenosis. The Right Proximal internal carotid artery exhibits minimal heterogeneous plaque. Right post bulb turbulence noted in the proximal ICA. Incidental finding of right neck mass consistent with a lymph node, measuring 1.62 x 0.50 x 1.47 cm. Normal vertebral flow. Left Impression The Left Proximal internal carotid artery exhibits 0-49% stenosis. The Left Proximal internal carotid artery exhibits mild calcific plaque . Left post bulb turbulence noted in the proximal ICA. Normal vertebral flow. Study Location:[facility]. Technical Quality:Adequate visualization.  Velocities are measured in cm/s ; Diameters are measured in cm Carotid Right Measurements +---------+----+----+-----+---------+----------+ ! Location ! PSV ! EDV ! Angle!%Stenosis! Tortuosity! +---------+----+----+-----+---------+----------+ ! Prox CCA !106 !10. 2! 60   !         !          ! +---------+----+----+-----+---------+----------+ ! Mid CCA  !84. 9!14. 1! 60   !         !          ! +---------+----+----+-----+---------+----------+ ! Prox ICA !69.2!12. 3!60   !0% to 49%!          ! +---------+----+----+-----+---------+----------+ ! Mid ICA  ! 56  !14. 7! 60   !         !          ! +---------+----+----+-----+---------+----------+ ! Dist ICA !57. 9!17. 2! 60   !         !          ! +---------+----+----+-----+---------+----------+ ! Prox ECA !99.2!    !60   !         !          ! +---------+----+----+-----+---------+----------+ ! Vertebral!27.3!    !60   !         !          ! +---------+----+----+-----+---------+----------+   - There is antegrade vertebral flow noted on the right side. - Additional Measurements:ICAPSV/CCAPSV 0.82. ICAEDV/CCAEDV 1.69. Carotid Left Measurements +---------+----+----+-----+---------+----------+ ! Location ! PSV ! EDV ! Angle!%Stenosis! Tortuosity! +---------+----+----+-----+---------+----------+ ! Prox CCA !78. 1!13. 8! 60   !         !          ! +---------+----+----+-----+---------+----------+ ! Mid CCA  !83  !17. 2! 60   !         !          ! +---------+----+----+-----+---------+----------+ ! Prox ICA !71.7!16. 7!60   !0% to 49%!          ! +---------+----+----+-----+---------+----------+ ! Mid ICA  !64.3!22. 6!60   !         !          ! +---------+----+----+-----+---------+----------+ ! Dist ICA !68.8!24. 6!60   !         !          ! +---------+----+----+-----+---------+----------+ ! Prox ECA !112 !    !61   !         !          ! +---------+----+----+-----+---------+----------+ ! Vertebral!30.6!    !60   !         !          ! +---------+----+----+-----+---------+----------+   - There is antegrade vertebral flow noted on the left side. - Additional Measurements:ICAPSV/CCAPSV 0.86. ICAEDV/CCAEDV 1.78.    CTA NECK W CONTRAST    Result Date: 7/20/2022  EXAMINATION: CTA OF THE NECK; CTA OF THE HEAD WITHOUT AND WITH CONTRAST 7/20/2022 12:17 pm: TECHNIQUE: CTA of the neck was performed with the administration of intravenous contrast. Multiplanar reformatted images are provided for review. MIP images are provided for review. Stenosis of the internal carotid arteries measured using NASCET criteria. Automated exposure control, iterative reconstruction, and/or weight based adjustment of the mA/kV was utilized to reduce the radiation dose to as low as reasonably achievable.; CTA of the head/brain was performed without and with the administration of intravenous contrast. Multiplanar reformatted images are provided for review. MIP images are provided for review. Automated exposure control, iterative reconstruction, and/or weight based adjustment of the mA/kV was utilized to reduce the radiation dose to as low as reasonably achievable. Noncontrast CT of the head with reconstructed 2-D images are also provided for review. COMPARISON: None.  HISTORY: ORDERING SYSTEM PROVIDED HISTORY: PVC (premature ventricular contraction) TECHNOLOGIST PROVIDED HISTORY: STAT Creatinine as needed:->Yes Reason for Exam: CVA, PVC (premature ventricular contraction), Balance disorder, Loss of balance Additional signs and symptoms: patient states he is having a lot of balance issues Relevant Medical/Surgical History: patient states he is having a lot of balance issues; ORDERING SYSTEM PROVIDED HISTORY: PVC (premature ventricular contraction) TECHNOLOGIST PROVIDED HISTORY: STAT Creatinine as needed:->Yes Reason for exam:->CVA Reason for Exam: CVA, PVC (premature ventricular contraction), Balance disorder, Loss of balance Additional signs and symptoms: patient states he is having a lot of balance issues Relevant Medical/Surgical History: patient states he is having a lot of balance issues FINDINGS: CT HEAD: BRAIN/VENTRICLES:  Trace right subdural isodense fluid collection measures up to 2 mm in thickness in the right frontal convexity. Grey-white differentiation is maintained. No evidence of mass, mass effect or midline shift. No evidence of hydrocephalus. ORBITS: The visualized portion of the orbits demonstrate no acute abnormality. SINUSES:  The visualized paranasal sinuses and mastoid air cells demonstrate no acute abnormality. SOFT TISSUES/SKULL: Right parietooccipital scalp soft tissue swelling. No acute displaced skull fracture. CTA NECK: AORTIC ARCH/ARCH VESSELS: No dissection or arterial injury. No significant stenosis of the brachiocephalic or subclavian arteries. Atherosclerosis in the visualized thoracic aorta. CAROTID ARTERIES: No dissection, arterial injury, or hemodynamically significant stenosis by NASCET criteria. Bilateral carotid bulb atherosclerotic plaque VERTEBRAL ARTERIES: No dissection, arterial injury, or significant stenosis. SOFT TISSUES: The visualized lung apices demonstrate respiratory motion and subsegmental atelectasis/scarring. No cervical or superior mediastinal lymphadenopathy. The larynx and pharynx are unremarkable. No acute abnormality of the salivary and thyroid glands. Coronary artery calcification. BONES: No acute osseous abnormality. Multilevel degenerative changes in the visualized spine. Diffusely heterogeneous marrow suggests osteopenia. CTA HEAD: ANTERIOR CIRCULATION: No significant stenosis of the intracranial internal carotid, anterior cerebral, or middle cerebral arteries. No aneurysm. POSTERIOR CIRCULATION: No significant stenosis of the vertebral, basilar, or posterior cerebral arteries. No aneurysm. OTHER: No dural venous sinus thrombosis on this non-dedicated study. 1. No focal hemodynamically significant stenosis or occlusion in the large arteries of the head and neck.  2. Trace right subdural isodense fluid collection may represent a small subdural hematoma of indeterminate age, possibly subacute to chronic. Acute bleed is difficult to entirely exclude. The findings were sent to the Radiology Results [id number] at 5:30 pm on 7/20/2022 to be communicated to a licensed caregiver. MRI BRAIN WO CONTRAST    Result Date: 7/20/2022  EXAMINATION: MRI OF THE BRAIN WITHOUT CONTRAST  7/20/2022 11:44 am TECHNIQUE: Multiplanar multisequence MRI of the brain was performed without the administration of intravenous contrast. COMPARISON: CT angiogram head and neck done same day. HISTORY: ORDERING SYSTEM PROVIDED HISTORY: Balance disorder TECHNOLOGIST PROVIDED HISTORY: Reason for exam:->CVA? Reason for Exam: fall FINDINGS: INTRACRANIAL STRUCTURES/VENTRICLES: There is no acute infarct. No mass effect or midline shift. Trace right subdural hematoma measures 2 mm in thickness. Generalized cerebral and cerebellar volume loss. Diffuse ventricular prominence may relate to central volume loss. The sellar/suprasellar regions appear unremarkable. The normal signal voids within the major intracranial vessels appear maintained. The axial FLAIR images demonstrate mild bilateral periventricular and deep white matter signal hyperintensities which are nonspecific but commonly seen in the setting of chronic small vessel ischemic disease. ORBITS: The visualized portion of the orbits demonstrate no acute abnormality. SINUSES: The visualized paranasal sinuses and mastoid air cells demonstrate no acute abnormality. BONES/SOFT TISSUES: Right parieto-occipital scalp soft tissue swelling. Incompletely characterized degenerative changes in the visualized spine. 1.  Trace right subdural hematoma measures 2 mm in thickness and is age indeterminate. This could represent subacute to chronic blood. Acute bleed is difficult to entirely exclude. No significant mass effect or midline shift. 2.  No acute stroke. 3.  Mild chronic small vessel ischemic disease.  The findings were sent to the [facility]iology [facility] at 5:34 pm on 7/20/2022 to be communicated to a licensed caregiver. EKG (if obtained): (All EKG's are interpreted by myself in the absence of a cardiologist)    12 lead EKG per my interpretation:  Normal Sinus Rhythm 75  Axis is   Left axis deviation  QTc is   444  There is no specific T wave changes appreciated. There is no specific ST wave changes appreciated. Prior EKG to compare with available     Total critical care time today provided was at least 15 minutes. This excludes seperately billable procedure. Critical care time provided for reviewing labs, images consulting neurosurgery consulting medicine giving nicardipine that required close evaluation and/or intervention with concern for patient decompensation. MDM:    Patient here with concern for intracranial bleed. Again patient states he has been dizzy off balance for a while he has been following up with cardiology etc.  He states he fell in the garden on Monday and hit his head. Did not pass out no blood thinners. Patient states he saw his cardiologist today he had outpatient head CT due to dizziness lightheadedness and he was called later this evening and told that the radiologist read a possible head bleed. Patient presented today after his cardiologist told him to come. Otherwise he would not be here. On arrival he appears remarkably well vital signs are stable he is mildly hypertensive. He has no current headache chest pain shortness of breath no weakness observed tingling that is new. He has no musculoskeletal pain that is new. Otherwise he appears well I did order nicardipine for high blood pressure I reviewed his head CT which did show possible subdural hematoma I did talk to my neurosurgery and patient will be admitted here neurosurgery will follow recommended head CT repeat in 6 hours.   Hopeful discharge home but will need admission overnight I will consult hospital medicine will admit, pending lab work otherwise patient stable. CLINICAL IMPRESSION:  Final diagnoses:   Abnormal CT scan   Intracranial bleed (HCC)       (Please note that portions of this note may have been completed with a voice recognition program. Efforts were made to edit the dictations but occasionally words aremis-transcribed.)    DISPOSITION REFERRAL (if applicable):  No follow-up provider specified.     DISPOSITION MEDICATIONS (if applicable):  New Prescriptions    No medications on file          Kirt Del Valle, [address] [facility], DO  07/20/22 2541

## 2022-07-21 NOTE — PROGRESS NOTES
safety/fall prevention w/ seating options and encouraged to sit on couch, bed, or recliner instead of desk chair. Vision: Macular degeneration  Hearing: WFL  Vitals: Stable vitals throughout session on room air      Body Systems and functions:  ROM: WFL   Strength: B UE grossly 5/5 across all major joints   Sensation: Reports numbness in all fingertips bilat  Tone: Normal  Coordination: WFL  Perception: WNL      Cognitive and Psychosocial Functioning:  Overall cognitive status: alert and oriented, WFL  Affect: Normal       Functional Mobility:  Bed mobility:  NT  Sitting balance:  SBA static and dynamic sitting unsupported    Transfers: STS from rolling desk chair w/ CGA, STS from low couch w/ CGA. Stand to sit to all surfaces w/ CGA  Standing balance:  SBA static, CGA dynamic w/ SPC  Functional Mobility: Ambulated long functional distance w/o AD w/ CGA   Toilet/Shower Transfers: NT - pt declined        Activities of Daily Living (ADLs):  Feeding: set up A while seated  Grooming: CGA while standing at the sink  UB bathing: SBA  LB bathing: CGA  UB dressing: SBA  LB dressing: CGA  Toileting: SBA hygiene and clothing mgmt     *ADL determined per observation of functional mobility, balance, activity tolerance, cognition, or actual ADL performance. AM-PAC 6 click short form for inpatient daily activity:   How much help from another person does the patient currently need. .. Unable  Dep A Lot  Max A A Lot   Mod A A Little  Min A A Little   CGA  SBA None   Mod I  Indep  Sup   1. Putting on and taking off regular lower body clothing? [] 1    [] 2   [] 2   [] 3   [x] 3   [] 4      2. Bathing (including washing, rinsing, drying)? [] 1   [] 2   [] 2 [] 3 [x] 3 [] 4   3. Toileting, which includes using toilet, bedpan, or urinal? [] 1    [] 2   [] 2   [] 3   [x] 3   [] 4     4. Putting on and taking off regular upper body clothing? [] 1   [] 2   [] 2   [] 3   [x] 3    [] 4      5.  Taking care of personal grooming such as

## 2022-07-21 NOTE — ED NOTES
Spoke with Dr Alok Ambriz. Reports wanting pressure around 140. Current pressure 141/83. Reports okay to hold cardene drip at this time.       Chad Pal RN  07/20/22 [id number]

## 2022-07-21 NOTE — ED NOTES
Attempted to call report. This nurse to call back in 5-10 minutes.      Chad Simmons RN  07/20/22 [id number]

## 2022-07-22 ENCOUNTER — TELEPHONE (OUTPATIENT)
Dept: NEUROSURGERY | Age: 82
End: 2022-07-22

## 2022-07-22 DIAGNOSIS — I62.00 SUBDURAL HEMORRHAGE (HCC): Primary | ICD-10-CM

## 2022-07-22 NOTE — TELEPHONE ENCOUNTER
Spoke to patient. Appointment scheduled on 8/10/22 @ 1 pm with Adriana Brown PA-C. Location of appointment given to patient. Patient also aware that CT Head is scheduled on 8/8/22 @ 1130 am, arrival time 11 am at the [address] [place] [address] on [address]. Patient agreeable and verbalized understanding. Nothing further needed at this time.

## 2022-07-22 NOTE — TELEPHONE ENCOUNTER
----- Message from Lynda Tobar PA-C sent at 7/21/2022  5:15 PM EDT -----  Follow-up in 2 weeks with repeat head CT for right subdural

## 2022-07-26 ENCOUNTER — HOSPITAL ENCOUNTER (OUTPATIENT)
Dept: SLEEP CENTER | Age: 82
Discharge: HOME | End: 2022-07-26
Payer: MEDICARE

## 2022-07-26 DIAGNOSIS — G47.33 OSA (OBSTRUCTIVE SLEEP APNEA): ICD-10-CM

## 2022-07-26 PROCEDURE — 95810 POLYSOM 6/> YRS 4/> PARAM: CPT

## 2022-07-27 LAB — STATUS: NORMAL

## 2022-07-27 PROCEDURE — 95810 POLYSOM 6/> YRS 4/> PARAM: CPT | Performed by: INTERNAL MEDICINE

## 2022-07-27 NOTE — PROGRESS NOTES
7/27/2022  sleep study  for Rohit Hughes  1940 is complete. Results are pending physician review.     Electronically signed by Chinedu Capone RCP on 7/27/2022 at 7:16 AM

## 2022-08-03 ENCOUNTER — HOSPITAL ENCOUNTER (OUTPATIENT)
Dept: SLEEP CENTER | Age: 82
Discharge: HOME | End: 2022-08-03
Payer: MEDICARE

## 2022-08-03 DIAGNOSIS — Z87.891 EX-SMOKER: ICD-10-CM

## 2022-08-03 DIAGNOSIS — G47.10 HYPERSOMNIA: ICD-10-CM

## 2022-08-03 DIAGNOSIS — E66.3 OVERWEIGHT (BMI 25.0-29.9): ICD-10-CM

## 2022-08-03 DIAGNOSIS — G47.33 OSA (OBSTRUCTIVE SLEEP APNEA): ICD-10-CM

## 2022-08-03 PROCEDURE — 9990000010 HC NO CHARGE VISIT

## 2022-08-03 PROCEDURE — 99214 OFFICE O/P EST MOD 30 MIN: CPT | Performed by: INTERNAL MEDICINE

## 2022-08-03 ASSESSMENT — ENCOUNTER SYMPTOMS
EYE DISCHARGE: 0
COUGH: 0
BACK PAIN: 0
ABDOMINAL DISTENTION: 0
EYE ITCHING: 0
ABDOMINAL PAIN: 0
SHORTNESS OF BREATH: 0

## 2022-08-03 NOTE — PROGRESS NOTES
Ethel Mccarthy  1940  Referring Provider: Yolanda Marie MD    Subjective:   No chief complaint on file. RAPHAEL Garvin is a [de-identified] y.o. male has come back as a  follow up. He had a PSG done on 07/26/22 which showed that he has mild JANA with an AHI 12.1 and desat to 78%. He has no loss of weight. He is still sleepy during the day time. Current Outpatient Medications   Medication Sig Dispense Refill    venlafaxine (EFFEXOR XR) 150 MG extended release capsule Take one Capsule every morning 90 capsule 1    metoprolol tartrate (LOPRESSOR) 25 MG tablet Take 1 tablet by mouth 2 times daily 90 tablet 3    allopurinol (ZYLOPRIM) 300 MG tablet TAKE 1 TABLET DAILY 90 tablet 1    finasteride (PROSCAR) 5 MG tablet Take 1 tablet daily 90 tablet 1    gemfibrozil (LOPID) 600 MG tablet TAKE 1 TABLET TWICE DAILY  BEFORE MEALS 180 tablet 1    nitroGLYCERIN (NITROSTAT) 0.4 MG SL tablet Place 1 tablet under the tongue every 5 minutes as needed for Chest pain 25 tablet 0    Cyanocobalamin (VITAMIN B 12) 500 MCG TABS Take 1 tablet by mouth daily 30 tablet 3    Multiple Vitamins-Minerals (VITABASIC SENIOR PO) Take by mouth      Multiple Vitamins-Minerals (OCUVITE EXTRA) TABS Take 1 tablet by mouth daily      isosorbide mononitrate (IMDUR) 30 MG extended release tablet Take 1 tablet by mouth daily (Patient not taking: Reported on 8/3/2022) 90 tablet 1    UNABLE TO FIND Indications: pharma fluorometholone-eye drops to prevent corneal implant rejections       escitalopram (LEXAPRO) 10 MG tablet TAKE 1 TABLET DAILY (Patient not taking: Reported on 8/3/2022) 90 tablet 1     No current facility-administered medications for this encounter. No Known Allergies    Past Medical History:   Diagnosis Date    Decreased hearing 5/4/2019    Gout 5/4/2019    H/O cardiovascular stress test 06/24/2019    EF 41%,  Mild ischemia of lateral wall involving small to medium size of left ventricle.     H/O echocardiogram 8/27/19    EF 50, Mod AR, Mild MR & TR    H/O percutaneous transluminal coronary angioplasty 2020    PCI to OM & Mid LAD,  PDA has 80-90 % tandem lesions but small vessel. Hyperlipidemia 2019    Nocturia 2019    Osteoarthritis 2019    Restless leg 2019       Past Surgical History:   Procedure Laterality Date    BLEPHAROPLASTY  2010    CHOLECYSTECTOMY  2001    COLECTOMY  2000    COLONOSCOPY N/A 2019    COLONOSCOPY DIAGNOSTIC performed by Kamilah Layne MD at [address] Left 2011    EYE SURGERY Right 2011    JOINT REPLACEMENT      KNEE ARTHROSCOPY  2002    SMALL INTESTINE SURGERY N/A 2019    LAPAROTOMY EXPLORATORY RIGHT ILEOCOLECTOMY performed by Michael Leslie MD at [address] N/A 2019    EGD DIAGNOSTIC ONLY performed by Kamilah Layne MD at [address] [facility] ENDOSCOPY       Social History     Socioeconomic History    Marital status:    Tobacco Use    Smoking status: Former     Packs/day: 2.00     Years: 25.00     Pack years: 50.00     Types: Cigarettes     Start date: 1960     Quit date:      Years since quittin.6    Smokeless tobacco: Never   Substance and Sexual Activity    Alcohol use: Yes     Alcohol/week: 1.0 standard drink     Types: 1 Cans of beer per week     Comment: rarely, 1-2 cups of coffee      Social Determinants of Health     Financial Resource Strain: Low Risk     Difficulty of Paying Living Expenses: Not hard at all   Food Insecurity: No Food Insecurity    Worried About Running Out of Food in the Last Year: Never true    Ran Out of Food in the Last Year: Never true   Physical Activity: Inactive    Days of Exercise per Week: 0 days    Minutes of Exercise per Session: 0 min       Review of Systems   Constitutional:  Positive for fatigue. HENT:  Negative for congestion and nosebleeds. Eyes:  Negative for discharge and itching. Respiratory:  Negative for cough and shortness of breath.     Cardiovascular:  Negative for chest pain and leg swelling. Gastrointestinal:  Negative for abdominal distention and abdominal pain. Endocrine: Negative for cold intolerance and heat intolerance. Genitourinary:  Negative for enuresis and frequency. Musculoskeletal:  Negative for arthralgias and back pain. Allergic/Immunologic: Negative for environmental allergies and food allergies. Neurological:  Negative for light-headedness and headaches. Hematological:  Negative for adenopathy. Psychiatric/Behavioral:  Negative for agitation and behavioral problems. Objective: There were no vitals taken for this visit. There is no height or weight on file to calculate BMI. Sleep Medicine 6/30/2022   Sitting and reading 2   Watching TV 2   Sitting, inactive in a public place (e.g. a theatre or a meeting) 2   As a passenger in a car for an hour without a break 2   Lying down to rest in the afternoon when circumstances permit 1   Sitting and talking to someone 0   Sitting quietly after a lunch without alcohol 3   In a car, while stopped for a few minutes in traffic 0   Total score 12   Neck circumference (Inches) 17.25     Mallampati 3    Physical Exam  Vitals reviewed. Constitutional:       Appearance: Normal appearance. HENT:      Head: Normocephalic and atraumatic. Nose: Nose normal.      Mouth/Throat:      Mouth: Mucous membranes are moist.   Eyes:      Extraocular Movements: Extraocular movements intact. Pupils: Pupils are equal, round, and reactive to light. Cardiovascular:      Rate and Rhythm: Normal rate and regular rhythm. Pulses: Normal pulses. Heart sounds: Normal heart sounds. Pulmonary:      Effort: Pulmonary effort is normal.      Breath sounds: Normal breath sounds. Abdominal:      General: Abdomen is flat. Palpations: Abdomen is soft. Musculoskeletal:         General: Normal range of motion. Cervical back: Normal range of motion and neck supple. Skin:     General: Skin is warm and dry. Neurological:      General: No focal deficit present. Mental Status: He is alert and oriented to person, place, and time. Psychiatric:         Mood and Affect: Mood normal.         Behavior: Behavior normal.       Radiology: None    Assessment and Plan     Problem List          Respiratory    JANA (obstructive sleep apnea)     He has mild JANA with EDS  Advised to go for the CPAP titration study  Loose weight         Relevant Orders    Sleep Study with PAP Titration       Other    Hypersomnia      He has mild JANA with EDS  Advised to go for the CPAP titration study  Loose weight           Overweight (BMI 25.0-29. 9)      Advised to loose weight         Ex-smoker      Advised to c/w quitting smoking                 Follow-Up:    No follow-ups on file.      Progress notes sent to the referring Provider    Burke Lee MD MD  8/3/2022  1:34 PM

## 2022-08-05 NOTE — TELEPHONE ENCOUNTER
Spoke to patient regarding upcoming appointment with Ermelinda Obrien PA-C on 8/10/22 @ 1 pm.     This appointment has been confirmed.

## 2022-08-08 ENCOUNTER — HOSPITAL ENCOUNTER (OUTPATIENT)
Dept: CT IMAGING | Age: 82
Discharge: HOME | End: 2022-08-08
Payer: MEDICARE

## 2022-08-08 DIAGNOSIS — I62.00 SUBDURAL HEMORRHAGE (HCC): ICD-10-CM

## 2022-08-08 PROCEDURE — 70450 CT HEAD/BRAIN W/O DYE: CPT

## 2022-08-08 NOTE — PATIENT INSTRUCTIONS
Notify the neurosurgical office urgently if you begin to develop any numbness or tingling in extremities, weakness in extremities, or loss of bowel or bladder control. Ok to resume Plavix. Return to the ER if you begin to experience worsening headaches, nausea/vomiting, vision changes, or strokelike symptoms such as weakness and/or numbness/tingling in your arms or legs, difficulty with speech, facial droop.

## 2022-08-09 NOTE — PROGRESS NOTES
Patient presents to the office today for a 2 week follow up on right subdural hematoma - DOI 7/18/22. Pain scale 0/10 today. Denies any headaches, nausea/vomiting or vision changes. States he has macular degeneration and can't read small print. States he is still having the numbness in bilateral hands that is constant and still has unsteadiness and fatigue. Patient has been ambulating without any assistive device. He has no complaints at this time. No new injuries or falls since last being seen. CT Head Without Contrast 8/8/22  Impression   Interval resolution of the previously identified right tentorium subdural   hematoma. Cerebral atrophy and mild chronic small vessel ischemic disease.

## 2022-08-10 ENCOUNTER — OFFICE VISIT (OUTPATIENT)
Dept: NEUROSURGERY | Age: 82
End: 2022-08-10
Payer: MEDICARE

## 2022-08-10 VITALS
RESPIRATION RATE: 16 BRPM | HEART RATE: 61 BPM | SYSTOLIC BLOOD PRESSURE: 138 MMHG | OXYGEN SATURATION: 97 % | WEIGHT: 225.38 LBS | BODY MASS INDEX: 30.53 KG/M2 | DIASTOLIC BLOOD PRESSURE: 86 MMHG | HEIGHT: 72 IN

## 2022-08-10 DIAGNOSIS — I62.00 SUBDURAL HEMORRHAGE (HCC): Primary | ICD-10-CM

## 2022-08-10 PROCEDURE — G8427 DOCREV CUR MEDS BY ELIG CLIN: HCPCS | Performed by: PHYSICIAN ASSISTANT

## 2022-08-10 PROCEDURE — 99213 OFFICE O/P EST LOW 20 MIN: CPT | Performed by: PHYSICIAN ASSISTANT

## 2022-08-10 PROCEDURE — G8417 CALC BMI ABV UP PARAM F/U: HCPCS | Performed by: PHYSICIAN ASSISTANT

## 2022-08-10 PROCEDURE — 1123F ACP DISCUSS/DSCN MKR DOCD: CPT | Performed by: PHYSICIAN ASSISTANT

## 2022-08-10 PROCEDURE — 1036F TOBACCO NON-USER: CPT | Performed by: PHYSICIAN ASSISTANT

## 2022-08-10 RX ORDER — FLUOROMETHOLONE 0.1 %
SUSPENSION, DROPS(FINAL DOSAGE FORM)(ML) OPHTHALMIC (EYE)
COMMUNITY
Start: 2022-05-30 | End: 2022-09-30

## 2022-08-10 NOTE — PROGRESS NOTES
5/4/2019    Nocturia 5/4/2019    Osteoarthritis 5/4/2019    Restless leg 5/4/2019         Procedure Laterality Date    BLEPHAROPLASTY  2010    CHOLECYSTECTOMY  2001    COLECTOMY  2000    COLONOSCOPY N/A 7/4/2019    COLONOSCOPY DIAGNOSTIC performed by Mando Angeles MD at [address] Left 2011    EYE SURGERY Right 2011    JOINT REPLACEMENT      KNEE ARTHROSCOPY  2002    SMALL INTESTINE SURGERY N/A 7/8/2019    LAPAROTOMY EXPLORATORY RIGHT ILEOCOLECTOMY performed by Jessica Handley MD at [address] N/A 7/4/2019    EGD DIAGNOSTIC ONLY performed by Mando Angeles MD at [facility] ENDOSCOPY       Social History:    TOBACCO:   reports that he quit smoking about 37 years ago. His smoking use included cigarettes. He started smoking about 62 years ago. He has a 50.00 pack-year smoking history. He has never used smokeless tobacco.  ETOH:   reports current alcohol use of about 1.0 standard drink per week. Family History:       Problem Relation Age of Onset    Colon Cancer Mother     Heart Disease Father     Lung Disease Father     Seizures Sister     High Blood Pressure Brother     No Known Problems Brother        Current Medications:    No current facility-administered medications for this visit. No Known Allergies     REVIEW OF SYSTEMS:    CONSTITUTIONAL:  negative for fevers, chills, diaphoresis, activity change, appetite change, fatigue, night sweats and unexpected weight change.    EYES:  negative for blurred vision, eye discharge, visual disturbance and icterus  HEENT:  negative for hearing loss, tinnitus, ear drainage, sinus pressure, nasal congestion, epistaxis and snoring  GASTROINTESTINAL:  negative for nausea, vomiting, diarrhea, constipation, blood in stool and abdominal pain  GENITOURINARY:  negative for frequency, dysuria, urinary incontinence, decreased urine volume, and hematuria  HEMATOLOGIC/LYMPHATIC:  negative for easy bruising, bleeding and lymphadenopathy  MUSCULOSKELETAL:  negative for  pain, joint swelling, decreased range of motion and muscle weakness  NEUROLOGICAL:  negative for headaches, slurred speech, unilateral weakness  PSYCHIATRIC/BEHAVIORAL: negative for hallucinations, behavioral problems, confusion and agitation. Objective:   PHYSICAL EXAM:      VITALS:  /86 (Site: Left Upper Arm, Position: Sitting, Cuff Size: Large Adult)   Pulse 61   Resp 16   Ht 6' (1.829 m)   Wt 225 lb 6 oz (102.2 kg)   SpO2 97%   BMI 30.57 kg/m²      24HR INTAKE/OUTPUT:  No intake or output data in the 24 hours ending 08/10/22 1420  CONSTITUTIONAL:  Awake, alert, cooperative, no apparent distress, and appears stated age  HEENT: NCAT, PERRL, EOMI, tongue protrudes midliney  PSYCHIATRIC: Oriented to person place and time. No obvious depression or anxiety. MUSCULOSKELETAL: No obvious misalignment or effusion of the joints. No clubbing, cyanosis of the digits. SKIN:  normal skin color, texture, turgor and no redness, warmth, or swelling. NEUROLOGIC: Alert and oriented x4, face symmetrical, no obvious droop, speech clear and coherent, no pronator drift, sensation decreased to light touch and pinprick sensation lateral hands and bottoms of feet, steady heel-to-toe walk  Upper extremity strength: Bilateral upper extremities 5/5 throughout, no Yarelis's bilaterally, no clonus bilaterally  Lower extremity strength: Bilateral lower extremities 5/5 throughout, no clonus bilateral    DATA:    Old records have been reviewed      Radiology Review:  All pertinent images / reports were reviewed as a part of this visit. CT head 8/8/22  Impression   Interval resolution of the previously identified right tentorium subdural   hematoma. Cerebral atrophy and mild chronic small vessel ischemic disease. MRI cervical spine 7/21/22  Impression   No cord abnormality visualized. 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 Assessment and plan:       1. Subdural hemorrhage (HCC)    -Reviewed CT of the head and cervical MRI with the patient today. CT head shows resolution of his known right subdural hematoma. I did tell him that it would be okay to restart his Plavix. Strict return precautions were placed in his AVS.   -Concerning his cervical MRI, he does have at minimum moderate stenosis at multiple levels of his cervical spine. His most severe narrowing is at C3-4. This could be the cause of his worsening gait disturbances and numbness and tingling in his bilateral hands. He has no hyperreflexia on my exam currently, does report dropping things frequently with his bilateral hands. He is interested in following up with Dr. Shahram Pryor to discuss surgery. Next steps: No need for further follow-up with me. Will discuss images with Dr. Shahram Pryor. Electronically signed by Norma Herrera PA-C on 8/10/2022 at 2:20 PM      Supervising physician: Shima Goodrich. Shahram Pryor MD.  Dr. Shahram Pryor was readily and continuously available by phone for direct consultation regarding the care of this patient. Time spent with patient in consultation, education, and collaboration with medical time is >50% of total time spent on case, including time spent in chart review and dictation. Total time spent: 20 minutes    Thank you for the opportunity to participate in the care of your patient.

## 2022-08-11 DIAGNOSIS — F33.9 EPISODE OF RECURRENT MAJOR DEPRESSIVE DISORDER, UNSPECIFIED DEPRESSION EPISODE SEVERITY (HCC): ICD-10-CM

## 2022-08-11 RX ORDER — VENLAFAXINE HYDROCHLORIDE 150 MG/1
CAPSULE, EXTENDED RELEASE ORAL
Qty: 90 CAPSULE | Refills: 1 | OUTPATIENT
Start: 2022-08-11

## 2022-08-12 ENCOUNTER — HOSPITAL ENCOUNTER (OUTPATIENT)
Dept: SLEEP CENTER | Age: 82
Discharge: HOME | End: 2022-08-12
Payer: MEDICARE

## 2022-08-12 ENCOUNTER — TELEPHONE (OUTPATIENT)
Dept: FAMILY MEDICINE CLINIC | Age: 82
End: 2022-08-12

## 2022-08-12 DIAGNOSIS — G47.33 OSA (OBSTRUCTIVE SLEEP APNEA): ICD-10-CM

## 2022-08-12 PROCEDURE — 95811 POLYSOM 6/>YRS CPAP 4/> PARM: CPT

## 2022-08-12 NOTE — TELEPHONE ENCOUNTER
Saw neurology r/t recent issues and they suggest he stop the lexapro since he is also taking the effexor xr. Wanted to let you know and get the ok.

## 2022-08-15 DIAGNOSIS — M48.02 CERVICAL STENOSIS OF SPINAL CANAL: Primary | ICD-10-CM

## 2022-08-17 LAB — STATUS: NORMAL

## 2022-08-17 PROCEDURE — 95811 POLYSOM 6/>YRS CPAP 4/> PARM: CPT | Performed by: INTERNAL MEDICINE

## 2022-09-02 ENCOUNTER — OFFICE VISIT (OUTPATIENT)
Dept: NEUROLOGY | Age: 82
End: 2022-09-02
Payer: MEDICARE

## 2022-09-02 ENCOUNTER — HOSPITAL ENCOUNTER (OUTPATIENT)
Age: 82
Discharge: HOME | End: 2022-09-02
Payer: MEDICARE

## 2022-09-02 VITALS
WEIGHT: 219.6 LBS | SYSTOLIC BLOOD PRESSURE: 110 MMHG | DIASTOLIC BLOOD PRESSURE: 60 MMHG | HEART RATE: 63 BPM | OXYGEN SATURATION: 96 % | HEIGHT: 72 IN | BODY MASS INDEX: 29.74 KG/M2

## 2022-09-02 DIAGNOSIS — G60.9 IDIOPATHIC PERIPHERAL NEUROPATHY: Primary | ICD-10-CM

## 2022-09-02 DIAGNOSIS — Z90.49 HISTORY OF COLON RESECTION: ICD-10-CM

## 2022-09-02 DIAGNOSIS — G60.9 IDIOPATHIC PERIPHERAL NEUROPATHY: ICD-10-CM

## 2022-09-02 DIAGNOSIS — R27.8 SENSORY ATAXIA: ICD-10-CM

## 2022-09-02 LAB
FOLATE: >20 NG/ML (ref 3.1–17.5)
VITAMIN B-12: 527.1 PG/ML (ref 211–911)
VITAMIN D 25-HYDROXY: 41.22 NG/ML

## 2022-09-02 PROCEDURE — 84590 ASSAY OF VITAMIN A: CPT

## 2022-09-02 PROCEDURE — 82746 ASSAY OF FOLIC ACID SERUM: CPT

## 2022-09-02 PROCEDURE — 1036F TOBACCO NON-USER: CPT | Performed by: PSYCHIATRY & NEUROLOGY

## 2022-09-02 PROCEDURE — 84446 ASSAY OF VITAMIN E: CPT

## 2022-09-02 PROCEDURE — 84591 ASSAY OF NOS VITAMIN: CPT

## 2022-09-02 PROCEDURE — G8427 DOCREV CUR MEDS BY ELIG CLIN: HCPCS | Performed by: PSYCHIATRY & NEUROLOGY

## 2022-09-02 PROCEDURE — 84165 PROTEIN E-PHORESIS SERUM: CPT

## 2022-09-02 PROCEDURE — 84630 ASSAY OF ZINC: CPT

## 2022-09-02 PROCEDURE — 36415 COLL VENOUS BLD VENIPUNCTURE: CPT

## 2022-09-02 PROCEDURE — 84155 ASSAY OF PROTEIN SERUM: CPT

## 2022-09-02 PROCEDURE — 99205 OFFICE O/P NEW HI 60 MIN: CPT | Performed by: PSYCHIATRY & NEUROLOGY

## 2022-09-02 PROCEDURE — 84425 ASSAY OF VITAMIN B-1: CPT

## 2022-09-02 PROCEDURE — 82525 ASSAY OF COPPER: CPT

## 2022-09-02 PROCEDURE — G8417 CALC BMI ABV UP PARAM F/U: HCPCS | Performed by: PSYCHIATRY & NEUROLOGY

## 2022-09-02 PROCEDURE — 84207 ASSAY OF VITAMIN B-6: CPT

## 2022-09-02 PROCEDURE — 1123F ACP DISCUSS/DSCN MKR DOCD: CPT | Performed by: PSYCHIATRY & NEUROLOGY

## 2022-09-02 PROCEDURE — 82306 VITAMIN D 25 HYDROXY: CPT

## 2022-09-02 PROCEDURE — 82607 VITAMIN B-12: CPT

## 2022-09-02 NOTE — PROGRESS NOTES
Michelle Lehman thought that the tablet 9/2/22    Florin Sow  1940    Chief Complaint   Patient presents with    Gait Problem     Pt presents for balance disorder, pt states he has fallen a few times, pt states things seem to be harder with age       History of Present Illness  Lolita Navarro is a [de-identified] y.o. male presenting today for evaluation of:  Balance issues    He states that he is has been having balance issues for 2 to 3 years now but it has steadily been getting worse. He notices it now essentially all the time when he is walking around. He has had some falls. Notable falls including one in May where he was at a gas station and he came out in the sun was in his eyes and he tripped on a curb and hurt his right shoulder. Another was in July where he is pulling a root out of his yard and when the root broke he fell back into a brick wall and hit his head. He ended up suffering a small subdural hematoma at that time. He has been following with neurosurgery. Repeat CT scans have shown resolution of the subdural hematoma. He does note some paresthesias on the bottom of his feet and his fingertips. He denies any low back pain. He denies any weakness in his legs. He denies being a daily drinker. He is not diabetic. He has never had cancer or chemotherapy. He has never had gastric bypass surgery but has had a significant portion of his colon removed in 2019 due to gastrointestinal bleeding.       Subjective    Review of Symptoms:  Neurologic   Symptoms: sensory disturbances, difficulty with gait or walking, no bowel symptoms, no vertigo, no confusion, no memory loss, no speech disorder, no visual loss, no double vision, no dizziness, no loss of hearing, no weakness, no headaches, no bladder symptoms, no seizures, no excessive fatigue, and no syncope    Current Outpatient Medications   Medication Sig Dispense Refill    metoprolol tartrate (LOPRESSOR) 25 MG tablet Take 1 tablet by mouth 2 times daily 90 tablet 3 fluorometholone (FML) 0.1 % ophthalmic suspension INSTILL 1 DROP IN EACH EYE ONE TIME A DAY      venlafaxine (EFFEXOR XR) 150 MG extended release capsule Take one Capsule every morning 90 capsule 1    allopurinol (ZYLOPRIM) 300 MG tablet TAKE 1 TABLET DAILY 90 tablet 1    finasteride (PROSCAR) 5 MG tablet Take 1 tablet daily 90 tablet 1    gemfibrozil (LOPID) 600 MG tablet TAKE 1 TABLET TWICE DAILY  BEFORE MEALS 180 tablet 1    nitroGLYCERIN (NITROSTAT) 0.4 MG SL tablet Place 1 tablet under the tongue every 5 minutes as needed for Chest pain 25 tablet 0    Cyanocobalamin (VITAMIN B 12) 500 MCG TABS Take 1 tablet by mouth daily 30 tablet 3    Multiple Vitamins-Minerals (OCUVITE EXTRA) TABS Take 1 tablet by mouth daily      isosorbide mononitrate (IMDUR) 30 MG extended release tablet Take 1 tablet by mouth daily (Patient not taking: No sig reported) 90 tablet 1    escitalopram (LEXAPRO) 10 MG tablet TAKE 1 TABLET DAILY (Patient not taking: Reported on 9/2/2022) 90 tablet 1    Multiple Vitamins-Minerals (VITABASIC SENIOR PO) Take by mouth (Patient not taking: Reported on 9/2/2022)       No current facility-administered medications for this visit. Past Medical History:   Diagnosis Date    Decreased hearing 5/4/2019    Gout 5/4/2019    H/O cardiovascular stress test 06/24/2019    EF 41%,  Mild ischemia of lateral wall involving small to medium size of left ventricle. H/O echocardiogram 8/27/19    EF 48, Mod AR, Mild MR & TR    H/O percutaneous transluminal coronary angioplasty 02/06/2020    PCI to OM & Mid LAD,  PDA has 80-90 % tandem lesions but small vessel.     Hyperlipidemia 5/4/2019    Nocturia 5/4/2019    Osteoarthritis 5/4/2019    Restless leg 5/4/2019       Past Surgical History:   Procedure Laterality Date    BLEPHAROPLASTY  2010    CHOLECYSTECTOMY  2001    COLECTOMY  2000    COLONOSCOPY N/A 7/4/2019    COLONOSCOPY DIAGNOSTIC performed by Sofia Bartlett MD at [address] Left 2011    EYE SURGERY Right 2011    JOINT REPLACEMENT      KNEE ARTHROSCOPY  2002    SMALL INTESTINE SURGERY N/A 2019    LAPAROTOMY EXPLORATORY RIGHT ILEOCOLECTOMY performed by Laura Mujica MD at [address] [place] [address] N/A 2019    EGD DIAGNOSTIC ONLY performed by Aby Liao MD at [facility] ENDOSCOPY        Social History     Socioeconomic History    Marital status:    Tobacco Use    Smoking status: Former     Packs/day: 2.00     Years: 25.00     Pack years: 50.00     Types: Cigarettes     Start date: 1960     Quit date:      Years since quittin.6    Smokeless tobacco: Never   Substance and Sexual Activity    Alcohol use:  Yes     Alcohol/week: 1.0 standard drink     Types: 1 Cans of beer per week     Comment: rarely, 1-2 cups of coffee      Social Determinants of Health     Financial Resource Strain: Low Risk     Difficulty of Paying Living Expenses: Not hard at all   Food Insecurity: No Food Insecurity    Worried About Running Out of Food in the Last Year: Never true    Ran Out of Food in the Last Year: Never true   Physical Activity: Inactive    Days of Exercise per Week: 0 days    Minutes of Exercise per Session: 0 min       Family History   Problem Relation Age of Onset    Colon Cancer Mother     Heart Disease Father     Lung Disease Father     Seizures Sister     High Blood Pressure Brother     No Known Problems Brother        Objective    Physical Exam:    Constitutional   Weight: well nourished  Heart/Vascular   Rate and Rhythm: RRR   Murmurs: none   Arterial Pulses:  no carotid bruits  Neck   Appearance/Palpation/Auscultation: supple  Mental Status   Orientation: oriented to person, oriented to place, oriented to problem, and oriented to time   Mood/Affect: appropriate mood and appropriate affect   Memory/Other: recent memory intact, remote memory intact, fund of knowledge intact, attention span normal, and concentration normal  Language   Language: (normal) language, no dysarthria, (normal) articulation, and no dysphasia/aphasia  Cranial Nerves   CN II Right: visual fields appear intact   CN II Left: visual fields appear intact   CN III, IV, VI: EOM no nystagmus, normal pursuit, and extraocular muscle strength normal   CN III: pupil normal size, pupil reactive to light and dark, pupil accomodates, and no ptosis   CN IV: normal   CN VI: normal   CN V Right: normal sensation and muscles of mastication intact   CN V Left: normal sensation and muscles of mastication intact   CN VII Right: normal facial expression   CN VII Left: normal facial expression   CN VIII Right: hearing in tact to normal conversation   CN VIII Left: hearing in tact to normal conversation   CN IX,X: normal palatal movement   CN XI Right: normal sternocleidomastoid and normal trapezius   CN XI Left: normal sternocleidomastoid and normal trapezius   CN XII: no tremors of the tongue, no fasciculation of the tongue, tongue protrudes midline, normal power to left, and normal power to right  Gait and Stance   Gait/Posture: ambulates independently, wide based stance, abnormal Romberg's test moderate, unsteady casual gate  moderate, and wide-based gait  Motor/Coordination Exam   General: no bradykinesia, no tremors, no chorea, no athetosis, no myoclonus, and no dyskinesia   Right Upper Extremity: normal motor strength, normal bulk, and normal tone   Left Upper Extremity: normal motor strength, normal bulk, and normal tone   Right Lower Extremity: normal motor strength, normal bulk, and normal tone   Left Lower Extremity: normal motor strength, normal bulk, and normal tone   Coordination: no drift, normal finger-to-nose, and rapid alternating movements normal  Reflexes   Reflexes Right: biceps 2/4, triceps 2/4, brachioradialis 2/4, and DTRs lower extremities non-reactive   Reflexes Left: biceps 2/4, triceps 2/4, brachioradialis 2/4, and DTRs lower extremities non-reactive   Plantar Reflex Right: response downgoing   Plantar Reflex Left: response downgoing   Hoffmans Reflex Right: absent   Hoffmans Reflex Left: absent  Sensory   Sensation: normal light touch, no neglect, decreased pinprick LLE stocking, decreased pinprick RLE stocking, and decreased vibration lowers severe  Spine   Cervical Spine: no tenderness, no dystonia , and full ROM   Thoracic Spine: no spasms, no bony abnormalities, normal curvature, no tenderness, and full ROM   Low Back: full ROM, no pain, no spasms, and no bony abnormalities  Lungs   Auscultation: normal breath sounds  Skin   Inspection: no jaundice, no lesions, no rashes, and no cyanosis      /60 (Site: Left Upper Arm, Position: Sitting, Cuff Size: Large Adult)   Pulse 63   Ht 6' (1.829 m)   Wt 219 lb 9.6 oz (99.6 kg)   SpO2 96%   BMI 29.78 kg/m²     Assessment and Plan     Diagnosis Orders   1. Idiopathic peripheral neuropathy  Vitamin B5    Vitamin B7    Vitamin B12 & Folate    Vitamin B1    Niacin (vitamin B3)    Vitamin B6    Vitamin A    Vitamin D 25 Hydroxy    Vitamin E    Copper, Serum    Zinc    Electrophoresis Protein, Serum    Nerve Conduction Test with EMG    Ambulatory referral to Physical Therapy      2. History of colon resection  Vitamin B5    Vitamin B7    Vitamin B12 & Folate    Vitamin B1    Niacin (vitamin B3)    Vitamin B6    Vitamin A    Vitamin D 25 Hydroxy    Vitamin E    Copper, Serum    Zinc    Electrophoresis Protein, Serum      3. Sensory ataxia  Ambulatory referral to Physical Therapy          Rohit has a peripheral neuropathy in his distal lower extremities. I suspect this is idiopathic in nature. He has had a significant colon resection surgery in 2019. There could be some degree of malabsorption present contributing to neuropathy. I will check a panel of vitamin and minerals to ensure he is not suffering from malabsorption syndrome which could be contributing to peripheral neuropathy. I will check an EMG to evaluate this in better detail.   He was also found to have cervical spinal stenosis. I do not see any Babinski responses or Yarelis reflexes but he does have some hyperreflexic patellar reflexes. He is following with neurosurgery in this regard. I will set him up with physical therapy to help with his gait and balance. Return in about 3 months (around 12/2/2022) for Follow-up PA/NP.     Yana Payne, DO

## 2022-09-05 LAB
RETINYL PALMITATE: 0.05 MG/L (ref 0–0.1)
VITAMIN A LEVEL: 0.75 MG/L (ref 0.3–1.2)
VITAMIN A, INTERP: NORMAL
VITAMIN E LEVEL: NORMAL MG/L (ref 5.5–18)

## 2022-09-06 LAB — VITAMIN B6: 101.3 NMOL/L (ref 20–125)

## 2022-09-07 LAB
COPPER: 125.7 UG/DL (ref 70–140)
ZINC: 105 UG/DL (ref 60–120)

## 2022-09-08 ENCOUNTER — HOSPITAL ENCOUNTER (OUTPATIENT)
Age: 82
Setting detail: SPECIMEN
Discharge: HOME | End: 2022-09-08
Payer: MEDICARE

## 2022-09-08 LAB
ALBUMIN ELP: 3.5 GM/DL (ref 3.2–5.6)
ALPHA-1-GLOBULIN: 0.2 GM/DL (ref 0.1–0.4)
ALPHA-2-GLOBULIN: 1 GM/DL (ref 0.4–1.2)
BETA GLOBULIN: 1.2 GM/DL (ref 0.5–1.3)
GAMMA GLOBULIN: 1 GM/DL (ref 0.5–1.6)
SPEP INTERPRETATION: NORMAL
TOTAL PROTEIN: 6.8 GM/DL (ref 6.4–8.2)

## 2022-09-08 PROCEDURE — 84425 ASSAY OF VITAMIN B-1: CPT

## 2022-09-13 LAB
NIACIN: 2.56 UG/ML (ref 0.5–8.45)
VITAMIN B1, PLASMA: 139 NMOL/L (ref 70–180)
VITAMIN B7: 1811.4 PG/ML (ref 221–3004)

## 2022-09-22 DIAGNOSIS — E78.5 HYPERLIPIDEMIA, UNSPECIFIED HYPERLIPIDEMIA TYPE: ICD-10-CM

## 2022-09-22 RX ORDER — GEMFIBROZIL 600 MG/1
TABLET, FILM COATED ORAL
Qty: 180 TABLET | Refills: 1 | Status: SHIPPED | OUTPATIENT
Start: 2022-09-22

## 2022-09-27 ENCOUNTER — HOSPITAL ENCOUNTER (OUTPATIENT)
Dept: PHYSICAL THERAPY | Age: 82
Setting detail: THERAPIES SERIES
Discharge: HOME | End: 2022-09-27
Payer: MEDICARE

## 2022-09-27 PROCEDURE — 97110 THERAPEUTIC EXERCISES: CPT

## 2022-09-27 PROCEDURE — 97161 PT EVAL LOW COMPLEX 20 MIN: CPT

## 2022-09-27 PROCEDURE — 97535 SELF CARE MNGMENT TRAINING: CPT

## 2022-09-27 NOTE — FLOWSHEET NOTE
See natividad         Any changes in Ambulatory Summary Sheet? None        Objective:  See natividad   COVID screening questions were asked and patient attested that there had been no contact or symptoms        Exercises: (No more than 4 columns)   Exercise/Equipment 9/27/22 #1 Date Date           WARM UP       Nu Step              TABLE      *TB DF                                 STANDING      *Staggered Stance STS      *Lateral Stepping Add YTB next     *Heel/Toe Raise                                   PROPRIOCEPTION      Airex      Marches      Gait w/ head turns                   MODALITIES                      Other Therapeutic Activities/Education:  Patient received education on their current pathology and how their condition effects them with their functional activities. Patient understood discussion and questions were answered. Patient understands their activity limitations and understands rational for treatment progression. Home Exercise Program:  HO issued, reviewed and discussed with patient. Pt agreed to comply. Manual Treatments:  --      Modalities:  --      Communication with other providers:  natividad sent 9/27/22      Assessment:  (Response towards treatment session) (Pain Rating)  Assessment: Pt is [de-identified]year old male with gradual worsening of numbness in bilateral feet and hands and balance. Patient is not diabetic and is currently undergoing blood work to investigate any involvement from nutrient deficiencies. Pt c/o increased numbness in his hands and feet, increased falls, change in his walking. Pt demo deficits this date that include fall risk as classified by the Garcia balance assessment, gait deviations related to poor motor control in RLE > LLE, impaired sensation, difficulty with proprioception. Pt will benefit with PT services with  to return to PLOF. Pt prior to onset of current condition had min/no pain with able to complete full ADLs and work activities.       Plan for Next Session: -- Time In / Time Out:    0815-0905      Timed Code/Total Treatment Minutes:  48'  (1) PT eval  (1) TE  (1) ADL      Next Progress Note due:        Plan of Care Interventions:  [x] Therapeutic Exercise  [] Modalities:  [x] Therapeutic Activity     [] Ultrasound  [] Estim  [x] Gait Training      [] Cervical Traction [] Lumbar Traction  [x] Neuromuscular Re-education    [] Cold/hotpack [] Iontophoresis   [x] Instruction in HEP      [] Vasopneumatic   [] Dry Needling    [] Manual Therapy               [] Aquatic Therapy              Electronically signed by:  Stephy Robbins PT, 9/27/2022, 11:09 AM

## 2022-09-27 NOTE — PLAN OF CARE
Outpatient Physical Therapy           [facility]           [] Phone: [phone]   Fax: [phone]  [facility]           [] Phone: [phone]   Fax: [phone]     To: Lydia Christine   From: Briseida Dunbar, PT, PT     Patient: Navneet Maria       : 1940  Diagnosis: Idiopathic peripheral neuropathy [G60.9]  Sensory ataxia [R27.8] Diagnosis: neuropathy  Treatment Diagnosis: Decreased balance and fall risk  Date: 2022    Physical Therapy Certification/Re-Certification Form  Dear Dr. Gunjan Christine,   The following patient has been evaluated for physical therapy services and for therapy to continue, insurance requires physician review of the treatment plan initially and every 90 days. Please review the attached evaluation and/or summary of the patient's plan of care, and verify that you agree therapy should continue by signing the attached document and sending it back to our office. Assessment:    Assessment: Pt is [de-identified]year old male with gradual worsening of numbness in bilateral feet and hands and balance. Patient is not diabetic and is currently undergoing blood work to investigate any involvement from nutrient deficiencies. Pt c/o increased numbness in his hands and feet, increased falls, change in his walking. Pt demo deficits this date that include fall risk as classified by the Garcia balance assessment, gait deviations related to poor motor control in RLE > LLE, impaired sensation, difficulty with proprioception. Pt will benefit with PT services with  to return to OF. Pt prior to onset of current condition had min/no pain with able to complete full ADLs and work activities.     Plan of Care/Treatment to date:  [x] Therapeutic Exercise  [] Modalities:  [x] Therapeutic Activity     [] Ultrasound  [] Electrical Stimulation  [x] Gait Training      [] Cervical Traction [] Lumbar Traction  [x] Neuromuscular Re-education    [] Cold/hotpack [] Iontophoresis   [x] Instruction in HEP      [] Vasopneumatic    [] Dry Needling  [] Manual Therapy               [] Aquatic Therapy       Other:          Frequency/Duration:  # Days per week: [] 1 day # Weeks: [] 1 week [x] 5 weeks     [x] 2 days   [] 2 weeks [] 6 weeks     [] 3 days   [] 3 weeks [] 7 weeks     [] 4 days   [] 4 weeks [] 8 weeks         [] 9 weeks [] 10 weeks         [] 11 weeks [] 12 weeks    Rehab Potential/Progress: [] Excellent [x] Good [] Fair  [] Poor     Goals:    Patient goals: improve numbness and balance  Short term goals  Time Frame for Short term goals: 5 weeks  Pt demo I w/ HEP and symptom management  Pt demo Garcia balance score >49/56 to improve fall risk  Pt demo >10 sit to stands in 30 seconds without UE assist  Pt demo >4/5 BLE strength in all directions to improve tolerance to standing activity  Pt demo DGI score >19/24 to improve fall risk      Electronically signed by:  Matilda Paulson PT, DPT, [facility] 9/27/2022, 11:08 AM        If you have any questions or concerns, please don't hesitate to call.   Thank you for your referral.      Physician Signature:________________________________Date:_________ TIME: _____  By signing above, therapists plan is approved by physician

## 2022-09-27 NOTE — PROGRESS NOTES
Physical Therapy: Initial Evaluation    Patient: Magaly Weller (89 y.o. male)   Examination Date:   Plan of Care Certification [id number] to        :  1940 ;    Confirmed: Yes MRN: [id number]  CSN: [id number]   Insurance: Payor: Alexa Garcia / Plan: [facility] [employer] / Product Type: *No Product type* /   Insurance ID: [id number] - (Medicare Managed) Secondary Insurance (if applicable):    Referring Physician: Ana Cristina Davis   PCP: Tee Fisher MD Visits to Date/Visits Approved:   /      No Show/Cancelled Appts:   /       Medical Diagnosis: Idiopathic peripheral neuropathy [G60.9]  Sensory ataxia [R27.8] neuropathy  Treatment Diagnosis: Decreased balance and fall risk     PERTINENT MEDICAL HISTORY   Patient Assessed for Rehabilitation Services: Yes  Self reported health status[de-identified] Good    Medical History: Chart Reviewed: Yes    Past Medical History:   Diagnosis Date    Decreased hearing 2019    Gout 2019    H/O cardiovascular stress test 2019    EF 41%,  Mild ischemia of lateral wall involving small to medium size of left ventricle. H/O echocardiogram 19    EF 48, Mod AR, Mild MR & TR    H/O percutaneous transluminal coronary angioplasty 2020    PCI to OM & Mid LAD,  PDA has 80-90 % tandem lesions but small vessel.     Hyperlipidemia 2019    Nocturia 2019    Osteoarthritis 2019    Restless leg 2019     Surgical History:   Past Surgical History:   Procedure Laterality Date    BLEPHAROPLASTY  2010    CHOLECYSTECTOMY      COLECTOMY      COLONOSCOPY N/A 2019    COLONOSCOPY DIAGNOSTIC performed by Ann Yadav MD at [address] Left 2011    EYE SURGERY Right 2011    JOINT REPLACEMENT      KNEE ARTHROSCOPY      SMALL INTESTINE SURGERY N/A 2019    LAPAROTOMY EXPLORATORY RIGHT ILEOCOLECTOMY performed by Shira Mcdonough MD at [address] 2019    EGD DIAGNOSTIC ONLY performed by Luanne Yap MD at [facility] ENDOSCOPY       Medications:   Current Outpatient Medications:     metoprolol tartrate (LOPRESSOR) 25 MG tablet, Take 1 tablet by mouth 2 times daily, Disp: 90 tablet, Rfl: 3    gemfibrozil (LOPID) 600 MG tablet, TAKE 1 TABLET TWICE DAILY  BEFORE MEALS, Disp: 180 tablet, Rfl: 1    fluorometholone (FML) 0.1 % ophthalmic suspension, INSTILL 1 DROP IN EACH EYE ONE TIME A DAY, Disp: , Rfl:     venlafaxine (EFFEXOR XR) 150 MG extended release capsule, Take one Capsule every morning, Disp: 90 capsule, Rfl: 1    isosorbide mononitrate (IMDUR) 30 MG extended release tablet, Take 1 tablet by mouth daily (Patient not taking: No sig reported), Disp: 90 tablet, Rfl: 1    allopurinol (ZYLOPRIM) 300 MG tablet, TAKE 1 TABLET DAILY, Disp: 90 tablet, Rfl: 1    finasteride (PROSCAR) 5 MG tablet, Take 1 tablet daily, Disp: 90 tablet, Rfl: 1    escitalopram (LEXAPRO) 10 MG tablet, TAKE 1 TABLET DAILY (Patient not taking: Reported on 9/2/2022), Disp: 90 tablet, Rfl: 1    nitroGLYCERIN (NITROSTAT) 0.4 MG SL tablet, Place 1 tablet under the tongue every 5 minutes as needed for Chest pain, Disp: 25 tablet, Rfl: 0    Cyanocobalamin (VITAMIN B 12) 500 MCG TABS, Take 1 tablet by mouth daily, Disp: 30 tablet, Rfl: 3    Multiple Vitamins-Minerals (VITABASIC SENIOR PO), Take by mouth (Patient not taking: Reported on 9/2/2022), Disp: , Rfl:     Multiple Vitamins-Minerals (OCUVITE EXTRA) TABS, Take 1 tablet by mouth daily, Disp: , Rfl:   Allergies: Patient has no known allergies. SUBJECTIVE EXAMINATION     History obtained from[de-identified] Chart Review, Patient,      Family/Caregiver Present: No    Subjective History:   Rohit reports the numbness started in his hands a couple of years ago. Seems to be worsening, started in the feet the same time. Reprots it is on the bottom of jas foot and has worked its way up to his shin/calf. He has fallen in May 2022 and hurt his shoulder. Has not completed an EMG.  Had an cervical MRI completed.     Additional Pertinent Hx (if applicable):       Prior diagnostic testing[de-identified] MRI  Any changes to allergies, medications, or have you had any medical procedures/ER visits since your last visit?: No       Learning/Language: Learning  Does the patient/guardian have any barriers to learning?: No barriers  Will there be a co-learner?: No  What is the preferred language of the patient/guardian?: English  Is an  required?: No  How does the patient/guardian prefer to learn new concepts?: Listening, Demonstration, Reading     Pain Screening    Pain Screening  Patient Currently in Pain: No    Functional Status         Social History:    Social History  Lives With: Spouse  Type of Home: House  Home Layout: One level, Work area in basement  [place] Shower/Tub: Tub/Shower unit  Bathroom Toilet: Standard    Occupation/Interests:  Occupation: Retired    Prior Level of Function:  independent Independent        Current Level of Function:  independent      ADL Assistance: Independent  Homemaking Assistance: Independent  Ambulation Assistance: Independent  Transfer Assistance: Independent  Active : Yes  Mode of Transportation: Car    OBJECTIVE EXAMINATION   Restrictions:  NONE   Review of Systems:  Vision: Within Functional Limits  Hearing: Within functional limits  Follows Commands: Within Functional Limits    Observations:  General Observations  Description: Patient ambulates without AD; noted decreased toe clearance and hip/knee flexion on RLE; increased YOKO and turned out positioning of R foot    Balance Screen:  Balance  Posture: Fair  Sitting - Static: Good  Sitting - Dynamic: Good  Standing - Static: Good  Standing - Dynamic: Fair  Tandem Stance R Le seconds  Tandem Stance L Le seconds  Safety Awareness  Safety awareness: Good    ROM:   Left AROM  Right AROM         AROM LUE (degrees)  LUE AROM : WNL  AROM LLE (degrees)  LLE AROM : WNL    AROM RUE (degrees)  RUE AROM : rehabilitation potential/prognosis is considered to be: Good    Factors which may impact rehabilitation potential include: None  Measures taken to address barrier(s): N/A     GOALS     Patient Goal(s): improve numbness and balance  Short Term Goals Completed by 5 weeks Goal Status   Pt demo I w/ HEP and symptom management New   Pt demo Garcia balance score >49/56 to improve fall risk New   Pt demo >10 sit to stands in 30 seconds without UE assist New   Pt demo >4/5 BLE strength in all directions to improve tolerance to standing activity New   Pt demo DGI score >19/24 to improve fall risk New                                   TREATMENT PLAN       Requires PT Follow-Up: Yes    Pt. actively involved in establishing Plan of Care and Goals: Yes  Patient/ Caregiver education and instruction:Goals, PT Role, Plan of Care, Evaluative findings, Home Exercise Program, Body mechanics, Disease Specific Education, Anatomy of condition, Fall prevention strategies         Treatment may include any combination of the following: Strengthening, ROM, Balance training, Gait training, Stair training, Functional mobility training, Neuromuscular re-education, Endurance training, Manual Therapy - Joint Manipulation, Manual Therapy - Soft Tissue Mobilization, Therapeutic activities, Safety education & training, Home exercise program     Frequency / Duration:  Patient to be seen 2x for 5 weeks weeks       Eval Complexity:    Decision Making: Low Complexity     Therapist Signature: Kyrie Fong, PT    Date: 7/35/3542     I certify that the above Therapy Services are being furnished while the patient is under my care. I agree with the treatment plan and certify that this therapy is necessary.       [de-identified] Signature:  ___________________________   Date:_______                                                                   Milagros Garcia DO        Physician Comments: _______________________________________________    Please sign and return to   [facility]. Please fax to the location listed below.  Karina Ruth for this referral!    [address], [address] [address]  Dept: [phone]  Loc: [phone]

## 2022-09-30 ENCOUNTER — OFFICE VISIT (OUTPATIENT)
Dept: FAMILY MEDICINE CLINIC | Age: 82
End: 2022-09-30
Payer: MEDICARE

## 2022-09-30 VITALS
BODY MASS INDEX: 30.57 KG/M2 | WEIGHT: 225.7 LBS | SYSTOLIC BLOOD PRESSURE: 120 MMHG | HEIGHT: 72 IN | OXYGEN SATURATION: 95 % | HEART RATE: 64 BPM | DIASTOLIC BLOOD PRESSURE: 68 MMHG

## 2022-09-30 DIAGNOSIS — G60.9 NEUROPATHY, IDIOPATHIC: ICD-10-CM

## 2022-09-30 DIAGNOSIS — M10.9 GOUT, UNSPECIFIED CAUSE, UNSPECIFIED CHRONICITY, UNSPECIFIED SITE: ICD-10-CM

## 2022-09-30 DIAGNOSIS — M15.9 OSTEOARTHRITIS OF MULTIPLE JOINTS, UNSPECIFIED OSTEOARTHRITIS TYPE: ICD-10-CM

## 2022-09-30 DIAGNOSIS — I25.10 CAD IN NATIVE ARTERY: ICD-10-CM

## 2022-09-30 DIAGNOSIS — N40.0 BENIGN PROSTATIC HYPERPLASIA WITHOUT LOWER URINARY TRACT SYMPTOMS: ICD-10-CM

## 2022-09-30 DIAGNOSIS — G47.33 OSA (OBSTRUCTIVE SLEEP APNEA): ICD-10-CM

## 2022-09-30 DIAGNOSIS — F33.9 EPISODE OF RECURRENT MAJOR DEPRESSIVE DISORDER, UNSPECIFIED DEPRESSION EPISODE SEVERITY (HCC): Primary | ICD-10-CM

## 2022-09-30 PROCEDURE — G8417 CALC BMI ABV UP PARAM F/U: HCPCS | Performed by: FAMILY MEDICINE

## 2022-09-30 PROCEDURE — G8427 DOCREV CUR MEDS BY ELIG CLIN: HCPCS | Performed by: FAMILY MEDICINE

## 2022-09-30 PROCEDURE — 90694 VACC AIIV4 NO PRSRV 0.5ML IM: CPT | Performed by: FAMILY MEDICINE

## 2022-09-30 PROCEDURE — G0008 ADMIN INFLUENZA VIRUS VAC: HCPCS | Performed by: FAMILY MEDICINE

## 2022-09-30 PROCEDURE — 99213 OFFICE O/P EST LOW 20 MIN: CPT | Performed by: FAMILY MEDICINE

## 2022-09-30 PROCEDURE — 1036F TOBACCO NON-USER: CPT | Performed by: FAMILY MEDICINE

## 2022-09-30 PROCEDURE — 1123F ACP DISCUSS/DSCN MKR DOCD: CPT | Performed by: FAMILY MEDICINE

## 2022-09-30 RX ORDER — PREDNISOLONE ACETATE 10 MG/ML
1 SUSPENSION/ DROPS OPHTHALMIC 4 TIMES DAILY
COMMUNITY
Start: 2022-09-07

## 2022-09-30 ASSESSMENT — ENCOUNTER SYMPTOMS
EYE PAIN: 0
SHORTNESS OF BREATH: 0
WHEEZING: 0
ABDOMINAL PAIN: 0
NAUSEA: 0
APNEA: 1
BLOOD IN STOOL: 0
DIARRHEA: 0
CHEST TIGHTNESS: 0
TROUBLE SWALLOWING: 0
VOMITING: 0

## 2022-09-30 NOTE — PROGRESS NOTES
9/30/2022    Roiht Carrillo    Chief Complaint   Patient presents with    Follow-up     Pt reports being treated for neuropathy by Dr. Delmy Kevin and will start PT soon       HPI  History was obtained from the patient. Meghan Garvin is a [de-identified] y.o. male who presents today with routine follow-up. Patient has a history of his sleep apnea on CPAP and notes improved energy. Also has a history of depression that is stable ,restless leg syndrome-status quo,history of gout not currently flared, and history of coronary disease without recent chest pain or shortness of breath. BPH is unchanged. 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  Saw Dr. Delmy Kevin for his neuropathy work-up so far negative EMG pending starting PT for balance and neuropathy help. Mood remains positive - he is looking forward to trip to [facility] with family members. REVIEW OF SYMPTOMS    Review of Systems   Constitutional:  Negative for activity change and fatigue. HENT:  Negative for congestion, hearing loss, mouth sores and trouble swallowing. Eyes:  Negative for pain and visual disturbance. Patient with visual disturbance felt to be secondary to problems with corneal transplants   Respiratory:  Positive for apnea. Negative for chest tightness, shortness of breath and wheezing. Cardiovascular:  Negative for chest pain and palpitations. Known coronary disease   Gastrointestinal:  Negative for abdominal pain, blood in stool, diarrhea, nausea and vomiting. Endocrine: Negative for polydipsia and polyuria. Genitourinary:  Negative for dysuria, frequency and urgency. Musculoskeletal:  Negative for arthralgias, gait problem and neck stiffness. Skin:  Negative for rash. Allergic/Immunologic: Negative for environmental allergies. Neurological:  Negative for dizziness, seizures, speech difficulty and weakness. Hematological:  Does not bruise/bleed easily. Psychiatric/Behavioral:  Positive for dysphoric mood. Negative for agitation, confusion, hallucinations and suicidal ideas. The patient is not nervous/anxious. PAST MEDICAL HISTORY  Past Medical History:   Diagnosis Date    Decreased hearing 2019    Gout 2019    H/O cardiovascular stress test 2019    EF 41%,  Mild ischemia of lateral wall involving small to medium size of left ventricle. H/O echocardiogram 19    EF 48, Mod AR, Mild MR & TR    H/O percutaneous transluminal coronary angioplasty 2020    PCI to OM & Mid LAD,  PDA has 80-90 % tandem lesions but small vessel. Hyperlipidemia 2019    Nocturia 2019    Osteoarthritis 2019    Restless leg 2019       FAMILY HISTORY  Family History   Problem Relation Age of Onset    Colon Cancer Mother     Heart Disease Father     Lung Disease Father     Seizures Sister     High Blood Pressure Brother     No Known Problems Brother        SOCIAL HISTORY  Social History     Socioeconomic History    Marital status:    Tobacco Use    Smoking status: Former     Packs/day: 2.00     Years: 25.00     Pack years: 50.00     Types: Cigarettes     Start date: 1960     Quit date:      Years since quittin.7    Smokeless tobacco: Never   Substance and Sexual Activity    Alcohol use:  Yes     Alcohol/week: 1.0 standard drink     Types: 1 Cans of beer per week     Comment: rarely, 1-2 cups of coffee      Social Determinants of Health     Financial Resource Strain: Low Risk     Difficulty of Paying Living Expenses: Not hard at all   Food Insecurity: No Food Insecurity    Worried About Running Out of Food in the Last Year: Never true    Ran Out of Food in the Last Year: Never true   Physical Activity: Inactive    Days of Exercise per Week: 0 days    Minutes of Exercise per Session: 0 min        SURGICAL HISTORY  Past Surgical History:   Procedure Laterality Date    BLEPHAROPLASTY  2010    CHOLECYSTECTOMY  2001    COLECTOMY 2000    COLONOSCOPY N/A 7/4/2019    COLONOSCOPY DIAGNOSTIC performed by Sadia Zarco MD at [address] Left 2011    EYE SURGERY Right 2011    JOINT REPLACEMENT      KNEE ARTHROSCOPY  2002    SMALL INTESTINE SURGERY N/A 7/8/2019    LAPAROTOMY EXPLORATORY RIGHT ILEOCOLECTOMY performed by Missy Roberson MD at [place] [address] 7/4/2019    EGD DIAGNOSTIC ONLY performed by [facility]ia [facility]co MD at [facility]  Current Outpatient Medications   Medication Sig Dispense Refill    prednisoLONE acetate (PRED FORTE) 1 % ophthalmic suspension Place 1 drop into both eyes in the morning, at noon, in the evening, and at bedtime      gemfibrozil (LOPID) 600 MG tablet TAKE 1 TABLET TWICE DAILY  BEFORE MEALS 180 tablet 1    metoprolol tartrate (LOPRESSOR) 25 MG tablet Take 1 tablet by mouth 2 times daily 90 tablet 3    venlafaxine (EFFEXOR XR) 150 MG extended release capsule Take one Capsule every morning 90 capsule 1    allopurinol (ZYLOPRIM) 300 MG tablet TAKE 1 TABLET DAILY 90 tablet 1    finasteride (PROSCAR) 5 MG tablet Take 1 tablet daily 90 tablet 1    nitroGLYCERIN (NITROSTAT) 0.4 MG SL tablet Place 1 tablet under the tongue every 5 minutes as needed for Chest pain 25 tablet 0    Cyanocobalamin (VITAMIN B 12) 500 MCG TABS Take 1 tablet by mouth daily 30 tablet 3    Multiple Vitamins-Minerals (VITABASIC SENIOR PO) Take by mouth      Multiple Vitamins-Minerals (OCUVITE EXTRA) TABS Take 1 tablet by mouth daily      isosorbide mononitrate (IMDUR) 30 MG extended release tablet Take 1 tablet by mouth daily (Patient not taking: No sig reported) 90 tablet 1    escitalopram (LEXAPRO) 10 MG tablet TAKE 1 TABLET DAILY (Patient not taking: No sig reported) 90 tablet 1     No current facility-administered medications for this visit.        ALLERGIES  No Known Allergies    PHYSICAL EXAM    /68   Pulse 64   Ht 6' (1.829 m)   Wt 225 lb 11.2 oz (102.4 kg)   SpO2 95%   BMI 30.61 kg/m²     Physical Exam  Vitals and nursing note reviewed. Constitutional:       General: He is not in acute distress. Appearance: Normal appearance. He is well-developed. He is not ill-appearing, toxic-appearing or diaphoretic. HENT:      Head: Normocephalic and atraumatic. Nose: Nose normal.      Mouth/Throat:      Mouth: Mucous membranes are moist.      Pharynx: Oropharynx is clear. Eyes:      Pupils: Pupils are equal, round, and reactive to light. Cardiovascular:      Rate and Rhythm: Normal rate and regular rhythm. Heart sounds: Normal heart sounds. No murmur heard. No gallop. Pulmonary:      Effort: Pulmonary effort is normal. No respiratory distress. Breath sounds: Normal breath sounds. No wheezing. Abdominal:      Palpations: Abdomen is soft. Musculoskeletal:         General: Swelling present. No deformity. Normal range of motion. Cervical back: Normal range of motion and neck supple. No rigidity. Lymphadenopathy:      Cervical: No cervical adenopathy. Skin:     General: Skin is warm and dry. Coloration: Skin is not jaundiced. Findings: No bruising. Neurological:      General: No focal deficit present. Mental Status: He is alert and oriented to person, place, and time. Mental status is at baseline. Cranial Nerves: No cranial nerve deficit. Motor: No weakness. Psychiatric:         Mood and Affect: Mood normal.         Behavior: Behavior normal.         Thought Content: Thought content normal.       ASSESSMENT & PLAN     Diagnosis Orders   1. Episode of recurrent major depressive disorder, unspecified depression episode severity ([address]        2. Neuropathy, idiopathic        3. Gout, unspecified cause, unspecified chronicity, unspecified site        4. Osteoarthritis of multiple joints, unspecified osteoarthritis type        5. JANA (obstructive sleep apnea)        6. CAD in native artery        7.  Benign prostatic hyperplasia without lower urinary tract symptoms        Discussion carried out and encouragement provided. No refills needed today -did receive his high-dose flu shot. He was advised to get COVID booster in near future. Call with issues or changes. Be sure to follow-up with subspecialists and with his PT. Contact this office if has problems or questions. Return in about 4 months (around 1/30/2023).          Electronically signed by Ever Arreguin MD on 9/30/2022

## 2022-10-10 ENCOUNTER — HOSPITAL ENCOUNTER (OUTPATIENT)
Dept: PHYSICAL THERAPY | Age: 82
Setting detail: THERAPIES SERIES
Discharge: HOME | End: 2022-10-10
Payer: MEDICARE

## 2022-10-10 PROCEDURE — 97110 THERAPEUTIC EXERCISES: CPT

## 2022-10-10 PROCEDURE — 97112 NEUROMUSCULAR REEDUCATION: CPT

## 2022-10-10 NOTE — FLOWSHEET NOTE
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] Diagnosis: neuropathy  Date of Injury/Surgery: --  Treatment Diagnosis:  Decreased balance and fall risk  Insurance/Certification information: [facility] Medicare  Referring Physician:  Jo Wheeler Query   PCP: Jenny Hutton MD  Next Doctor Visit:  --  Plan of care signed (Y/N):  N, sent 22  Outcome Measure: ABC: see folder  Visit# / total visits:   210  Pain level: 0/10   Goals:     Patient goals: improve numbness and balance  Short term goals  Time Frame for Short term goals: 5 weeks  Pt demo I w/ HEP and symptom management  Pt demo Garcia balance score >49/56 to improve fall risk  Pt demo >10 sit to stands in 30 seconds without UE assist  Pt demo >4/5 BLE strength in all directions to improve tolerance to standing activity  Pt demo DGI score >19/24 to improve fall risk        Summary of Evaluation:  Assessment: Pt is [de-identified]year old male with gradual worsening of numbness in bilateral feet and hands and balance. Patient is not diabetic and is currently undergoing blood work to investigate any involvement from nutrient deficiencies. Pt c/o increased numbness in his hands and feet, increased falls, change in his walking. Pt demo deficits this date that include fall risk as classified by the Garcia balance assessment, gait deviations related to poor motor control in RLE > LLE, impaired sensation, difficulty with proprioception. Pt will benefit with PT services with  to return to [facility]. Pt prior to onset of current condition had min/no pain with able to complete full ADLs and work activities.         Subjective: Pt arrives to tx session reporting 0/10 pain; does relates tingling in fingers/LE's. Any changes in Ambulatory Summary Sheet? None      Objective:  See eval   COVID screening questions were asked and patient attested that there had been no contact or symptoms    Exercises: (No more than 4 columns)   Exercise/Equipment 9/27/22 #1 10/10/22 #2 Date           WARM UP       Nu Step    L6 5'          TABLE      *TB DF  RTB 2x10                               STANDING      *Staggered Stance STS  2x10    *Lateral Stepping Add YTB next 20' x2 YTB    *Heel/Toe Raise  2x10                                 PROPRIOCEPTION      Airex  30\" x2 EO/EC    Marches  2x10    Gait w/ head turns   20' x4    Wobble board  30\" x2           MODALITIES                      Other Therapeutic Activities/Education:  Patient received education on their current pathology and how their condition effects them with their functional activities. Patient understood discussion and questions were answered. Patient understands their activity limitations and understands rational for treatment progression. Home Exercise Program:  HO issued, reviewed and discussed with patient. Pt agreed to comply. Manual Treatments:  --      Modalities:  --      Communication with other providers:  natividad sent 9/27/22      Assessment:  Pt tolerates tx session well without adverse reactions or complications. Pt fairly challenged during gait w/ head turns and air-ex standing; required Margie at times due to LOB's. Pt will benefit with PT services with to return to [facility]. Assessment: Pt is [de-identified]year old male with gradual worsening of numbness in bilateral feet and hands and balance. Patient is not diabetic and is currently undergoing blood work to investigate any involvement from nutrient deficiencies. Pt c/o increased numbness in his hands and feet, increased falls, change in his walking.  Pt demo deficits this date that include fall risk as classified by the Lafaye Kavitha balance assessment, gait deviations related to poor motor control in RLE > LLE, impaired sensation, difficulty with proprioception. Pt will benefit with PT services with  to return to PLOF. Pt prior to onset of current condition had min/no pain with able to complete full ADLs and work activities.       Plan for Next Session: --      Time In / Time Out:     5782 / 1153      Timed Code/Total Treatment Minutes:   45' / 45'    2 TE    1 NR      Next Progress Note due:        Plan of Care Interventions:  [x] Therapeutic Exercise  [] Modalities:  [x] Therapeutic Activity     [] Ultrasound  [] Estim  [x] Gait Training      [] Cervical Traction [] Lumbar Traction  [x] Neuromuscular Re-education    [] Cold/hotpack [] Iontophoresis   [x] Instruction in HEP      [] Vasopneumatic   [] Dry Needling    [] Manual Therapy               [] Aquatic Therapy              Electronically signed by:  Terrance Romero PTA, 10/10/2022, 11:17 AM

## 2022-10-13 ENCOUNTER — HOSPITAL ENCOUNTER (OUTPATIENT)
Dept: PHYSICAL THERAPY | Age: 82
Setting detail: THERAPIES SERIES
Discharge: HOME | End: 2022-10-13
Payer: MEDICARE

## 2022-10-13 PROCEDURE — 97110 THERAPEUTIC EXERCISES: CPT

## 2022-10-13 PROCEDURE — 97112 NEUROMUSCULAR REEDUCATION: CPT

## 2022-10-13 NOTE — FLOWSHEET NOTE
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] Diagnosis: neuropathy  Date of Injury/Surgery: --  Treatment Diagnosis:  Decreased balance and fall risk  Insurance/Certification information: [facility] Medicare  Referring Physician:  Chantel Mcdaniel   PCP: Emerita Bautista MD  Next Doctor Visit:  --  Plan of care signed (Y/N):  N, sent 22  Outcome Measure: ABC: see folder  Visit# / total visits:   3/10  Pain level: 0/10   Goals:     Patient goals: improve numbness and balance  Short term goals  Time Frame for Short term goals: 5 weeks  Pt demo I w/ HEP and symptom management  Pt demo Garcia balance score >49/56 to improve fall risk  Pt demo >10 sit to stands in 30 seconds without UE assist  Pt demo >4/5 BLE strength in all directions to improve tolerance to standing activity  Pt demo DGI score >19/24 to improve fall risk      Summary of Evaluation:  Assessment: Pt is [de-identified]year old male with gradual worsening of numbness in bilateral feet and hands and balance. Patient is not diabetic and is currently undergoing blood work to investigate any involvement from nutrient deficiencies. Pt c/o increased numbness in his hands and feet, increased falls, change in his walking. Pt demo deficits this date that include fall risk as classified by the Garcia balance assessment, gait deviations related to poor motor control in RLE > LLE, impaired sensation, difficulty with proprioception. Pt will benefit with PT services with  to return to PLOF. Pt prior to onset of current condition had min/no pain with able to complete full ADLs and work activities.       Subjective:  Pt arrives to tx session reporting 0/10 pain; does relates tingling in fingers/LE's. Any changes in Ambulatory Summary Sheet? None      Objective:  See natividad   COVID screening questions were asked and patient attested that there had been no contact or symptoms    Exercises: (No more than 4 columns)   Exercise/Equipment 9/27/22 #1 10/10/22 #2 10/13/22 #3           WARM UP       Nu Step    L6 5' Lv6 5'         TABLE      *TB DF  RTB 2x10                               STANDING      *Staggered Stance STS  2x10    *Lateral Stepping Add YTB next 20' x2 YTB    *Heel/Toe Raise  2x10                                 PROPRIOCEPTION      Airex  30\" x2 EO/EC 30\" x2 EO/EC foam   Head turns 30\" x2   Marches  2x10 2x20 alt   Gait w/ head turns   20' x4 R/L 1 lap  Up/down 1 lap    Wobble board  30\" x2  30\" x2   Tandem Walking   25 ft x1 lap   MODALITIES                      Other Therapeutic Activities/Education:  Patient received education on their current pathology and how their condition effects them with their functional activities. Patient understood discussion and questions were answered. Patient understands their activity limitations and understands rational for treatment progression. Home Exercise Program:  HO issued, reviewed and discussed with patient. Pt agreed to comply. Manual Treatments:  --      Modalities:  --      Communication with other providers:  natividad sent 9/27/22      Assessment:  Rohit demonstrates fair tolerance to today's session. He had x3 LOB during today's session requiring mod-maxA to get steady. He has increased difficulty on foam surface and without his visual system. Continue to progress with difficulty. End of session: 0/10      Assessment: Pt is [de-identified]year old male with gradual worsening of numbness in bilateral feet and hands and balance. Patient is not diabetic and is currently undergoing blood work to investigate any involvement from nutrient deficiencies.  Pt c/o increased numbness in his hands and feet, increased falls, change in his walking. Pt demo deficits this date that include fall risk as classified by the Garcia balance assessment, gait deviations related to poor motor control in RLE > LLE, impaired sensation, difficulty with proprioception. Pt will benefit with PT services with  to return to PLOF. Pt prior to onset of current condition had min/no pain with able to complete full ADLs and work activities.       Plan for Next Session: --      Time In / Time Out:     2188-2621      Timed Code/Total Treatment Minutes:   45' / 45'    2 TE    1 NR      Next Progress Note due:        Plan of Care Interventions:  [x] Therapeutic Exercise  [] Modalities:  [x] Therapeutic Activity     [] Ultrasound  [] Estim  [x] Gait Training      [] Cervical Traction [] Lumbar Traction  [x] Neuromuscular Re-education    [] Cold/hotpack [] Iontophoresis   [x] Instruction in HEP      [] Vasopneumatic   [] Dry Needling    [] Manual Therapy               [] Aquatic Therapy              Electronically signed by:  Stef Pang PT, 10/13/2022, 6:44 AM

## 2022-10-18 ENCOUNTER — OFFICE VISIT (OUTPATIENT)
Dept: CARDIOLOGY CLINIC | Age: 82
End: 2022-10-18
Payer: MEDICARE

## 2022-10-18 ENCOUNTER — HOSPITAL ENCOUNTER (OUTPATIENT)
Dept: PHYSICAL THERAPY | Age: 82
Setting detail: THERAPIES SERIES
Discharge: HOME | End: 2022-10-18
Payer: MEDICARE

## 2022-10-18 VITALS
WEIGHT: 234.6 LBS | DIASTOLIC BLOOD PRESSURE: 70 MMHG | SYSTOLIC BLOOD PRESSURE: 122 MMHG | HEIGHT: 72 IN | BODY MASS INDEX: 31.77 KG/M2 | HEART RATE: 68 BPM

## 2022-10-18 DIAGNOSIS — R00.2 HEART PALPITATIONS: ICD-10-CM

## 2022-10-18 DIAGNOSIS — G60.9 NEUROPATHY, IDIOPATHIC: ICD-10-CM

## 2022-10-18 DIAGNOSIS — R06.02 SHORTNESS OF BREATH: ICD-10-CM

## 2022-10-18 DIAGNOSIS — E78.5 DYSLIPIDEMIA: ICD-10-CM

## 2022-10-18 DIAGNOSIS — I49.3 PVC (PREMATURE VENTRICULAR CONTRACTION): Primary | ICD-10-CM

## 2022-10-18 DIAGNOSIS — R07.2 PRECORDIAL PAIN: ICD-10-CM

## 2022-10-18 DIAGNOSIS — S06.5XAA SUBDURAL HEMATOMA: ICD-10-CM

## 2022-10-18 DIAGNOSIS — N40.0 BENIGN PROSTATIC HYPERPLASIA WITHOUT LOWER URINARY TRACT SYMPTOMS: ICD-10-CM

## 2022-10-18 DIAGNOSIS — I95.1 ORTHOSTATIC HYPOTENSION: ICD-10-CM

## 2022-10-18 DIAGNOSIS — G47.33 OSA (OBSTRUCTIVE SLEEP APNEA): ICD-10-CM

## 2022-10-18 PROCEDURE — G8427 DOCREV CUR MEDS BY ELIG CLIN: HCPCS | Performed by: INTERNAL MEDICINE

## 2022-10-18 PROCEDURE — G8484 FLU IMMUNIZE NO ADMIN: HCPCS | Performed by: INTERNAL MEDICINE

## 2022-10-18 PROCEDURE — 1123F ACP DISCUSS/DSCN MKR DOCD: CPT | Performed by: INTERNAL MEDICINE

## 2022-10-18 PROCEDURE — 97110 THERAPEUTIC EXERCISES: CPT

## 2022-10-18 PROCEDURE — 99214 OFFICE O/P EST MOD 30 MIN: CPT | Performed by: INTERNAL MEDICINE

## 2022-10-18 PROCEDURE — 1036F TOBACCO NON-USER: CPT | Performed by: INTERNAL MEDICINE

## 2022-10-18 PROCEDURE — G8417 CALC BMI ABV UP PARAM F/U: HCPCS | Performed by: INTERNAL MEDICINE

## 2022-10-18 PROCEDURE — 97112 NEUROMUSCULAR REEDUCATION: CPT

## 2022-10-18 NOTE — PROGRESS NOTES
CARDIOLOGY   NOTE    Chief Complaint: chest Pain / SOB     HPI:   Bridget Tierney is a [de-identified]y.o. year old who has Past medical history as noted below. Mr. Mo Clinaaron his balance is better, HE is undergoing balance therapy with PT and seeing neurology  Stopped ranexa and cut down on metoprolol has all helped   MRI brain in July showed small SDH  Carotid are normal.   His bp in office today drops from 120's to 90's  HE is using cpap which has helped a lot   He denies any chest pain but continues to have some shortness of breath . Rohit  had PCI to LAd and Circ in Jan 2020 . He was having chest pain before cardiac cath  in spite of being on 2 anti anginal RX. After PCI he says that  chest pain is resolved. 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 But after starting on metoprolol and repeating Holter by March 2020 PVC burden had come down to 2.4% denies any palpitations      He was actually seen by myself in June 2019 at that time he was having a lot of shortness of breath and chest pressure. Stress test was abnormal is planned for cardiac cath he had lab work which revealed severe profound anemia he went to the hospital and was actually anemic. He required multiple units of blood transfusion work-up revealed his bleeding from his colon he underwent colectomy by Dr. Jorge A Celestin. He has been tolerating antiplatelet okay since January 2020  Father had MI in his 52's . HE is retired from [employer] .  He is on Plavix now    Current Outpatient Medications   Medication Sig Dispense Refill    prednisoLONE acetate (PRED FORTE) 1 % ophthalmic suspension Place 1 drop into both eyes in the morning, at noon, in the evening, and at bedtime      gemfibrozil (LOPID) 600 MG tablet TAKE 1 TABLET TWICE DAILY  BEFORE MEALS 180 tablet 1    metoprolol tartrate (LOPRESSOR) 25 MG tablet Take 1 tablet by mouth 2 times daily 90 tablet 3    venlafaxine (EFFEXOR XR) 150 MG extended release capsule Take one Capsule every morning 90 capsule 1    isosorbide mononitrate (IMDUR) 30 MG extended release tablet Take 1 tablet by mouth daily (Patient not taking: No sig reported) 90 tablet 1    allopurinol (ZYLOPRIM) 300 MG tablet TAKE 1 TABLET DAILY 90 tablet 1    finasteride (PROSCAR) 5 MG tablet Take 1 tablet daily 90 tablet 1    escitalopram (LEXAPRO) 10 MG tablet TAKE 1 TABLET DAILY (Patient not taking: No sig reported) 90 tablet 1    nitroGLYCERIN (NITROSTAT) 0.4 MG SL tablet Place 1 tablet under the tongue every 5 minutes as needed for Chest pain 25 tablet 0    Cyanocobalamin (VITAMIN B 12) 500 MCG TABS Take 1 tablet by mouth daily 30 tablet 3    Multiple Vitamins-Minerals (VITABASIC SENIOR PO) Take by mouth      Multiple Vitamins-Minerals (OCUVITE EXTRA) TABS Take 1 tablet by mouth daily       No current facility-administered medications for this visit. Allergies:   Patient has no known allergies. Patient History:  Past Medical History:   Diagnosis Date    Decreased hearing 5/4/2019    Gout 5/4/2019    H/O cardiovascular stress test 06/24/2019    EF 41%,  Mild ischemia of lateral wall involving small to medium size of left ventricle. H/O echocardiogram 8/27/19    EF 48, Mod AR, Mild MR & TR    H/O percutaneous transluminal coronary angioplasty 02/06/2020    PCI to OM & Mid LAD,  PDA has 80-90 % tandem lesions but small vessel.     Hyperlipidemia 5/4/2019    Nocturia 5/4/2019    Osteoarthritis 5/4/2019    Restless leg 5/4/2019     Past Surgical History:   Procedure Laterality Date    BLEPHAROPLASTY  2010    CHOLECYSTECTOMY  2001    COLECTOMY  2000    COLONOSCOPY N/A 7/4/2019    COLONOSCOPY DIAGNOSTIC performed by Jayne Chaudhry MD at [address] Left 2011 EYE SURGERY Right 2011    JOINT REPLACEMENT      KNEE ARTHROSCOPY  2002    SMALL INTESTINE SURGERY N/A 2019    LAPAROTOMY EXPLORATORY RIGHT ILEOCOLECTOMY performed by Burak Cleaning MD at [facility] N/A 2019    EGD DIAGNOSTIC ONLY performed by Sona Candelaria MD at [facility] ENDOSCOPY     Family History   Problem Relation Age of Onset    Colon Cancer Mother     Heart Disease Father     Lung Disease Father     Seizures Sister     High Blood Pressure Brother     No Known Problems Brother      Social History     Tobacco Use    Smoking status: Former     Packs/day: 2.00     Years: 25.00     Pack years: 50.00     Types: Cigarettes     Start date: 1960     Quit date:      Years since quittin.8    Smokeless tobacco: Never   Substance Use Topics    Alcohol use: Yes     Alcohol/week: 1.0 standard drink     Types: 1 Cans of beer per week     Comment: rarely, 1-2 cups of coffee         Review of Systems:   Constitutional: No Fever or Weight Loss   Eyes: No Decreased Vision  ENT: No Headaches, Hearing Loss or Vertigo  Cardiovascular: as per note above   Respiratory: No cough or wheezing and as per note above.    Gastrointestinal: No abdominal pain, appetite loss, blood in stools, constipation, diarrhea or heartburn  Genitourinary: No dysuria, trouble voiding, or hematuria  Musculoskeletal:  denies any new  joint aches , swelling  or pain   Integumentary: No rash or pruritis  Neurological: No TIA or stroke symptoms  Psychiatric: No anxiety or depression  Endocrine: No malaise, fatigue or temperature intolerance  Hematologic/Lymphatic: No bleeding problems, blood clots or swollen lymph nodes  Allergic/Immunologic: No nasal congestion or hives    Objective:      Physical Exam:  /70 (Site: Left Upper Arm, Position: Sitting, Cuff Size: Medium Adult)   Pulse 68   Ht 6' (1.829 m)   Wt 234 lb 9.6 oz (106.4 kg)   BMI 31.82 kg/m²   Wt Readings from Last 3 Encounters:   10/18/22 234 lb 9.6 oz (106.4 kg)   09/30/22 225 lb 11.2 oz (102.4 kg)   09/02/22 219 lb 9.6 oz (99.6 kg)     Body mass index is 31.82 kg/m². Vitals:    10/18/22 1139   BP: 122/70   Pulse: 68        General Appearance:  No distress, conversant  Constitutional:  Well developed, Well nourished, No acute distress, Non-toxic appearance. HENT:  Normocephalic, Atraumatic, Bilateral external ears normal, Oropharynx moist, No oral exudates, Nose normal. Neck- Normal range of motion, No tenderness, Supple, No stridor,no apical-carotid delay  Eyes:  PERRL, EOMI, Conjunctiva normal, No discharge. Respiratory:  Normal breath sounds, No respiratory distress, No wheezing, No chest tenderness. ,no use of accessory muscles, NO crackles  Cardiovascular: (PMI) apex non displaced,no lifts no thrills,S1 and S2 audible, No added heart sounds, No signs of ankle edema, or volume overload, No evidence of JVD, No crackles  GI:  Bowel sounds normal, Soft, No tenderness, No masses, No gross visceromegaly   :  No costovertebral angle tenderness   Musculoskeletal:  No edema, no tenderness, no deformities.  Back- no tenderness  Integument:  Well hydrated, no rash   Lymphatic:  No lymphadenopathy noted   Neurologic:  Alert & oriented x 3, CN 2-12 normal, normal motor function, normal sensory function, no focal deficits noted   Psychiatric:  Speech and behavior appropriate       Medical decision making and Data review:  DATA:  Lab Results   Component Value Date    TROPONINT <0.010 07/20/2022     BNP:    Lab Results   Component Value Date    PROBNP 93.58 07/20/2022     PT/INR:  No results found for: PTINR  No results found for: LABA1C  Lab Results   Component Value Date    CHOL 139 05/31/2022    TRIG 165 (H) 05/31/2022    HDL 31 (L) 05/31/2022    LDLCALC 75 05/31/2022    LDLDIRECT 92 05/05/2021     Lab Results   Component Value Date    ALT 11 07/20/2022    AST 19 07/20/2022     TSH:   Lab Results   Component Value Date    TSH 1.61 05/31/2022     Lab Results   Component Value Date    AST 19 07/20/2022    ALT 11 07/20/2022    BILIDIR 0.2 01/17/2012    BILITOT 0.3 07/20/2022    ALKPHOS 87 07/20/2022     Lab Results   Component Value Date    PROBNP 93.58 07/20/2022    PROBNP 61.38 01/28/2020    PROBNP 95.08 07/02/2019     No results found for: LABA1C  Lab Results   Component Value Date    WBC 6.3 07/20/2022    HGB 13.2 (L) 07/20/2022    HCT 39.2 (L) 07/20/2022     07/20/2022     Stress test  6/24/19      Summary    Supervising physician Dr. Suha Mock . ECG portion of stress test is negative for ischemia by diagnostic criteria. Completed 4.6 METS and 4 Mins of exercise    Global hypokinesis with EF of 41 %    Mild ischemia of lateral wall involving small to medium size of left    ventricle    Abnormal stress test         Echo 6/27/19  Summary   Technically difficult study with poor windows   Left ventricular systolic function is probably low normal with an ejection   fraction of 50 %. Grade I diastolic dysfunction. Mild concentric left ventricular hypertrophy. Sclerotic, but non-stenotic aortic valve. Doppler evaluation reveals moderate aortic and mild mitral and tricuspid   regurgitation. No evidence of pericardial effusion. Cath 2/6/2020   Procedure Summary   Indication Abnormal stress test showing lateral ischemia pt on 3   anti anginal meds and having class III angina   Access L Radial   1. PCI to OM ostial lesion reduced 90 % lesion to 0 % using 2.75   X 18 LUISA   2. Mid LAD has 70-80 % lesion reduced to 0 % using 3.0 X 18 LUISA   3. PDA has 80-90 % tandem lesions but small vessel   4. LVEDP was 11 mmHG  Angiographic Findings      Diagnostic Findings      Cardiac Arteries and Lesion Findings     LMCA: Normal, Normal (no stenosis %) and No Angiographicalyl Significant  Disease. widely patent     LAD: Abnormal and Focal stenosis. Mid LAD had calcified focal 70-80 % stenosis  , large Type III LAD , 2 diag are patent       Lesion on Mid LAD: 80% stenosis. Culprit lesion. Devices used       - Runthrough  cm Wire. Number of passes: 1.       - 3.0 x 18 mm Resolute Integrity Drug Eluting Stent. Diameter: 3 mm. Length: 18 mm. 1 inflation(s) to a max pressure of: 12 stephanie. LCx: Ostial OM after Circ Av groove take off has 80-90 % lesion     Lesion on 1st Ob Claire% stenosis. Culprit lesion. RCA: Diffuse irregularity and Abnormal.Large vessel , Right Dominant system ,  PDA has 80-90 % lesion , there are 2 tandem lesions but PDA is small after  lesions   Interventional procedure  Cardiac lesions  LAD:     Lesion on Mid LAD: 80% stenosis. Culprit lesion. LCx:     Lesion on 1st Ob Claire% stenosis. Culprit lesion. Holter 2020  Notes recorded by Delmy Wilkinson MD on 2020 at 3:19 PM EST  Abnormal 48-hour Holter monitor with average heart rate of 68 maximum heart rate of 119 minimum heart rate is 48 at 6:25 AM with sinus bradycardia. No significant A. fib recorded longest R-R interval was 1.7 seconds. Patient had multiple runs of ventricular ectopy with a total of 16 .2% of PVC burden multifocal PVCs bigeminy's reported,  There were also multiple PAC runs noted. No atrial fibrillation recorded. Holter3/  48-hour Holter monitor showing 2.4% PVCs which  is significantly improved compared to previous Holter monitor a month ago. Lowest heart rate was 34 average was 88 maximum heart rate was 90  Multifocal PVC noted no atrial fibrillation recorded. Carotid 2022      Mild (0-49%) disease of the Bilateral proximal Internal carotid artery. Normal vertebral flow. Holter 2022  48-hour Holter monitor with short runs of SVT but no A. fib episodes Short run of wide-complex tachycardia. Primarily in sinus rhythm lowest heart rate was 47 at 1041 maximum heart rate 143 average heart rate was 65 bpm.  We will 0.85% supraventricular ectopy and 1.5% ventricular ectopy.   1 occurrence of ventricular tachycardia of 5 beats at 3 5:05 PM patient did not report any symptoms it was at 143 bpm polymorphic PVC runs. Few episodes of polymorphic PVCs and ventricular runs noted no sustained SVT or A. fib recorded clinical correlation needed for wide-complex tachycardia which could be aberrant conduction        All labs, medications and tests reviewed by myself including data and history from outside source , patient and available family . Assessment & Plan:      1. PVC (premature ventricular contraction)    2. Precordial pain    3. JANA (obstructive sleep apnea)    4. Orthostatic hypotension    5. Neuropathy, idiopathic    6. Dyslipidemia    7. Benign prostatic hyperplasia without lower urinary tract symptoms    8. Heart palpitations    9. Shortness of breath    10. Subdural hematoma           Abnormal stress test / chest pain  Precordial pain  S/p PCi to LAD and Circ in Jan 2020 for Class III angina on 3 anti anginal .  Currently  on imdur and metoprolol  Continue Plavix advised him to stop using aspirin . Especially due to history of GI bleeding in the past and Fuchs retinopathy. He complains of tingling in his fingers to the point that sometimes he cannot close buttons  I have asked him to stop using vitamin E and use vitamin B12 to see if it helps with his neuropathy? Orthostatic  Back off metoprolol 25 mg bid and stopping ranexa ( may cause dizziness)  helped also try compression stockings      Loss of balance    Positive Romberg's sign , possible CVA? He is supposed to have  MRI brain but he lost the appointment , carotid a re normal. He is undergoing balance Physio which is helping   Holter is normal , back off metoprolol MRI examination of the internal auditory canals in July 202 showed small SDH     Fatigue  TSH is normal Cpap has helped a lot       Heart palpitations  EF is also low normal at 50% . she had multifocal PVCs, Holter shows 17 % PVC burden  HE snores at night .  HE has less energy which improved after his anemia has resolved. After pci and  Increased metoprolol to 50 mg bid the  repeat holter showed reduction in PVC from 16 % to 2% , so continue the plan      Dyslipidemia :  All available lab work was reviewed. Lipid panel is acceptable patient was advised to repeat lab work before next visit. Necessary orders were placed , instructions given by myself   Check labs before his next visit      Counseled extensively and medication compliance urged. We discussed that for the  prevention of ASCVD our  goal is aggressive risk modification. Patient is encouraged to exercise even a brisk walk for 30 minutes  at least 3 to 4 times a week   Various goals were discussed and questions answered. Continue current medications. Appropriate prescriptions are addressed and refills ordered. Questions answered and patient verbalizes understanding. Call for any problems, questions, or concerns. Continue all other medications of all above medical condition listed as is. Return in about 6 months (around 4/18/2023). Please note this report has been partially produced using speech recognition software and may contain errors related to that system including errors in grammar, punctuation, and spelling, as well as words and phrases that may be inappropriate. If there are any questions or concerns please feel free to contact the dictating provider for clarification.

## 2022-10-18 NOTE — FLOWSHEET NOTE
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] Diagnosis: neuropathy  Date of Injury/Surgery: --  Treatment Diagnosis:  Decreased balance and fall risk  Insurance/Certification information: [facility] Medicare  Referring Physician:  Ruben Seth   PCP: Jacky Lawson MD  Next Doctor Visit:  --  Plan of care signed (Y/N):  N, sent 22  Outcome Measure: ABC: see folder  Visit# / total visits:   10  Pain level: 0/10   Goals:     Patient goals: improve numbness and balance  Short term goals  Time Frame for Short term goals: 5 weeks  Pt demo I w/ HEP and symptom management  Pt demo Garcia balance score >49/56 to improve fall risk  Pt demo >10 sit to stands in 30 seconds without UE assist  Pt demo >4/5 BLE strength in all directions to improve tolerance to standing activity  Pt demo DGI score >19/24 to improve fall risk      Summary of Evaluation:  Assessment: Pt is [de-identified]year old male with gradual worsening of numbness in bilateral feet and hands and balance. Patient is not diabetic and is currently undergoing blood work to investigate any involvement from nutrient deficiencies. Pt c/o increased numbness in his hands and feet, increased falls, change in his walking. Pt demo deficits this date that include fall risk as classified by the Garcia balance assessment, gait deviations related to poor motor control in RLE > LLE, impaired sensation, difficulty with proprioception. Pt will benefit with PT services with  to return to PLOF. Pt prior to onset of current condition had min/no pain with able to complete full ADLs and work activities.       Subjective:  Pt arrives to tx session reporting 0/10 pain; got stuck in traffic      Any changes in Ambulatory Summary Sheet? None      Objective:  See orlandoal   COVID screening questions were asked and patient attested that there had been no contact or symptoms    Exercises: (No more than 4 columns)   Exercise/Equipment 9/27/22 #1 10/10/22 #2 10/13/22 #3 10/18/22 #4            WARM UP        Nu Step    L6 5' Lv6 5' Lv6 5'          TABLE       *TB DF  RTB 2x10                                    STANDING       *Staggered Stance STS  2x10     *Lateral Stepping Add YTB next 20' x2 YTB     *Heel/Toe Raise  2x10                                      PROPRIOCEPTION       Airex  30\" x2 EO/EC 30\" x2 EO/EC foam   Head turns 30\" x2 30\" x2 EC       Marches  2x10 2x20 alt 2x20 alt   Gait w/ head turns   20' x4 R/L 1 lap  Up/down 1 lap  R/L 1 lap  Ball tossup fwd/bwd 1 lap   Wobble board  30\" x2  30\" x2 30\" x2   Tandem Walking   25 ft x1 lap 35 ft 1 lap    BOSU     Static stand 30\" x2   MODALITIES                         Other Therapeutic Activities/Education:  Patient received education on their current pathology and how their condition effects them with their functional activities. Patient understood discussion and questions were answered. Patient understands their activity limitations and understands rational for treatment progression. Home Exercise Program:  HO issued, reviewed and discussed with patient. Pt agreed to comply. Manual Treatments:  --      Modalities:  --      Communication with other providers:  orlandoal sent 9/27/22      Assessment:  Steeg demonstrates fair tolerance to today's session. Has increased difficulty with SLS especially noted during marches and tandem stance. While ambulating backwards often crosses behind himself and causes increased difficulty with balance. End of session: 0/10      Assessment: Pt is [de-identified]year old male with gradual worsening of numbness in bilateral feet and hands and balance.  Patient is not diabetic and is currently undergoing blood work to investigate any involvement from nutrient deficiencies. Pt c/o increased numbness in his hands and feet, increased falls, change in his walking. Pt demo deficits this date that include fall risk as classified by the Garcia balance assessment, gait deviations related to poor motor control in RLE > LLE, impaired sensation, difficulty with proprioception. Pt will benefit with PT services with  to return to OF. Pt prior to onset of current condition had min/no pain with able to complete full ADLs and work activities.       Plan for Next Session: --      Time In / Time Out:     [id number]      Timed Code/Total Treatment Minutes:   30'   (1) TE  (1) NEURO      Next Progress Note due:        Plan of Care Interventions:  [x] Therapeutic Exercise  [] Modalities:  [x] Therapeutic Activity     [] Ultrasound  [] Estim  [x] Gait Training      [] Cervical Traction [] Lumbar Traction  [x] Neuromuscular Re-education    [] Cold/hotpack [] Iontophoresis   [x] Instruction in HEP      [] Vasopneumatic   [] Dry Needling    [] Manual Therapy               [] Aquatic Therapy              Electronically signed by:  Giuseppe Mendieta PT, 10/18/2022, 6:40 AM

## 2022-10-20 ENCOUNTER — HOSPITAL ENCOUNTER (OUTPATIENT)
Dept: PHYSICAL THERAPY | Age: 82
Setting detail: THERAPIES SERIES
Discharge: HOME | End: 2022-10-20
Payer: MEDICARE

## 2022-10-20 PROCEDURE — 97110 THERAPEUTIC EXERCISES: CPT

## 2022-10-20 PROCEDURE — 97112 NEUROMUSCULAR REEDUCATION: CPT

## 2022-10-20 NOTE — FLOWSHEET NOTE
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] Diagnosis: neuropathy  Date of Injury/Surgery: --  Treatment Diagnosis:  Decreased balance and fall risk  Insurance/Certification information: [facility] Medicare  Referring Physician:  Jose Antonio Kraus   PCP: Latesha Cox MD  Next Doctor Visit:  --  Plan of care signed (Y/N):  N, sent 22  Outcome Measure: ABC: see folder  Visit# / total visits:   5/10  Pain level: 0/10   Goals:     Patient goals: improve numbness and balance  Short term goals  Time Frame for Short term goals: 5 weeks  Pt demo I w/ HEP and symptom management  Pt demo Garcia balance score >49/56 to improve fall risk  Pt demo >10 sit to stands in 30 seconds without UE assist  Pt demo >4/5 BLE strength in all directions to improve tolerance to standing activity  Pt demo DGI score >19/24 to improve fall risk      Summary of Evaluation:  Assessment: Pt is [de-identified]year old male with gradual worsening of numbness in bilateral feet and hands and balance. Patient is not diabetic and is currently undergoing blood work to investigate any involvement from nutrient deficiencies. Pt c/o increased numbness in his hands and feet, increased falls, change in his walking. Pt demo deficits this date that include fall risk as classified by the Garcia balance assessment, gait deviations related to poor motor control in RLE > LLE, impaired sensation, difficulty with proprioception. Pt will benefit with PT services with  to return to PLOF. Pt prior to onset of current condition had min/no pain with able to complete full ADLs and work activities.       Subjective:  Pt undergoing blood work to investigate any involvement from nutrient deficiencies. Pt c/o increased numbness in his hands and feet, increased falls, change in his walking. Pt demo deficits this date that include fall risk as classified by the Garcia balance assessment, gait deviations related to poor motor control in RLE > LLE, impaired sensation, difficulty with proprioception. Pt will benefit with PT services with  to return to OF. Pt prior to onset of current condition had min/no pain with able to complete full ADLs and work activities.       Plan for Next Session: --      Time In / Time Out:      7858 / 1623      Timed Code/Total Treatment Minutes:    28' / 32'   1 NR    1 TE      Next Progress Note due:        Plan of Care Interventions:  [x] Therapeutic Exercise  [] Modalities:  [x] Therapeutic Activity     [] Ultrasound  [] Estim  [x] Gait Training      [] Cervical Traction [] Lumbar Traction  [x] Neuromuscular Re-education    [] Cold/hotpack [] Iontophoresis   [x] Instruction in HEP      [] Vasopneumatic   [] Dry Needling    [] Manual Therapy               [] Aquatic Therapy              Electronically signed by:  Jan Newton PTA, 10/20/2022, 3:53 PM

## 2022-10-25 ENCOUNTER — HOSPITAL ENCOUNTER (OUTPATIENT)
Dept: PHYSICAL THERAPY | Age: 82
Setting detail: THERAPIES SERIES
Discharge: HOME | End: 2022-10-25
Payer: MEDICARE

## 2022-10-25 PROCEDURE — 97110 THERAPEUTIC EXERCISES: CPT

## 2022-10-25 PROCEDURE — 97112 NEUROMUSCULAR REEDUCATION: CPT

## 2022-10-25 NOTE — FLOWSHEET NOTE
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] Diagnosis: neuropathy  Date of Injury/Surgery: --  Treatment Diagnosis:  Decreased balance and fall risk  Insurance/Certification information: [facility] Medicare  Referring Physician:  Terrell Ramos   PCP: Radha Taylor MD  Next Doctor Visit:  --  Plan of care signed (Y/N):  N, sent 22  Outcome Measure: ABC: see folder  Visit# / total visits:   610  Pain level: 0/10   Goals:     Patient goals: improve numbness and balance  Short term goals  Time Frame for Short term goals: 5 weeks  Pt demo I w/ HEP and symptom management  Pt demo Garcia balance score >49/56 to improve fall risk  Pt demo >10 sit to stands in 30 seconds without UE assist  Pt demo >4/5 BLE strength in all directions to improve tolerance to standing activity  Pt demo DGI score >19/24 to improve fall risk      Summary of Evaluation:  Assessment: Pt is [de-identified]year old male with gradual worsening of numbness in bilateral feet and hands and balance. Patient is not diabetic and is currently undergoing blood work to investigate any involvement from nutrient deficiencies. Pt c/o increased numbness in his hands and feet, increased falls, change in his walking. Pt demo deficits this date that include fall risk as classified by the Garcia balance assessment, gait deviations related to poor motor control in RLE > LLE, impaired sensation, difficulty with proprioception. Pt will benefit with PT services with  to return to PLOF. Pt prior to onset of current condition had min/no pain with able to complete full ADLs and work activities.       Subjective:  Pt arrives to tx session reporting 0/10 pain; denies falls/stumbles since . Any changes in Ambulatory Summary Sheet? None      Objective:  See eval   COVID screening questions were asked and patient attested that there had been no contact or symptoms    Exercises: (No more than 4 columns)   Exercise/Equipment 10/13/22 #3 10/18/22 #4 10/20/22 #5 10/25/22 #6            WARM UP        Nu Step   Lv6 5' Lv6 5' Lv6 5' L7 5'          TABLE       *TB DF                                      STANDING       *Staggered Stance STS    2x10   *Lateral Stepping       *Heel/Toe Raise                                        PROPRIOCEPTION       Airex 30\" x2 EO/EC foam   Head turns 30\" x2 30\" x2 EC     30\" x2 EC 30\" x2 EC   Marches 2x20 alt 2x20 alt 2x20 alt 2x20 alt   Gait w/ head turns  R/L 1 lap  Up/down 1 lap  R/L 1 lap  Ball tossup fwd/bwd 1 lap R/L 1 lap  Ball tossup fwd/bwd 1 lap R/L 1 lap  Ball tossup fwd/bwd 1 lap   Wobble board 30\" x2 30\" x2 30\" x2 30\" x2   Tandem Walking 25 ft x1 lap 35 ft 1 lap  35 ft 1 lap  35 ft 1 lap    BOSU   Static stand 30\" x2 Static stand 30\" x2 Static stand 30\" x2   Wobbleboard     30\" x2 ea dir   Cone weaving     8 cones x3 laps    MODALITIES                         Other Therapeutic Activities/Education:  Patient received education on their current pathology and how their condition effects them with their functional activities. Patient understood discussion and questions were answered. Patient understands their activity limitations and understands rational for treatment progression. Home Exercise Program:  HO issued, reviewed and discussed with patient. Pt agreed to comply. Manual Treatments:  --      Modalities:  --      Communication with other providers:  orlandoal sent 9/27/22      Assessment:   Pt tolerates tx session well without adverse reactions or complications. Pt continues to struggle with SLB during marching; requires min/modA at times due to LOB's L.  Pt will continue to

## 2022-10-27 ENCOUNTER — HOSPITAL ENCOUNTER (OUTPATIENT)
Dept: PHYSICAL THERAPY | Age: 82
Setting detail: THERAPIES SERIES
Discharge: HOME | End: 2022-10-27
Payer: MEDICARE

## 2022-10-27 PROCEDURE — 97112 NEUROMUSCULAR REEDUCATION: CPT

## 2022-10-27 PROCEDURE — 97110 THERAPEUTIC EXERCISES: CPT

## 2022-10-27 NOTE — FLOWSHEET NOTE
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] Diagnosis: neuropathy  Date of Injury/Surgery: --  Treatment Diagnosis:  Decreased balance and fall risk  Insurance/Certification information: [facility] Medicare  Referring Physician:  Jay Jay Villa   PCP: Hugo Daugherty MD  Next Doctor Visit:  --  Plan of care signed (Y/N):  N, sent 22  Outcome Measure: ABC: see folder  Visit# / total visits:   7/10  Pain level: 0/10   Goals:     Patient goals: improve numbness and balance  Short term goals  Time Frame for Short term goals: 5 weeks  Pt demo I w/ HEP and symptom management  Pt demo Garcia balance score >49/56 to improve fall risk  Pt demo >10 sit to stands in 30 seconds without UE assist  Pt demo >4/5 BLE strength in all directions to improve tolerance to standing activity  Pt demo DGI score >19/24 to improve fall risk      Summary of Evaluation:  Assessment: Pt is [de-identified]year old male with gradual worsening of numbness in bilateral feet and hands and balance. Patient is not diabetic and is currently undergoing blood work to investigate any involvement from nutrient deficiencies. Pt c/o increased numbness in his hands and feet, increased falls, change in his walking. Pt demo deficits this date that include fall risk as classified by the Garcia balance assessment, gait deviations related to poor motor control in RLE > LLE, impaired sensation, difficulty with proprioception. Pt will benefit with PT services with  to return to OF. Pt prior to onset of current condition had min/no pain with able to complete full ADLs and work activities.       Subjective:  Pt stated that he has no pain and has had no falls. Pt stated that he feels that therapy has helped his balance some. Any changes in Ambulatory Summary Sheet? None      Objective:    COVID screening questions were asked and patient attested that there had been no contact or symptoms  Tends to fall posterior and to the L when he loses his balance. Exercises: (No more than 4 columns)   Exercise/Equipment 10/18/22 #4 10/20/22 #5 10/25/22 #6 10/27/2022 #7            WARM UP        Nu Step   Lv6 5' Lv6 5' L7 5' L-7 x 6'           TABLE       *TB DF                                      STANDING       *Staggered Stance STS   2x10 10x2 ea foot fwd   *Lateral Stepping       *Heel/Toe Raise                                        PROPRIOCEPTION       Airex 30\" x2 EC     30\" x2 EC 30\" x2 EC 30\" x2 on airex w/ EC and cues for direction needed to correct balance   Marches 2x20 alt 2x20 alt 2x20 alt 20x2 alt   Gait w/ head turns  R/L 1 lap  Ball tossup fwd/bwd 1 lap R/L 1 lap  Ball tossup fwd/bwd 1 lap R/L 1 lap  Ball tossup fwd/bwd 1 lap Ball toss up 1 full lap  Ball bounce and catch 1 full lap   Wobble board 30\" x2 30\" x2 30\" x2 30\" x2   Tandem Walking 35 ft 1 lap  35 ft 1 lap  35 ft 1 lap     BOSU  Static stand 30\" x2 Static stand 30\" x2 Static stand 30\" x2 Static stand 30\" x2 with CGA for safety   Wobbleboard    30\" x2 ea dir 30\" x2 ea    Cone weaving    8 cones x3 laps  8 cones   MODALITIES                         Other Therapeutic Activities/Education:  Patient received education on their current pathology and how their condition effects them with their functional activities. Patient understood discussion and questions were answered. Patient understands their activity limitations and understands rational for treatment progression. Home Exercise Program:  HO issued, reviewed and discussed with patient. Pt agreed to comply.         Manual Treatments:  --      Modalities:  --      Communication with other providers: natividad sent 9/27/22      Assessment:   Pt tolerated treatment fair today. Pt's treatment was modified due to being 13' late for appointment. Pt  continues to require  min/modA at times due to LOB to L and posteriorly . Pt will continue to benefit from PT services with  to return to PLOF. End of session: 0/10      Assessment: Pt is [de-identified]year old male with gradual worsening of numbness in bilateral feet and hands and balance. Patient is not diabetic and is currently undergoing blood work to investigate any involvement from nutrient deficiencies. Pt c/o increased numbness in his hands and feet, increased falls, change in his walking. Pt demo deficits this date that include fall risk as classified by the Garcia balance assessment, gait deviations related to poor motor control in RLE > LLE, impaired sensation, difficulty with proprioception. Pt will benefit with PT services with  to return to PLOF. Pt prior to onset of current condition had min/no pain with able to complete full ADLs and work activities.       Plan for Next Session: --      Time In / Time Out: 1330/ 1400            Timed Code/Total Treatment Minutes:    30'/ 30' 1 TE ( 8') 1 neuro (22')       Next Progress Note due:        Plan of Care Interventions:  [x] Therapeutic Exercise  [] Modalities:  [x] Therapeutic Activity     [] Ultrasound  [] Estim  [x] Gait Training      [] Cervical Traction [] Lumbar Traction  [x] Neuromuscular Re-education    [] Cold/hotpack [] Iontophoresis   [x] Instruction in HEP      [] Vasopneumatic   [] Dry Needling    [] Manual Therapy               [] Aquatic Therapy              Electronically signed by:  Valrie Cushing 10/27/2022, 12:58 PM       10/27/2022,5:22 PM

## 2022-11-01 ENCOUNTER — HOSPITAL ENCOUNTER (OUTPATIENT)
Dept: PHYSICAL THERAPY | Age: 82
Setting detail: THERAPIES SERIES
Discharge: HOME | End: 2022-11-01
Payer: MEDICARE

## 2022-11-01 PROCEDURE — 97110 THERAPEUTIC EXERCISES: CPT

## 2022-11-01 PROCEDURE — 97112 NEUROMUSCULAR REEDUCATION: CPT

## 2022-11-01 NOTE — FLOWSHEET NOTE
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] Diagnosis: neuropathy  Date of Injury/Surgery: --  Treatment Diagnosis:  Decreased balance and fall risk  Insurance/Certification information: [facility] Medicare  Referring Physician:  Ingrid Michelle   PCP: Aubrey Toscano MD  Next Doctor Visit:  --  Plan of care signed (Y/N):  N, sent 22  Outcome Measure: ABC: see folder  Visit# / total visits:   8/10  Pain level: 0/10   Goals:     Patient goals: improve numbness and balance  Short term goals  Time Frame for Short term goals: 5 weeks  Pt demo I w/ HEP and symptom management  Pt demo Garcia balance score >49/56 to improve fall risk  Pt demo >10 sit to stands in 30 seconds without UE assist  Pt demo >4/5 BLE strength in all directions to improve tolerance to standing activity  Pt demo DGI score >19/24 to improve fall risk      Summary of Evaluation:  Assessment: Pt is [de-identified]year old male with gradual worsening of numbness in bilateral feet and hands and balance. Patient is not diabetic and is currently undergoing blood work to investigate any involvement from nutrient deficiencies. Pt c/o increased numbness in his hands and feet, increased falls, change in his walking. Pt demo deficits this date that include fall risk as classified by the Garcia balance assessment, gait deviations related to poor motor control in RLE > LLE, impaired sensation, difficulty with proprioception. Pt will benefit with PT services with  to return to OF. Pt prior to onset of current condition had min/no pain with able to complete full ADLs and work activities.       Subjective:  Pt reports that his balance has been a little better since last treatment session. No close falls    Any changes in Ambulatory Summary Sheet? None      Objective:    COVID screening questions were asked and patient attested that there had been no contact or symptoms  Tends to fall posterior and to the L when he loses his balance. Required danna UE support for all balance exercises    Exercises: (No more than 4 columns)   Exercise/Equipment 10/25/22 #6 10/27/2022 #7 11/1/22 #8           WARM UP       Nu Step   L7 5' L-7 x 6'  L7 5'         TABLE      *TB DF                                 STANDING      *Staggered Stance STS 2x10 10x2 ea foot fwd 2x10 ea foot fwd   *Lateral Stepping      *Heel/Toe Raise                                   PROPRIOCEPTION      Airex 30\" x2 EC 30\" x2 on airex w/ EC and cues for direction needed to correct balance 30\" x2 w/ EC  30\" x2 tandem x/ EO   Marches 2x20 alt 20x2 alt 2x20 alt   Gait w/ head turns  R/L 1 lap  Ball tossup fwd/bwd 1 lap Ball toss up 1 full lap  Ball bounce and catch 1 full lap Ball toss 1 lap fwd   Wobble board 30\" x2 30\" x2 30\" x2   Tandem Walking 35 ft 1 lap      BOSU Static stand 30\" x2 Static stand 30\" x2 with CGA for safety Static stand 30\" x2 with min assist   Wobbleboard  30\" x2 ea dir 30\" x2 ea  30\" ea direction   Cone weaving  8 cones x3 laps  8 cones 8 cones 4 laps   Ball handoff behind back   On airex 1 ea direction, semi-tandem         MODALITIES                      Other Therapeutic Activities/Education:  Patient received education on their current pathology and how their condition effects them with their functional activities. Patient understood discussion and questions were answered. Patient understands their activity limitations and understands rational for treatment progression. Home Exercise Program:  HO issued, reviewed and discussed with patient. Pt agreed to comply.         Manual Treatments:  --      Modalities:  --      Communication with other providers:  natividad sent 9/27/22      Assessment:   Pt tolerated tx session with no adverse complications. He was able to progress balance training by moving to static tandem stance on airex and ball handoff behind the back in semi tandem. Pt required min to mod assist during wobbleboard, marches, and airex exercises. Pt had not difficulty performing cone weaving exercise. End of session: 0/10      Assessment: Pt is [de-identified]year old male with gradual worsening of numbness in bilateral feet and hands and balance. Patient is not diabetic and is currently undergoing blood work to investigate any involvement from nutrient deficiencies. Pt c/o increased numbness in his hands and feet, increased falls, change in his walking. Pt demo deficits this date that include fall risk as classified by the Garcia balance assessment, gait deviations related to poor motor control in RLE > LLE, impaired sensation, difficulty with proprioception. Pt will benefit with PT services with  to return to PLOF. Pt prior to onset of current condition had min/no pain with able to complete full ADLs and work activities. Plan for Next Session: Continue to progress exercises for balance and proprioception.       Time In / Time Out: 1035-  1115      Timed Code/Total Treatment Minutes:    40'/40' (1) TE (2) neuro      Next Progress Note due:  10th visit      Plan of Care Interventions:  [x] Therapeutic Exercise  [] Modalities:  [x] Therapeutic Activity     [] Ultrasound  [] Estim  [x] Gait Training      [] Cervical Traction [] Lumbar Traction  [x] Neuromuscular Re-education    [] Cold/hotpack [] Iontophoresis   [x] Instruction in HEP      [] Vasopneumatic   [] Dry Needling    [] Manual Therapy               [] Aquatic Therapy              Electronically signed by:  LETY Biggs 11/1/2022, 6:42 AM    11/1/2022,6:42 AM

## 2022-11-03 ENCOUNTER — HOSPITAL ENCOUNTER (OUTPATIENT)
Dept: SLEEP CENTER | Age: 82
Discharge: HOME | End: 2022-11-03
Payer: MEDICARE

## 2022-11-03 ENCOUNTER — TELEPHONE (OUTPATIENT)
Dept: PULMONOLOGY | Age: 82
End: 2022-11-03

## 2022-11-03 DIAGNOSIS — Z87.891 EX-SMOKER: ICD-10-CM

## 2022-11-03 DIAGNOSIS — G47.10 HYPERSOMNIA: ICD-10-CM

## 2022-11-03 DIAGNOSIS — G47.33 OSA (OBSTRUCTIVE SLEEP APNEA): ICD-10-CM

## 2022-11-03 DIAGNOSIS — E66.3 OVERWEIGHT (BMI 25.0-29.9): ICD-10-CM

## 2022-11-03 DIAGNOSIS — E66.9 OBESITY (BMI 30-39.9): ICD-10-CM

## 2022-11-03 PROCEDURE — 9990000010 HC NO CHARGE VISIT

## 2022-11-03 PROCEDURE — 99214 OFFICE O/P EST MOD 30 MIN: CPT | Performed by: INTERNAL MEDICINE

## 2022-11-03 ASSESSMENT — SLEEP AND FATIGUE QUESTIONNAIRES
HOW LIKELY ARE YOU TO NOD OFF OR FALL ASLEEP WHILE WATCHING TV: 0
HOW LIKELY ARE YOU TO NOD OFF OR FALL ASLEEP WHEN YOU ARE A PASSENGER IN A CAR FOR AN HOUR WITHOUT A BREAK: 1
ESS TOTAL SCORE: 5
HOW LIKELY ARE YOU TO NOD OFF OR FALL ASLEEP WHILE SITTING INACTIVE IN A PUBLIC PLACE: 0
HOW LIKELY ARE YOU TO NOD OFF OR FALL ASLEEP WHILE SITTING AND TALKING TO SOMEONE: 0
HOW LIKELY ARE YOU TO NOD OFF OR FALL ASLEEP WHILE SITTING QUIETLY AFTER LUNCH WITHOUT ALCOHOL: 0
HOW LIKELY ARE YOU TO NOD OFF OR FALL ASLEEP WHILE LYING DOWN TO REST IN THE AFTERNOON WHEN CIRCUMSTANCES PERMIT: 3
HOW LIKELY ARE YOU TO NOD OFF OR FALL ASLEEP WHILE SITTING AND READING: 1
HOW LIKELY ARE YOU TO NOD OFF OR FALL ASLEEP IN A CAR, WHILE STOPPED FOR A FEW MINUTES IN TRAFFIC: 0

## 2022-11-03 ASSESSMENT — ENCOUNTER SYMPTOMS
EYE ITCHING: 0
SHORTNESS OF BREATH: 0
BACK PAIN: 0
ABDOMINAL PAIN: 0
EYE DISCHARGE: 0
COUGH: 0
ABDOMINAL DISTENTION: 0

## 2022-11-03 NOTE — PROGRESS NOTES
Ramesh Angel  1940  Referring Provider:      Subjective:     Chief Complaint   Patient presents with    Sleep Apnea    1 Month Follow-Up       HPI  Ascencion Jasmnie is a [de-identified] y.o. male has come back as a follow up. He has mild JANA with EDS. He is on a CPAP of 6 cm h20 which he is using it every night about 6 to 8 hours. He says that it is helping him. He has no loss of weight. He has a FFM. He is not sleepy or tired during the day time. His 2 week download data showed a residual AHI of 1.9 and leak is 1.3 L/min    Current Outpatient Medications   Medication Sig Dispense Refill    prednisoLONE acetate (PRED FORTE) 1 % ophthalmic suspension Place 1 drop into both eyes in the morning, at noon, in the evening, and at bedtime      gemfibrozil (LOPID) 600 MG tablet TAKE 1 TABLET TWICE DAILY  BEFORE MEALS 180 tablet 1    metoprolol tartrate (LOPRESSOR) 25 MG tablet Take 1 tablet by mouth 2 times daily 90 tablet 3    venlafaxine (EFFEXOR XR) 150 MG extended release capsule Take one Capsule every morning 90 capsule 1    allopurinol (ZYLOPRIM) 300 MG tablet TAKE 1 TABLET DAILY 90 tablet 1    finasteride (PROSCAR) 5 MG tablet Take 1 tablet daily 90 tablet 1    nitroGLYCERIN (NITROSTAT) 0.4 MG SL tablet Place 1 tablet under the tongue every 5 minutes as needed for Chest pain 25 tablet 0    Cyanocobalamin (VITAMIN B 12) 500 MCG TABS Take 1 tablet by mouth daily 30 tablet 3    Multiple Vitamins-Minerals (VITABASIC SENIOR PO) Take by mouth      Multiple Vitamins-Minerals (OCUVITE EXTRA) TABS Take 1 tablet by mouth daily      isosorbide mononitrate (IMDUR) 30 MG extended release tablet Take 1 tablet by mouth daily (Patient not taking: No sig reported) 90 tablet 1    escitalopram (LEXAPRO) 10 MG tablet TAKE 1 TABLET DAILY (Patient not taking: No sig reported) 90 tablet 1     No current facility-administered medications for this encounter.        No Known Allergies    Past Medical History:   Diagnosis Date    Decreased hearing 5/4/2019 Gout 2019    H/O cardiovascular stress test 2019    EF 41%,  Mild ischemia of lateral wall involving small to medium size of left ventricle. H/O echocardiogram 19    EF 48, Mod AR, Mild MR & TR    H/O percutaneous transluminal coronary angioplasty 2020    PCI to OM & Mid LAD,  PDA has 80-90 % tandem lesions but small vessel. Hyperlipidemia 2019    Nocturia 2019    Osteoarthritis 2019    Restless leg 2019       Past Surgical History:   Procedure Laterality Date    BLEPHAROPLASTY  2010    CHOLECYSTECTOMY      COLECTOMY      COLONOSCOPY N/A 2019    COLONOSCOPY DIAGNOSTIC performed by Ata Marcus MD at [address] Left 2011    EYE SURGERY Right 2011    JOINT REPLACEMENT      KNEE ARTHROSCOPY      SMALL INTESTINE SURGERY N/A 2019    LAPAROTOMY EXPLORATORY RIGHT ILEOCOLECTOMY performed by Jay Ravi MD at [address] N/A 2019    EGD DIAGNOSTIC ONLY performed by Ata Marcus MD at [facility] ENDOSCOPY       Social History     Socioeconomic History    Marital status:    Tobacco Use    Smoking status: Former     Packs/day: 2.00     Years: 25.00     Pack years: 50.00     Types: Cigarettes     Start date: 1960     Quit date:      Years since quittin.8    Smokeless tobacco: Never   Substance and Sexual Activity    Alcohol use: Yes     Alcohol/week: 1.0 standard drink     Types: 1 Cans of beer per week     Comment: rarely, 1-2 cups of coffee      Social Determinants of Health     Financial Resource Strain: Low Risk     Difficulty of Paying Living Expenses: Not hard at all   Food Insecurity: No Food Insecurity    Worried About Running Out of Food in the Last Year: Never true    Ran Out of Food in the Last Year: Never true   Physical Activity: Inactive    Days of Exercise per Week: 0 days    Minutes of Exercise per Session: 0 min       Review of Systems   Constitutional:  Negative for fatigue. HENT:  Negative for congestion and postnasal drip. Eyes:  Negative for discharge and itching. Respiratory:  Negative for cough and shortness of breath. Cardiovascular:  Negative for chest pain and leg swelling. Gastrointestinal:  Negative for abdominal distention and abdominal pain. Endocrine: Negative for cold intolerance and heat intolerance. Genitourinary:  Negative for enuresis and frequency. Musculoskeletal:  Negative for arthralgias and back pain. Allergic/Immunologic: Negative for environmental allergies and food allergies. Neurological:  Negative for light-headedness and headaches. Hematological:  Negative for adenopathy. Psychiatric/Behavioral:  Negative for agitation and behavioral problems. Objective: There were no vitals taken for this visit. There is no height or weight on file to calculate BMI. Sleep Medicine 11/3/2022 6/30/2022   Sitting and reading 1 2   Watching TV 0 2   Sitting, inactive in a public place (e.g. a theatre or a meeting) 0 2   As a passenger in a car for an hour without a break 1 2   Lying down to rest in the afternoon when circumstances permit 3 1   Sitting and talking to someone 0 0   Sitting quietly after a lunch without alcohol 0 3   In a car, while stopped for a few minutes in traffic 0 0   [place] Sleepiness Score 5 12   Neck circumference (Inches) - 17.25     Mallampati 3    Physical Exam  Vitals reviewed. Constitutional:       Appearance: Normal appearance. HENT:      Head: Normocephalic and atraumatic. Nose: Nose normal.      Mouth/Throat:      Mouth: Mucous membranes are moist.   Eyes:      Extraocular Movements: Extraocular movements intact. Pupils: Pupils are equal, round, and reactive to light. Cardiovascular:      Rate and Rhythm: Normal rate and regular rhythm. Pulses: Normal pulses. Heart sounds: Normal heart sounds.    Pulmonary:      Effort: Pulmonary effort is normal.      Breath sounds: Normal breath sounds. Abdominal:      General: Abdomen is flat. Palpations: Abdomen is soft. Musculoskeletal:         General: Normal range of motion. Cervical back: Normal range of motion and neck supple. Skin:     General: Skin is warm and dry. Neurological:      General: No focal deficit present. Mental Status: He is alert and oriented to person, place, and time. Psychiatric:         Mood and Affect: Mood normal.         Behavior: Behavior normal.       Radiology: None    Assessment and Plan     Problem List          Respiratory    JANA (obstructive sleep apnea)      Advised to be compliant with the CPAP  Loose weight            Other    Hypersomnia      Advised to be compliant with the CPAP  Loose weight           Ex-smoker      Advised to c/w quitting smoking         Obesity (BMI 30-39. 9)      Advised to loose weight with diet and exercise                   Follow-Up:    No follow-ups on file.      Progress notes sent to the referring Provider    Teresita Bronson MD MD  11/3/2022  11:00 AM

## 2022-11-08 ENCOUNTER — HOSPITAL ENCOUNTER (OUTPATIENT)
Dept: PHYSICAL THERAPY | Age: 82
Setting detail: THERAPIES SERIES
Discharge: HOME | End: 2022-11-08
Payer: MEDICARE

## 2022-11-08 PROCEDURE — 97112 NEUROMUSCULAR REEDUCATION: CPT

## 2022-11-08 PROCEDURE — 97110 THERAPEUTIC EXERCISES: CPT

## 2022-11-08 NOTE — DISCHARGE SUMMARY
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 States he did get on his roof, but got stuck. Advised patient to not do this activity due to safety.        Plan of Care/Treatment to date:  [x] Therapeutic Exercise    [] Modalities:  [x] Therapeutic Activity     [] Ultrasound  [] Electrical Stimulation  [x] Gait Training      [] Cervical Traction   [] Lumbar Traction  [x] Neuromuscular Re-education  [] Cold/hotpack [] Iontophoresis  [x] Instruction in HEP      Other:  [] Manual Therapy       []  Vasopneumatic  [] Aquatic Therapy       []   Dry Needle Therapy                      Objective/Significant Findings At Last Visit/Comments:      Observations:  General Observations  Description: Patient ambulates without AD; noted decreased toe clearance and hip/knee flexion on RLE; increased YOKO and turned out positioning of R foot     Balance Screen:  Balance  Posture: Fair  Sitting - Static: Good  Sitting - Dynamic: Good  Standing - Static: Good  Standing - Dynamic: Fair  Tandem Stance R Le seconds  Tandem Stance L Le seconds    Safety awareness: Good     Left AROM  Right AROM      AROM LUE (degrees)  LUE AROM : WNL  AROM LLE (degrees)  LLE AROM : WNL AROM RUE (degrees)  RUE AROM : WNL  AROM RLE (degrees)  RLE AROM: WNL      Left PROM  Right PROM       WNL    WNL        Left Strength  Right Strength        Strength LLE  Strength LLE: [facility] Strength RLE  Strength RLE: WFL        30 STS: 11 times eithout UE assist  6 MWT: 1,008 ft  Garcia Balance: 49/56  DGI: 21/24      Assessment:   Rohit has completed 8 visits since the start of therapy on 9/27/22. He has significant improvements with Garcia balance and DGI assessments and is a low fall risk. Demonstrates WNL strength of BLE, WNL sensation, and good improvements in balance activities in the clinic. Provided instructions for safely performing corner balance activities at home. Will be discharged at this time to complete his home program independently       Goal Status:  [x] Achieved [] Partially Achieved  [] Not Achieved     Patient goals: improve numbness and balance  Short term goals  Time Frame for Short term goals: 5 weeks  Pt demo I w/ HEP and symptom management reports compliance  Pt demo Garcia balance score >49/56 to improve fall risk Improved  Pt demo >10 sit to stands in 30 seconds without UE assist MET  Pt demo >4/5 BLE strength in all directions to improve tolerance to standing activity MET  Pt demo DGI score >19/24 to improve fall risk MET      Frequency/Duration:  # Days per week: [] 1 day # Weeks: [] 1 week [] 4 weeks [] 8 weeks     [x] 2 days   [] 2 weeks [x] 5 weeks [] 10 weeks     [] 3 days   [] 3 weeks [] 6 weeks [] 12 weeks       Rehab Potential: [] Excellent [x] Good [] Fair  [] Poor       Patient Status: [] Continue per initial plan of Care     [x] Patient now discharged     [] Additional visits requested, Please re-certify for additional visits: If we are requesting more visits, we fully anticipate the patient's condition is expected to improve within the treatment timeframe we are requesting. Electronically signed by:  Giuseppe Mendieta PT, DPT, 11/8/2022, 6:49 AM    If you have any questions or concerns, please don't hesitate to call.   Thank you for your referral.    Physician Signature:______________________ Date:______ Time: ________  By signing above, therapists plan is approved by physician

## 2022-11-08 NOTE — FLOWSHEET NOTE
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] Diagnosis: neuropathy  Date of Injury/Surgery: --  Treatment Diagnosis:  Decreased balance and fall risk  Insurance/Certification information: [facility] Medicare  Referring Physician:  Jose Antonio Kraus   PCP: Latesha Cox MD  Next Doctor Visit:  --  Plan of care signed (Y/N):  N, sent 22  Outcome Measure: ABC: see folder  Visit# / total visits:   9/10  Pain level: 0/10   Goals:     Patient goals: improve numbness and balance  Short term goals  Time Frame for Short term goals: 5 weeks  Pt demo I w/ HEP and symptom management reports compliance  Pt demo Garcia balance score >49/56 to improve fall risk Improved  Pt demo >10 sit to stands in 30 seconds without UE assist MET  Pt demo >4/5 BLE strength in all directions to improve tolerance to standing activity MET  Pt demo DGI score >19/24 to improve fall risk MET      Summary of Evaluation:  Assessment: Pt is [de-identified]year old male with gradual worsening of numbness in bilateral feet and hands and balance. Patient is not diabetic and is currently undergoing blood work to investigate any involvement from nutrient deficiencies. Pt c/o increased numbness in his hands and feet, increased falls, change in his walking. Pt demo deficits this date that include fall risk as classified by the Garcia balance assessment, gait deviations related to poor motor control in RLE > LLE, impaired sensation, difficulty with proprioception. Pt will benefit with PT services with  to return to [facility].  Pt prior to onset of current condition had min/no pain with able to complete full ADLs and work activities. Subjective:  Rohit reports no pain or falls since his last session. States he did get on his roof, but got stuck. Advised patient to not do this activity due to safety. Any changes in Ambulatory Summary Sheet? None      Objective:    COVID screening questions were asked and patient attested that there had been no contact or symptoms  Tends to fall posterior and to the L when he loses his balance. Required danna UE support for all balance exercises    Exercises: (No more than 4 columns)   Exercise/Equipment 10/25/22 #6 10/27/2022 #7 11/1/22 #8 11/8/22 #9            WARM UP        Nu Step   L7 5' L-7 x 6'  L7 5'           TABLE       *TB DF                                      STANDING       *Staggered Stance STS 2x10 10x2 ea foot fwd 2x10 ea foot fwd    *Lateral Stepping       *Heel/Toe Raise                                        PROPRIOCEPTION       Airex 30\" x2 EC 30\" x2 on airex w/ EC and cues for direction needed to correct balance 30\" x2 w/ EC  30\" x2 tandem x/ EO    Marches 2x20 alt 20x2 alt 2x20 alt    Gait w/ head turns  R/L 1 lap  Ball tossup fwd/bwd 1 lap Ball toss up 1 full lap  Ball bounce and catch 1 full lap Ball toss 1 lap fwd    Wobble board 30\" x2 30\" x2 30\" x2    Tandem Walking 35 ft 1 lap       BOSU Static stand 30\" x2 Static stand 30\" x2 with CGA for safety Static stand 30\" x2 with min assist    Wobbleboard  30\" x2 ea dir 30\" x2 ea  30\" ea direction    Cone weaving  8 cones x3 laps  8 cones 8 cones 4 laps    Ball handoff behind back   On airex 1 ea direction, semi-tandem           MODALITIES                         Other Therapeutic Activities/Education:  Patient received education on their current pathology and how their condition effects them with their functional activities. Patient understood discussion and questions were answered. Patient understands their activity limitations and understands rational for treatment progression.        Home Exercise Program:  LIZ issued, reviewed and discussed with patient. Pt agreed to comply. Manual Treatments:  --      Modalities:  --      Communication with other providers:  natividad sent 9/27/22      Assessment:   Neisha Ziegler has completed 8 visits since the start of therapy on 9/27/22. He has significant improvements with Garcia balance and DGI assessments and is a low fall risk. Demonstrates WNL strength of BLE, WNL sensation, and good improvements in balance activities in the clinic. Provided instructions for safely performing corner balance activities at home. Will be discharged at this time to complete his home program independently        End of session: 0/10      Assessment: Pt is [de-identified]year old male with gradual worsening of numbness in bilateral feet and hands and balance. Patient is not diabetic and is currently undergoing blood work to investigate any involvement from nutrient deficiencies. Pt c/o increased numbness in his hands and feet, increased falls, change in his walking. Pt demo deficits this date that include fall risk as classified by the Garcia balance assessment, gait deviations related to poor motor control in RLE > LLE, impaired sensation, difficulty with proprioception. Pt will benefit with PT services with  to return to [facility]. Pt prior to onset of current condition had min/no pain with able to complete full ADLs and work activities. Plan for Next Session: Continue to progress exercises for balance and proprioception.       Time In / Time Out: 7068-5600      Timed Code/Total Treatment Minutes:    40'/40' (1) TE (2) neuro      Next Progress Note due:  10th visit      Plan of Care Interventions:  [x] Therapeutic Exercise  [] Modalities:  [x] Therapeutic Activity     [] Ultrasound  [] Estim  [x] Gait Training      [] Cervical Traction [] Lumbar Traction  [x] Neuromuscular Re-education    [] Cold/hotpack [] Iontophoresis   [x] Instruction in HEP      [] Vasopneumatic   [] Dry Needling    [] Manual Therapy               [] Aquatic Therapy              Electronically signed by:  Sanford Garcia, PT, DPT, CSCS 11/8/2022, 6:48 AM    11/8/2022,6:48 AM

## 2022-11-15 ENCOUNTER — OFFICE VISIT (OUTPATIENT)
Dept: FAMILY MEDICINE CLINIC | Age: 82
End: 2022-11-15
Payer: MEDICARE

## 2022-11-15 VITALS
OXYGEN SATURATION: 95 % | WEIGHT: 235.1 LBS | RESPIRATION RATE: 16 BRPM | SYSTOLIC BLOOD PRESSURE: 128 MMHG | DIASTOLIC BLOOD PRESSURE: 72 MMHG | HEIGHT: 72 IN | HEART RATE: 59 BPM | BODY MASS INDEX: 31.84 KG/M2

## 2022-11-15 DIAGNOSIS — M54.31 SCIATICA OF RIGHT SIDE: Primary | ICD-10-CM

## 2022-11-15 PROCEDURE — G8484 FLU IMMUNIZE NO ADMIN: HCPCS | Performed by: FAMILY MEDICINE

## 2022-11-15 PROCEDURE — 1036F TOBACCO NON-USER: CPT | Performed by: FAMILY MEDICINE

## 2022-11-15 PROCEDURE — G8427 DOCREV CUR MEDS BY ELIG CLIN: HCPCS | Performed by: FAMILY MEDICINE

## 2022-11-15 PROCEDURE — G8417 CALC BMI ABV UP PARAM F/U: HCPCS | Performed by: FAMILY MEDICINE

## 2022-11-15 PROCEDURE — 99213 OFFICE O/P EST LOW 20 MIN: CPT | Performed by: FAMILY MEDICINE

## 2022-11-15 PROCEDURE — 1123F ACP DISCUSS/DSCN MKR DOCD: CPT | Performed by: FAMILY MEDICINE

## 2022-11-15 RX ORDER — METHYLPREDNISOLONE 4 MG/1
TABLET ORAL
Qty: 1 KIT | Refills: 0 | Status: SHIPPED | OUTPATIENT
Start: 2022-11-15 | End: 2022-11-21

## 2022-11-15 ASSESSMENT — ENCOUNTER SYMPTOMS
CHEST TIGHTNESS: 0
EYE PAIN: 0
WHEEZING: 0
BLOOD IN STOOL: 0
DIARRHEA: 0
ABDOMINAL PAIN: 0
TROUBLE SWALLOWING: 0
NAUSEA: 0
SHORTNESS OF BREATH: 0
VOMITING: 0

## 2022-11-15 NOTE — PROGRESS NOTES
11/15/2022    Rohit Carrillo    Chief Complaint   Patient presents with    Hip Pain     Right hip radiating down leg. Getting worse. About a 3 or 4 now, when he gets up in the morning it's a 8 or 9 on the pain scale. Since he fell in May. HPI  History was obtained from the patient. Gamaliel Kumar is a [de-identified] y.o. male who presents today with history of increasing right leg pain worse in the mornings thinks it feels back to time he fell in May. But on further questioning it is really been worse since fairly recently maybe since he returned from a bus trip to [facility]. Patient's been working on balance and gait which has helped but has not been working on current right leg and but symptoms. With discussion patient is to describing sciatic notch pain with occasional radiation of pain and numbness in the back of his leg. No definite weakness no history of acute injury recently and no rash. No acute current weakness. REVIEW OF SYMPTOMS    Review of Systems   Constitutional:  Negative for activity change and fatigue. HENT:  Negative for congestion, hearing loss, mouth sores and trouble swallowing. Eyes:  Negative for pain and visual disturbance. Respiratory:  Negative for chest tightness, shortness of breath and wheezing. Cardiovascular:  Negative for chest pain and palpitations. Gastrointestinal:  Negative for abdominal pain, blood in stool, diarrhea, nausea and vomiting. Endocrine: Negative for polydipsia and polyuria. Genitourinary:  Negative for dysuria, frequency and urgency. Musculoskeletal:  Negative for arthralgias, gait problem and neck stiffness. Patient with right sciatic notch pain radiation down his leg. Seems to be somewhat positional.   Skin:  Negative for rash. Allergic/Immunologic: Negative for environmental allergies. Neurological:  Negative for dizziness, seizures, speech difficulty and weakness. Hematological:  Does not bruise/bleed easily. Psychiatric/Behavioral:  Negative for agitation, confusion, hallucinations, self-injury and suicidal ideas. The patient is not nervous/anxious. PAST MEDICAL HISTORY  Past Medical History:   Diagnosis Date    Decreased hearing 2019    Gout 2019    H/O cardiovascular stress test 2019    EF 41%,  Mild ischemia of lateral wall involving small to medium size of left ventricle. H/O echocardiogram 19    EF 48, Mod AR, Mild MR & TR    H/O percutaneous transluminal coronary angioplasty 2020    PCI to OM & Mid LAD,  PDA has 80-90 % tandem lesions but small vessel. Hyperlipidemia 2019    Nocturia 2019    Osteoarthritis 2019    Restless leg 2019       FAMILY HISTORY  Family History   Problem Relation Age of Onset    Colon Cancer Mother     Heart Disease Father     Lung Disease Father     Seizures Sister     High Blood Pressure Brother     No Known Problems Brother        SOCIAL HISTORY  Social History     Socioeconomic History    Marital status:    Tobacco Use    Smoking status: Former     Packs/day: 2.00     Years: 25.00     Pack years: 50.00     Types: Cigarettes     Start date: 1960     Quit date:      Years since quittin.8    Smokeless tobacco: Never   Substance and Sexual Activity    Alcohol use:  Yes     Alcohol/week: 1.0 standard drink     Types: 1 Cans of beer per week     Comment: rarely, 1-2 cups of coffee      Social Determinants of Health     Financial Resource Strain: Low Risk     Difficulty of Paying Living Expenses: Not hard at all   Food Insecurity: No Food Insecurity    Worried About Running Out of Food in the Last Year: Never true    Ran Out of Food in the Last Year: Never true   Physical Activity: Inactive    Days of Exercise per Week: 0 days    Minutes of Exercise per Session: 0 min        SURGICAL HISTORY  Past Surgical History:   Procedure Laterality Date    BLEPHAROPLASTY  2010    CHOLECYSTECTOMY      [address] COLONOSCOPY N/A 7/4/2019    COLONOSCOPY DIAGNOSTIC performed by Omayra Tanner MD at [address] Left 2011    EYE SURGERY Right 2011    JOINT REPLACEMENT      KNEE ARTHROSCOPY  2002    SMALL INTESTINE SURGERY N/A 7/8/2019    LAPAROTOMY EXPLORATORY RIGHT ILEOCOLECTOMY performed by Joce Muniz MD at [address] [facility] 7/4/2019    EGD DIAGNOSTIC ONLY performed by Omayra Tanner MD at [facility]  Current Outpatient Medications   Medication Sig Dispense Refill    methylPREDNISolone (MEDROL DOSEPACK) 4 MG tablet Take by mouth. 1 kit 0    prednisoLONE acetate (PRED FORTE) 1 % ophthalmic suspension Place 1 drop into both eyes in the morning, at noon, in the evening, and at bedtime      gemfibrozil (LOPID) 600 MG tablet TAKE 1 TABLET TWICE DAILY  BEFORE MEALS 180 tablet 1    metoprolol tartrate (LOPRESSOR) 25 MG tablet Take 1 tablet by mouth 2 times daily 90 tablet 3    venlafaxine (EFFEXOR XR) 150 MG extended release capsule Take one Capsule every morning 90 capsule 1    allopurinol (ZYLOPRIM) 300 MG tablet TAKE 1 TABLET DAILY 90 tablet 1    finasteride (PROSCAR) 5 MG tablet Take 1 tablet daily 90 tablet 1    nitroGLYCERIN (NITROSTAT) 0.4 MG SL tablet Place 1 tablet under the tongue every 5 minutes as needed for Chest pain 25 tablet 0    Cyanocobalamin (VITAMIN B 12) 500 MCG TABS Take 1 tablet by mouth daily 30 tablet 3    Multiple Vitamins-Minerals (VITABASIC SENIOR PO) Take by mouth      Multiple Vitamins-Minerals (OCUVITE EXTRA) TABS Take 1 tablet by mouth daily      isosorbide mononitrate (IMDUR) 30 MG extended release tablet Take 1 tablet by mouth daily (Patient not taking: No sig reported) 90 tablet 1    escitalopram (LEXAPRO) 10 MG tablet TAKE 1 TABLET DAILY (Patient not taking: No sig reported) 90 tablet 1     No current facility-administered medications for this visit.        ALLERGIES  No Known Allergies    PHYSICAL EXAM    BP 128/72 (Site: Right Upper Arm, Position: Sitting, Cuff Size: Medium Adult)   Pulse 59   Resp 16   Ht 6' (1.829 m)   Wt 235 lb 1.6 oz (106.6 kg)   SpO2 95%   BMI 31.89 kg/m²     Physical Exam  Vitals and nursing note reviewed. Constitutional:       General: He is not in acute distress. Appearance: Normal appearance. He is well-developed. He is not ill-appearing, toxic-appearing or diaphoretic. HENT:      Head: Normocephalic and atraumatic. Nose: Nose normal.      Mouth/Throat:      Mouth: Mucous membranes are moist.   Eyes:      Pupils: Pupils are equal, round, and reactive to light. Cardiovascular:      Rate and Rhythm: Normal rate and regular rhythm. Heart sounds: Normal heart sounds. No murmur heard. No gallop. Pulmonary:      Effort: Pulmonary effort is normal. No respiratory distress. Breath sounds: Normal breath sounds. No wheezing, rhonchi or rales. Abdominal:      Palpations: Abdomen is soft. Musculoskeletal:         General: No swelling or deformity. Normal range of motion. Cervical back: Normal range of motion and neck supple. No rigidity. Lymphadenopathy:      Cervical: No cervical adenopathy. Skin:     General: Skin is warm and dry. Coloration: Skin is not jaundiced. Findings: No bruising. Neurological:      General: No focal deficit present. Mental Status: He is alert and oriented to person, place, and time. Mental status is at baseline. Cranial Nerves: No cranial nerve deficit. Psychiatric:         Behavior: Behavior normal.         Thought Content: Thought content normal.       ASSESSMENT & PLAN     Diagnosis Orders   1. Sciatica of right side        He is to stay on usual regimen. The patient may apply ice alternated with heat to area of sciatic notch on the right.   I did give him sciatica exercises to do to help stretch out the piriformis muscle - will also treat with a Medrol 4 mg Dosepak as directed with food, and apply topical preparations such as Aspercreme with lidocaine what ever preparation he would like to see if this might help. Follow-up in 1 to 2 weeks -let me know how is doing with the symptoms-if not resolving may need to consider physical therapy directed to this area including appropriate stretching, ultrasound, etc.    Return in about 3 months (around 2/15/2023), or if symptoms worsen or fail to improve.          Electronically signed by Cyndee Cooper MD on 11/15/2022

## 2022-12-09 DIAGNOSIS — M10.9 GOUT, UNSPECIFIED CAUSE, UNSPECIFIED CHRONICITY, UNSPECIFIED SITE: ICD-10-CM

## 2022-12-09 RX ORDER — ALLOPURINOL 300 MG/1
TABLET ORAL
Qty: 90 TABLET | Refills: 1 | Status: SHIPPED | OUTPATIENT
Start: 2022-12-09

## 2022-12-22 ENCOUNTER — PROCEDURE VISIT (OUTPATIENT)
Dept: NEUROLOGY | Age: 82
End: 2022-12-22
Payer: MEDICARE

## 2022-12-22 DIAGNOSIS — G60.9 IDIOPATHIC PERIPHERAL NEUROPATHY: ICD-10-CM

## 2022-12-22 PROCEDURE — 95910 NRV CNDJ TEST 7-8 STUDIES: CPT | Performed by: STUDENT IN AN ORGANIZED HEALTH CARE EDUCATION/TRAINING PROGRAM

## 2022-12-22 PROCEDURE — 95886 MUSC TEST DONE W/N TEST COMP: CPT | Performed by: STUDENT IN AN ORGANIZED HEALTH CARE EDUCATION/TRAINING PROGRAM

## 2022-12-22 NOTE — PROGRESS NOTES
EMG/NCS Bilateral Lower Extremities    Reason for referral/Clinical data:  Decreased sensation in feet, occasional burning type pain in toes, loss of sensation intermittently up into knees    Refer to the Electrodiagnostic Data Sheet for normative values, specific techniques utilized for conductions, the numeric values obtained on nerve conductions, and specific muscles sampled for needle examination. Informed consent was given by the patient after discussion of the following - Expected potential benefits of procedure include the following: identifying diagnoses, excluding diagnoses, and helping the treating practitioners to prescribe treatment, testing, and follow-up. Possible adverse events of electrodiagnostic testing include local discomfort, mild bleeding or bruising (common) and rare/uncommon events such as infection, nausea, fainting, or other idiosyncratic adverse events. Motor studies:  -- Right tibial motor response is absent  -- Left tibial motor response is absent  -- Right peroneal motor response is absent at EDB, reduced amplitude with normal latency and conduction velocity at TA  -- Left peroneal motor response is absent at EDB, reduced amplitude with normal latency and conduction velocity at TA    Sensory studies:  -- Right Superficial Peroneal sensory nerve conduction response is absent  -- Left Superficial Peroneal sensory nerve conduction response is absent  -- Right sural sensory nerve conduction response is absent   -- Left sural sensory nerve conduction response is absent      NEEDLE ELECTRODE EXAMINATION  Needle examination performed throughout multiple, selected muscles of the bilateral lower limbs and lumbar paraspinals (as detailed on the data sheet) demonstrates findings which are abnormal including severely reduced recruitment in distal lower muscle groups improving proximally, there is also significantly enlarged motor units in these areas.   There is no evidence of any spontaneous activity throughout, the findings are symmetric bilaterally. .     Diagnosis Orders   1. Idiopathic peripheral neuropathy  Nerve Conduction Test with EMG             Impression: This is an abnormal EMG of the bilateral lower extremities due to:  1. Severe generalized peripheral neuropathy, sensorimotor involvement, axonal predominant and chronic appearing. 2.  Otherwise no convincing evidence of underlying lumbosacral radiculopathy, plexopathy, or myopathy. Of note, secondary to significant generalized neuropathy, sensitivity of detecting focal neurogenic lesions such as these is reduced. Please correlate clinically.       Marisa Almanzar DO  12/22/2022 3:17 PM

## 2023-01-04 RX ORDER — CLOPIDOGREL BISULFATE 75 MG/1
TABLET ORAL
Qty: 90 TABLET | Refills: 1 | Status: SHIPPED | OUTPATIENT
Start: 2023-01-04

## 2023-01-04 RX ORDER — FINASTERIDE 5 MG/1
5 TABLET, FILM COATED ORAL DAILY
Qty: 90 TABLET | Refills: 1 | Status: SHIPPED | OUTPATIENT
Start: 2023-01-04

## 2023-01-06 ENCOUNTER — OFFICE VISIT (OUTPATIENT)
Dept: NEUROLOGY | Age: 83
End: 2023-01-06
Payer: MEDICARE

## 2023-01-06 VITALS
HEART RATE: 61 BPM | DIASTOLIC BLOOD PRESSURE: 80 MMHG | OXYGEN SATURATION: 98 % | SYSTOLIC BLOOD PRESSURE: 142 MMHG | WEIGHT: 230 LBS | BODY MASS INDEX: 31.15 KG/M2 | HEIGHT: 72 IN

## 2023-01-06 DIAGNOSIS — R27.8 SENSORY ATAXIA: ICD-10-CM

## 2023-01-06 DIAGNOSIS — G60.9 IDIOPATHIC PERIPHERAL NEUROPATHY: Primary | ICD-10-CM

## 2023-01-06 PROCEDURE — 99214 OFFICE O/P EST MOD 30 MIN: CPT | Performed by: NURSE PRACTITIONER

## 2023-01-06 PROCEDURE — 1123F ACP DISCUSS/DSCN MKR DOCD: CPT | Performed by: NURSE PRACTITIONER

## 2023-01-06 PROCEDURE — G8417 CALC BMI ABV UP PARAM F/U: HCPCS | Performed by: NURSE PRACTITIONER

## 2023-01-06 PROCEDURE — 1036F TOBACCO NON-USER: CPT | Performed by: NURSE PRACTITIONER

## 2023-01-06 PROCEDURE — G8427 DOCREV CUR MEDS BY ELIG CLIN: HCPCS | Performed by: NURSE PRACTITIONER

## 2023-01-06 PROCEDURE — G8484 FLU IMMUNIZE NO ADMIN: HCPCS | Performed by: NURSE PRACTITIONER

## 2023-01-06 NOTE — PROGRESS NOTES
1/6/23    Rohit ERIC Carrillo  1940    Chief Complaint   Patient presents with    Follow-up     Neuropathy       History of Present Illness  Scottie Linda is a [de-identified] y.o. male presenting today for follow-up of: Balance issues. He has had a resolved subdural hematoma in the past as a result of a fall. He has paresthesias on the bottom of his feet and his fingertips. EMG of the lower extremities showed a severe generalized peripheral neuropathy, sensorimotor involvement, axonal predominant and chronic appearing otherwise no convincing evidence of underlying lumbosacral radiculopathy, plexopathy or myopathy. Labs for reversible causes of neuropathy were normal. He does follow with neurosurgery for cervical spinal stenosis. He was referred to physical therapy to help with his gait and balance. He was discharged from therapy, he completed 8 visits and it was noted he had significant improvements with balance. Today, Atilano Bumpers tells me he has not fallen recently since he completed balance therapy. He denies any tingling or burning pain. He does have numbness in his bilateral legs up to his shins and also has bilateral fingertips. He tells me he has a cane and walker at home. He drives and tells me he does not have any issues with feeling the pedals.       Current Outpatient Medications   Medication Sig Dispense Refill    finasteride (PROSCAR) 5 MG tablet Take 1 tablet by mouth daily Take 1 tablet daily 90 tablet 1    clopidogrel (PLAVIX) 75 MG tablet TAKE 1 TABLET DAILY 90 tablet 1    allopurinol (ZYLOPRIM) 300 MG tablet TAKE 1 TABLET DAILY 90 tablet 1    prednisoLONE acetate (PRED FORTE) 1 % ophthalmic suspension Place 1 drop into both eyes in the morning, at noon, in the evening, and at bedtime      gemfibrozil (LOPID) 600 MG tablet TAKE 1 TABLET TWICE DAILY  BEFORE MEALS 180 tablet 1    metoprolol tartrate (LOPRESSOR) 25 MG tablet Take 1 tablet by mouth 2 times daily 90 tablet 3    venlafaxine (EFFEXOR XR) 150 MG extended release capsule Take one Capsule every morning 90 capsule 1    nitroGLYCERIN (NITROSTAT) 0.4 MG SL tablet Place 1 tablet under the tongue every 5 minutes as needed for Chest pain 25 tablet 0    Cyanocobalamin (VITAMIN B 12) 500 MCG TABS Take 1 tablet by mouth daily 30 tablet 3    Multiple Vitamins-Minerals (VITABASIC SENIOR PO) Take by mouth      Multiple Vitamins-Minerals (OCUVITE EXTRA) TABS Take 1 tablet by mouth daily      isosorbide mononitrate (IMDUR) 30 MG extended release tablet Take 1 tablet by mouth daily (Patient not taking: No sig reported) 90 tablet 1    escitalopram (LEXAPRO) 10 MG tablet TAKE 1 TABLET DAILY (Patient not taking: No sig reported) 90 tablet 1     No current facility-administered medications for this visit.        Physical Exam:  Constitutional              Weight: well nourished  Mental Status              Orientation: oriented to person, oriented to place, oriented to problem, and oriented to time              Mood/Affect: appropriate mood and appropriate affect              Memory/Other: recent memory intact, remote memory intact, fund of knowledge intact, attention span normal, and concentration normal  Language              Language: (normal) language, no dysarthria, (normal) articulation, and no dysphasia/aphasia  Cranial Nerves              CN II Right: visual fields appear intact              CN II Left: visual fields appear intact              CN III, IV, VI: EOM no nystagmus, normal pursuit, and extraocular muscle strength normal              CN III: pupil normal size, pupil reactive to light and dark, pupil accomodates, and no ptosis              CN IV: normal              CN VI: normal              CN V Right: normal sensation and muscles of mastication intact              CN V Left: normal sensation and muscles of mastication intact              CN VII Right: normal facial expression              CN VII Left: normal facial expression              CN VIII Right: hearing in tact to normal conversation              CN VIII Left: hearing in tact to normal conversation              CN IX,X: normal palatal movement              CN XI Right: normal sternocleidomastoid and normal trapezius              CN XI Left: normal sternocleidomastoid and normal trapezius              CN XII: no tremors of the tongue, no fasciculation of the tongue, tongue protrudes midline, normal power to left, and normal power to right  Gait and Stance              Gait/Posture: ambulates independently, wide based stance, abnormal Romberg's test moderate, unsteady casual gate  moderate, and wide-based gait  Motor/Coordination Exam              General: no bradykinesia, no tremors, no chorea, no athetosis, no myoclonus, and no dyskinesia              Right Upper Extremity: normal motor strength, normal bulk, and normal tone              Left Upper Extremity: normal motor strength, normal bulk, and normal tone              Right Lower Extremity: normal motor strength, normal bulk, and normal tone              Left Lower Extremity: normal motor strength, normal bulk, and normal tone              Coordination: no drift, normal finger-to-nose, and rapid alternating movements normal  Reflexes              Reflexes Right: biceps 2/4, triceps 2/4, brachioradialis 2/4, and DTRs lower extremities non-reactive              Reflexes Left: biceps 2/4, triceps 2/4, brachioradialis 2/4, and DTRs lower extremities non-reactive              Plantar Reflex Right: response downgoing              Plantar Reflex Left: response downgoing              Hoffmans Reflex Right: absent              Hoffmans Reflex Left: absent  Sensory              Sensation: normal light touch, no neglect, decreased pinprick LLE stocking, decreased pinprick RLE stocking, and decreased vibration lowers severe    BP (!) 142/80 (Site: Left Upper Arm, Position: Sitting, Cuff Size: Medium Adult)   Pulse 61   Ht 6' (1.829 m)   Wt 230 lb (104.3 kg)   SpO2 98%   BMI 31.19 kg/m²     Assessment and Plan     Diagnosis Orders   1. Idiopathic peripheral neuropathy        2. Sensory ataxia        Rohit was seen in neurological follow up in regards to idiopathic peripheral.  I am happy to hear that Rohit completed balance therapy. Since he has completed therapy, he has not had any falls. His EMG showed severe peripheral neuropathy. He does not have any neuropathic pain. He does have numbness in his bilateral lower legs and bilateral fingertips. His labs for reversible causes of neuropathy were normal.  He continues to follow with neurosurgery for his cervical spinal stenosis. We discussed that his treatment is going to be focused on fall prevention. Rohit would like to be seen on an as-needed basis and we will be happy to see him for any new or worsening symptoms. Return if symptoms worsen or fail to improve.     Eulalia Rey, APRN - CNP

## 2023-01-10 DIAGNOSIS — F33.9 EPISODE OF RECURRENT MAJOR DEPRESSIVE DISORDER, UNSPECIFIED DEPRESSION EPISODE SEVERITY (HCC): ICD-10-CM

## 2023-01-11 RX ORDER — VENLAFAXINE HYDROCHLORIDE 150 MG/1
CAPSULE, EXTENDED RELEASE ORAL
Qty: 90 CAPSULE | Refills: 1 | Status: SHIPPED | OUTPATIENT
Start: 2023-01-11

## 2023-01-27 ENCOUNTER — TELEPHONE (OUTPATIENT)
Dept: SLEEP CENTER | Age: 83
End: 2023-01-27

## 2023-01-27 NOTE — TELEPHONE ENCOUNTER
Rohit called today and complained that [facility] keeps sending him supplies that he doesn't need and charges him for them and he wants it to stop. He said he called [facility] and they told him he had to call the sleep center. I told Mr. Wyatt Fishman that I would call [facility] myself. I did speak with Bro Yeung at [facility] and informed her of the situation and she will get the automatic supply refills stopped and will only fill them when he calls and says he needs them.

## 2023-01-30 ENCOUNTER — OFFICE VISIT (OUTPATIENT)
Dept: FAMILY MEDICINE CLINIC | Age: 83
End: 2023-01-30
Payer: MEDICARE

## 2023-01-30 VITALS
DIASTOLIC BLOOD PRESSURE: 64 MMHG | OXYGEN SATURATION: 95 % | HEIGHT: 72 IN | HEART RATE: 60 BPM | SYSTOLIC BLOOD PRESSURE: 116 MMHG | WEIGHT: 238.6 LBS | BODY MASS INDEX: 32.32 KG/M2 | RESPIRATION RATE: 16 BRPM

## 2023-01-30 DIAGNOSIS — F33.9 EPISODE OF RECURRENT MAJOR DEPRESSIVE DISORDER, UNSPECIFIED DEPRESSION EPISODE SEVERITY (HCC): Primary | ICD-10-CM

## 2023-01-30 DIAGNOSIS — G60.9 NEUROPATHY, IDIOPATHIC: ICD-10-CM

## 2023-01-30 DIAGNOSIS — G25.81 RESTLESS LEG: ICD-10-CM

## 2023-01-30 DIAGNOSIS — I25.10 CAD IN NATIVE ARTERY: ICD-10-CM

## 2023-01-30 DIAGNOSIS — M10.9 GOUT, UNSPECIFIED CAUSE, UNSPECIFIED CHRONICITY, UNSPECIFIED SITE: ICD-10-CM

## 2023-01-30 PROCEDURE — 99213 OFFICE O/P EST LOW 20 MIN: CPT | Performed by: FAMILY MEDICINE

## 2023-01-30 PROCEDURE — 1123F ACP DISCUSS/DSCN MKR DOCD: CPT | Performed by: FAMILY MEDICINE

## 2023-01-30 PROCEDURE — G8484 FLU IMMUNIZE NO ADMIN: HCPCS | Performed by: FAMILY MEDICINE

## 2023-01-30 PROCEDURE — 90677 PCV20 VACCINE IM: CPT | Performed by: FAMILY MEDICINE

## 2023-01-30 PROCEDURE — G0009 ADMIN PNEUMOCOCCAL VACCINE: HCPCS | Performed by: FAMILY MEDICINE

## 2023-01-30 PROCEDURE — G8427 DOCREV CUR MEDS BY ELIG CLIN: HCPCS | Performed by: FAMILY MEDICINE

## 2023-01-30 PROCEDURE — G8417 CALC BMI ABV UP PARAM F/U: HCPCS | Performed by: FAMILY MEDICINE

## 2023-01-30 PROCEDURE — 1036F TOBACCO NON-USER: CPT | Performed by: FAMILY MEDICINE

## 2023-01-30 ASSESSMENT — PATIENT HEALTH QUESTIONNAIRE - PHQ9
1. LITTLE INTEREST OR PLEASURE IN DOING THINGS: 0
7. TROUBLE CONCENTRATING ON THINGS, SUCH AS READING THE NEWSPAPER OR WATCHING TELEVISION: 0
9. THOUGHTS THAT YOU WOULD BE BETTER OFF DEAD, OR OF HURTING YOURSELF: 0
SUM OF ALL RESPONSES TO PHQ9 QUESTIONS 1 & 2: 0
SUM OF ALL RESPONSES TO PHQ QUESTIONS 1-9: 3
4. FEELING TIRED OR HAVING LITTLE ENERGY: 3
3. TROUBLE FALLING OR STAYING ASLEEP: 0
2. FEELING DOWN, DEPRESSED OR HOPELESS: 0
5. POOR APPETITE OR OVEREATING: 0
8. MOVING OR SPEAKING SO SLOWLY THAT OTHER PEOPLE COULD HAVE NOTICED. OR THE OPPOSITE, BEING SO FIGETY OR RESTLESS THAT YOU HAVE BEEN MOVING AROUND A LOT MORE THAN USUAL: 0
10. IF YOU CHECKED OFF ANY PROBLEMS, HOW DIFFICULT HAVE THESE PROBLEMS MADE IT FOR YOU TO DO YOUR WORK, TAKE CARE OF THINGS AT HOME, OR GET ALONG WITH OTHER PEOPLE: 0
SUM OF ALL RESPONSES TO PHQ QUESTIONS 1-9: 3
6. FEELING BAD ABOUT YOURSELF - OR THAT YOU ARE A FAILURE OR HAVE LET YOURSELF OR YOUR FAMILY DOWN: 0
SUM OF ALL RESPONSES TO PHQ QUESTIONS 1-9: 3
SUM OF ALL RESPONSES TO PHQ QUESTIONS 1-9: 3

## 2023-01-30 ASSESSMENT — ENCOUNTER SYMPTOMS
SHORTNESS OF BREATH: 0
VOMITING: 0
APNEA: 1
BLOOD IN STOOL: 0
NAUSEA: 0
DIARRHEA: 0
EYE PAIN: 0
CHEST TIGHTNESS: 0
WHEEZING: 0
ABDOMINAL PAIN: 0
TROUBLE SWALLOWING: 0

## 2023-01-30 NOTE — PROGRESS NOTES
1/30/2023    Rohit Carrillo    Chief Complaint   Patient presents with    3 Month Follow-Up     4 months. No complaints. Medication Check     Patient is to take 500 mcg of Vitamin b 12. Patient believes he is taking 1000 mcg. Is this OK? HPI  History was obtained from the patient. Babar Ma is a [de-identified] y.o. male who presents today with routine recheck on sleep apnea with CPAP, depression, coronary artery disease, restless leg syndrome, gout, and BPH. Generally is doing fairly well had recent eye surgery and is trying to get his eyes healed up and new glasses we can get approval to extend his drivers license. Mood remains positive energy levels are stable. On review he will need Prevnar 20 and advised to get Shingrix shot in near future. Patient had fairly extensive labs with neurology or evaluating him for neuropathy. No angina reported. Depression stable on Effexor and off of generic Lexapro. REVIEW OF SYMPTOMS    Review of Systems   Constitutional:  Negative for activity change and fatigue. HENT:  Negative for congestion, hearing loss, mouth sores and trouble swallowing. Eyes:  Negative for pain and visual disturbance. Respiratory:  Positive for apnea. Negative for chest tightness, shortness of breath and wheezing. Cardiovascular:  Negative for chest pain and palpitations. Gastrointestinal:  Negative for abdominal pain, blood in stool, diarrhea, nausea and vomiting. Endocrine: Negative for polydipsia. Genitourinary:  Positive for difficulty urinating. Negative for dysuria, frequency and urgency. Musculoskeletal:  Positive for arthralgias. Negative for gait problem and neck stiffness. Skin:  Negative for rash. Allergic/Immunologic: Negative for environmental allergies. Neurological:  Negative for dizziness, seizures, speech difficulty and weakness. Hematological:  Does not bruise/bleed easily. Psychiatric/Behavioral:  Positive for dysphoric mood.  Negative for agitation, confusion, hallucinations, self-injury and suicidal ideas. PAST MEDICAL HISTORY  Past Medical History:   Diagnosis Date    Decreased hearing 2019    Gout 2019    H/O cardiovascular stress test 2019    EF 41%,  Mild ischemia of lateral wall involving small to medium size of left ventricle. H/O echocardiogram 19    EF 48, Mod AR, Mild MR & TR    H/O percutaneous transluminal coronary angioplasty 2020    PCI to OM & Mid LAD,  PDA has 80-90 % tandem lesions but small vessel. Hyperlipidemia 2019    Nocturia 2019    Osteoarthritis 2019    Restless leg 2019       FAMILY HISTORY  Family History   Problem Relation Age of Onset    Colon Cancer Mother     Heart Disease Father     Lung Disease Father     Seizures Sister     High Blood Pressure Brother     No Known Problems Brother        SOCIAL HISTORY  Social History     Socioeconomic History    Marital status:    Tobacco Use    Smoking status: Former     Packs/day: 2.00     Years: 25.00     Pack years: 50.00     Types: Cigarettes     Start date: 1960     Quit date:      Years since quittin.1    Smokeless tobacco: Never   Substance and Sexual Activity    Alcohol use:  Yes     Alcohol/week: 1.0 standard drink     Types: 1 Cans of beer per week     Comment: rarely, 1-2 cups of coffee      Social Determinants of Health     Financial Resource Strain: Low Risk     Difficulty of Paying Living Expenses: Not hard at all   Food Insecurity: No Food Insecurity    Worried About Running Out of Food in the Last Year: Never true    Ran Out of Food in the Last Year: Never true   Physical Activity: Inactive    Days of Exercise per Week: 0 days    Minutes of Exercise per Session: 0 min        SURGICAL HISTORY  Past Surgical History:   Procedure Laterality Date    BLEPHAROPLASTY  2010    CHOLECYSTECTOMY      COLECTOMY      COLONOSCOPY N/A 2019    COLONOSCOPY DIAGNOSTIC performed by Javi Torres MD at [facility] ENDOSCOPY    EYE SURGERY Left 2011    EYE SURGERY Right 2011    JOINT REPLACEMENT      KNEE ARTHROSCOPY  2002    SMALL INTESTINE SURGERY N/A 7/8/2019    LAPAROTOMY EXPLORATORY RIGHT ILEOCOLECTOMY performed by Manjinder Meza MD at [address] [place] [address] N/A 7/4/2019    EGD DIAGNOSTIC ONLY performed by Raymundo Parikh MD at [place] [address]  Current Outpatient Medications   Medication Sig Dispense Refill    Multiple Vitamins-Minerals (ICAPS AREDS 2 PO) Take by mouth      venlafaxine (EFFEXOR XR) 150 MG extended release capsule Take one Capsule every morning 90 capsule 1    finasteride (PROSCAR) 5 MG tablet Take 1 tablet by mouth daily Take 1 tablet daily 90 tablet 1    clopidogrel (PLAVIX) 75 MG tablet TAKE 1 TABLET DAILY 90 tablet 1    allopurinol (ZYLOPRIM) 300 MG tablet TAKE 1 TABLET DAILY 90 tablet 1    prednisoLONE acetate (PRED FORTE) 1 % ophthalmic suspension Place 1 drop into both eyes in the morning, at noon, in the evening, and at bedtime      gemfibrozil (LOPID) 600 MG tablet TAKE 1 TABLET TWICE DAILY  BEFORE MEALS 180 tablet 1    metoprolol tartrate (LOPRESSOR) 25 MG tablet Take 1 tablet by mouth 2 times daily 90 tablet 3    nitroGLYCERIN (NITROSTAT) 0.4 MG SL tablet Place 1 tablet under the tongue every 5 minutes as needed for Chest pain 25 tablet 0    Cyanocobalamin (VITAMIN B 12) 500 MCG TABS Take 1 tablet by mouth daily 30 tablet 3    Multiple Vitamins-Minerals (OCUVITE EXTRA) TABS Take 1 tablet by mouth daily       No current facility-administered medications for this visit. ALLERGIES  No Known Allergies    PHYSICAL EXAM    /64 (Site: Left Upper Arm, Position: Sitting, Cuff Size: Medium Adult)   Pulse 60   Resp 16   Ht 6' (1.829 m)   Wt 238 lb 9.6 oz (108.2 kg)   SpO2 95%   BMI 32.36 kg/m²     Physical Exam  Vitals and nursing note reviewed. Constitutional:       General: He is not in acute distress.      Appearance: He is well-developed. He is not ill-appearing, toxic-appearing or diaphoretic. HENT:      Head: Normocephalic and atraumatic. Nose: Nose normal.      Mouth/Throat:      Mouth: Mucous membranes are moist.      Pharynx: Oropharynx is clear. Eyes:      Pupils: Pupils are equal, round, and reactive to light. Cardiovascular:      Rate and Rhythm: Normal rate and regular rhythm. Heart sounds: Normal heart sounds. No murmur heard. No gallop. Pulmonary:      Effort: Pulmonary effort is normal. No respiratory distress. Breath sounds: Normal breath sounds. No wheezing, rhonchi or rales. Abdominal:      Palpations: Abdomen is soft. Musculoskeletal:         General: No swelling or deformity. Normal range of motion. Cervical back: Normal range of motion and neck supple. No rigidity. Lymphadenopathy:      Cervical: No cervical adenopathy. Skin:     General: Skin is warm and dry. Coloration: Skin is not jaundiced. Findings: No bruising. Neurological:      General: No focal deficit present. Mental Status: He is alert and oriented to person, place, and time. Mental status is at baseline. Cranial Nerves: No cranial nerve deficit. Motor: No weakness. Psychiatric:         Mood and Affect: Mood normal.         Behavior: Behavior normal.         Thought Content: Thought content normal.       ASSESSMENT & PLAN     Diagnosis Orders   1. Episode of recurrent major depressive disorder, unspecified depression episode severity ([address]        2. Gout, unspecified cause, unspecified chronicity, unspecified site        3. CAD in native artery        4. Neuropathy, idiopathic        5. Restless leg        Patient to continue on same regimen. Keep appointments with subspecialists and call with changes or problems. He did receive a Prevnar 20 today advised to get Shingrix shot in near future. Return in about 4 months (around 5/30/2023).          Electronically signed by Oral Harris Angel Butler MD on 1/30/2023

## 2023-03-22 DIAGNOSIS — E78.5 HYPERLIPIDEMIA, UNSPECIFIED HYPERLIPIDEMIA TYPE: ICD-10-CM

## 2023-03-22 RX ORDER — GEMFIBROZIL 600 MG/1
TABLET, FILM COATED ORAL
Qty: 180 TABLET | Refills: 1 | Status: SHIPPED | OUTPATIENT
Start: 2023-03-22

## 2023-04-17 ENCOUNTER — OFFICE VISIT (OUTPATIENT)
Dept: CARDIOLOGY CLINIC | Age: 83
End: 2023-04-17
Payer: MEDICARE

## 2023-04-17 VITALS
SYSTOLIC BLOOD PRESSURE: 132 MMHG | HEART RATE: 58 BPM | BODY MASS INDEX: 33.21 KG/M2 | WEIGHT: 245.2 LBS | HEIGHT: 72 IN | DIASTOLIC BLOOD PRESSURE: 80 MMHG

## 2023-04-17 DIAGNOSIS — G47.33 OSA (OBSTRUCTIVE SLEEP APNEA): ICD-10-CM

## 2023-04-17 DIAGNOSIS — I49.3 PVC (PREMATURE VENTRICULAR CONTRACTION): Primary | ICD-10-CM

## 2023-04-17 DIAGNOSIS — E78.5 DYSLIPIDEMIA: ICD-10-CM

## 2023-04-17 DIAGNOSIS — I95.1 ORTHOSTATIC HYPOTENSION: ICD-10-CM

## 2023-04-17 DIAGNOSIS — I25.10 CAD IN NATIVE ARTERY: ICD-10-CM

## 2023-04-17 PROCEDURE — 1123F ACP DISCUSS/DSCN MKR DOCD: CPT | Performed by: NURSE PRACTITIONER

## 2023-04-17 PROCEDURE — G8427 DOCREV CUR MEDS BY ELIG CLIN: HCPCS | Performed by: NURSE PRACTITIONER

## 2023-04-17 PROCEDURE — 93000 ELECTROCARDIOGRAM COMPLETE: CPT | Performed by: NURSE PRACTITIONER

## 2023-04-17 PROCEDURE — G8417 CALC BMI ABV UP PARAM F/U: HCPCS | Performed by: NURSE PRACTITIONER

## 2023-04-17 PROCEDURE — 1036F TOBACCO NON-USER: CPT | Performed by: NURSE PRACTITIONER

## 2023-04-17 PROCEDURE — 99214 OFFICE O/P EST MOD 30 MIN: CPT | Performed by: NURSE PRACTITIONER

## 2023-04-17 RX ORDER — NITROGLYCERIN 0.4 MG/1
0.4 TABLET SUBLINGUAL EVERY 5 MIN PRN
Qty: 25 TABLET | Refills: 0 | Status: SHIPPED | OUTPATIENT
Start: 2023-04-17

## 2023-04-17 RX ORDER — PRAVASTATIN SODIUM 20 MG
20 TABLET ORAL DAILY
Qty: 90 TABLET | Refills: 1 | Status: SHIPPED | OUTPATIENT
Start: 2023-04-17

## 2023-04-17 ASSESSMENT — ENCOUNTER SYMPTOMS
SHORTNESS OF BREATH: 0
COUGH: 0

## 2023-04-17 NOTE — PROGRESS NOTES
CARDIOLOGY  NOTE    2023    Romana Courser (:  1940) is a 82 y.o. Jojo Ao established patient with Dr. Layo Montoya, here for evaluation of the following chief complaint(s):  Hyperlipidemia, Irregular Heart Beat, and Follow-up (No chest pain,sob,dizziness,no swelling,no palpitations)        SUBJECTIVE/OBJECTIVE:    Hyperlipidemia  Pertinent negatives include no chest pain or shortness of breath. Irregular Heart Beat   Associated symptoms include an irregular heartbeat. Pertinent negatives include no chest pain, coughing, dizziness, fever, shortness of breath or weakness. Rohit has Past medical history as noted below. Mr. Mitchell Argueta his balance is better. He states that his wife had an emergency pacemaker placed last week. He is worried that her head aches are from the pacer. He is undergoing balance therapy with PT and seeing neurology  Stopped ranexa and cut down on metoprolol has all helped   MRI brain in July showed small SDH  Carotid are normal.   His bp in office today drops from 120's to 90's  HE is using cpap which has helped a lot   He denies any chest pain but continues to have some shortness of breath . Rohit  had PCI to LAd and Circ in 2020 . He was having chest pain before cardiac cath  in spite of being on 2 anti anginal RX. After PCI he says that  chest pain is resolved. 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 But after starting on metoprolol and repeating Holter by 2020 PVC burden had come down to 2.4% denies any palpitations    He was actually seen by myself in 2019 at that time he was having a lot of shortness of breath and chest pressure.   Stress test was abnormal is planned for cardiac cath he had lab work which

## 2023-05-23 DIAGNOSIS — M10.9 GOUT, UNSPECIFIED CAUSE, UNSPECIFIED CHRONICITY, UNSPECIFIED SITE: ICD-10-CM

## 2023-05-23 RX ORDER — ALLOPURINOL 300 MG/1
TABLET ORAL
Qty: 90 TABLET | Refills: 1 | Status: SHIPPED | OUTPATIENT
Start: 2023-05-23

## 2023-05-31 DIAGNOSIS — F33.9 EPISODE OF RECURRENT MAJOR DEPRESSIVE DISORDER, UNSPECIFIED DEPRESSION EPISODE SEVERITY (HCC): ICD-10-CM

## 2023-05-31 DIAGNOSIS — M10.9 GOUT, UNSPECIFIED CAUSE, UNSPECIFIED CHRONICITY, UNSPECIFIED SITE: ICD-10-CM

## 2023-05-31 RX ORDER — CLOPIDOGREL BISULFATE 75 MG/1
TABLET ORAL
Qty: 90 TABLET | Refills: 1 | OUTPATIENT
Start: 2023-05-31

## 2023-05-31 RX ORDER — VENLAFAXINE HYDROCHLORIDE 150 MG/1
CAPSULE, EXTENDED RELEASE ORAL
Qty: 90 CAPSULE | Refills: 1 | OUTPATIENT
Start: 2023-05-31

## 2023-06-05 RX ORDER — CLOPIDOGREL BISULFATE 75 MG/1
75 TABLET ORAL DAILY
Qty: 90 TABLET | Refills: 1 | Status: SHIPPED | OUTPATIENT
Start: 2023-06-05

## 2023-06-23 ENCOUNTER — OFFICE VISIT (OUTPATIENT)
Dept: FAMILY MEDICINE CLINIC | Age: 83
End: 2023-06-23
Payer: MEDICARE

## 2023-06-23 VITALS
SYSTOLIC BLOOD PRESSURE: 102 MMHG | DIASTOLIC BLOOD PRESSURE: 66 MMHG | OXYGEN SATURATION: 95 % | HEART RATE: 67 BPM | WEIGHT: 237.6 LBS | RESPIRATION RATE: 12 BRPM | BODY MASS INDEX: 34.01 KG/M2 | HEIGHT: 70 IN

## 2023-06-23 DIAGNOSIS — G60.9 NEUROPATHY, IDIOPATHIC: Primary | ICD-10-CM

## 2023-06-23 DIAGNOSIS — Z91.81 AT HIGH RISK FOR FALLS: ICD-10-CM

## 2023-06-23 DIAGNOSIS — M10.9 GOUT, UNSPECIFIED CAUSE, UNSPECIFIED CHRONICITY, UNSPECIFIED SITE: ICD-10-CM

## 2023-06-23 DIAGNOSIS — F33.9 EPISODE OF RECURRENT MAJOR DEPRESSIVE DISORDER, UNSPECIFIED DEPRESSION EPISODE SEVERITY (HCC): ICD-10-CM

## 2023-06-23 DIAGNOSIS — I25.10 CAD IN NATIVE ARTERY: ICD-10-CM

## 2023-06-23 DIAGNOSIS — G47.33 OSA (OBSTRUCTIVE SLEEP APNEA): ICD-10-CM

## 2023-06-23 DIAGNOSIS — M15.9 OSTEOARTHRITIS OF MULTIPLE JOINTS, UNSPECIFIED OSTEOARTHRITIS TYPE: ICD-10-CM

## 2023-06-23 PROCEDURE — 99214 OFFICE O/P EST MOD 30 MIN: CPT | Performed by: FAMILY MEDICINE

## 2023-06-23 PROCEDURE — 1123F ACP DISCUSS/DSCN MKR DOCD: CPT | Performed by: FAMILY MEDICINE

## 2023-06-23 RX ORDER — CIPROFLOXACIN HYDROCHLORIDE 3.5 MG/ML
1 SOLUTION/ DROPS TOPICAL 4 TIMES DAILY
COMMUNITY
Start: 2023-05-15

## 2023-06-23 SDOH — ECONOMIC STABILITY: HOUSING INSECURITY
IN THE LAST 12 MONTHS, WAS THERE A TIME WHEN YOU DID NOT HAVE A STEADY PLACE TO SLEEP OR SLEPT IN A SHELTER (INCLUDING NOW)?: NO

## 2023-06-23 SDOH — ECONOMIC STABILITY: FOOD INSECURITY: WITHIN THE PAST 12 MONTHS, THE FOOD YOU BOUGHT JUST DIDN'T LAST AND YOU DIDN'T HAVE MONEY TO GET MORE.: NEVER TRUE

## 2023-06-23 SDOH — ECONOMIC STABILITY: INCOME INSECURITY: HOW HARD IS IT FOR YOU TO PAY FOR THE VERY BASICS LIKE FOOD, HOUSING, MEDICAL CARE, AND HEATING?: NOT HARD AT ALL

## 2023-06-23 SDOH — ECONOMIC STABILITY: FOOD INSECURITY: WITHIN THE PAST 12 MONTHS, YOU WORRIED THAT YOUR FOOD WOULD RUN OUT BEFORE YOU GOT MONEY TO BUY MORE.: NEVER TRUE

## 2023-06-23 ASSESSMENT — ENCOUNTER SYMPTOMS
VOMITING: 0
DIARRHEA: 0
BLOOD IN STOOL: 0
WHEEZING: 0
ABDOMINAL PAIN: 0
SHORTNESS OF BREATH: 0
TROUBLE SWALLOWING: 0
NAUSEA: 0
CHEST TIGHTNESS: 0
EYE PAIN: 0

## 2023-06-23 NOTE — PROGRESS NOTES
6/23/2023    Rohit Carrillo    Chief Complaint   Patient presents with    3 Month Follow-Up     4 month. Peripheral Neuropathy     Legs and hands. States you are aware of this. Bothers him more in the morning. Using a walker at night. HPI  History was obtained from the patient. Blake Dolan is a 82 y.o. male who presents today with routine recheck. Patient with history of sleep apnea, osteoarthritis, depression, neuropathy, restless leg syndrome, gout, coronary disease, and increased weight. Overall his idiopathic peripheral neuropathy is about the same. Patient has fallen a couple times in the past.  Uses a cane when away from home and a walker at home. Mood remains positive remains on medication at same level. Denies chest pain is on CPAP for sleep apnea. Restless leg symptoms are unchanged no gout flare reported denies increase in chest discomfort. On review he will need screening labs I believe refills are up-to-date we discussed COVID booster he will consider that. Please note this patient has seen neurology had complete evaluation of neuropathy no real treatment available. Does not have a lot of discomfort but if he does start to have he is to let us know would consider something such as gabapentin or pregabalin. REVIEW OF SYMPTOMS    Review of Systems   Constitutional:  Negative for activity change and fatigue. Patient with known idiopathic neuropathy. Has fallen a couple times. HENT:  Negative for congestion, hearing loss, mouth sores and trouble swallowing. Eyes:  Negative for pain and visual disturbance. Respiratory:  Negative for chest tightness, shortness of breath and wheezing. Cardiovascular:  Negative for chest pain and palpitations. Patient with known stable coronary disease. Gastrointestinal:  Negative for abdominal pain, blood in stool, diarrhea, nausea and vomiting. Endocrine: Negative for polydipsia and polyuria.    Genitourinary:  Negative for dysuria,

## 2023-07-06 RX ORDER — FINASTERIDE 5 MG/1
5 TABLET, FILM COATED ORAL DAILY
Qty: 90 TABLET | Refills: 1 | Status: SHIPPED | OUTPATIENT
Start: 2023-07-06

## 2023-08-03 RX ORDER — PRAVASTATIN SODIUM 20 MG
20 TABLET ORAL DAILY
Qty: 90 TABLET | Refills: 3 | Status: SHIPPED | OUTPATIENT
Start: 2023-08-03

## 2023-08-08 ENCOUNTER — TELEPHONE (OUTPATIENT)
Dept: FAMILY MEDICINE CLINIC | Age: 83
End: 2023-08-08

## 2023-08-08 DIAGNOSIS — R26.9 GAIT DISTURBANCE: Primary | ICD-10-CM

## 2023-08-08 DIAGNOSIS — R26.89 BALANCE PROBLEM: ICD-10-CM

## 2023-08-08 NOTE — TELEPHONE ENCOUNTER
----- Message from Fabien Chang sent at 8/8/2023  3:08 PM EDT -----  Subject: Referral Request    Reason for referral request? Physical Therapy  Provider patient wants to be referred to(if known):     Provider Phone Number(if known): Additional Information for Provider? Patient decided that he wants to do   PT and he would like to go to the PT that is in the same building as the   office.   ---------------------------------------------------------------------------  --------------  Joce [facility]    [phone]; OK to leave message on voicemail  ---------------------------------------------------------------------------  --------------

## 2023-08-08 NOTE — TELEPHONE ENCOUNTER
I called Rohit. He stated he is having trouble with balance, and walking large distance. He is requesting PT. Would you like to see him?  Or I can do referral ? He is ok with [facility] PT.

## 2023-08-10 ENCOUNTER — TELEMEDICINE (OUTPATIENT)
Dept: FAMILY MEDICINE CLINIC | Age: 83
End: 2023-08-10
Payer: MEDICARE

## 2023-08-10 DIAGNOSIS — Z00.00 MEDICARE ANNUAL WELLNESS VISIT, SUBSEQUENT: Primary | ICD-10-CM

## 2023-08-10 PROCEDURE — G0439 PPPS, SUBSEQ VISIT: HCPCS | Performed by: STUDENT IN AN ORGANIZED HEALTH CARE EDUCATION/TRAINING PROGRAM

## 2023-08-10 PROCEDURE — 1123F ACP DISCUSS/DSCN MKR DOCD: CPT | Performed by: STUDENT IN AN ORGANIZED HEALTH CARE EDUCATION/TRAINING PROGRAM

## 2023-08-10 ASSESSMENT — PATIENT HEALTH QUESTIONNAIRE - PHQ9
SUM OF ALL RESPONSES TO PHQ QUESTIONS 1-9: 6
4. FEELING TIRED OR HAVING LITTLE ENERGY: 3
10. IF YOU CHECKED OFF ANY PROBLEMS, HOW DIFFICULT HAVE THESE PROBLEMS MADE IT FOR YOU TO DO YOUR WORK, TAKE CARE OF THINGS AT HOME, OR GET ALONG WITH OTHER PEOPLE: 1
3. TROUBLE FALLING OR STAYING ASLEEP: 0
6. FEELING BAD ABOUT YOURSELF - OR THAT YOU ARE A FAILURE OR HAVE LET YOURSELF OR YOUR FAMILY DOWN: 0
SUM OF ALL RESPONSES TO PHQ QUESTIONS 1-9: 6
SUM OF ALL RESPONSES TO PHQ9 QUESTIONS 1 & 2: 0
7. TROUBLE CONCENTRATING ON THINGS, SUCH AS READING THE NEWSPAPER OR WATCHING TELEVISION: 3
SUM OF ALL RESPONSES TO PHQ QUESTIONS 1-9: 6
1. LITTLE INTEREST OR PLEASURE IN DOING THINGS: 0
5. POOR APPETITE OR OVEREATING: 0
2. FEELING DOWN, DEPRESSED OR HOPELESS: 0
9. THOUGHTS THAT YOU WOULD BE BETTER OFF DEAD, OR OF HURTING YOURSELF: 0
SUM OF ALL RESPONSES TO PHQ QUESTIONS 1-9: 6
8. MOVING OR SPEAKING SO SLOWLY THAT OTHER PEOPLE COULD HAVE NOTICED. OR THE OPPOSITE, BEING SO FIGETY OR RESTLESS THAT YOU HAVE BEEN MOVING AROUND A LOT MORE THAN USUAL: 0

## 2023-08-10 ASSESSMENT — LIFESTYLE VARIABLES
HOW MANY STANDARD DRINKS CONTAINING ALCOHOL DO YOU HAVE ON A TYPICAL DAY: 1 OR 2
HOW OFTEN DO YOU HAVE A DRINK CONTAINING ALCOHOL: MONTHLY OR LESS

## 2023-08-10 NOTE — PROGRESS NOTES
Medicare Annual Wellness Visit    Rohit Mason is here for Medicare AWV    Assessment & Plan   Medicare annual wellness visit, subsequent  Recommendations for Preventive Services Due: see orders and patient instructions/AVS.  Recommended screening schedule for the next 5-10 years is provided to the patient in written form: see Patient Instructions/AVS.     No follow-ups on file. Subjective       Patient's complete Health Risk Assessment and screening values have been reviewed and are found in Flowsheets. The following problems were reviewed today and where indicated follow up appointments were made and/or referrals ordered. Positive Risk Factor Screenings with Interventions:    Fall Risk:  Do you feel unsteady or are you worried about falling? : (!) yes (due to neurophathy in legs/knees/fingers,cane or walker prn)  2 or more falls in past year?: (!) yes  Fall with injury in past year?: no     Interventions:    Patient comments: states he will feel unsteady due to neuropathy bilateral legs, feet and in his fingers. States he will use a cane or walker as needed. He has been referred to Physical Therapy and is waiting on them to call and get him scheduled. Patient declines any further evaluation or treatment     Depression:  PHQ-2 Score: 0  PHQ-9 Total Score: 6    Interpretation:   1-4 = minimal  5-9 = mild  10-14 = moderate  15-19 = moderately severe  20-27 = severe  Interventions:  LPN INTERVENTION GUIDE: SCORE 5-14 = MODERATE DEPRESSION: FOLLOW UP IN 1 WEEK  Patient denies suicidal ideation or intent. States he does tire easily and has trouble concentrating on things because of his macular degeneration. States he is \"just not fast anymore\". He is scheduled to see PCP 10/23/23.             Weight and Activity:  Physical Activity: Inactive    Days of Exercise per Week: 0 days    Minutes of Exercise per Session: 0 min     On average, how many days per week do you engage in moderate to strenuous exercise (like a

## 2023-08-10 NOTE — PATIENT INSTRUCTIONS
Personalized Preventive Plan for Ambar Ignacio - 8/10/2023  Medicare offers a range of preventive health benefits. Some of the tests and screenings are paid in full while other may be subject to a deductible, co-insurance, and/or copay. Some of these benefits include a comprehensive review of your medical history including lifestyle, illnesses that may run in your family, and various assessments and screenings as appropriate. After reviewing your medical record and screening and assessments performed today your provider may have ordered immunizations, labs, imaging, and/or referrals for you. A list of these orders (if applicable) as well as your Preventive Care list are included within your After Visit Summary for your review. Other Preventive Recommendations:    A preventive eye exam performed by an eye specialist is recommended every 1-2 years to screen for glaucoma; cataracts, macular degeneration, and other eye disorders. A preventive dental visit is recommended every 6 months. Try to get at least 150 minutes of exercise per week or 10,000 steps per day on a pedometer . Order or download the FREE \"Exercise & Physical Activity: Your Everyday Guide\" from [employer]. Call 6-238.290.9619 or search [employer] online. You need 4502-3009 mg of calcium and 7391-1882 IU of vitamin D per day. It is possible to meet your calcium requirement with diet alone, but a vitamin D supplement is usually necessary to meet this goal.  When exposed to the sun, use a sunscreen that protects against both UVA and UVB radiation with an SPF of 30 or greater. Reapply every 2 to 3 hours or after sweating, drying off with a towel, or swimming. Always wear a seat belt when traveling in a car. Always wear a helmet when riding a bicycle or motorcycle.

## 2023-08-18 DIAGNOSIS — F33.9 EPISODE OF RECURRENT MAJOR DEPRESSIVE DISORDER, UNSPECIFIED DEPRESSION EPISODE SEVERITY (HCC): ICD-10-CM

## 2023-08-18 RX ORDER — VENLAFAXINE HYDROCHLORIDE 150 MG/1
CAPSULE, EXTENDED RELEASE ORAL
Qty: 90 CAPSULE | Refills: 1 | Status: SHIPPED | OUTPATIENT
Start: 2023-08-18

## 2023-08-21 ENCOUNTER — OFFICE VISIT (OUTPATIENT)
Dept: FAMILY MEDICINE CLINIC | Age: 83
End: 2023-08-21
Payer: MEDICARE

## 2023-08-21 VITALS
WEIGHT: 240.1 LBS | DIASTOLIC BLOOD PRESSURE: 78 MMHG | BODY MASS INDEX: 34.37 KG/M2 | HEART RATE: 67 BPM | SYSTOLIC BLOOD PRESSURE: 120 MMHG | OXYGEN SATURATION: 96 % | HEIGHT: 70 IN | RESPIRATION RATE: 16 BRPM

## 2023-08-21 DIAGNOSIS — K59.09 OTHER CONSTIPATION: Primary | ICD-10-CM

## 2023-08-21 DIAGNOSIS — R19.5 CHANGE IN STOOL CALIBER: ICD-10-CM

## 2023-08-21 PROCEDURE — 99213 OFFICE O/P EST LOW 20 MIN: CPT

## 2023-08-21 PROCEDURE — 1123F ACP DISCUSS/DSCN MKR DOCD: CPT

## 2023-08-21 RX ORDER — DOCUSATE SODIUM 100 MG/1
100 CAPSULE, LIQUID FILLED ORAL 2 TIMES DAILY PRN
Qty: 180 CAPSULE | Refills: 1 | Status: SHIPPED | OUTPATIENT
Start: 2023-08-21

## 2023-08-21 ASSESSMENT — ENCOUNTER SYMPTOMS
NAUSEA: 0
ABDOMINAL PAIN: 0
BLOOD IN STOOL: 0
CONSTIPATION: 1
ANAL BLEEDING: 0
VOMITING: 0
ABDOMINAL DISTENTION: 0
DIARRHEA: 1
RECTAL PAIN: 0

## 2023-08-21 NOTE — PROGRESS NOTES
8/21/2023    Rohit Carrillo    Chief Complaint   Patient presents with    Constipation     Having issues with constipation last week. No bowel movement for 5 days. Took Dulcolax last wed or Thursday. Now he is having diarrhea. HPI  History was obtained from patient. Mauro Parker is a pleasant 82 y.o. male who presents today for concerns over constipation. Patient reports constipation X5 days and took Dulcolax which has resulted in diarrhea. Normally he has looser stools due to 2/5 of his colon being resected in 2020. He notes that prior to his constipation there was no significant changes to his diet, activities or medications that could explain the constipation. Denies blood in stools, black stools. Denies N/V, abdominal cramping. This is the first episode of constipation that he has had since his bowel resection. When he does have solid BMs- which are rare since his surgery he notes that they are formed, soft. Normally he has daily BMs in the mornings daily or every other day. He drinks a lot of water. He notes that he did have smaller in diameter stools prior to the constipation and these are continuing. His surgeon was Dr. Wally Mckay and has never followed with GI.       1. Other constipation    2. Change in stool caliber         REVIEW OF SYMPTOMS    Review of Systems   Gastrointestinal:  Positive for constipation and diarrhea. Negative for abdominal distention, abdominal pain, anal bleeding, blood in stool, nausea, rectal pain and vomiting. PAST MEDICAL HISTORY  Past Medical History:   Diagnosis Date    Decreased hearing 5/4/2019    Gout 5/4/2019    H/O cardiovascular stress test 06/24/2019    EF 41%,  Mild ischemia of lateral wall involving small to medium size of left ventricle. H/O echocardiogram 8/27/19    EF 48, Mod AR, Mild MR & TR    H/O percutaneous transluminal coronary angioplasty 02/06/2020    PCI to OM & Mid LAD,  PDA has 80-90 % tandem lesions but small vessel.     Hyperlipidemia 5/4/2019

## 2023-08-21 NOTE — PATIENT INSTRUCTIONS
[facility] - Mindy Ramires MD  [address], [address]  [place], [address]  [phone]

## 2023-08-22 ENCOUNTER — TELEPHONE (OUTPATIENT)
Dept: GASTROENTEROLOGY | Age: 83
End: 2023-08-22

## 2023-08-22 NOTE — TELEPHONE ENCOUNTER
Called pt. In regards to a referral for constipation and change in stool caliber. LM for pt to call back to make an appt.

## 2023-08-29 ENCOUNTER — TELEPHONE (OUTPATIENT)
Dept: GASTROENTEROLOGY | Age: 83
End: 2023-08-29

## 2023-08-29 NOTE — TELEPHONE ENCOUNTER
Called pt. In regards to a referral for constipation and change in stool caliber.  Made appt for pt to see dr. Jonel Faust on 9/13/23 @1:45pm

## 2023-09-11 ENCOUNTER — HOSPITAL ENCOUNTER (OUTPATIENT)
Age: 83
Discharge: HOME | End: 2023-09-11
Payer: MEDICARE

## 2023-09-11 DIAGNOSIS — E78.5 DYSLIPIDEMIA: ICD-10-CM

## 2023-09-11 LAB
ALBUMIN SERPL-MCNC: 4.1 GM/DL (ref 3.4–5)
ALP BLD-CCNC: 75 IU/L (ref 40–128)
ALT SERPL-CCNC: 21 U/L (ref 10–40)
ANION GAP SERPL CALCULATED.3IONS-SCNC: 14 MMOL/L (ref 4–16)
AST SERPL-CCNC: 24 IU/L (ref 15–37)
BILIRUB SERPL-MCNC: 0.4 MG/DL (ref 0–1)
BUN SERPL-MCNC: 16 MG/DL (ref 6–23)
CALCIUM SERPL-MCNC: 9.4 MG/DL (ref 8.3–10.6)
CHLORIDE BLD-SCNC: 105 MMOL/L (ref 99–110)
CHOLEST SERPL-MCNC: 155 MG/DL
CO2: 24 MMOL/L (ref 21–32)
CREAT SERPL-MCNC: 0.8 MG/DL (ref 0.9–1.3)
GFR SERPL CREATININE-BSD FRML MDRD: >60 ML/MIN/1.73M2
GLUCOSE SERPL-MCNC: 90 MG/DL (ref 70–99)
HDLC SERPL-MCNC: 36 MG/DL
LDLC SERPL CALC-MCNC: 76 MG/DL
POTASSIUM SERPL-SCNC: 4.6 MMOL/L (ref 3.5–5.1)
SODIUM BLD-SCNC: 143 MMOL/L (ref 135–145)
TOTAL PROTEIN: 6.6 GM/DL (ref 6.4–8.2)
TRIGL SERPL-MCNC: 214 MG/DL

## 2023-09-11 PROCEDURE — 80061 LIPID PANEL: CPT

## 2023-09-11 PROCEDURE — 36415 COLL VENOUS BLD VENIPUNCTURE: CPT

## 2023-09-11 PROCEDURE — 80053 COMPREHEN METABOLIC PANEL: CPT

## 2023-09-12 ENCOUNTER — HOSPITAL ENCOUNTER (OUTPATIENT)
Dept: PHYSICAL THERAPY | Age: 83
Setting detail: THERAPIES SERIES
Discharge: HOME | End: 2023-09-12
Payer: MEDICARE

## 2023-09-12 PROCEDURE — 97161 PT EVAL LOW COMPLEX 20 MIN: CPT

## 2023-09-12 PROCEDURE — 97110 THERAPEUTIC EXERCISES: CPT

## 2023-09-12 NOTE — PROGRESS NOTES
Outpatient Physical Therapy           [facility]           [] Phone: [phone]   Fax: [phone]  [facility]           [] Phone: [phone]   Fax: [phone]      To: Servando Small, *     From: Glenny Tracy, PT, PT     Patient: Angelita Franz                    : 1940  Diagnosis:  No admission diagnoses are documented for this encounter. Treatment Diagnosis:       Date: 2023  [x]  Progress Note                []  Discharge Note    Evaluation Date:   23  Total Visits to date:   1 Cancels/No-shows to date:  0    Subjective:  Steeg reports his balance has become disabling in the last 6 months. Has fallen twice. Has not been able to exercise much because he has been taking care of his wife after a TKA. Feels he is getting worn out more quickly. Feels he needs to start a workout routine again. He can mow the lawn. He still has his previous handouts, has been looking on Youtube for exercises for neuropathy.       Plan of Care/Treatment to date:  [x] Therapeutic Exercise    [] Modalities:  [x] Therapeutic Activity     [] Ultrasound  [] Electrical Stimulation  [x] Gait Training      [] Cervical Traction   [] Lumbar Traction  [x] Neuromuscular Re-education  [] Cold/hotpack [] Iontophoresis  [x] Instruction in HEP      Other:  [] Manual Therapy       []  Vasopneumatic  [] Aquatic Therapy       []   Dry Needle Therapy                      Objective/Significant Findings At Last Visit/Comments:      Observations: Patient ambulates without AD; noted decreased toe clearance and hip/knee flexion on RLE; increased YOKO and turned out positioning of R foot; does kylee his cane in the morning for extra support     Balance Screen:  Posture: Fair  Sitting - Static: Good  Sitting - Dynamic: Good  Standing - Static: Good  Standing - Dynamic: Fair    Tandem Stance R Leg: 10 seconds  Tandem Stance L Leg: 10 seconds    Safety awareness: Good     Left LE Strength  Right LE Strength      WFL except 4/5

## 2023-09-12 NOTE — FLOWSHEET NOTE
Outpatient Physical Therapy  [facility]           [x] Phone: [phone]   Fax: [phone]  Wymary Marroquin           [] Phone: [phone]   Fax: [phone]        Physical Therapy Daily Treatment Note  Date:  2023    Patient Name:  Stephy Andrade    :  1940  MRN: [id number]  Restrictions/Precautions: NONE  Diagnosis:   balance therapy  Date of Injury/Surgery: --  Treatment Diagnosis:  balance deficit  Insurance/Certification information: [facility] Medicare  Referring Physician:  Yeni Worley, *   PCP: Yeni Worley MD  Next Doctor Visit:  --  Plan of care signed (Y/N):  N, sent 23  Outcome Measure: ABC: see folder  Visit# / total visits:   1/10  Pain level: 0/10   Goals:     Patient goals: improve numbness and balance  Short term goals  Time Frame for Short term goals: 5 weeks  Pt demo I w/ HEP and symptom management   Pt demo Garcia balance score >49/56 to improve fall risk   Pt demo >10 sit to stands in 30 seconds without UE assist   Pt demo >4/5 BLE strength in all directions to improve tolerance to standing activity   Pt demo DGI score >19/24 to improve fall risk    Summary of Evaluation:   Patient is a 83 y/o of male who arrives to therapy with worsening balance and strength deficits. Pt c/o increased numbness in his hands and feet, increased falls, change in his walking. Pt demo deficits this date that include fall risk as classified by the Garcia balance assessment, gait deviations related to poor motor control in RLE > LLE, impaired sensation, difficulty with proprioception. Pt will benefit from PT intervention to address deficits listed above. Subjective:  See eval         Any changes in Ambulatory Summary Sheet?   None        Objective:  See eval           Exercises: (No more than 4 columns)   Exercise/Equipment Date Date Date           WARM UP      Nu Step               TABLE                                       STANDING      *Hip ABD      *mini Squat      *STS

## 2023-09-13 ENCOUNTER — HOSPITAL ENCOUNTER (OUTPATIENT)
Age: 83
Discharge: HOME | End: 2023-09-13
Payer: MEDICARE

## 2023-09-13 ENCOUNTER — OFFICE VISIT (OUTPATIENT)
Dept: GASTROENTEROLOGY | Age: 83
End: 2023-09-13
Payer: MEDICARE

## 2023-09-13 ENCOUNTER — HOSPITAL ENCOUNTER (OUTPATIENT)
Dept: GENERAL RADIOLOGY | Age: 83
Discharge: HOME | End: 2023-09-13
Payer: MEDICARE

## 2023-09-13 VITALS
WEIGHT: 239.8 LBS | OXYGEN SATURATION: 96 % | HEART RATE: 63 BPM | HEIGHT: 70 IN | DIASTOLIC BLOOD PRESSURE: 86 MMHG | SYSTOLIC BLOOD PRESSURE: 138 MMHG | BODY MASS INDEX: 34.33 KG/M2 | TEMPERATURE: 98.4 F

## 2023-09-13 DIAGNOSIS — K59.00 CONSTIPATION, UNSPECIFIED CONSTIPATION TYPE: ICD-10-CM

## 2023-09-13 DIAGNOSIS — Z90.49 HISTORY OF PARTIAL COLECTOMY: ICD-10-CM

## 2023-09-13 DIAGNOSIS — K59.00 CONSTIPATION, UNSPECIFIED CONSTIPATION TYPE: Primary | ICD-10-CM

## 2023-09-13 PROCEDURE — 1123F ACP DISCUSS/DSCN MKR DOCD: CPT | Performed by: INTERNAL MEDICINE

## 2023-09-13 PROCEDURE — 99203 OFFICE O/P NEW LOW 30 MIN: CPT | Performed by: INTERNAL MEDICINE

## 2023-09-13 PROCEDURE — 74018 RADEX ABDOMEN 1 VIEW: CPT

## 2023-09-13 RX ORDER — POLYETHYLENE GLYCOL 3350 17 G/17G
17 POWDER, FOR SOLUTION ORAL 2 TIMES DAILY
Qty: 1530 G | Refills: 1 | Status: SHIPPED | OUTPATIENT
Start: 2023-09-13 | End: 2023-12-12

## 2023-09-13 NOTE — PROGRESS NOTES
[facility]             MD Liyah Caceres Junior, MD             Lambert Madsen, APRN-CNP             [address], [address]             [phone] fax [phone]        Gastroenterology Clinic Consultation    Liyah Norwood MD  Encounter Date: 09/13/23     CC: Constipation (Hx of colon resection- he feels like he has a blockage )       Francisco Bowles, APRN - CNP  [address],  [address] [address]     History obtained from: patient, medical records     Subjective:       Xavier Gillis is an 82 y.o. male with past medical history of gout, CAD on Plavix, JANA history of colon polyps status post colon resection who presents for Constipation (Hx of colon resection- he feels like he has a blockage )    According to the patient he mentions he feels constipated more over the last year. Still has BM every year when he takes dulcolax but has currenlty stopped it. Has loose BM but feels incompletetly evacuated. Denies any melena or hematochezia, no abdominal pain, bloating, GERD, dysphagia, weight loss. On review of records patient was being followed up by Dr. Raul Hunt. He had a EGD and colonoscopy done in July 2019 by him which revealed esophagitis gastritis and duodenitis on the colonoscopy he had diverticulosis coli and hemorrhoids no blood noted in the upper or lower GI tract. Electrolytes, Cr noted to be normal, LFTs wnl on 9/11/23.       Patient Active Problem List   Diagnosis    Dyslipidemia    Depression    Osteoarthritis    Nocturia    Decreased hearing    Restless leg    Gout    Benign neoplasm of right choroid    Fuchs' corneal dystrophy    Left posterior capsular opacification    Macular pigment deposit    Macular scar    Post corneal transplant    Osteoarthritis of left knee    Nonexudative age-related macular degeneration, bilateral, intermediate dry stage    Heart palpitations    Precordial pain    Shortness of breath

## 2023-09-15 ENCOUNTER — HOSPITAL ENCOUNTER (OUTPATIENT)
Dept: PHYSICAL THERAPY | Age: 83
Setting detail: THERAPIES SERIES
Discharge: HOME | End: 2023-09-15
Payer: MEDICARE

## 2023-09-15 PROCEDURE — 97530 THERAPEUTIC ACTIVITIES: CPT

## 2023-09-15 PROCEDURE — 97110 THERAPEUTIC EXERCISES: CPT

## 2023-09-15 PROCEDURE — 97112 NEUROMUSCULAR REEDUCATION: CPT

## 2023-09-15 NOTE — FLOWSHEET NOTE
Outpatient Physical Therapy  [facility]           [x] Phone: [phone]   Fax: [phone]  Kurtis Willard           [] Phone: [phone]   Fax: [phone]        Physical Therapy Daily Treatment Note  Date:  9/15/2023    Patient Name:  Reece Good    :  1940  MRN: [id number]  Restrictions/Precautions: NONE  Diagnosis:   balance therapy  Date of Injury/Surgery: --  Treatment Diagnosis:  balance deficit  Insurance/Certification information: [facility] Medicare  Referring Physician:  Vlad Ferguson, Gigi   PCP: Vlad Ferguson MD  Next Doctor Visit:  --  Plan of care signed (Y/N):  N, sent 23  Outcome Measure: ABC: see folder  Visit# / total visits:   2/10  Pain level: 0/10   Goals:     Patient goals: improve numbness and balance  Short term goals  Time Frame for Short term goals: 5 weeks  Pt demo I w/ HEP and symptom management   Pt demo Garcia balance score >49/56 to improve fall risk   Pt demo >10 sit to stands in 30 seconds without UE assist   Pt demo >4/5 BLE strength in all directions to improve tolerance to standing activity   Pt demo DGI score >19/24 to improve fall risk    Summary of Evaluation:   Patient is a 81 y/o of male who arrives to therapy with worsening balance and strength deficits. Pt c/o increased numbness in his hands and feet, increased falls, change in his walking. Pt demo deficits this date that include fall risk as classified by the Garcia balance assessment, gait deviations related to poor motor control in RLE > LLE, impaired sensation, difficulty with proprioception. Pt will benefit from PT intervention to address deficits listed above. Subjective:  Pt reports he feel like he just can't move like he used to/like he wants to. Pt reports no falls in the last couple months! Any changes in Ambulatory Summary Sheet?   None      Objective:  See below       Exercises: (No more than 4 columns)   Exercise/Equipment Date 9/15/23   #2 Date           WARM UP      Nu

## 2023-09-19 ENCOUNTER — TELEPHONE (OUTPATIENT)
Dept: GASTROENTEROLOGY | Age: 83
End: 2023-09-19

## 2023-09-19 ENCOUNTER — HOSPITAL ENCOUNTER (OUTPATIENT)
Dept: PHYSICAL THERAPY | Age: 83
Discharge: HOME | End: 2023-09-19

## 2023-09-19 NOTE — TELEPHONE ENCOUNTER
Patient was informed of x ray results of moderate stool burden. He was advised to take miralax 3x times a day and follow up in office.

## 2023-09-21 ENCOUNTER — HOSPITAL ENCOUNTER (OUTPATIENT)
Dept: PHYSICAL THERAPY | Age: 83
Setting detail: THERAPIES SERIES
Discharge: HOME | End: 2023-09-21
Payer: MEDICARE

## 2023-09-21 ENCOUNTER — TELEPHONE (OUTPATIENT)
Dept: CARDIOLOGY CLINIC | Age: 83
End: 2023-09-21

## 2023-09-21 PROCEDURE — 97112 NEUROMUSCULAR REEDUCATION: CPT

## 2023-09-21 PROCEDURE — 97530 THERAPEUTIC ACTIVITIES: CPT

## 2023-09-21 PROCEDURE — 97110 THERAPEUTIC EXERCISES: CPT

## 2023-09-21 NOTE — FLOWSHEET NOTE
Step         Biodex/NuStep  L4 x 5' L6 x5'         TABLE                                       STANDING      *Hip ABD  1x12 R/L ea 2# 1x12 R/L    Hip ext  1x12 R/L ea 2# 1x12 R/L    *mini Squat      *STS  22\" 1x15 22\" 1x15   *Heel raises  2x10 2x10   Gastroc stretch  1 x 2' 1 x 2'   LAT step up, over, & return  4\" 1x16 4\" 1x10 over and back              PROPRIOCEPTION      Airex close stance   30\" EO SBA, 30\" EC with min/mod A   Marches on AIREX  1x15 R/L ea 2x10   Tandem/inline stance  30\" R/L ea fwd 2x30\" ea R/L  fwd   Uni-farmers carry  10# 1 lap R/L ea 10# 1 lap R/L ea         MODALITIES                      Other Therapeutic Activities/Education:  Patient received education on their current pathology and how their condition effects them with their functional activities. Patient understood discussion and questions were answered. Patient understands their activity limitations and understands rational for treatment progression. Home Exercise Program:  *HO issued, reviewed and discussed with patient. All questions answered. Pt agreed to comply. Manual Treatments:  --    Modalities:  --    Communication with other providers:  eval sent 9/12/23    Assessment:  (Response towards treatment session) (Pain Rating)  Vaneg tolerated therapy session well without increase complaints. Did require a couple seated rest breaks during session. Balance most challenged on airex with EC. Will continue with therapy progressing as tolerated. End pain 0/10    Patient is a 81 y/o of male who arrives to therapy with worsening balance and strength deficits. Pt c/o increased numbness in his hands and feet, increased falls, change in his walking. Pt demo deficits this date that include fall risk as classified by the Garcia balance assessment, gait deviations related to poor motor control in RLE > LLE, impaired sensation, difficulty with proprioception. Pt will benefit from PT intervention to address deficits listed above.      Plan

## 2023-09-27 ENCOUNTER — HOSPITAL ENCOUNTER (OUTPATIENT)
Dept: PHYSICAL THERAPY | Age: 83
Setting detail: THERAPIES SERIES
Discharge: HOME | End: 2023-09-27
Payer: MEDICARE

## 2023-09-27 PROCEDURE — 97530 THERAPEUTIC ACTIVITIES: CPT

## 2023-09-27 PROCEDURE — 97112 NEUROMUSCULAR REEDUCATION: CPT

## 2023-09-27 PROCEDURE — 97110 THERAPEUTIC EXERCISES: CPT

## 2023-09-27 NOTE — FLOWSHEET NOTE
WARM UP      Nu Step      L 7 x 5'   Biodex/NuStep L4 x 5' L6 x5'          TABLE                                       STANDING      *Hip ABD 1x12 R/L ea 2# 1x12 R/L  2# 1x12 R/L   Hip ext 1x12 R/L ea 2# 1x12 R/L  2# 1x12 R/L   *mini Squat      *STS 22\" 1x15 22\" 1x15 21\" 1x15   *Heel raises 2x10 2x10 2x10   Gastroc stretch 1 x 2' 1 x 2' 1 x 2'   LAT step up, over, & return 4\" 1x16 4\" 1x10 over and back 6\" 1x10 over and back              PROPRIOCEPTION      Airex close stance  30\" EO SBA, 30\" EC with min/mod A 30\" EO SBA, 30\" EC with min   Marches on AIREX 1x15 R/L ea 2x10 2x10   Tandem/inline stance 30\" R/L ea fwd 2x30\" ea R/L  fwd 2x30\" ea R/L  fwd   Uni-farmers carry 10# 1 lap R/L ea 10# 1 lap R/L ea 10# 1 lap R/L ea         MODALITIES                      Other Therapeutic Activities/Education:  Patient received education on their current pathology and how their condition effects them with their functional activities. Patient understood discussion and questions were answered. Patient understands their activity limitations and understands rational for treatment progression. Home Exercise Program:  *HO issued, reviewed and discussed with patient. All questions answered. Pt agreed to comply. Manual Treatments:  --    Modalities:  --    Communication with other providers:  eval sent 9/12/23    Assessment:  (Response towards treatment session) (Pain Rating)  Vaneg tolerated therapy session well without increase complaints. Balance most challenged on airex with EC but was somewhat improved today compared to previous session. Will continue with therapy progressing as tolerated. End pain 0/10    Patient is a 81 y/o of male who arrives to therapy with worsening balance and strength deficits. Pt c/o increased numbness in his hands and feet, increased falls, change in his walking.  Pt demo deficits this date that include fall risk as classified by the Garcia balance assessment, gait deviations related to poor

## 2023-09-29 ENCOUNTER — HOSPITAL ENCOUNTER (OUTPATIENT)
Dept: PHYSICAL THERAPY | Age: 83
Setting detail: THERAPIES SERIES
Discharge: HOME | End: 2023-09-29
Payer: MEDICARE

## 2023-09-29 PROCEDURE — 97530 THERAPEUTIC ACTIVITIES: CPT

## 2023-09-29 PROCEDURE — 97112 NEUROMUSCULAR REEDUCATION: CPT

## 2023-09-29 NOTE — FLOWSHEET NOTE
Outpatient Physical Therapy  [facility]           [x] Phone: [phone]   Fax: [phone]  Sarita Ruiz           [] Phone: [phone]   Fax: [phone]        Physical Therapy Daily Treatment Note  Date:  2023    Patient Name:  Claribel Dimas    :  1940  MRN: [id number]  Restrictions/Precautions: NONE  Diagnosis:   balance therapy  Date of Injury/Surgery: --  Treatment Diagnosis:  balance deficit  Insurance/Certification information: [facility] Medicare  Referring Physician:  Fernando Nicole, *   PCP: Fernando Nicole MD  Next Doctor Visit:  --  Plan of care signed (Y/N):  N, sent 23  Outcome Measure: ABC: see folder  Visit# / total visits:   5/10  Pain level: 0/10   Goals:     Patient goals: improve numbness and balance  Short term goals  Time Frame for Short term goals: 5 weeks  Pt demo I w/ HEP and symptom management   Pt demo Garcia balance score >49/56 to improve fall risk   Pt demo >10 sit to stands in 30 seconds without UE assist   Pt demo >4/5 BLE strength in all directions to improve tolerance to standing activity   Pt demo DGI score >19/24 to improve fall risk    Summary of Evaluation:   Patient is a 83 y/o of male who arrives to therapy with worsening balance and strength deficits. Pt c/o increased numbness in his hands and feet, increased falls, change in his walking. Pt demo deficits this date that include fall risk as classified by the Garcia balance assessment, gait deviations related to poor motor control in RLE > LLE, impaired sensation, difficulty with proprioception. Pt will benefit from PT intervention to address deficits listed above. Subjective:  Pt reports he has not been able to do his exercises today as he has been busy all day. Any changes in Ambulatory Summary Sheet?   None      Objective:  See below   Min cueing given for correct technique     Exercises: (No more than 4 columns)   Exercise/Equipment 9/15/23   #2 Date 23 #3 23 #4 23  #5

## 2023-10-02 ENCOUNTER — HOSPITAL ENCOUNTER (OUTPATIENT)
Dept: PHYSICAL THERAPY | Age: 83
Setting detail: THERAPIES SERIES
Discharge: HOME | End: 2023-10-02
Payer: MEDICARE

## 2023-10-02 PROCEDURE — 97112 NEUROMUSCULAR REEDUCATION: CPT

## 2023-10-02 NOTE — FLOWSHEET NOTE
Nu Step    L7 x 5' Lv5 5'   Biodex/NuStep          TABLE                                 STANDING     *Hip ABD 3# 1x10 R/L ea 3# 2x10 ea side   Hip ext 3# 1x10 R/L ea 3# 2x10 ea side   *mini Squat     *STS     *Heel raises 1x25 2x10   Gastroc stretch 1x2' foam wedge 30\" x3   LAT step up, over, & return 6\" 1x10 6\" x10 ea side             PROPRIOCEPTION     Airex close stance 30\" EO 30\" EO/EC   Marches on AIREX 1x20 R/L ea/alt X20 alt    Tandem/inline stance 60\" R/L ea fwd 30\" x2 ea side   Uni-farmers carry 10#KB 1lap R/L ea 15# DB x1 lap ea hand        MODALITIES                   Other Therapeutic Activities/Education:  Patient received education on their current pathology and how their condition effects them with their functional activities. Patient understood discussion and questions were answered. Patient understands their activity limitations and understands rational for treatment progression. Home Exercise Program:  *HO issued, reviewed and discussed with patient. All questions answered. Pt agreed to comply. Manual Treatments:  --    Modalities:  --    Communication with other providers:  natividad sent 9/12/23    Assessment:  Rohit demonstrates good tolerance to today's session without increased pain. He recalls exercises well but has not been compliant at home. Recommended completing two times a week independently. End pain 0/10    Patient is a 83 y/o of male who arrives to therapy with worsening balance and strength deficits. Pt c/o increased numbness in his hands and feet, increased falls, change in his walking. Pt demo deficits this date that include fall risk as classified by the Garcia balance assessment, gait deviations related to poor motor control in RLE > LLE, impaired sensation, difficulty with proprioception. Pt will benefit from PT intervention to address deficits listed above.      Plan for Next Session:      Time In / Time Out:    3465-0791    Timed Code/Total Treatment Minutes:     (3)

## 2023-10-04 ENCOUNTER — HOSPITAL ENCOUNTER (OUTPATIENT)
Dept: PHYSICAL THERAPY | Age: 83
Setting detail: THERAPIES SERIES
Discharge: HOME | End: 2023-10-04
Payer: MEDICARE

## 2023-10-04 PROCEDURE — 97112 NEUROMUSCULAR REEDUCATION: CPT

## 2023-10-04 NOTE — FLOWSHEET NOTE
address deficits listed above.      Plan for Next Session:      Time In / Time Out:    4622/5478    Timed Code/Total Treatment Minutes:    27/61 (3) NEURO    Next Progress Note due:  10th visit    Plan of Care Interventions:  [x] Therapeutic Exercise  [] Modalities:  [x] Therapeutic Activity     [] Ultrasound  [] Estim  [x] Gait Training      [] Cervical Traction [] Lumbar Traction  [x] Neuromuscular Re-education    [] Cold/hotpack [] Iontophoresis   [x] Instruction in HEP      [] Vasopneumatic   [] Dry Needling    [] Manual Therapy               [] Aquatic Therapy           Electronically signed by:  Ermelinda Valdovinos PTA       10/4/2023, 3:20 PM

## 2023-10-10 ENCOUNTER — HOSPITAL ENCOUNTER (OUTPATIENT)
Dept: PHYSICAL THERAPY | Age: 83
Setting detail: THERAPIES SERIES
Discharge: HOME | End: 2023-10-10
Payer: MEDICARE

## 2023-10-10 PROCEDURE — 97112 NEUROMUSCULAR REEDUCATION: CPT

## 2023-10-10 NOTE — FLOWSHEET NOTE
9/29/23  #5 10/2/23 #6 10/10/23 #7           WARM UP      Nu Step    L7 x 5' Lv5 5' Lv5 5'   Biodex/NuStep            TABLE                                       STANDING      *Hip ABD 3# 1x10 R/L ea 3# 2x10 ea side YTB 2x10   Hip ext 3# 1x10 R/L ea 3# 2x10 ea side YTB 2x10   *mini Squat      *STS      *Heel raises 1x25 2x10 x20   Gastroc stretch 1x2' foam wedge 30\" x3    LAT step up, over, & return 6\" 1x10 6\" x10 ea side 6\" ant x20 ea side    Shuttle Press        2x10 3C DL   PROPRIOCEPTION      Airex close stance 30\" EO 30\" EO/EC 30\" EO/EC   Marches on AIREX 1x20 R/L ea/alt X20 alt  X20 alt EO   Tandem/inline stance 60\" R/L ea fwd 30\" x2 ea side    Uni-farmers carry 10#KB 1lap R/L ea 15# DB x1 lap ea hand    Gait    Ball bounce x1 lap   MODALITIES                      Other Therapeutic Activities/Education:  Patient received education on their current pathology and how their condition effects them with their functional activities. Patient understood discussion and questions were answered. Patient understands their activity limitations and understands rational for treatment progression. Home Exercise Program:  *HO issued, reviewed and discussed with patient. All questions answered. Pt agreed to comply. Manual Treatments:  --    Modalities:  --    Communication with other providers:  orlandoal sent 9/12/23    Assessment:  Rohit demonstrates good tolerance to today's session without increased pain. He is able to tolerate all exercises and has minimal sway with balance activities. Will likely complete discharge next week following his final visit. End pain 0/10    Patient is a 81 y/o of male who arrives to therapy with worsening balance and strength deficits. Pt c/o increased numbness in his hands and feet, increased falls, change in his walking.  Pt demo deficits this date that include fall risk as classified by the Garcia balance assessment, gait deviations related to poor motor control in RLE > LLE, impaired

## 2023-10-13 ENCOUNTER — HOSPITAL ENCOUNTER (OUTPATIENT)
Dept: PHYSICAL THERAPY | Age: 83
Discharge: HOME | End: 2023-10-13

## 2023-10-13 NOTE — FLOWSHEET NOTE
Physical Therapy  Cancellation/No-show Note  Patient Name:  Claribel Dimas  :  1940   Date:  10/13/2023  Cancelled visits to date: 2  No-shows to date: 0    For today's appointment patient:  [x]  Cancelled  []  Rescheduled appointment  []  No-show     Reason given by patient:  []  Patient ill  []  Conflicting appointment  []  No transportation    []  Conflict with work  []  No reason given  [x]  Other:     Comments:  Pt phoned in and said [facility] can't get away, he's in the middle of something. \"    Electronically signed by:  Milvia Mayfield, PTA [id number]

## 2023-10-16 ENCOUNTER — HOSPITAL ENCOUNTER (OUTPATIENT)
Dept: PHYSICAL THERAPY | Age: 83
Setting detail: THERAPIES SERIES
Discharge: HOME | End: 2023-10-16
Payer: MEDICARE

## 2023-10-16 PROCEDURE — 97112 NEUROMUSCULAR REEDUCATION: CPT

## 2023-10-16 NOTE — FLOWSHEET NOTE
Outpatient Physical Therapy  [place]           [x] Phone: [phone]   Fax: [phone]  [facility]           [] Phone: [phone]   Fax: [phone]        Physical Therapy Daily Treatment Note  Date:  10/16/2023    Patient Name:  Samm Ghotra    :  1940  MRN: [id number]  Restrictions/Precautions: NONE  Diagnosis:   balance therapy  Date of Injury/Surgery: --  Treatment Diagnosis:  balance deficit  Insurance/Certification information: [facility] Medicare  Referring Physician:  Judy Laguerre, Gigi   PCP: Judy Laguerre MD  Next Doctor Visit:  --  Plan of care signed (Y/N):  N, sent 23  Outcome Measure: ABC: see folder  Visit# / total visits:   8/10  Pain level: 0/10   Goals:     Patient goals: improve numbness and balance  Short term goals  Time Frame for Short term goals: 5 weeks  Pt demo I w/ HEP and symptom management   Pt demo Garcia balance score >49/56 to improve fall risk   Pt demo >10 sit to stands in 30 seconds without UE assist   Pt demo >4/5 BLE strength in all directions to improve tolerance to standing activity   Pt demo DGI score >19/24 to improve fall risk    Summary of Evaluation:   Patient is a 83 y/o of male who arrives to therapy with worsening balance and strength deficits. Pt c/o increased numbness in his hands and feet, increased falls, change in his walking. Pt demo deficits this date that include fall risk as classified by the Garcia balance assessment, gait deviations related to poor motor control in RLE > LLE, impaired sensation, difficulty with proprioception. Pt will benefit from PT intervention to address deficits listed above. Subjective:  Pt reports he is not feeling good today. He broke two teeth this weekend when he was eating. Any changes in Ambulatory Summary Sheet?   None    Objective:    Min cueing given for correct technique  Does not require many rest breaks throughout the treatment    Exercises: (No more than 4 columns)   Exercise/Equipment

## 2023-10-17 ENCOUNTER — OFFICE VISIT (OUTPATIENT)
Dept: CARDIOLOGY CLINIC | Age: 83
End: 2023-10-17
Payer: MEDICARE

## 2023-10-17 VITALS
BODY MASS INDEX: 32.37 KG/M2 | WEIGHT: 239 LBS | DIASTOLIC BLOOD PRESSURE: 68 MMHG | HEART RATE: 66 BPM | HEIGHT: 72 IN | SYSTOLIC BLOOD PRESSURE: 110 MMHG | OXYGEN SATURATION: 95 %

## 2023-10-17 DIAGNOSIS — G47.33 OSA (OBSTRUCTIVE SLEEP APNEA): ICD-10-CM

## 2023-10-17 DIAGNOSIS — Z87.891 EX-SMOKER: ICD-10-CM

## 2023-10-17 DIAGNOSIS — I25.10 CAD IN NATIVE ARTERY: ICD-10-CM

## 2023-10-17 DIAGNOSIS — I95.1 ORTHOSTATIC HYPOTENSION: Primary | ICD-10-CM

## 2023-10-17 DIAGNOSIS — E78.5 DYSLIPIDEMIA: ICD-10-CM

## 2023-10-17 DIAGNOSIS — I49.3 PVC (PREMATURE VENTRICULAR CONTRACTION): ICD-10-CM

## 2023-10-17 PROCEDURE — 99214 OFFICE O/P EST MOD 30 MIN: CPT | Performed by: NURSE PRACTITIONER

## 2023-10-17 PROCEDURE — 1123F ACP DISCUSS/DSCN MKR DOCD: CPT | Performed by: NURSE PRACTITIONER

## 2023-10-17 ASSESSMENT — ENCOUNTER SYMPTOMS
COUGH: 0
SHORTNESS OF BREATH: 0

## 2023-10-17 NOTE — PATIENT INSTRUCTIONS
**It is YOUR responsibilty to bring medication bottles and/or updated medication list to [facility]. This will allow us to better serve you and all your healthcare needs**   [facility] Locations - No appointment necessary. Sites open Monday to Friday. Call your preferred location for test preparation, business   hours and other information you need. [employer] accepts 's. [address] [place] [address]. [address], [address]  Phone: [phone]    Thank you for allowing us to care for you today! We want to ensure we can follow your treatment plan and we strive to give you the best outcomes and experience possible. If you ever have a life threatening emergency and call 911 - for an ambulance (EMS)   Our providers can only care for you at:   [facility] or [facility]. Even if you have someone take you or you drive yourself we can only care for you in a [facility]. Our providers are not setup at the other healthcare locations! Please be informed that if you contact our office outside of normal business hours the physician on call cannot help with any scheduling or rescheduling issues, procedure instruction questions or any type of medication issue. We advise you for any urgent/emergency that you go to the nearest emergency room! PLEASE CALL OUR OFFICE DURING NORMAL BUSINESS HOURS    Monday - Friday   8 am to 5 pm    [place]: [address] [place]: [phone]    [place]:  [phone]  We are committed to providing you the best care possible. If you receive a survey after visiting one of our offices, please take time to share your experience concerning your physician office visit. These surveys are confidential and no health information about you is shared. We are eager to improve for you and we are counting on your feedback to help make that happen.

## 2023-10-17 NOTE — PROGRESS NOTES
examination of the internal auditory canals in July 2022 showed small SDH   Reduction of metoprolol has helped. Fatigue  TSH is normal.    Improved now that he can wear his cpap again. Heart palpitations  EF is also low normal at 50% . he had multifocal PVCs, Holter shows 17 % PVC burden  HE snores at night . HE has less energy which improved after his anemia has resolved. After pci and  Increased metoprolol to 50 mg bid the  repeat holter showed reduction in PVC from 17 % to 2%. Unfortunately had to decrease metoprolol to 25 mg BID, no recent monitor, but he is not having issues therefore will monitor for now. Dyslipidemia   All available lab work was reviewed. Necessary orders were placed , instructions given by myself    Latest Reference Range & Units 09/11/23 10:34   Cholesterol, Total <200 MG/   HDL Cholesterol >40 MG/DL 36 (L)   LDL Calculated <100 MG/DL 76   Triglycerides <150 MG/ (H)   (L): Data is abnormally low  (H): Data is abnormally high  He is on pravastatin  Encouraged exercise. He is near goal and much improved since staring pravastatin. Counseled extensively and medication compliance urged. We discussed that for the  prevention of ASCVD our  goal is aggressive risk modification. Patient is encouraged to exercise even a brisk walk for 30 minutes  at least 3 to 4 times a week   Various goals were discussed and questions answered. Continue current medications. Appropriate prescriptions are addressed and refills ordered. Questions answered and patient verbalizes understanding. Call for any problems, questions, or concerns. Return in about 6 months (around 4/17/2024). An electronic signature was used to authenticate this note.     Electronically signed by MAC Mcadams CNP on 10/17/2023 at 10:12 AM

## 2023-10-18 ENCOUNTER — HOSPITAL ENCOUNTER (OUTPATIENT)
Dept: PHYSICAL THERAPY | Age: 83
Setting detail: THERAPIES SERIES
Discharge: HOME | End: 2023-10-18
Payer: MEDICARE

## 2023-10-18 PROCEDURE — 97112 NEUROMUSCULAR REEDUCATION: CPT

## 2023-10-18 NOTE — DISCHARGE SUMMARY
Outpatient Physical Therapy           [place]           [] Phone: [phone]   Fax: [phone]  [facility]           [] Phone: [phone]   Fax: [phone]      To: Ricardo Felipe, *     From: Chadd Shaw, PT, PT     Patient: Juan Manuel Wilson                    : 1940  Diagnosis:  No admission diagnoses are documented for this encounter. Treatment Diagnosis:       Date: 10/18/2023  []  Progress Note                [x]  Discharge Note    Evaluation Date:   23  Total Visits to date:   9 Cancels/No-shows to date:  0    Subjective:  Rohit reports he is disappointment in his status not going to change. He has not fallen. Still not doing his exercises at home.       Plan of Care/Treatment to date:  [x] Therapeutic Exercise    [] Modalities:  [x] Therapeutic Activity     [] Ultrasound  [] Electrical Stimulation  [x] Gait Training      [] Cervical Traction   [] Lumbar Traction  [x] Neuromuscular Re-education  [] Cold/hotpack [] Iontophoresis  [x] Instruction in HEP      Other:  [] Manual Therapy       []  Vasopneumatic  [] Aquatic Therapy       []   Dry Needle Therapy                      Objective/Significant Findings At Last Visit/Comments:      Observations: Patient ambulates without AD; noted decreased toe clearance and hip/knee flexion on RLE; increased YOKO and turned out positioning of R foot; does kylee his cane in the morning for extra support     Balance Screen:  Posture: Fair  Sitting - Static: Good  Sitting - Dynamic: Good  Standing - Static: Good  Standing - Dynamic: Fair    Tandem Stance R Le seconds  Tandem Stance L Le seconds    Safety awareness: Good     Left LE Strength  Right LE Strength      WFL except 5/5 hip flexion, 4+/5 knee extension/flexion WFL except 5/5 hip flexion 4+/5 knee flexion, 4+/5 knee extension        30 STS: 11 times without UE assist, no LOB  6 MWT: 1,008 ft without AD    Garcia Balance: 49/56    Assessment:   Rohit has completed 9 visits of

## 2023-10-18 NOTE — FLOWSHEET NOTE
Outpatient Physical Therapy  [place]           [x] Phone: [phone]   Fax: [phone]  [facility]           [] Phone: [phone]   Fax: [phone]        Physical Therapy Daily Treatment Note  Date:  10/18/2023    Patient Name:  Juan Manuel Wilson    :  1940  MRN: [id number]  Restrictions/Precautions: NONE  Diagnosis:   balance therapy  Date of Injury/Surgery: --  Treatment Diagnosis:  balance deficit  Insurance/Certification information: [facility] Medicare  Referring Physician:  Ricardo Felipe, *   PCP: Ricardo Felipe MD  Next Doctor Visit:  --  Plan of care signed (Y/N):  N, sent 23  Outcome Measure: ABC: see folder  Visit# / total visits:   9/10  Pain level: 0/10   Goals:     Patient goals: improve numbness and balance  Short term goals  Time Frame for Short term goals: 5 weeks  Pt demo I w/ HEP and symptom management   Pt demo Garcia balance score >49/56 to improve fall risk Not met, 42/56  Pt demo >10 sit to stands in 30 seconds without UE assist MET  Pt demo >4/5 BLE strength in all directions to improve tolerance to standing activity MET  Pt demo DGI score >19/24 to improve fall risk Not assessed     Summary of Evaluation:   Patient is a 83 y/o of male who arrives to therapy with worsening balance and strength deficits. Pt c/o increased numbness in his hands and feet, increased falls, change in his walking. Pt demo deficits this date that include fall risk as classified by the Garcia balance assessment, gait deviations related to poor motor control in RLE > LLE, impaired sensation, difficulty with proprioception. Pt will benefit from PT intervention to address deficits listed above. Subjective:  Steeg reports he is disappointment in his status not going to change. He has not fallen. Still not doing his exercises at home. Any changes in Ambulatory Summary Sheet?   None    Objective:    Min cueing given for correct technique  Does not require many rest breaks throughout the

## 2023-10-23 ENCOUNTER — OFFICE VISIT (OUTPATIENT)
Dept: FAMILY MEDICINE CLINIC | Age: 83
End: 2023-10-23
Payer: MEDICARE

## 2023-10-23 VITALS
DIASTOLIC BLOOD PRESSURE: 80 MMHG | OXYGEN SATURATION: 96 % | HEART RATE: 56 BPM | WEIGHT: 238 LBS | SYSTOLIC BLOOD PRESSURE: 134 MMHG | HEIGHT: 72 IN | BODY MASS INDEX: 32.23 KG/M2

## 2023-10-23 DIAGNOSIS — M15.9 OSTEOARTHRITIS OF MULTIPLE JOINTS, UNSPECIFIED OSTEOARTHRITIS TYPE: ICD-10-CM

## 2023-10-23 DIAGNOSIS — G47.33 OSA (OBSTRUCTIVE SLEEP APNEA): ICD-10-CM

## 2023-10-23 DIAGNOSIS — N40.0 BENIGN PROSTATIC HYPERPLASIA WITHOUT LOWER URINARY TRACT SYMPTOMS: ICD-10-CM

## 2023-10-23 DIAGNOSIS — F33.9 EPISODE OF RECURRENT MAJOR DEPRESSIVE DISORDER, UNSPECIFIED DEPRESSION EPISODE SEVERITY (HCC): ICD-10-CM

## 2023-10-23 DIAGNOSIS — G60.9 NEUROPATHY, IDIOPATHIC: Primary | ICD-10-CM

## 2023-10-23 DIAGNOSIS — I25.10 CAD IN NATIVE ARTERY: ICD-10-CM

## 2023-10-23 DIAGNOSIS — E66.9 OBESITY (BMI 30-39.9): ICD-10-CM

## 2023-10-23 PROCEDURE — 99214 OFFICE O/P EST MOD 30 MIN: CPT | Performed by: FAMILY MEDICINE

## 2023-10-23 PROCEDURE — 1123F ACP DISCUSS/DSCN MKR DOCD: CPT | Performed by: FAMILY MEDICINE

## 2023-10-23 RX ORDER — GABAPENTIN 100 MG/1
100 CAPSULE ORAL NIGHTLY
Qty: 30 CAPSULE | Refills: 1 | Status: SHIPPED | OUTPATIENT
Start: 2023-10-23 | End: 2023-12-22

## 2023-10-23 ASSESSMENT — ENCOUNTER SYMPTOMS
BLOOD IN STOOL: 0
TROUBLE SWALLOWING: 0
CHEST TIGHTNESS: 0
NAUSEA: 0
ABDOMINAL PAIN: 0
VOMITING: 0
SHORTNESS OF BREATH: 0
APNEA: 1
EYE PAIN: 0
DIARRHEA: 0
WHEEZING: 0

## 2023-10-23 NOTE — PROGRESS NOTES
10/23/2023    Rohit Carrillo    Chief Complaint   Patient presents with    Follow-up       HPI  History was obtained from the patient. Nam Wills is a 82 y.o. male who presents today with routine recheck. Patient states overall has been doing well partially doing through some dental work currently still having a lot of neuropathy symptoms saw neurology about a year ago extensive work-up was done. He did not think they can help him apparently had a not sure otherwise patient says sleep apnea stable 7 little bit of balance issues but for the most part has not gotten worse his neuropathy as stated above uses a cane and has not fallen recently mood is reasonable at this time arthritis is about the same no gout flares noted hearing stable denies chest pain follows closely with specialist.  BPH is unchanged. We discussed situation with neuropathy he is willing to try something after discussion apparently never been on gabapentin we will try something at low-dose such as that. Labs were done in September 23 by cardiology we will place a couple follow-up labs also. In addition we discussed his immunizations. Patient is planning on getting RSV at pharmacy has had his high-dose flu shot defers currently on COVID shot. Ruma Ruiz REVIEW OF SYMPTOMS    Review of Systems   Constitutional:  Negative for activity change and fatigue. HENT:  Positive for hearing loss. Negative for congestion, mouth sores and trouble swallowing. Eyes:  Negative for pain and visual disturbance. Respiratory:  Positive for apnea. Negative for chest tightness, shortness of breath and wheezing. Cardiovascular:  Negative for chest pain and palpitations. Patient with known vascular disease follows with specialist.   Gastrointestinal:  Negative for abdominal pain, blood in stool, diarrhea, nausea and vomiting. Endocrine: Negative for cold intolerance, polydipsia and polyuria. Genitourinary:  Negative for dysuria, frequency and urgency.

## 2023-10-24 ENCOUNTER — TELEPHONE (OUTPATIENT)
Dept: FAMILY MEDICINE CLINIC | Age: 83
End: 2023-10-24

## 2023-11-14 ENCOUNTER — OFFICE VISIT (OUTPATIENT)
Dept: PULMONOLOGY | Age: 83
End: 2023-11-14
Payer: MEDICARE

## 2023-11-14 VITALS
HEART RATE: 56 BPM | WEIGHT: 261 LBS | OXYGEN SATURATION: 98 % | DIASTOLIC BLOOD PRESSURE: 68 MMHG | HEIGHT: 72 IN | SYSTOLIC BLOOD PRESSURE: 116 MMHG | BODY MASS INDEX: 35.35 KG/M2

## 2023-11-14 DIAGNOSIS — E66.01 SEVERE OBESITY (BMI 35.0-39.9) WITH COMORBIDITY (HCC): ICD-10-CM

## 2023-11-14 DIAGNOSIS — E66.9 OBESITY (BMI 30-39.9): ICD-10-CM

## 2023-11-14 DIAGNOSIS — G47.33 OSA (OBSTRUCTIVE SLEEP APNEA): ICD-10-CM

## 2023-11-14 DIAGNOSIS — G47.10 HYPERSOMNIA: ICD-10-CM

## 2023-11-14 DIAGNOSIS — Z87.891 EX-SMOKER: ICD-10-CM

## 2023-11-14 PROCEDURE — 99214 OFFICE O/P EST MOD 30 MIN: CPT | Performed by: INTERNAL MEDICINE

## 2023-11-14 PROCEDURE — 1123F ACP DISCUSS/DSCN MKR DOCD: CPT | Performed by: INTERNAL MEDICINE

## 2023-11-14 ASSESSMENT — ENCOUNTER SYMPTOMS
EYE DISCHARGE: 0
BACK PAIN: 0
ABDOMINAL PAIN: 0
EYE ITCHING: 0
ABDOMINAL DISTENTION: 0
SHORTNESS OF BREATH: 0
COUGH: 0

## 2023-11-14 NOTE — PROGRESS NOTES
Kamari Myrick  1940  Referring Provider: Ronda Baca MD    Subjective:     Chief Complaint   Patient presents with    Follow-up     1 year sleep lab f/u. DME is [employer] faxed for report       HPI  Sadiq Santiago is a 82 y.o. male has come back as a follow up. He has mild JANA with EDS. He is on a CPAP of 6 cm h20 which he is using it about 6 to 8 hours. He has used it for 4/30 days (13%) as he had eye surgery partial corneal transplant. He says that it is helping him. He has no loss of weight. He has a FFM. He is sleepy or tired during the day time. His 2 week download data showed a residual AHI of 0.3 and leak is 0.7 L/min    Current Outpatient Medications   Medication Sig Dispense Refill    gabapentin (NEURONTIN) 100 MG capsule Take 1 capsule by mouth nightly for 60 days.  Intended supply: 30 days 30 capsule 1    venlafaxine (EFFEXOR XR) 150 MG extended release capsule Take one Capsule every morning 90 capsule 1    metoprolol tartrate (LOPRESSOR) 25 MG tablet Take 1 tablet by mouth 2 times daily 180 tablet 3    pravastatin (PRAVACHOL) 20 MG tablet Take 1 tablet by mouth daily 90 tablet 3    finasteride (PROSCAR) 5 MG tablet Take 1 tablet by mouth daily Take 1 tablet daily 90 tablet 1    clopidogrel (PLAVIX) 75 MG tablet Take 1 tablet by mouth daily TAKE 1 TABLET DAILY 90 tablet 1    allopurinol (ZYLOPRIM) 300 MG tablet TAKE 1 TABLET DAILY 90 tablet 1    nitroGLYCERIN (NITROSTAT) 0.4 MG SL tablet Place 1 tablet under the tongue every 5 minutes as needed for Chest pain 25 tablet 0    Multiple Vitamins-Minerals (ICAPS AREDS 2 PO) Take by mouth      prednisoLONE acetate (PRED FORTE) 1 % ophthalmic suspension Place 1 drop into both eyes in the morning, at noon, in the evening, and at bedtime      Cyanocobalamin (VITAMIN B 12) 500 MCG TABS Take 1 tablet by mouth daily 30 tablet 3    Multiple Vitamins-Minerals (OCUVITE EXTRA) TABS Take 1 tablet by mouth daily       No current facility-administered medications for this

## 2023-11-28 DIAGNOSIS — M10.9 GOUT, UNSPECIFIED CAUSE, UNSPECIFIED CHRONICITY, UNSPECIFIED SITE: ICD-10-CM

## 2023-11-28 RX ORDER — ALLOPURINOL 300 MG/1
TABLET ORAL
Qty: 90 TABLET | Refills: 1 | Status: SHIPPED | OUTPATIENT
Start: 2023-11-28

## 2023-12-07 RX ORDER — CLOPIDOGREL BISULFATE 75 MG/1
75 TABLET ORAL DAILY
Qty: 90 TABLET | Refills: 1 | Status: SHIPPED | OUTPATIENT
Start: 2023-12-07

## 2023-12-20 NOTE — TELEPHONE ENCOUNTER
NUTRITION INTERVENTION:  Low FODMAP Elimination and Reintroduction Diet     Long Term Goals:   Goal: 1-2 bowl movement daily Latty Stool Type 4 soft and formed  Goal: Decrease digestive symptoms -loose stool, gas, bloating, and constipation   Goal: weight loss and maintain a healthy weight     Short Term Goals:  Goal 1: Cut back on wheat/gluten and dairy. Focus on reviewing high FODMAP foods (info below and handout provided) specifically, onion, garlic, legums/beans, nuts - almonds, coconut milk, cashews to see if they trigger symptoms.   .      Goal 2: Improvement Shown: Focus on starting a low fodmap smoothie for breakfast with focus on adding in adequate fiber. See recipes provided and try to add in some collagen protein to the smoothies that you already have on hand.      Also, check out some of the other breakfast ideas provided such as oatmeal (naturally gluten free) with dairy free alternative milk, flax seed, nuts such as walnuts, fruit such as berries and protein such as chicken/turkey sausage.     Try an 1/8 avocado or 1 tbsp of nut butter ex peanut butter on slice of gluten or sourdough bread - peanuts are low in fodmaps but a legume so monitor if trigger symptoms. Can also try some other nut butters such as almond or sunflower these are higher fodmap foods but important for you to identify if they trigger symptoms.         Goal 3: Improvement Shown:  Try to add in a snack at least 1-2x per day with focus on adding in extra fiber. Meal and snack planning can be challenging at the best of times, particularly for those of us following a low FODMAP diet. Snacks are an important part of meal planning, as they can help to fill the gaps nutritionally i.e. snacking on yoghurt/cheese and biscuits to top up calcium intake for the day. However, for some people and at certain times of day (e.g. in between work and dinner), snacking can become problematic not just from a FODMAP point of view, but also in regards  LV 1-28-22  NA 5-31-22  [facility] to calorie intake and general healthy eating. Here are some ohealthy low FODMAP snack ideas. It is important that also monitor how you handle dairy from cheese for example it could trigger symptoms even if a low fodmap.     Tips and tricks:   Mix and match your food groups - adding a source of protein to your snacks will keep you feeling kinsey for longer. E.g. peanut butter on rice cakes or gluten free toast, yoghurt & fruit, cheese & crackers.- try [employer] Mills Chips - monitor if the almond flour triggers symptoms as almonds are higher fodmap sometimes you can consume small portions and still be ok and or try Siete Chips as they are gluten/wheat free.     Get organised at the start of the week - although meal prepping can be laborious, you will thank yourself for putting some extra time aside for snacks. See our snacks that require preparation below.      Have food handy for when you are on the move - you might have pre-packaged snacks ready in your bag or drawer at work (e.g. pack of mixed nuts, fruit or even jar of brazil nuts), or in the fridge at home (e.g. chopped up vegetables and dips).     Go for dairy - a low FODMAP diet does not mean a low dairy diet! Dairy is a great source of protein, calcium, and other vitamins/minerals with plenty of suitable lactose-free options available for those following a lactose-free diet. On-the-go pouch yoghurts (look for low sugar and monitor symptoms), as well as cheese & crackers are excellent choices for those who are busy and might find themselves snacking on the move.     Boost your fibre intake: eating fibre containing foods helps us to feel kinsey for longer. Keep the peels on the low FODMAP fruits and vegetables, sprinkle extra seeds (i.e. ayaan or kee) on your yoghurt, and consider wholegrain varieties where possible when choosing low FODMAP breads.     Consider your reusable food containers - gone are the days of simply taking a piece of fruit as a snack. These  days, food storage containers come in a variety of shapes and sizes for all sorts of tasty snacks. This can be particularly helpful when preparing meals and snacks the night before a busy work or school day e.g. squeeze pouches for yoghurt if tolerated (now there is almond and coconut dairy free varieties) or containers with separate compartments for dip and vegetables.     Snacks that require preparation:   Smoothies: green, summer berry, ariane peanut butter   Sweet: peanut butter bars, muesli bars, mixed berry & yoghurt granola bar  Savoury: savoury slice, vegetable muffins, rice paper roll, zucchini and rice slice  Energy balls: Oat & ariane chip, peanut butter protein balls, double ariane bliss balls    Snacks with minimal preparation:  Protein: Boiled eggs, tuna can, 20g mixed nuts or 10 almonds  Fruit: kiwi fruit, pineapple, mandarin, orange, firm banana, rockmelon/cantaloupe  Dairy & alternatives: yoghurt (lactose-free), hot chocolate made with drinking chocolate or latte made with lactose-free if required or soy milk (made from soy protein), cheese & crackers  Vegetables: vegetables with dip, pre-made sweet potato chips (baked with olive oil and a sprinkle of spices)  Gluten free toast or rice crackers with toppings: peanut butter, cheese, marmalade     Goal 4: Needs Improvement: Tracking what you eat. Writing down or tracking through an ahsan what you eat as well as how you feel can help you identify patterns in your symptoms. This can help you become more aware and create a diet that is right for you without over restricting.     mysymptoms ahsan, you can track symptoms, bowl movements, medications, stress, exercise, sleep and foods as well as beverages to become more mindful. [email or web address]    Goal 5: Needs Improvement: Start probiotic lactic acid based by ruthy duarte MD either the 30 or 100 billion for at least 30-90 days let me know how you tolerate as it could be beneficial to add a soil based such  as the following   https://ancientnutrition.com/products/sbo-probiotics-ultimate    Start one muli by pure encapsulations 1 cap per day with food and womens omega 3 by nordic naturals 2 cap per day with food     Calm Magnesium plus calcium powder add to some chamomile tea before or even in the smoothie 1 tsp and increase to 250-400mg (nice to try in the future)     Start Pure Encapsulations Iron-C  Iron and Vitamin C Supplement to Support Muscle Function, Red Blood Cell Function, and Energy*  - 1 capsule daily with food in am with multivitamin     Or get the O.N.E. Multivitamin with Iron   Pure Encapsulations -    Nordic Naturals Evening Primrose oil - 2 caps daily with food (nice to try in the future)     Goal 6: Needs Improvement: Work on just meal planning check out platejoy.com or mealime.com. Focus on picking days to grocery shop and day to make a new meal. Focus on 3 dinner meals per week enough for leftovers. Set some days on calendar that you will have time to make the foods.     Look at green  or hello fresh to help with prepared meals.      What are FODMAPs?  FODMAP stands for fermentable oligo-, di-, monosaccharides and polyols    These short-chain carbs are resistant to digestion. Instead of being absorbed into your bloodstream, they reach the far end of your intestine, where most of your gut bacteria reside.    Your gut bacteria then use these carbs for fuel, producing hydrogen gas and causing digestive symptoms in sensitive individuals. FODMAPs also draw liquid into your intestine, which may cause diarrhea.    Although not everyone has a sensitivity to FODMAPs, it is very common among people with irritable bowel syndrome (IBS)     Common FODMAPs include:    Fructose: a simple sugar found in many fruits and vegetables that also makes up the structure of table sugar and most added sugars  Lactose: a carbohydrate found in dairy products like milk  Fructans: found in many foods, including grains  like wheat, spelt, rye and barley  Galactans: found in large amounts in legumes  Polyols: sugar alcohols like xylitol, sorbitol, maltitol, and mannitol. They are found in some fruits and vegetables and often used as sweetener      What is the purpose of a FODMAP diet?  A FODMAP diet is a 3 step diet used to help manage digestive symptoms.  Common gut problem with symptoms including abdominal (tummy) pain, bloating, wind (farting) and changes in bowel habit (diarrhea, constipation or both).     The aims of the diet are to:    Learn which foods and FODMAPs you tolerate, and which trigger your digestive symptoms. Understanding this will help you to follow a less restrictive, more nutritionally balanced diet for the long term that only restricts foods that trigger your digestive symptoms.    Assess whether your digestive symptoms are sensitive to FODMAPs. If you don't experience symptom improvement on the diet, than you may need to consider other digestive therapies.      How to follow a FODMAP diet      Stage 1: Restriction/Elimination   This stage involves strict avoidance of all high-FODMAP foods. It's important to note you should NOT avoid all FODMAPs long-term, and this stage should only last about 4-12 weeks. This is because it's important to include FODMAPs in the diet for gut health.      First restrict alcohol, caffeine, spicy foods and other common food triggers (wheat and dairy). Focus should be on eating whole, unprocessed real foods in their unprocessed forms such as fruits, vegetables, whole grains (prefer wheat/gluten free), nuts, extra virgin olive oil, herbs, moderate amounts of plant based proteins, and healthy fats. Monitor how you handle nightshades, corn, soy as these can be common triggers for digestive issues.     A low-fodmap diet is complex and should be tailored to your individual symptoms. Moving through the following stages over 4-12 weeks to provide more awareness as to what foods could be  "triggering symptoms.   Some people notice an improvement in symptoms in the first week, while others take the full eight - twelve weeks. Once you have adequate relief of your digestive symptoms, you can progress to the second stage.  If by eight to twelve weeks your gut symptoms have not resolved you should check for hidden sources of fodmaps, try probiotic/digestive enzymes and consider life stressors as stress is a major contributor.     The diet can be difficult to follow if you are not prepared. Here are some tips:  Try avoiding just wheat by going gluten free or taking out dairy first while you prepare to make other dietary changes to follow a low-FODMAP diet. Eliminating these two foods first and finding alternatives can make you feel less restricted before you begin to take out other foods.   Find out what to buy: Ensure you have access to credible low-FODMAP food lists. See handouts of where to find these.  Get rid of high-FODMAP foods: Clear your fridge and pantry of these foods.  Make a shopping list: Create a low-FODMAP shopping list before heading to the grocery store, so you know which foods to purchase or avoid.  Read menus in advance: Familiarize yourself with low-FODMAP menu options so you'll be prepared when dining out.    Stage 2: Reintroduction    This stage involves systematically reintroducing high-FODMAP foods. The purpose of this is twofold:   To identify which types of FODMAPs you tolerate. Few people are sensitive to all of them.  To establish the amount of FODMAPs you can tolerate. This is known as your \"threshold level.\"    In this step, you test specific foods one by one for three days each.  It is recommended that you undertake this step with a trained dietitian who can guide you through the appropriate foods. Alternatively, this ahsan can help you identify which foods to reintroduce. https://www.oLyfe.ilab/get-ahsan-help/    It is worth noting that you need to continue a low-FODMAP " "diet throughout this stage. This means even if you can tolerate a certain high-FODMAP food, you must continue to restrict it until stage 3.  It is also important to remember that, unlike people with most food allergies, people with IBS can tolerate small amounts of FODMAPs.     Stage 3: Personalization  This stage is also known as the \"modified low-FODMAP diet.\" In other words, you still restrict some FODMAPs. However, the amount and type are tailored to your personal tolerance, identified in stage 2.  It is important to progress to this final stage in order to address nutrient deficiencies and increase diet variety and flexibility.    This step is aimed to relax dietary restrictions as much as possible, expand the variety of foods included in your diet and establish a  personalized FODMAP diet  for the long-term. In this step well tolerated foods and FODMAPs are reintroduced to your diet, while poorly tolerated foods and FODMAPs are restricted, but only to a level that provides symptom relief. We recommend that you repeat challenges of poorly tolerated foods and FODMAPs over time to see whether your tolerance changes. You can also use the Filter function in the [employer] FODMAP Hilda to personalize your hilda experience during Step 3 of the diet.      Foods high in FODMAPs  Here is a list of some common foods and ingredients that are high in FODMAPs:    Fruits: apples, applesauce, apricots, blackberries, boysenberries, canned fruit, cherries, dates, figs, peaches, pears, watermelon  Sweeteners: fructose, high fructose corn syrup, honey, maltitol, mannitol, sorbitol, xylitol  Dairy products: ice cream, milk (from cows, goats, and sheep), most yogurts, soft and fresh cheeses (cottage cheese, ricotta, etc.), sour cream, whey protein supplements  Vegetables: artichokes, asparagus, beetroot, broccoli, [place] sprouts, cabbage, cauliflower, fennel, garlic, leeks, mushrooms, okra, onions, peas, shallots  Legumes: beans, baked " beans, chickpeas, lentils, red kidney beans, soybeans  Wheat: biscuits, bread, most breakfast cereals, crackers, pancakes, pasta, tortillas, waffles  Other grains: barley, rye  Beverages: beer, fortified ryan, fruit juices, milk, soft drinks with high fructose corn syrup, soy milk      Foods you can eat on a low FODMAP diet  Keep in mind that the purpose of this diet is not to completely eliminate FODMAPs, which is extremely difficult. Simply minimizing these types of carbs is considered sufficient to reduce digestive symptoms.    There is a wide variety of healthy and nutritious foods that you can eat on a low FODMAP diet, including    Meats, fish, and eggs (well tolerated unless they have added high FODMAP ingredients, like wheat or high fructose corn syrup)  All fats and oils  Most herbs and spices  Nuts and seeds (including almonds, peanuts, macadamia nuts, pine nuts, and sesame seeds but not pistachios or cashews, which are high in FODMAPs)    Fruits, such as:  unripe bananas  blueberries  cantaloupe  grapefruit  grapes  kiwi  yancy  lime  mandarins  melons (except watermelon)  oranges  passionfruit  raspberries  strawberries  sweeteners (maple syrup, molasses, and stevia)  dairy products if they are lactose-free as well as hard cheeses and aged softer varieties (like Brie and Camembert)    Vegetables, such as:  alfalfa  bell peppers  bok flower  carrots  celery  chives  cucumbers  eggplant  elma  green beans  kale  lettuce  olives  parsnips  potatoes  radishes  spinach  spring onion (only green)  squash  sweet potatoes  tomatoes  turnips  water chestnuts  yams  zucchini    Grains, such as:  corn  oats  quinoa  rice  sorghum  tapioca  beverages (water, coffee, tea, etc)    However, keep in mind that these lists are neither definitive nor exhaustive. Naturally, there are foods not listed here that are either high or low in FODMAPs.     In addition, everyone is different. You may tolerate some foods on the list  of foods to avoid while noticing digestive symptoms from foods low in FODMAPs for other reasons.  How much of a food you eat will affect how likely you are to experience symptoms if you have IBS. The individual tolerance to FODMAPs varies.      NUTRITION RESOURCES:  1. Core Food Plan   2.  [employer] FODMAP Website, Hilda and Handout- 3 step FODMAP diet guide & food list  3. Low FODMAP Smoothie Recipes         21 Day Low FODMAP Smoothie Challenge (21 Day Low FODMAP Challenges Book 1) Gagan Edition by Aria Bedoya        Low FODMAP Smoothies Recipe Book: A Beginners Guide to Low FODMAP Smoothies for Gut Health  4. The Low-FODMAP Diet for Beginners: A 7-Day Plan to Beat Bloat and Soothe Your Gut with Recipes for Fast IBS Relief by Edie Mcginnis  (Author), Patricia SMITH [facility]  (Author), Regulo Najera MD PhD

## 2023-12-26 RX ORDER — FINASTERIDE 5 MG/1
5 TABLET, FILM COATED ORAL DAILY
Qty: 90 TABLET | Refills: 1 | Status: SHIPPED | OUTPATIENT
Start: 2023-12-26

## 2024-01-19 DIAGNOSIS — F33.9 EPISODE OF RECURRENT MAJOR DEPRESSIVE DISORDER, UNSPECIFIED DEPRESSION EPISODE SEVERITY (HCC): ICD-10-CM

## 2024-01-19 RX ORDER — VENLAFAXINE HYDROCHLORIDE 150 MG/1
CAPSULE, EXTENDED RELEASE ORAL
Qty: 90 CAPSULE | Refills: 1 | Status: SHIPPED | OUTPATIENT
Start: 2024-01-19

## 2024-02-07 ENCOUNTER — OFFICE VISIT (OUTPATIENT)
Dept: FAMILY MEDICINE CLINIC | Age: 84
End: 2024-02-07
Payer: COMMERCIAL

## 2024-02-07 VITALS
OXYGEN SATURATION: 98 % | HEART RATE: 64 BPM | WEIGHT: 242 LBS | HEIGHT: 72 IN | DIASTOLIC BLOOD PRESSURE: 76 MMHG | BODY MASS INDEX: 32.78 KG/M2 | SYSTOLIC BLOOD PRESSURE: 124 MMHG | RESPIRATION RATE: 16 BRPM

## 2024-02-07 DIAGNOSIS — F33.9 EPISODE OF RECURRENT MAJOR DEPRESSIVE DISORDER, UNSPECIFIED DEPRESSION EPISODE SEVERITY (HCC): Primary | ICD-10-CM

## 2024-02-07 DIAGNOSIS — E66.9 OBESITY (BMI 30-39.9): ICD-10-CM

## 2024-02-07 DIAGNOSIS — G47.33 OSA (OBSTRUCTIVE SLEEP APNEA): ICD-10-CM

## 2024-02-07 DIAGNOSIS — G25.81 RESTLESS LEG: ICD-10-CM

## 2024-02-07 DIAGNOSIS — H35.3132 NONEXUDATIVE AGE-RELATED MACULAR DEGENERATION, BILATERAL, INTERMEDIATE DRY STAGE: ICD-10-CM

## 2024-02-07 DIAGNOSIS — I25.10 CAD IN NATIVE ARTERY: ICD-10-CM

## 2024-02-07 DIAGNOSIS — M15.9 OSTEOARTHRITIS OF MULTIPLE JOINTS, UNSPECIFIED OSTEOARTHRITIS TYPE: ICD-10-CM

## 2024-02-07 DIAGNOSIS — G60.9 NEUROPATHY, IDIOPATHIC: ICD-10-CM

## 2024-02-07 DIAGNOSIS — M10.9 GOUT, UNSPECIFIED CAUSE, UNSPECIFIED CHRONICITY, UNSPECIFIED SITE: ICD-10-CM

## 2024-02-07 PROCEDURE — 99213 OFFICE O/P EST LOW 20 MIN: CPT | Performed by: FAMILY MEDICINE

## 2024-02-07 PROCEDURE — 1123F ACP DISCUSS/DSCN MKR DOCD: CPT | Performed by: FAMILY MEDICINE

## 2024-02-07 ASSESSMENT — ENCOUNTER SYMPTOMS
SHORTNESS OF BREATH: 0
BLOOD IN STOOL: 0
TROUBLE SWALLOWING: 0
EYE PAIN: 0
CHEST TIGHTNESS: 0
DIARRHEA: 0
NAUSEA: 0
APNEA: 1
VOMITING: 0
ABDOMINAL PAIN: 0
WHEEZING: 0

## 2024-02-07 ASSESSMENT — PATIENT HEALTH QUESTIONNAIRE - PHQ9
9. THOUGHTS THAT YOU WOULD BE BETTER OFF DEAD, OR OF HURTING YOURSELF: 0
2. FEELING DOWN, DEPRESSED OR HOPELESS: 0
SUM OF ALL RESPONSES TO PHQ QUESTIONS 1-9: 2
SUM OF ALL RESPONSES TO PHQ QUESTIONS 1-9: 2
8. MOVING OR SPEAKING SO SLOWLY THAT OTHER PEOPLE COULD HAVE NOTICED. OR THE OPPOSITE, BEING SO FIGETY OR RESTLESS THAT YOU HAVE BEEN MOVING AROUND A LOT MORE THAN USUAL: 0
4. FEELING TIRED OR HAVING LITTLE ENERGY: 2
5. POOR APPETITE OR OVEREATING: 0
SUM OF ALL RESPONSES TO PHQ QUESTIONS 1-9: 2
7. TROUBLE CONCENTRATING ON THINGS, SUCH AS READING THE NEWSPAPER OR WATCHING TELEVISION: 0
3. TROUBLE FALLING OR STAYING ASLEEP: 0
1. LITTLE INTEREST OR PLEASURE IN DOING THINGS: 0
SUM OF ALL RESPONSES TO PHQ9 QUESTIONS 1 & 2: 0
6. FEELING BAD ABOUT YOURSELF - OR THAT YOU ARE A FAILURE OR HAVE LET YOURSELF OR YOUR FAMILY DOWN: 0
10. IF YOU CHECKED OFF ANY PROBLEMS, HOW DIFFICULT HAVE THESE PROBLEMS MADE IT FOR YOU TO DO YOUR WORK, TAKE CARE OF THINGS AT HOME, OR GET ALONG WITH OTHER PEOPLE: 0
SUM OF ALL RESPONSES TO PHQ QUESTIONS 1-9: 2

## 2024-02-10 NOTE — CARE COORDINATION
Ambulatory Care Coordination Note  2021  CM Risk Score: 0  Charlson 10 Year Mortality Risk Score: 23%     ACC: Bethany Hope, SHILO    Summary Note:   Spoke with patient for ACM follow up. Identified patient by name and . Oriented to the role of ACM. Patient consents to enrollment. Patient reports that he is feeling a little better than yesterday. Reports that his thoughts are becoming clearer. Confirmed Provider contact. Instructed on recommendation for rest/ hydration. Reviewed worsening s/s to report to Md/Trent. Patient reports general fatigue and weakness. Instructed on safety/ fall prevention measures. Encouraged proper use of DME. Instructed patient to avoid sudden changes in position. Discussed the benefits of HHC for safety/ PT/OT for strengthening. Patient reports that he does not feel that he needs HHC support at this time; but is agreeable to reach out to his PCP if needs arise. Patient denies any questions, equipment or resource needs. ACM contact information provided should needs arise. Plan for next outreach:  SDOH, Ongoing assessment of support needs        Ambulatory Care Coordination Assessment    Care Coordination Protocol  Program Enrollment: Rising Risk  Referral from Primary Care Provider: No  Week 1 - Initial Assessment     Do you have all of your prescriptions and are they filled?: Yes  Barriers to medication adherence: None  Are you able to afford your medications?: Yes  How often do you have trouble taking your medications the way you have been told to take them?: I always take them as prescribed. Do you have Home O2 Therapy?: No      Ability to seek help/take action for Emergent Urgent situations i.e. fire, crime, inclement weather or health crisis. : Independent  Ability to ambulate to restroom: Independent  Ability handle personal hygeine needs (bathing/dressing/grooming): Independent  Ability to manage Medications:  Independent  Ability to prepare Food Preparation: Independent  Ability to maintain home (clean home, laundry): Needs Assistance  Ability to drive and/or has transportation: Independent  Ability to do shopping: Independent  Ability to manage finances: Independent  Is patient able to live independently?: Yes     Current Housing: Private Residence        Per the Fall Risk Screening, did the patient have 2 or more falls or 1 fall with injury in the past year?: No     Frequent urination at night?: Yes  Do you use rails/bars?: Yes  Do you have a non-slip tub mat?: Yes     Are you experiencing loss of meaning?: No  Are you experiencing loss of hope and peace?: No     Suggested Interventions and Community Resources  Fall Risk Prevention: In Process       Set up/Review an Education Plan, Set up/Review Goals              Prior to Admission medications    Medication Sig Start Date End Date Taking?  Authorizing Provider   venlafaxine (EFFEXOR XR) 150 MG extended release capsule TAKE 1 CAPSULE EVERY       MORNING 8/3/21  Yes Shena Wharton MD   finasteride (PROSCAR) 5 MG tablet TAKE 1 TABLET DAILY 7/12/21  Yes Shena Wharton MD   escitalopram (LEXAPRO) 10 MG tablet TAKE 1 TABLET DAILY 6/2/21  Yes Laurie Quintero,    allopurinol (ZYLOPRIM) 300 MG tablet TAKE 1 TABLET DAILY 6/2/21  Yes Laurie Quintero, DO   clopidogrel (PLAVIX) 75 MG tablet Take 1 tablet by mouth daily 6/2/21  Yes Laurie Quintero, DO   metoprolol tartrate (LOPRESSOR) 50 MG tablet Take 1 tablet by mouth 2 times daily 5/4/21  Yes Em George MD   nitroGLYCERIN (NITROSTAT) 0.4 MG SL tablet Place 1 tablet under the tongue every 5 minutes as needed for Chest pain 5/4/21  Yes Em George MD   Cyanocobalamin (VITAMIN B 12) 500 MCG TABS Take 1 tablet by mouth daily 1/20/21  Yes Shena Wharton MD   gemfibrozil (LOPID) 600 MG tablet TAKE 1 TABLET TWICE DAILY  BEFORE MEALS 1/20/21  Yes Shena Wharton MD   isosorbide mononitrate (IMDUR) 30 MG extended release tablet Take 1 tablet by mouth daily 1/20/21  Yes Diya Arias MD   ranolazine [facility]) 500 MG extended release tablet Take 1 tablet by mouth 2 times daily 1/20/21  Yes Diya Arias MD   Multiple Vitamins-Minerals (VITABASIC SENIOR PO) Take by mouth   Yes Historical Provider, MD   Multiple Vitamins-Minerals (OCUVITE EXTRA) TABS Take 1 tablet by mouth daily   Yes Historical Provider, MD       Future Appointments   Date Time Provider Trini Garcia   9/20/2021  1:15 PM Diya Arias MD [facility]   11/4/2021 11:00 AM MAC Gusman - CNP [facility]   5/25/2022  2:00 PM SCHEDULE, [facility] AWV LPN [facility]         Goals Addressed                 This Visit's Progress     Reduce Falls         I will reduce my risk of falls by the following: Remove rugs or use non slip rugs  Install grab bars in bathroom  Use walking aids like cane or walker  Follow through on orders for PT    Barriers: overwhelmed by complexity of regimen  Plan for overcoming my barriers: Patient to ensure clear pathways, proper use of DME; PT if recommended  Confidence: 8/10  Anticipated Goal Completion Date:  11/25/21            Prior to Admission medications    Medication Sig Start Date End Date Taking?  Authorizing Provider   venlafaxine (EFFEXOR XR) 150 MG extended release capsule TAKE 1 CAPSULE EVERY       MORNING 8/3/21  Yes Diya Arias MD   finasteride (PROSCAR) 5 MG tablet TAKE 1 TABLET DAILY 7/12/21  Yes Diya Arias MD   escitalopram (LEXAPRO) 10 MG tablet TAKE 1 TABLET DAILY 6/2/21  Yes Laurie Quintero DO   allopurinol (ZYLOPRIM) 300 MG tablet TAKE 1 TABLET DAILY 6/2/21  Yes Laurie Quintero DO   clopidogrel (PLAVIX) 75 MG tablet Take 1 tablet by mouth daily 6/2/21  Yes Laurie Quintero DO   metoprolol tartrate (LOPRESSOR) 50 MG tablet Take 1 tablet by mouth 2 times daily 5/4/21  Yes Fahad Santos MD   nitroGLYCERIN (NITROSTAT) 0.4 MG SL tablet Place 1 tablet under the tongue every 5 minutes as needed for Chest pain 5/4/21  Yes Rolo Alba MD   Cyanocobalamin (VITAMIN B 12) 500 MCG TABS Take 1 tablet by mouth daily 1/20/21  Yes Lenora Diaz MD   gemfibrozil (LOPID) 600 MG tablet TAKE 1 TABLET TWICE DAILY  BEFORE MEALS 1/20/21  Yes Lenora Diaz MD   isosorbide mononitrate (IMDUR) 30 MG extended release tablet Take 1 tablet by mouth daily 1/20/21  Yes Lenora Diaz MD   ranolazine [facility]) 500 MG extended release tablet Take 1 tablet by mouth 2 times daily 1/20/21  Yes Lenora Diaz MD   Multiple Vitamins-Minerals (VITABASIC SENIOR PO) Take by mouth   Yes Historical Provider, MD   Multiple Vitamins-Minerals (OCUVITE EXTRA) TABS Take 1 tablet by mouth daily   Yes Historical Provider, MD       Future Appointments   Date Time Provider Trini Garcia   9/20/2021  1:15 PM Lenora Diaz MD [facility]   11/4/2021 11:00 AM MAC Nicholas - CNP [facility]   5/25/2022  2:00 PM SCHEDULE, [facility] AWV LPN [facility]      and   General Assessment    Do you have any symptoms that are causing concern?: No Patient requests all Lab, Cardiology, and Radiology Results on their Discharge Instructions

## 2024-02-29 ENCOUNTER — OFFICE VISIT (OUTPATIENT)
Dept: CARDIOLOGY CLINIC | Age: 84
End: 2024-02-29
Payer: COMMERCIAL

## 2024-02-29 VITALS
BODY MASS INDEX: 33.38 KG/M2 | SYSTOLIC BLOOD PRESSURE: 124 MMHG | HEIGHT: 72 IN | HEART RATE: 73 BPM | DIASTOLIC BLOOD PRESSURE: 78 MMHG | WEIGHT: 246.4 LBS

## 2024-02-29 DIAGNOSIS — G47.33 OSA (OBSTRUCTIVE SLEEP APNEA): ICD-10-CM

## 2024-02-29 DIAGNOSIS — I25.10 CAD IN NATIVE ARTERY: ICD-10-CM

## 2024-02-29 DIAGNOSIS — I95.1 ORTHOSTATIC HYPOTENSION: Primary | ICD-10-CM

## 2024-02-29 DIAGNOSIS — E78.5 DYSLIPIDEMIA: ICD-10-CM

## 2024-02-29 DIAGNOSIS — I49.3 PVC (PREMATURE VENTRICULAR CONTRACTION): ICD-10-CM

## 2024-02-29 PROCEDURE — 1123F ACP DISCUSS/DSCN MKR DOCD: CPT | Performed by: NURSE PRACTITIONER

## 2024-02-29 PROCEDURE — 99214 OFFICE O/P EST MOD 30 MIN: CPT | Performed by: NURSE PRACTITIONER

## 2024-02-29 ASSESSMENT — ENCOUNTER SYMPTOMS
COUGH: 0
SHORTNESS OF BREATH: 0

## 2024-02-29 NOTE — PROGRESS NOTES
instructions given by myself    Latest Reference Range & Units 10/23/23 11:46   Cholesterol, Total 0 - 199 mg/dL 164   HDL Cholesterol 40 - 60 mg/dL 40   LDL Calculated <100 mg/dL 80   Triglycerides 0 - 150 mg/dL 222 (H)   VLDL Cholesterol Calculated Not Established mg/dL 44   (H): Data is abnormally high  He is on pravastatin  Encouraged exercise.   He is near goal and much improved since staring pravastatin.         Counseled extensively and medication compliance urged.  We discussed that for the  prevention of ASCVD our  goal is aggressive risk modification.Patient is encouraged to exercise even a brisk walk for 30 minutes  at least 3 to 4 times a week   Various goals were discussed and questions answered. Continue current medications. Appropriate prescriptions are addressed and refills ordered.  Questions answered and patient verbalizes understanding.  Call for any problems, questions, or concerns.      Return in about 6 months (around 8/29/2024).      An electronic signature was used to authenticate this note.    Electronically signed by MAC Poon CNP on 2/29/2024 at 4:11 PM

## 2024-02-29 NOTE — PATIENT INSTRUCTIONS
We are committed to providing you the best care possible.    If you receive a survey after visiting one of our offices, please take time to share your experience concerning your physician office visit.  These surveys are confidential and no health information about you is shared.    We are eager to improve for you and we are counting on your feedback to help make that happen.      **It is YOUR responsibilty to bring medication bottles and/or updated medication list to EACH APPOINTMENT. This will allow us to better serve you and all your healthcare needs**    Thank you for allowing us to care for you today!   We want to ensure we can follow your treatment plan and we strive to give you the best outcomes and experience possible.   If you ever have a life threatening emergency and call 911 - for an ambulance (EMS)   Our providers can only care for you at:   [facility] or [facility].   Even if you have someone take you or you drive yourself we can only care for you in a [facility] facility. Our providers are not setup at the other healthcare locations!     Please be informed that if you contact our office outside of normal business hours the physician on call cannot help with any scheduling or rescheduling issues, procedure instruction questions or any type of medication issue.    We advise you for any urgent/emergency that you go to the nearest emergency room!    PLEASE CALL OUR OFFICE DURING NORMAL BUSINESS HOURS    Monday - Friday   8 am to 5 pm    [place]: [phone]    [place]: [phone]    [place]:  [phone]

## 2024-05-02 RX ORDER — CLOPIDOGREL BISULFATE 75 MG/1
75 TABLET ORAL DAILY
Qty: 90 TABLET | Refills: 3 | Status: SHIPPED | OUTPATIENT
Start: 2024-05-02

## 2024-05-19 DIAGNOSIS — M10.9 GOUT, UNSPECIFIED CAUSE, UNSPECIFIED CHRONICITY, UNSPECIFIED SITE: ICD-10-CM

## 2024-05-20 RX ORDER — ALLOPURINOL 300 MG/1
TABLET ORAL
Qty: 90 TABLET | Refills: 1 | Status: SHIPPED | OUTPATIENT
Start: 2024-05-20

## 2024-06-19 ENCOUNTER — OFFICE VISIT (OUTPATIENT)
Dept: FAMILY MEDICINE CLINIC | Age: 84
End: 2024-06-19
Payer: COMMERCIAL

## 2024-06-19 VITALS
OXYGEN SATURATION: 98 % | HEART RATE: 56 BPM | BODY MASS INDEX: 32.3 KG/M2 | DIASTOLIC BLOOD PRESSURE: 70 MMHG | HEIGHT: 72 IN | WEIGHT: 238.5 LBS | SYSTOLIC BLOOD PRESSURE: 120 MMHG | RESPIRATION RATE: 20 BRPM

## 2024-06-19 DIAGNOSIS — G47.33 OSA (OBSTRUCTIVE SLEEP APNEA): ICD-10-CM

## 2024-06-19 DIAGNOSIS — M10.9 GOUT, UNSPECIFIED CAUSE, UNSPECIFIED CHRONICITY, UNSPECIFIED SITE: ICD-10-CM

## 2024-06-19 DIAGNOSIS — I25.10 CAD IN NATIVE ARTERY: ICD-10-CM

## 2024-06-19 DIAGNOSIS — G25.81 RESTLESS LEG: ICD-10-CM

## 2024-06-19 DIAGNOSIS — G60.9 NEUROPATHY, IDIOPATHIC: ICD-10-CM

## 2024-06-19 DIAGNOSIS — M15.9 OSTEOARTHRITIS OF MULTIPLE JOINTS, UNSPECIFIED OSTEOARTHRITIS TYPE: Primary | ICD-10-CM

## 2024-06-19 DIAGNOSIS — R00.2 HEART PALPITATIONS: ICD-10-CM

## 2024-06-19 DIAGNOSIS — F33.0 MAJOR DEPRESSIVE DISORDER, RECURRENT, MILD (HCC): ICD-10-CM

## 2024-06-19 PROBLEM — F33.9 MAJOR DEPRESSIVE DISORDER, RECURRENT, UNSPECIFIED (HCC): Status: ACTIVE | Noted: 2024-06-19

## 2024-06-19 PROBLEM — F33.1 MAJOR DEPRESSIVE DISORDER, RECURRENT, MODERATE (HCC): Status: ACTIVE | Noted: 2024-06-19

## 2024-06-19 PROCEDURE — 1123F ACP DISCUSS/DSCN MKR DOCD: CPT | Performed by: FAMILY MEDICINE

## 2024-06-19 PROCEDURE — 99214 OFFICE O/P EST MOD 30 MIN: CPT | Performed by: FAMILY MEDICINE

## 2024-06-19 ASSESSMENT — ENCOUNTER SYMPTOMS
CHEST TIGHTNESS: 0
TROUBLE SWALLOWING: 0
VOMITING: 0
ABDOMINAL PAIN: 0
WHEEZING: 0
BACK PAIN: 1
EYE PAIN: 0
NAUSEA: 0
SHORTNESS OF BREATH: 0
APNEA: 1
DIARRHEA: 0
BLOOD IN STOOL: 0

## 2024-06-20 RX ORDER — FINASTERIDE 5 MG/1
5 TABLET, FILM COATED ORAL DAILY
Qty: 90 TABLET | Refills: 1 | Status: SHIPPED | OUTPATIENT
Start: 2024-06-20

## 2024-07-17 RX ORDER — PRAVASTATIN SODIUM 20 MG
20 TABLET ORAL DAILY
Qty: 90 TABLET | Refills: 3 | Status: SHIPPED | OUTPATIENT
Start: 2024-07-17

## 2024-08-10 DIAGNOSIS — F33.9 EPISODE OF RECURRENT MAJOR DEPRESSIVE DISORDER, UNSPECIFIED DEPRESSION EPISODE SEVERITY (HCC): ICD-10-CM

## 2024-08-12 RX ORDER — VENLAFAXINE HYDROCHLORIDE 150 MG/1
CAPSULE, EXTENDED RELEASE ORAL
Qty: 90 CAPSULE | Refills: 1 | Status: SHIPPED | OUTPATIENT
Start: 2024-08-12

## 2024-08-28 DIAGNOSIS — F33.9 EPISODE OF RECURRENT MAJOR DEPRESSIVE DISORDER, UNSPECIFIED DEPRESSION EPISODE SEVERITY (HCC): ICD-10-CM

## 2024-08-28 RX ORDER — VENLAFAXINE HYDROCHLORIDE 150 MG/1
CAPSULE, EXTENDED RELEASE ORAL
Qty: 90 CAPSULE | Refills: 1 | Status: SHIPPED | OUTPATIENT
Start: 2024-08-28

## 2024-08-29 ENCOUNTER — TELEMEDICINE (OUTPATIENT)
Dept: FAMILY MEDICINE CLINIC | Age: 84
End: 2024-08-29

## 2024-08-29 DIAGNOSIS — Z00.00 MEDICARE ANNUAL WELLNESS VISIT, SUBSEQUENT: Primary | ICD-10-CM

## 2024-08-29 RX ORDER — M-VIT,TX,IRON,MINS/CALC/FOLIC 27MG-0.4MG
1 TABLET ORAL DAILY
COMMUNITY

## 2024-08-29 SDOH — ECONOMIC STABILITY: FOOD INSECURITY: WITHIN THE PAST 12 MONTHS, THE FOOD YOU BOUGHT JUST DIDN'T LAST AND YOU DIDN'T HAVE MONEY TO GET MORE.: NEVER TRUE

## 2024-08-29 SDOH — ECONOMIC STABILITY: FOOD INSECURITY: WITHIN THE PAST 12 MONTHS, YOU WORRIED THAT YOUR FOOD WOULD RUN OUT BEFORE YOU GOT MONEY TO BUY MORE.: NEVER TRUE

## 2024-08-29 SDOH — ECONOMIC STABILITY: INCOME INSECURITY: HOW HARD IS IT FOR YOU TO PAY FOR THE VERY BASICS LIKE FOOD, HOUSING, MEDICAL CARE, AND HEATING?: NOT HARD AT ALL

## 2024-08-29 ASSESSMENT — PATIENT HEALTH QUESTIONNAIRE - PHQ9
4. FEELING TIRED OR HAVING LITTLE ENERGY: NEARLY EVERY DAY
SUM OF ALL RESPONSES TO PHQ QUESTIONS 1-9: 7
SUM OF ALL RESPONSES TO PHQ9 QUESTIONS 1 & 2: 3
1. LITTLE INTEREST OR PLEASURE IN DOING THINGS: NEARLY EVERY DAY
6. FEELING BAD ABOUT YOURSELF - OR THAT YOU ARE A FAILURE OR HAVE LET YOURSELF OR YOUR FAMILY DOWN: NOT AT ALL
7. TROUBLE CONCENTRATING ON THINGS, SUCH AS READING THE NEWSPAPER OR WATCHING TELEVISION: NOT AT ALL
SUM OF ALL RESPONSES TO PHQ QUESTIONS 1-9: 7
8. MOVING OR SPEAKING SO SLOWLY THAT OTHER PEOPLE COULD HAVE NOTICED. OR THE OPPOSITE, BEING SO FIGETY OR RESTLESS THAT YOU HAVE BEEN MOVING AROUND A LOT MORE THAN USUAL: SEVERAL DAYS
10. IF YOU CHECKED OFF ANY PROBLEMS, HOW DIFFICULT HAVE THESE PROBLEMS MADE IT FOR YOU TO DO YOUR WORK, TAKE CARE OF THINGS AT HOME, OR GET ALONG WITH OTHER PEOPLE: NOT DIFFICULT AT ALL
SUM OF ALL RESPONSES TO PHQ QUESTIONS 1-9: 7
5. POOR APPETITE OR OVEREATING: NOT AT ALL
2. FEELING DOWN, DEPRESSED OR HOPELESS: NOT AT ALL
9. THOUGHTS THAT YOU WOULD BE BETTER OFF DEAD, OR OF HURTING YOURSELF: NOT AT ALL
SUM OF ALL RESPONSES TO PHQ QUESTIONS 1-9: 7
3. TROUBLE FALLING OR STAYING ASLEEP: NOT AT ALL

## 2024-08-29 ASSESSMENT — LIFESTYLE VARIABLES
HOW MANY STANDARD DRINKS CONTAINING ALCOHOL DO YOU HAVE ON A TYPICAL DAY: PATIENT DOES NOT DRINK
HOW OFTEN DO YOU HAVE A DRINK CONTAINING ALCOHOL: NEVER

## 2024-08-29 NOTE — PROGRESS NOTES
on file to calculate BMI. (!) Abnormal  Interventions:  Patient declines any further evaluation or treatment          Vision Screen:  Do you have difficulty driving, watching TV, or doing any of your daily activities because of your eyesight?: (!) Yes (mac degen)  Have you had an eye exam within the past year?: Yes  Interventions:   Patient comments: states he does have macular degeneration that is worsening. He sees his eye specialist regularly.  Patient declines any further evaluation or treatment                    Objective    Patient-Reported Vitals  No data recorded     Unable to obtain 3 vital signs due to patient not having equipment to take blood pressure/temperature.              No Known Allergies  Prior to Visit Medications    Medication Sig Taking? Authorizing Provider   Multiple Vitamins-Minerals (THERAPEUTIC MULTIVITAMIN-MINERALS) tablet Take 1 tablet by mouth daily Yes Ryan Dhaliwal MD   venlafaxine (EFFEXOR XR) 150 MG extended release capsule TAKE 1 CAPSULE EVERY       MORNING Yes Angel Mireles MD   pravastatin (PRAVACHOL) 20 MG tablet TAKE 1 TABLET DAILY Yes Leila Campbell APRN - CNP   metoprolol tartrate (LOPRESSOR) 25 MG tablet TAKE 1 TABLET TWICE A DAY Yes Leila Campbell APRN - CNP   finasteride (PROSCAR) 5 MG tablet TAKE 1 TABLET DAILY Yes Angel Mireles MD   allopurinol (ZYLOPRIM) 300 MG tablet TAKE 1 TABLET DAILY Yes Agnel Mireles MD   clopidogrel (PLAVIX) 75 MG tablet Take 1 tablet by mouth daily TAKE 1 TABLET DAILY Yes Gallo Roberts MD   Multiple Vitamins-Minerals (ICAPS AREDS 2 PO) Take by mouth Yes Ryan Dhaliwal MD   prednisoLONE acetate (PRED FORTE) 1 % ophthalmic suspension Place 1 drop into both eyes Daily Yes Ryan Dhaliwal MD   Cyanocobalamin (VITAMIN B 12) 500 MCG TABS Take 1 tablet by mouth daily Yes Angel Mireles MD   Multiple Vitamins-Minerals (OCUVITE EXTRA) TABS Take 1 tablet by mouth daily Yes Madhuri  MD Ryan   nitroGLYCERIN (NITROSTAT) 0.4 MG SL tablet Place 1 tablet under the tongue every 5 minutes as needed for Chest pain  Patient not taking: Reported on 8/29/2024  Leila Campbell, MAC - CNP       [facility] (Including outside providers/suppliers regularly involved in providing care):   Patient Care Team:  Angel Mireles MD as PCP - General  Angel Mireles MD as PCP - [facility]      Reviewed and updated this visit:  Allergies  Meds  Med Hx  Surg Hx  Soc Hx  Fam Hx      I, Hanane Franklin LPN, 8/29/2024, performed the documented evaluation under the direct supervision of the attending physician.  Rohit Carrillo, was evaluated through a synchronous (real-time) audio encounter. The patient (or guardian if applicable) is aware that this is a billable service, which includes applicable co-pays. This Virtual Visit was conducted with patient's (and/or legal guardian's) consent. Patient identification was verified, and a caregiver was present when appropriate.   The patient was located at Home: [address] [place] [id number]  Provider was located at Facility (Appt Dept): [address] [place] [address]  [address]  [place] [address]  Confirm you are appropriately licensed, registered, or certified to deliver care in the state where the patient is located as indicated above. If you are not or unsure, please re-schedule the visit: Yes, I confirm.          This encounter was performed under my, Racheal Ruff MD’s, direct supervision, 8/29/2024.

## 2024-08-29 NOTE — PATIENT INSTRUCTIONS
Personalized Preventive Plan for Rohit Carrillo - 8/29/2024  Medicare offers a range of preventive health benefits. Some of the tests and screenings are paid in full while other may be subject to a deductible, co-insurance, and/or copay.    Some of these benefits include a comprehensive review of your medical history including lifestyle, illnesses that may run in your family, and various assessments and screenings as appropriate.    After reviewing your medical record and screening and assessments performed today your provider may have ordered immunizations, labs, imaging, and/or referrals for you.  A list of these orders (if applicable) as well as your Preventive Care list are included within your After Visit Summary for your review.    Other Preventive Recommendations:    A preventive eye exam performed by an eye specialist is recommended every 1-2 years to screen for glaucoma; cataracts, macular degeneration, and other eye disorders.  A preventive dental visit is recommended every 6 months.  Try to get at least 150 minutes of exercise per week or 10,000 steps per day on a pedometer .  Order or download the FREE \"Exercise & Physical Activity: Your Everyday Guide\" from [employer]. Call 1-956.107.6921 or search [employer] online.  You need 0693-1058 mg of calcium and 2372-6737 IU of vitamin D per day. It is possible to meet your calcium requirement with diet alone, but a vitamin D supplement is usually necessary to meet this goal.  When exposed to the sun, use a sunscreen that protects against both UVA and UVB radiation with an SPF of 30 or greater. Reapply every 2 to 3 hours or after sweating, drying off with a towel, or swimming.  Always wear a seat belt when traveling in a car. Always wear a helmet when riding a bicycle or motorcycle.

## 2024-09-17 ENCOUNTER — OFFICE VISIT (OUTPATIENT)
Dept: CARDIOLOGY CLINIC | Age: 84
End: 2024-09-17
Payer: MEDICARE

## 2024-09-17 VITALS
SYSTOLIC BLOOD PRESSURE: 120 MMHG | OXYGEN SATURATION: 97 % | HEART RATE: 57 BPM | DIASTOLIC BLOOD PRESSURE: 72 MMHG | HEIGHT: 72 IN | WEIGHT: 242 LBS | BODY MASS INDEX: 32.78 KG/M2

## 2024-09-17 DIAGNOSIS — I49.3 PVC (PREMATURE VENTRICULAR CONTRACTION): ICD-10-CM

## 2024-09-17 DIAGNOSIS — R07.9 CHEST PAIN, UNSPECIFIED TYPE: ICD-10-CM

## 2024-09-17 DIAGNOSIS — F33.9 EPISODE OF RECURRENT MAJOR DEPRESSIVE DISORDER, UNSPECIFIED DEPRESSION EPISODE SEVERITY (HCC): ICD-10-CM

## 2024-09-17 DIAGNOSIS — Z94.7 POST CORNEAL TRANSPLANT: ICD-10-CM

## 2024-09-17 DIAGNOSIS — R07.2 PRECORDIAL PAIN: ICD-10-CM

## 2024-09-17 DIAGNOSIS — S06.5XAA SUBDURAL HEMATOMA (HCC): ICD-10-CM

## 2024-09-17 DIAGNOSIS — R01.1 HEART MURMUR: ICD-10-CM

## 2024-09-17 DIAGNOSIS — E66.9 OBESITY (BMI 30-39.9): ICD-10-CM

## 2024-09-17 DIAGNOSIS — I25.10 CAD IN NATIVE ARTERY: Primary | ICD-10-CM

## 2024-09-17 DIAGNOSIS — R06.02 SHORTNESS OF BREATH: ICD-10-CM

## 2024-09-17 DIAGNOSIS — G47.33 OSA (OBSTRUCTIVE SLEEP APNEA): ICD-10-CM

## 2024-09-17 PROCEDURE — 1123F ACP DISCUSS/DSCN MKR DOCD: CPT | Performed by: INTERNAL MEDICINE

## 2024-09-17 PROCEDURE — 99214 OFFICE O/P EST MOD 30 MIN: CPT | Performed by: INTERNAL MEDICINE

## 2024-09-17 PROCEDURE — G8427 DOCREV CUR MEDS BY ELIG CLIN: HCPCS | Performed by: INTERNAL MEDICINE

## 2024-09-17 PROCEDURE — G8417 CALC BMI ABV UP PARAM F/U: HCPCS | Performed by: INTERNAL MEDICINE

## 2024-09-17 PROCEDURE — 1036F TOBACCO NON-USER: CPT | Performed by: INTERNAL MEDICINE

## 2024-10-15 ENCOUNTER — TELEPHONE (OUTPATIENT)
Dept: CARDIOLOGY CLINIC | Age: 84
End: 2024-10-15

## 2024-10-15 NOTE — TELEPHONE ENCOUNTER
Test preformed on 10/15, next OV not until December.     Informed pt about some mildly abnormal results, and made an earlier OV to speak the the physician       Exercise stress test       ECG: Resting ECG demonstrates incomplete right bundle branch block.    Stress Test: A modified Zach protocol stress test was performed. The patient reached stage 1 of the protocol and was stressed for 4 min and 20 sec. pt worked at 0 percent incline due to mobity problems The patient reported no symptoms during the stress test. The patient achieved the target heart rate. Hemodynamics are adequate for diagnosis. Blood pressure demonstrated a hypertensive response and heart rate demonstrated a normal response to stress. The patient's heart rate recovery was normal.  LOPRESSOR 25mg HELD TODAY FOR TEST    Inadequate chronotropic response which is probably secondary to patient's medication i.e. beta-blocker   Suggest clinical correlation and outpatient visit for further discussion    Echo (TTE) complete       Left Ventricle: Normal left ventricular systolic function with a visually estimated EF of 55 - 60%. Left ventricle size is normal. Mildly increased wall thickness. Normal wall motion. Grade I diastolic dysfunction with normal LAP.    Aortic Valve: Sclerotic but non-stenotic aortic valve. Moderate regurgitation. AV PHT is 404.0 ms.    Mitral Valve: Annular calcification. Mildly calcified, at the posterior leaflet. Mild regurgitation.    Tricuspid Valve: Mild regurgitation. The estimated RVSP is 29 mmHg.    Left Atrium: Left atrium is mildly dilated.    Pericardium: No pericardial effusion.    Image quality is fair.

## 2024-11-01 ENCOUNTER — OFFICE VISIT (OUTPATIENT)
Dept: FAMILY MEDICINE CLINIC | Age: 84
End: 2024-11-01

## 2024-11-01 VITALS
HEART RATE: 64 BPM | OXYGEN SATURATION: 97 % | DIASTOLIC BLOOD PRESSURE: 74 MMHG | HEIGHT: 72 IN | BODY MASS INDEX: 32.51 KG/M2 | WEIGHT: 240 LBS | SYSTOLIC BLOOD PRESSURE: 122 MMHG

## 2024-11-01 DIAGNOSIS — M15.9 OSTEOARTHRITIS OF MULTIPLE JOINTS, UNSPECIFIED OSTEOARTHRITIS TYPE: ICD-10-CM

## 2024-11-01 DIAGNOSIS — R00.2 HEART PALPITATIONS: ICD-10-CM

## 2024-11-01 DIAGNOSIS — R53.83 OTHER FATIGUE: Primary | ICD-10-CM

## 2024-11-01 DIAGNOSIS — F33.9 EPISODE OF RECURRENT MAJOR DEPRESSIVE DISORDER, UNSPECIFIED DEPRESSION EPISODE SEVERITY (HCC): ICD-10-CM

## 2024-11-01 DIAGNOSIS — M10.9 GOUT, UNSPECIFIED CAUSE, UNSPECIFIED CHRONICITY, UNSPECIFIED SITE: ICD-10-CM

## 2024-11-01 DIAGNOSIS — I25.10 CAD IN NATIVE ARTERY: ICD-10-CM

## 2024-11-01 DIAGNOSIS — G25.81 RESTLESS LEG: ICD-10-CM

## 2024-11-01 DIAGNOSIS — R53.83 OTHER FATIGUE: ICD-10-CM

## 2024-11-01 DIAGNOSIS — H54.7 DECREASED VISION: ICD-10-CM

## 2024-11-01 RX ORDER — ALLOPURINOL 300 MG/1
TABLET ORAL
Qty: 90 TABLET | Refills: 1 | Status: SHIPPED | OUTPATIENT
Start: 2024-11-01

## 2024-11-01 ASSESSMENT — ENCOUNTER SYMPTOMS
SHORTNESS OF BREATH: 0
EYE PAIN: 0
NAUSEA: 0
APNEA: 1
DIARRHEA: 0
BLOOD IN STOOL: 0
VOMITING: 0
WHEEZING: 0
TROUBLE SWALLOWING: 0
CHEST TIGHTNESS: 0
ABDOMINAL PAIN: 0

## 2024-11-01 NOTE — PROGRESS NOTES
11/1/2024    Rohit Carrillo    Chief Complaint   Patient presents with    Follow-up     Regular check up     Other     Patient states he has no energy       HPI  History was obtained from the patient.  Rohit is a 84 y.o. male who presents today with routine recheck.  Complaining of increasing fatigue nonspecific.  He has a history of sleep apnea, obesity, decreased hearing, neuropathy, depression, BPH, osteoarthritis, restless leg syndrome, and gout.  Also discouraged because of decrease in vision-he can still drive but not as easily or at night like he used to.  On review patient does need a high-dose flu shot and COVID shot as well as screening lab work.  If fatigue persists and nothing is down on screening labs may need to consider titration or change of antidepressant medications.    REVIEW OF SYMPTOMS    Review of Systems   Constitutional:  Positive for fatigue. Negative for activity change.   HENT:  Positive for hearing loss. Negative for congestion, mouth sores and trouble swallowing.    Eyes:  Negative for pain and visual disturbance.        Patient with increase in visual issues   Respiratory:  Positive for apnea. Negative for chest tightness, shortness of breath and wheezing.    Cardiovascular:  Negative for chest pain and palpitations.   Gastrointestinal:  Negative for abdominal pain, blood in stool, diarrhea, nausea and vomiting.   Endocrine: Negative for polydipsia and polyuria.   Genitourinary:  Positive for difficulty urinating. Negative for dysuria, frequency and urgency.   Musculoskeletal:  Negative for arthralgias, gait problem and neck stiffness.        Patient with history of gout-no acute flares reported   Skin:  Negative for rash.   Allergic/Immunologic: Negative for environmental allergies.   Neurological:  Positive for numbness. Negative for dizziness, seizures, speech difficulty and weakness.        History of stable restless leg syndrome.   Hematological:  Does not bruise/bleed easily.

## 2024-11-02 LAB
25(OH)D3 SERPL-MCNC: 24.1 NG/ML
ALBUMIN SERPL-MCNC: 4.3 G/DL (ref 3.4–5)
ALBUMIN/GLOB SERPL: 1.7 {RATIO} (ref 1.1–2.2)
ALP SERPL-CCNC: 69 U/L (ref 40–129)
ALT SERPL-CCNC: 24 U/L (ref 10–40)
ANION GAP SERPL CALCULATED.3IONS-SCNC: 11 MMOL/L (ref 3–16)
AST SERPL-CCNC: 28 U/L (ref 15–37)
BILIRUB SERPL-MCNC: 0.4 MG/DL (ref 0–1)
BUN SERPL-MCNC: 20 MG/DL (ref 7–20)
CALCIUM SERPL-MCNC: 9.7 MG/DL (ref 8.3–10.6)
CHLORIDE SERPL-SCNC: 104 MMOL/L (ref 99–110)
CHOLEST SERPL-MCNC: 160 MG/DL (ref 0–199)
CO2 SERPL-SCNC: 24 MMOL/L (ref 21–32)
CREAT SERPL-MCNC: 0.7 MG/DL (ref 0.8–1.3)
DEPRECATED RDW RBC AUTO: 14.4 % (ref 12.4–15.4)
FOLATE SERPL-MCNC: 32.1 NG/ML (ref 4.78–24.2)
GFR SERPLBLD CREATININE-BSD FMLA CKD-EPI: >90 ML/MIN/{1.73_M2}
GLUCOSE SERPL-MCNC: 101 MG/DL (ref 70–99)
HCT VFR BLD AUTO: 43 % (ref 40.5–52.5)
HDLC SERPL-MCNC: 39 MG/DL (ref 40–60)
HGB BLD-MCNC: 14.3 G/DL (ref 13.5–17.5)
LDLC SERPL CALC-MCNC: 64 MG/DL
MCH RBC QN AUTO: 32.9 PG (ref 26–34)
MCHC RBC AUTO-ENTMCNC: 33.4 G/DL (ref 31–36)
MCV RBC AUTO: 98.6 FL (ref 80–100)
PLATELET # BLD AUTO: 226 K/UL (ref 135–450)
PMV BLD AUTO: 9.5 FL (ref 5–10.5)
POTASSIUM SERPL-SCNC: 4.5 MMOL/L (ref 3.5–5.1)
PROT SERPL-MCNC: 6.9 G/DL (ref 6.4–8.2)
RBC # BLD AUTO: 4.36 M/UL (ref 4.2–5.9)
SODIUM SERPL-SCNC: 139 MMOL/L (ref 136–145)
TRIGL SERPL-MCNC: 284 MG/DL (ref 0–150)
TSH SERPL DL<=0.005 MIU/L-ACNC: 1.15 UIU/ML (ref 0.27–4.2)
URATE SERPL-MCNC: 4.3 MG/DL (ref 3.5–7.2)
VIT B12 SERPL-MCNC: 840 PG/ML (ref 211–911)
VLDLC SERPL CALC-MCNC: 57 MG/DL
WBC # BLD AUTO: 7.1 K/UL (ref 4–11)

## 2024-11-04 ENCOUNTER — TELEPHONE (OUTPATIENT)
Dept: FAMILY MEDICINE CLINIC | Age: 84
End: 2024-11-04

## 2024-11-04 PROBLEM — E55.9 VITAMIN D DEFICIENCY: Status: ACTIVE | Noted: 2024-11-04

## 2024-11-05 DIAGNOSIS — E55.9 VITAMIN D DEFICIENCY: Primary | ICD-10-CM

## 2024-11-12 ENCOUNTER — OFFICE VISIT (OUTPATIENT)
Dept: CARDIOLOGY CLINIC | Age: 84
End: 2024-11-12

## 2024-11-12 VITALS
HEART RATE: 65 BPM | WEIGHT: 245.4 LBS | DIASTOLIC BLOOD PRESSURE: 76 MMHG | SYSTOLIC BLOOD PRESSURE: 130 MMHG | HEIGHT: 72 IN | BODY MASS INDEX: 33.24 KG/M2

## 2024-11-12 DIAGNOSIS — R06.02 SHORTNESS OF BREATH: ICD-10-CM

## 2024-11-12 DIAGNOSIS — Z87.891 EX-SMOKER: ICD-10-CM

## 2024-11-12 DIAGNOSIS — I49.3 PVC (PREMATURE VENTRICULAR CONTRACTION): ICD-10-CM

## 2024-11-12 DIAGNOSIS — I95.1 ORTHOSTATIC HYPOTENSION: ICD-10-CM

## 2024-11-12 DIAGNOSIS — G47.33 OSA (OBSTRUCTIVE SLEEP APNEA): ICD-10-CM

## 2024-11-12 DIAGNOSIS — S06.5XAA SUBDURAL HEMATOMA: ICD-10-CM

## 2024-11-12 DIAGNOSIS — R07.2 PRECORDIAL PAIN: ICD-10-CM

## 2024-11-12 DIAGNOSIS — Z00.00 EXAMINATION: Primary | ICD-10-CM

## 2024-11-12 DIAGNOSIS — R00.2 HEART PALPITATIONS: ICD-10-CM

## 2024-11-12 DIAGNOSIS — I25.10 CAD IN NATIVE ARTERY: ICD-10-CM

## 2024-11-12 NOTE — PROGRESS NOTES
CARDIOLOGY   NOTE    Chief Complaint: chest Pain / SOB     HPI:   Rohit is a 84 y.o. year old who has Past medical history as noted below.  Mr. Carrillo  feels he is tired a lot , he is sleeping good but not doing any exercise due to lack of motivation and neuropathy  Stopped ranexa and cut down on metoprolol has all helped   MRI brain in July 2022 showed small SDH  Carotid are normal.   HE is using cpap for sleep apnea ,He gest sob when he climbs or walks up his drive way   He denies any chest pain but continues to have some shortness of breath .   HE is developing macular degeneration and worried he may not be able to drive long . He also has lot of neuropathy   Rohit  had PCI to LAd and Circ in Jan 2020 . He was having chest pain before cardiac cath  in spite of being on 2 anti anginal RX.  After PCI he says that  chest pain is resolved. 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  But after starting on metoprolol and repeating Holter by March 2020 PVC burden had come down to 2.4% denies any palpitations      He was actually seen by myself in June 2019 at that time he was having a lot of shortness of breath and chest pressure.  Stress test was abnormal is planned for cardiac cath he had lab work which revealed severe profound anemia he went to the hospital and was actually anemic.  He required multiple units of blood transfusion work-up revealed his bleeding from his colon he underwent colectomy by Dr. Pollack.   He has been tolerating antiplatelet okay since January 2020  Father had MI in his 50's . HE is retired from [employer] . He is on Plavix now    Current Outpatient Medications   Medication Sig Dispense Refill    allopurinol

## 2024-11-15 ENCOUNTER — OFFICE VISIT (OUTPATIENT)
Dept: PULMONOLOGY | Age: 84
End: 2024-11-15

## 2024-11-15 VITALS
OXYGEN SATURATION: 97 % | HEIGHT: 72 IN | WEIGHT: 244.2 LBS | SYSTOLIC BLOOD PRESSURE: 126 MMHG | BODY MASS INDEX: 33.08 KG/M2 | DIASTOLIC BLOOD PRESSURE: 78 MMHG | HEART RATE: 81 BPM

## 2024-11-15 DIAGNOSIS — G47.33 OSA (OBSTRUCTIVE SLEEP APNEA): Primary | ICD-10-CM

## 2024-11-15 DIAGNOSIS — E66.9 OBESITY (BMI 30-39.9): ICD-10-CM

## 2024-11-15 DIAGNOSIS — Z87.891 EX-SMOKER: ICD-10-CM

## 2024-11-15 DIAGNOSIS — G47.10 HYPERSOMNIA: ICD-10-CM

## 2024-11-15 ASSESSMENT — ENCOUNTER SYMPTOMS
BACK PAIN: 0
EYE ITCHING: 0
SHORTNESS OF BREATH: 0
EYE DISCHARGE: 0
ABDOMINAL DISTENTION: 0
COUGH: 0
ABDOMINAL PAIN: 0

## 2024-11-15 NOTE — PROGRESS NOTES
Rohit Carrillo  1940  Referring Provider: Anegl Mireles, MDPERIK - General     Subjective:     Chief Complaint   Patient presents with    Follow-up     1yr F/U       HPI  Rohit is a 84 y.o. male  has come back as a follow up. He has mild JANA with EDS. He is on a CPAP of 6 cm h20 which he is using it about 6 to 8 hours. He says that it is helping him. He has 17 lb loss of weight. He has a FFM. Await download data.    Current Outpatient Medications   Medication Sig Dispense Refill    allopurinol (ZYLOPRIM) 300 MG tablet TAKE 1 TABLET DAILY 90 tablet 1    Omega-3 Fatty Acids (OMEGA 3 500 PO) Take 600 mg by mouth daily      venlafaxine (EFFEXOR XR) 150 MG extended release capsule TAKE 1 CAPSULE EVERY       MORNING 90 capsule 1    pravastatin (PRAVACHOL) 20 MG tablet TAKE 1 TABLET DAILY 90 tablet 3    metoprolol tartrate (LOPRESSOR) 25 MG tablet TAKE 1 TABLET TWICE A  tablet 3    clopidogrel (PLAVIX) 75 MG tablet Take 1 tablet by mouth daily TAKE 1 TABLET DAILY 90 tablet 3    nitroGLYCERIN (NITROSTAT) 0.4 MG SL tablet Place 1 tablet under the tongue every 5 minutes as needed for Chest pain 25 tablet 0    prednisoLONE acetate (PRED FORTE) 1 % ophthalmic suspension Place 1 drop into both eyes Daily      Cyanocobalamin (VITAMIN B 12) 500 MCG TABS Take 1 tablet by mouth daily 30 tablet 3    Multiple Vitamins-Minerals (THERAPEUTIC MULTIVITAMIN-MINERALS) tablet Take 1 tablet by mouth daily (Patient not taking: Reported on 11/12/2024)      finasteride (PROSCAR) 5 MG tablet TAKE 1 TABLET DAILY (Patient not taking: Reported on 11/15/2024) 90 tablet 1    Multiple Vitamins-Minerals (ICAPS AREDS 2 PO) Take by mouth (Patient not taking: Reported on 11/12/2024)      Multiple Vitamins-Minerals (OCUVITE EXTRA) TABS Take 1 tablet by mouth daily (Patient not taking: Reported on 11/12/2024)       No current facility-administered medications for this visit.       No Known Allergies    Past Medical History:   Diagnosis Date

## 2024-12-17 RX ORDER — FINASTERIDE 5 MG/1
5 TABLET, FILM COATED ORAL DAILY
Qty: 90 TABLET | Refills: 1 | Status: SHIPPED | OUTPATIENT
Start: 2024-12-17

## 2025-05-07 DIAGNOSIS — F33.9 EPISODE OF RECURRENT MAJOR DEPRESSIVE DISORDER, UNSPECIFIED DEPRESSION EPISODE SEVERITY: ICD-10-CM

## 2025-05-07 RX ORDER — VENLAFAXINE HYDROCHLORIDE 150 MG/1
150 CAPSULE, EXTENDED RELEASE ORAL EVERY MORNING
Qty: 90 CAPSULE | Refills: 1 | OUTPATIENT
Start: 2025-05-07

## 2025-05-22 NOTE — PROGRESS NOTES
MRN: [id number]  Name: Rohit Carrillo  : 1940    Insurance: Payor: [employer] MEDICARE /  /  /      Phone #: [phone]  Provider: Gallo Sandoval MD     Date of Visit: 2025    Reason for visit:  Recent Hospitalization Date:    Reason for Hospitalization:    Last EK2024  Type of Device:       Vitals BP HR O2% WT HT ORTHO BP LYING ORTHO BP SITTING ORTHO BP SITTING   Today's Findings           Patients work up- Check List     Testing Last Date Completed Date Expected  ([facility]) Additional Notes    MA to document For provider to complete Either MA or Provider    Carotid Duplex  STAT 1 WK 6 MTH       THIS WK 2 WK 1 YEAR     Cardiac CTA  STAT 1 WK 6 MTH       THIS WK 2 WK 1 YEAR     Cardiac CT Calcium scoring  STAT 1 WK 6 MTH       THIS WK 2 WK 1 YEAR     CTA Chest, Abdomen & Pelvis  STAT 1 WK 6 MTH       THIS WK 2 WK 1 YEAR     CT Chest IV w/ Contrast  STAT 1 WK 6 MTH       THIS WK 2 WK 1 YEAR     CT Chest w/o Contrast  STAT 1 WK 6 MTH       THIS WK 2 WK 1 YEAR     CXR  STAT 1 WK 6 MTH       THIS WK 2 WK 1 YEAR     ECHO  Stress Complete Limited     MRI- Cardiac  STAT 1 WK 6 MTH       THIS WK 2 WK 1 YEAR     MUGA Scan  STAT 1 WK 6 MTH       THIS WK 2 WK 1 YEAR     Nuclear Stress  Lexiscan Cardiolite     PFT  STAT 1 WK 6 MTH       THIS WK 2 WK 1 YEAR     Treadmill Stress Test  STAT 1 WK 6 MTH       THIS WK 2 WK 1 YEAR     Vascular Duplex  Lower: Right Left Bilat       Upper: Right Left Bilat     Other Test Not Listed:    Monitors Last Date Completed Day's Request/Ordered     Holter  Short term 24 hours 48 hours      Long term 3 days 7 days 14 days   Event   (1-30 days)      Procedures Last Date Performed Procedure Details Date Expected   Additional Notes    ASD Closure        Carotid Angio        Cardioversion        Heart Cath  R L R&L      Peripheral Angio  R L      PFO Closure        PTCA/PCI        JOANNE        JOANNE/Cardioversion        Venogram        Tilt Table        Other Type of Procedure:

## 2025-05-27 ENCOUNTER — OFFICE VISIT (OUTPATIENT)
Dept: CARDIOLOGY CLINIC | Age: 85
End: 2025-05-27
Payer: MEDICARE

## 2025-05-27 VITALS
BODY MASS INDEX: 34.54 KG/M2 | HEIGHT: 72 IN | HEART RATE: 68 BPM | DIASTOLIC BLOOD PRESSURE: 66 MMHG | WEIGHT: 255 LBS | SYSTOLIC BLOOD PRESSURE: 106 MMHG

## 2025-05-27 DIAGNOSIS — R26.89 LOSS OF BALANCE: ICD-10-CM

## 2025-05-27 DIAGNOSIS — I49.3 PVC (PREMATURE VENTRICULAR CONTRACTION): ICD-10-CM

## 2025-05-27 DIAGNOSIS — H91.90 DECREASED HEARING, UNSPECIFIED LATERALITY: ICD-10-CM

## 2025-05-27 DIAGNOSIS — E78.5 DYSLIPIDEMIA: ICD-10-CM

## 2025-05-27 DIAGNOSIS — R06.02 SHORTNESS OF BREATH: ICD-10-CM

## 2025-05-27 DIAGNOSIS — G60.9 NEUROPATHY, IDIOPATHIC: ICD-10-CM

## 2025-05-27 DIAGNOSIS — I95.1 ORTHOSTATIC HYPOTENSION: ICD-10-CM

## 2025-05-27 DIAGNOSIS — R07.2 PRECORDIAL PAIN: ICD-10-CM

## 2025-05-27 DIAGNOSIS — G47.10 HYPERSOMNIA: ICD-10-CM

## 2025-05-27 DIAGNOSIS — S06.5XAA SUBDURAL HEMATOMA (HCC): ICD-10-CM

## 2025-05-27 DIAGNOSIS — F33.9 EPISODE OF RECURRENT MAJOR DEPRESSIVE DISORDER, UNSPECIFIED DEPRESSION EPISODE SEVERITY: ICD-10-CM

## 2025-05-27 DIAGNOSIS — N40.0 BENIGN PROSTATIC HYPERPLASIA WITHOUT LOWER URINARY TRACT SYMPTOMS: Primary | ICD-10-CM

## 2025-05-27 DIAGNOSIS — G47.33 OSA (OBSTRUCTIVE SLEEP APNEA): ICD-10-CM

## 2025-05-27 DIAGNOSIS — E66.9 OBESITY (BMI 30-39.9): ICD-10-CM

## 2025-05-27 DIAGNOSIS — I25.10 CAD IN NATIVE ARTERY: ICD-10-CM

## 2025-05-27 PROCEDURE — 1123F ACP DISCUSS/DSCN MKR DOCD: CPT | Performed by: INTERNAL MEDICINE

## 2025-05-27 PROCEDURE — 99214 OFFICE O/P EST MOD 30 MIN: CPT | Performed by: INTERNAL MEDICINE

## 2025-05-27 PROCEDURE — 1159F MED LIST DOCD IN RCRD: CPT | Performed by: INTERNAL MEDICINE

## 2025-05-27 PROCEDURE — G8417 CALC BMI ABV UP PARAM F/U: HCPCS | Performed by: INTERNAL MEDICINE

## 2025-05-27 PROCEDURE — 1036F TOBACCO NON-USER: CPT | Performed by: INTERNAL MEDICINE

## 2025-05-27 PROCEDURE — G8427 DOCREV CUR MEDS BY ELIG CLIN: HCPCS | Performed by: INTERNAL MEDICINE

## 2025-05-27 RX ORDER — METOPROLOL TARTRATE 25 MG/1
25 TABLET, FILM COATED ORAL 2 TIMES DAILY
Qty: 180 TABLET | Refills: 3 | Status: SHIPPED | OUTPATIENT
Start: 2025-05-27

## 2025-05-27 RX ORDER — PRAVASTATIN SODIUM 20 MG
20 TABLET ORAL DAILY
Qty: 90 TABLET | Refills: 3 | Status: SHIPPED | OUTPATIENT
Start: 2025-05-27

## 2025-05-27 RX ORDER — GABAPENTIN 300 MG/1
300 CAPSULE ORAL 2 TIMES DAILY
COMMUNITY

## 2025-05-27 RX ORDER — NITROGLYCERIN 0.4 MG/1
0.4 TABLET SUBLINGUAL EVERY 5 MIN PRN
Qty: 25 TABLET | Refills: 0 | Status: SHIPPED | OUTPATIENT
Start: 2025-05-27

## 2025-05-27 RX ORDER — CLOPIDOGREL BISULFATE 75 MG/1
75 TABLET ORAL DAILY
Qty: 90 TABLET | Refills: 3 | Status: SHIPPED | OUTPATIENT
Start: 2025-05-27

## 2025-05-27 NOTE — PATIENT INSTRUCTIONS
Thank you for allowing us to care for you today!   We want to ensure we can follow your treatment plan and we strive to give you the best outcomes and experience possible.   If you ever have a life threatening emergency and call 911 - for an ambulance (EMS)  REMEMBER  Our providers can only care for you at:   [facility] or [facility]   Even if you have someone take you or you drive yourself we can only care for you in a [facility] facility. Our providers are not setup at the other healthcare locations!    PLEASE CALL OUR OFFICE DURING NORMAL BUSINESS HOURS  Monday through Friday 8 am to 5 pm  AFTER HOURS the physician on-call cannot help with scheduling, rescheduling, procedure instruction questions or any type of medication need or issue.  [facility] P:[phone] - [facility] P:[phone] - [facility] P:[phone]      If you receive a survey:  We would appreciate you taking the time to share your experience concerning your provider visit in the office.    These surveys are confidential!  We are eager to improve and are counting on you to share your feedback so we can ensure you get the best care possible.

## 2025-05-27 NOTE — PROGRESS NOTES
CARDIOLOGY   NOTE    Chief Complaint: chest Pain / SOB     HPI:   Rohit is a 85 y.o. year old who has Past medical history as noted below.  Mr. Carrillo  feels he is tired a lot , he is sleeping good but not doing any exercise due to lack of motivation and neuropathy  Stopped ranexa and cut down on metoprolol has all helped   He is struggling with neuropathy affecting his hands and legs   MRI brain in July 2022 showed small SDH  Carotid are normal.   HE is using cpap for sleep apnea ,He gest sob when he climbs or walks up his drive way   He denies any chest pain but continues to have some shortness of breath .   HE is developing macular degeneration and worried he may not be able to drive long . He also has lot of neuropathy   Rohit  had PCI to LAd and Circ in Jan 2020 . He was having chest pain before cardiac cath  in spite of being on 2 anti anginal RX.  After PCI he says that  chest pain is resolved. 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  But after starting on metoprolol and repeating Holter by March 2020 PVC burden had come down to 2.4% denies any palpitations      He was actually seen by myself in June 2019 at that time he was having a lot of shortness of breath and chest pressure.  Stress test was abnormal is planned for cardiac cath he had lab work which revealed severe profound anemia he went to the hospital and was actually anemic.  He required multiple units of blood transfusion work-up revealed his bleeding from his colon he underwent colectomy by Dr. Pollack.   He has been tolerating antiplatelet okay since January 2020  Father had MI in his 50's . HE is retired from [employer] . He is on Plavix now    Current Outpatient

## 2025-05-27 NOTE — PROGRESS NOTES
CLINICAL STAFF DOCUMENTATION    Dr. Gallo Carrillo  1940  [id number]    Have you had any Chest Pain recently? - No    Have you had any Shortness of Breath - yes no worse  When did it begin? - Years   If Yes - When on exertion  How many flights of stairs can you go up without having SOB? -   Are you on Oxygen during the day or at night? - No cpap  How many liters of Oxygen are you on? -   How many pillows do you sleep with under your head? - 1    Have you had any dizziness - No  Have you had any palpitations recently? - No  Any thyroid issues? - No    Do you have any edema - swelling in No      Is the patient on any of the following medications -   If Yes DO EKG - Needs done every 3 months    When did you have your last labs drawn 2024  What doctor ordered   Do we have the labs in their chart Yes  If we do not have the labs, ask where they were drawn     If we do not have these labs, you are retrieve these labs for the provider!    Do you need any prescriptions refilled? - Yes    Do you have a surgery or procedure scheduled in the near future - No  Do use tobacco products? - No  Do you drink alcohol? - No  Do you use any illicit drugs? - No  Caffeine? - Yes  How much caffeine? .3  cups

## 2025-05-28 RX ORDER — VENLAFAXINE HYDROCHLORIDE 150 MG/1
CAPSULE, EXTENDED RELEASE ORAL
Qty: 90 CAPSULE | Refills: 1 | OUTPATIENT
Start: 2025-05-28

## 2025-06-07 DIAGNOSIS — F33.9 EPISODE OF RECURRENT MAJOR DEPRESSIVE DISORDER, UNSPECIFIED DEPRESSION EPISODE SEVERITY: ICD-10-CM

## 2025-06-09 RX ORDER — FINASTERIDE 5 MG/1
5 TABLET, FILM COATED ORAL DAILY
Qty: 90 TABLET | Refills: 1 | OUTPATIENT
Start: 2025-06-09

## 2025-06-09 RX ORDER — VENLAFAXINE HYDROCHLORIDE 150 MG/1
150 CAPSULE, EXTENDED RELEASE ORAL EVERY MORNING
Qty: 90 CAPSULE | Refills: 1 | OUTPATIENT
Start: 2025-06-09

## 2025-06-13 ENCOUNTER — PATIENT MESSAGE (OUTPATIENT)
Dept: FAMILY MEDICINE CLINIC | Age: 85
End: 2025-06-13

## 2025-06-13 DIAGNOSIS — F33.9 EPISODE OF RECURRENT MAJOR DEPRESSIVE DISORDER, UNSPECIFIED DEPRESSION EPISODE SEVERITY: ICD-10-CM

## 2025-06-13 RX ORDER — FINASTERIDE 5 MG/1
5 TABLET, FILM COATED ORAL DAILY
Qty: 90 TABLET | Refills: 1 | Status: SHIPPED | OUTPATIENT
Start: 2025-06-13

## 2025-06-13 RX ORDER — VENLAFAXINE HYDROCHLORIDE 150 MG/1
CAPSULE, EXTENDED RELEASE ORAL
Qty: 90 CAPSULE | Refills: 1 | Status: SHIPPED | OUTPATIENT
Start: 2025-06-13

## 2025-06-19 RX ORDER — VENLAFAXINE HYDROCHLORIDE 150 MG/1
150 CAPSULE, EXTENDED RELEASE ORAL DAILY
Qty: 30 CAPSULE | Refills: 0 | Status: SHIPPED | OUTPATIENT
Start: 2025-06-19 | End: 2025-07-19

## 2025-06-19 RX ORDER — FINASTERIDE 5 MG/1
5 TABLET, FILM COATED ORAL DAILY
Qty: 30 TABLET | Refills: 0 | Status: SHIPPED | OUTPATIENT
Start: 2025-06-19 | End: 2025-07-19

## 2025-07-14 DIAGNOSIS — F33.9 EPISODE OF RECURRENT MAJOR DEPRESSIVE DISORDER, UNSPECIFIED DEPRESSION EPISODE SEVERITY: ICD-10-CM

## 2025-07-15 RX ORDER — VENLAFAXINE HYDROCHLORIDE 150 MG/1
CAPSULE, EXTENDED RELEASE ORAL
Qty: 90 CAPSULE | Refills: 1 | Status: SHIPPED | OUTPATIENT
Start: 2025-07-15

## 2025-08-04 RX ORDER — METOPROLOL TARTRATE 25 MG/1
25 TABLET, FILM COATED ORAL 2 TIMES DAILY
Qty: 180 TABLET | Refills: 3 | Status: SHIPPED | OUTPATIENT
Start: 2025-08-04

## 2025-08-23 DIAGNOSIS — M10.9 GOUT, UNSPECIFIED CAUSE, UNSPECIFIED CHRONICITY, UNSPECIFIED SITE: ICD-10-CM

## 2025-08-25 RX ORDER — ALLOPURINOL 300 MG/1
300 TABLET ORAL DAILY
Qty: 90 TABLET | Refills: 1 | OUTPATIENT
Start: 2025-08-25

## 2025-09-03 DIAGNOSIS — M10.9 GOUT, UNSPECIFIED CAUSE, UNSPECIFIED CHRONICITY, UNSPECIFIED SITE: ICD-10-CM

## 2025-09-04 RX ORDER — ALLOPURINOL 300 MG/1
TABLET ORAL
Qty: 90 TABLET | Refills: 1 | Status: SHIPPED | OUTPATIENT
Start: 2025-09-04

## (undated) DEVICE — LINER,SEMI-RIGID,3000CC,50EA/CS: Brand: MEDLINE

## (undated) DEVICE — SEALER ENDOSCP NANO COAT OPN DIV CRV L JAW LIGASURE IMPACT

## (undated) DEVICE — MARKER,SKIN,WI/RULER AND LABELS: Brand: MEDLINE

## (undated) DEVICE — SUTURE VCRL SZ 3-0 L36IN ABSRB VLT SH L26MM 1/2 CIR DBL J527H

## (undated) DEVICE — GOWN,SIRUS,POLYRNF,BRTHSLV,XLN/XL,20/CS: Brand: MEDLINE

## (undated) DEVICE — STAPLER INT L75MM CUT LN L73MM STPL LN L77MM BLU B FRM 8

## (undated) DEVICE — Device

## (undated) DEVICE — CHLORAPREP 26ML ORANGE

## (undated) DEVICE — SUTURE VCRL SZ 3-0 L27IN ABSRB UD L26MM SH 1/2 CIR J416H

## (undated) DEVICE — TOTAL TRAY, DB, 100% SILI FOLEY, 16FR 10: Brand: MEDLINE

## (undated) DEVICE — STANDARD HYPODERMIC NEEDLE,POLYPROPYLENE HUB: Brand: MONOJECT

## (undated) DEVICE — TOWEL,OR,DSP,ST,BLUE,STD,6/PK,12PK/CS: Brand: MEDLINE

## (undated) DEVICE — RELOAD STPL L75MM OPN H3.8MM CLS 1.5MM WIRE DIA0.2MM REG

## (undated) DEVICE — SUTURE PERMAHAND SZ 2-0 L18IN NONABSORBABLE BLK L26MM SH C012D

## (undated) DEVICE — CORD ES L15FT PT RET REUSE VALLEYLAB REM

## (undated) DEVICE — PENCIL ES CRD L10FT HND SWCHING ROCK SWCH W/ EDGE COAT BLDE

## (undated) DEVICE — PACK,BASIC,IX: Brand: MEDLINE

## (undated) DEVICE — STERILE LATEX POWDER-FREE SURGICAL GLOVESWITH NITRILE COATING: Brand: PROTEXIS

## (undated) DEVICE — SHEET, T, LAPAROTOMY, STERILE: Brand: MEDLINE

## (undated) DEVICE — SUTURE PERMAHAND SZ 2-0 L17X18IN NONABSORBABLE BLK SILK SA65H

## (undated) DEVICE — COUNTER NDL 30 COUNT FOAM STRP SGL MAG

## (undated) DEVICE — DRAPE SHEET ULTRAGARD: Brand: MEDLINE

## (undated) DEVICE — DRESSING WND W4XL10IN UNIV ANTIMIC TELFA

## (undated) DEVICE — GLOVE SURG SZ 65 L12IN FNGR THK87MIL WHT LTX FREE

## (undated) DEVICE — LINER SUCT CANSTR 1500CC SEMI RIG W/ POR HYDROPHOBIC SHUT

## (undated) DEVICE — BLADE CLIPPER GEN PURP NS

## (undated) DEVICE — DUAL LUMEN STOMACH TUBE: Brand: SALEM SUMP

## (undated) DEVICE — SUTURE PERMAHAND SZ 2-0 L30IN 10X30IN TIE NONABSORBABLE BLK SA85H

## (undated) DEVICE — ANESTHESIA CIRCUIT ADULT-LF: Brand: MEDLINE INDUSTRIES, INC.

## (undated) DEVICE — SOLUTION IV IRRIG WATER 1000ML POUR BRL 2F7114

## (undated) DEVICE — SUTURE PROL SZ 1 L60IN NONABSORBABLE BLU L65MM TP-1 3/8 CIR 8824G

## (undated) DEVICE — INTENDED FOR TISSUE SEPARATION, AND OTHER PROCEDURES THAT REQUIRE A SHARP SURGICAL BLADE TO PUNCTURE OR CUT.: Brand: BARD-PARKER ® STAINLESS STEEL BLADES

## (undated) DEVICE — SPONGE LAP W18XL18IN WHT COT 4 PLY FLD STRUNG RADPQ DISP ST

## (undated) DEVICE — YANKAUER,FLEXIBLE HANDLE,REGLR CAPACITY: Brand: MEDLINE INDUSTRIES, INC.

## (undated) DEVICE — GAUZE,SPONGE,4"X4",16PLY,XRAY,STRL,LF: Brand: MEDLINE

## (undated) DEVICE — TUBING, SUCTION, 3/16" X 10', STRAIGHT: Brand: MEDLINE

## (undated) DEVICE — SUTURE PERMAHAND SZ 3-0 L18IN NONABSORBABLE BLK L26MM SH C013D

## (undated) DEVICE — Z DISCONTINUED (USE MFG CAT MVABO)  TUBING GAS SAMPLING STD 6.5 FT FEMALE CONN SMRT CAPNOLINE

## (undated) DEVICE — 20 ML SYRINGE LUER-LOCK TIP: Brand: MONOJECT

## (undated) DEVICE — ELECTRODE ES AD CRDLSS PT RET REM POLYHESIVE